# Patient Record
Sex: MALE | Race: WHITE | NOT HISPANIC OR LATINO | Employment: OTHER | URBAN - METROPOLITAN AREA
[De-identification: names, ages, dates, MRNs, and addresses within clinical notes are randomized per-mention and may not be internally consistent; named-entity substitution may affect disease eponyms.]

---

## 2018-04-23 ENCOUNTER — OFFICE VISIT CONVERTED (OUTPATIENT)
Dept: ORTHOPEDIC SURGERY | Facility: CLINIC | Age: 68
End: 2018-04-23
Attending: ORTHOPAEDIC SURGERY

## 2018-06-25 ENCOUNTER — OFFICE VISIT CONVERTED (OUTPATIENT)
Dept: ORTHOPEDIC SURGERY | Facility: CLINIC | Age: 68
End: 2018-06-25
Attending: PHYSICIAN ASSISTANT

## 2018-06-25 ENCOUNTER — CONVERSION ENCOUNTER (OUTPATIENT)
Dept: ORTHOPEDIC SURGERY | Facility: CLINIC | Age: 68
End: 2018-06-25

## 2019-06-04 ENCOUNTER — OFFICE VISIT (OUTPATIENT)
Dept: ENDOCRINOLOGY | Age: 69
End: 2019-06-04

## 2019-06-04 VITALS
HEIGHT: 72 IN | BODY MASS INDEX: 35.49 KG/M2 | SYSTOLIC BLOOD PRESSURE: 124 MMHG | WEIGHT: 262 LBS | DIASTOLIC BLOOD PRESSURE: 78 MMHG

## 2019-06-04 DIAGNOSIS — Z46.81 COUNSELING FOR INSULIN PUMP: ICD-10-CM

## 2019-06-04 DIAGNOSIS — Z46.81 INSULIN PUMP TITRATION: ICD-10-CM

## 2019-06-04 DIAGNOSIS — E78.2 MIXED HYPERLIPIDEMIA: ICD-10-CM

## 2019-06-04 DIAGNOSIS — I10 ESSENTIAL HYPERTENSION: ICD-10-CM

## 2019-06-04 DIAGNOSIS — E67.3 HYPERVITAMINOSIS D: ICD-10-CM

## 2019-06-04 DIAGNOSIS — IMO0002 DIABETES MELLITUS TYPE 2, UNCONTROLLED, WITH COMPLICATIONS: Primary | ICD-10-CM

## 2019-06-04 DIAGNOSIS — Z79.4 LONG-TERM INSULIN USE (HCC): ICD-10-CM

## 2019-06-04 PROCEDURE — 99214 OFFICE O/P EST MOD 30 MIN: CPT | Performed by: NURSE PRACTITIONER

## 2019-06-04 RX ORDER — METFORMIN HYDROCHLORIDE 500 MG/1
1000 TABLET, EXTENDED RELEASE ORAL 2 TIMES DAILY
Refills: 1 | COMMUNITY
Start: 2019-05-06 | End: 2022-03-31

## 2019-06-04 RX ORDER — CETIRIZINE HYDROCHLORIDE 10 MG/1
10 TABLET ORAL DAILY
COMMUNITY

## 2019-06-04 RX ORDER — VENLAFAXINE HYDROCHLORIDE 150 MG/1
150 CAPSULE, EXTENDED RELEASE ORAL DAILY
Refills: 1 | COMMUNITY
Start: 2019-04-06 | End: 2022-03-31

## 2019-06-04 RX ORDER — CARVEDILOL 12.5 MG/1
12.5 TABLET ORAL 2 TIMES DAILY
Refills: 1 | COMMUNITY
Start: 2019-04-06 | End: 2022-03-31

## 2019-06-04 RX ORDER — PERPHENAZINE 16 MG/1
1 TABLET, FILM COATED ORAL
Refills: 3 | COMMUNITY
Start: 2019-04-30 | End: 2019-07-16 | Stop reason: SDUPTHER

## 2019-06-04 RX ORDER — ASPIRIN 81 MG/1
81 TABLET, CHEWABLE ORAL DAILY
COMMUNITY
End: 2022-03-31

## 2019-06-04 RX ORDER — SIMVASTATIN 20 MG
20 TABLET ORAL DAILY
Refills: 1 | COMMUNITY
Start: 2019-04-06 | End: 2020-01-16 | Stop reason: SDUPTHER

## 2019-06-04 RX ORDER — AMLODIPINE BESYLATE AND BENAZEPRIL HYDROCHLORIDE 10; 20 MG/1; MG/1
1 CAPSULE ORAL DAILY
Refills: 1 | COMMUNITY
Start: 2019-05-06 | End: 2022-07-25

## 2019-06-04 NOTE — PROGRESS NOTES
Puma Baumann who presents for consult per the request of Provider, No Known  evaluation and treatment of Type 2 diabetes mellitus insulin dependent.      The initial diagnosis of diabetes was made 26 years ago. The condition of diabetes location is system with the course being clinical course has fluctuated , the severity is Moderate , and the modifying/allievating factors are  insulin pump. Insulin dosage review with Puma Baumann suggested compliance most of the time   .       The patient reports associated symptoms of hyperglycemia have been none and associated symptoms of hypoglycemia have been jitteriness and sweating, with their hypoglycemia threshold for symptoms is 80 mg/dl .       The patient is currently taking home blood tests - Blood glucose testin times daily, that are:  fasting- 1st thing in morning before eating or drinking  before each meal  bedtime  anytime you feel symptoms of hyperglycemia or hypoglycemia (high or low blood sugars)  Novolog U100 per medtronic 530g insulin pump. Changes pump site every 2.5 days.      Compliance with blood glucose monitoring: good.     Exercise:intermittently The patient is using carbohydrate counting, but is not on a specified limit, being a pump user.    Home blood glucose testing daily: 4  insulin pump upload   -Yes see upload from 2019, patient presently on paradigm revel 723    Patterns reported per patient are none.      The following portions of the patient's history were reviewed and updated as appropriate: current medications, past family history, past medical history, past social history, past surgical history and problem list.        Medical records, chart, and labs reviewed from primary care provider.    Past Surgical History:   Procedure Laterality Date   • KNEE SURGERY     • TRIGGER FINGER RELEASE         Family History   Problem Relation Age of Onset   • Diabetes Mother    • Diabetes Maternal Grandmother          Current Outpatient Medications:  "  •  amLODIPine-benazepril (LOTREL) 10-20 MG per capsule, Take 1 capsule by mouth Daily., Disp: , Rfl: 1  •  aspirin 81 MG chewable tablet, Chew 81 mg Daily., Disp: , Rfl:   •  carvedilol (COREG) 12.5 MG tablet, Take 12.5 mg by mouth 2 (Two) Times a Day., Disp: , Rfl: 1  •  cetirizine (zyrTEC) 10 MG tablet, Take 10 mg by mouth Daily., Disp: , Rfl:   •  CONTOUR NEXT TEST test strip, 1 each by Other route 5 (Five) Times a Day. test blood sugar 5 times daily, Disp: , Rfl: 3  •  lysine 500 MG tablet, Take  by mouth Daily., Disp: , Rfl:   •  metFORMIN ER (GLUCOPHAGE-XR) 500 MG 24 hr tablet, Take 1,000 mg by mouth 2 (Two) Times a Day., Disp: , Rfl: 1  •  Multiple Vitamins-Minerals (CENTRUM ADULTS PO), Take  by mouth., Disp: , Rfl:   •  Omega-3 Fatty Acids (OMEGA-3 CF PO), Take  by mouth., Disp: , Rfl:   •  simvastatin (ZOCOR) 20 MG tablet, Take 20 mg by mouth Daily., Disp: , Rfl: 1  •  venlafaxine XR (EFFEXOR-XR) 150 MG 24 hr capsule, Take 150 mg by mouth Daily., Disp: , Rfl: 1    Allergies   Allergen Reactions   • Codeine Nausea Only       Patient Active Problem List    Diagnosis   • Diabetes mellitus type 2, uncontrolled, with complications (CMS/[facility]) [E11.8, E11.65]   • Essential hypertension [I10]   • Mixed hyperlipidemia [E78.2]   • Long-term insulin use (CMS/[facility]) [Z79.4]   • Counseling for insulin pump [Z46.81]   • Insulin pump titration [Z46.81]   • Hypervitaminosis D  [E67.3]       Review of Systems  A comprehensive review of the 14 systems was negative except of listed below:  Endocrine: hyperglycemia      Objective:     Wt Readings from Last 3 Encounters:   06/04/19 119 kg (262 lb)     Temp Readings from Last 3 Encounters:   No data found for Temp     BP Readings from Last 3 Encounters:   06/04/19 124/78     Pulse Readings from Last 3 Encounters:   No data found for Pulse         /78   Ht 182.9 cm (72\")   Wt 119 kg (262 lb)   BMI 35.53 kg/m²     Physical Exam   Constitutional: He is oriented to person, " place, and time. He appears well-developed and well-nourished. No distress.   HENT:   Head: Normocephalic and atraumatic.   Eyes: EOM are normal. Pupils are equal, round, and reactive to light.   Neck: Normal range of motion. Neck supple. No thyromegaly present.   Cardiovascular: Normal rate, regular rhythm, normal heart sounds and intact distal pulses.   No murmur heard.  Pulmonary/Chest: Effort normal and breath sounds normal.   Abdominal: Soft. Bowel sounds are normal.   Musculoskeletal: Normal range of motion.   Neurological: He is alert and oriented to person, place, and time.   Skin: Skin is warm and dry. Capillary refill takes 2 to 3 seconds. He is not diaphoretic.   Psychiatric: He has a normal mood and affect. His behavior is normal. Judgment and thought content normal.   Nursing note and vitals reviewed.        Lab Review  No results found for this or any previous visit.      Assessment:   Puma was seen today for diabetes.    Diagnoses and all orders for this visit:    Diabetes mellitus type 2, uncontrolled, with complications (CMS/[facility])  -     Cortisol  -     Follicle Stimulating Hormone  -     Luteinizing Hormone  -     Testosterone  -     Testosterone, Free, Total  -     Comprehensive Metabolic Panel  -     C-Peptide  -     Hemoglobin A1c  -     Insulin, Total  -     Lipid Panel  -     Microalbumin / Creatinine Urine Ratio - Urine, Clean Catch  -     Uric Acid  -     Vitamin D 25 Hydroxy  -     TSH  -     T4, Free  -     Thyroid Antibodies  -     T3, Free  -     Fructosamine; Future  -     Comprehensive Metabolic Panel; Future  -     Hemoglobin A1c; Future  -     Lipid Panel; Future  -     Microalbumin / Creatinine Urine Ratio - Urine, Clean Catch; Future  -     Uric Acid; Future  -     Vitamin D 25 Hydroxy; Future  -     TSH; Future  -     T4, Free; Future  -     Thyroid Antibodies; Future  -     T3, Free; Future    Essential hypertension  -     Comprehensive Metabolic Panel  -     Comprehensive  Metabolic Panel; Future    Mixed hyperlipidemia  -     Comprehensive Metabolic Panel  -     Lipid Panel  -     Comprehensive Metabolic Panel; Future  -     Lipid Panel; Future    Long-term insulin use (CMS/HCC)  -     Comprehensive Metabolic Panel  -     Comprehensive Metabolic Panel; Future    Counseling for insulin pump  -     Comprehensive Metabolic Panel  -     Comprehensive Metabolic Panel; Future    Insulin pump titration  -     Comprehensive Metabolic Panel  -     Comprehensive Metabolic Panel; Future    Hypervitaminosis D   -     Vitamin D 25 Hydroxy  -     Comprehensive Metabolic Panel; Future  -     Vitamin D 25 Hydroxy; Future         Plan:    Summary/Medication changes:   Puma Baumann who presents for consult per the request of Provider, No Known  evaluation and treatment of Type 2 diabetes mellitus insulin dependent.  The initial diagnosis of diabetes was made 26 years ago with a general physical with PCP. The condition of diabetes  has fluctuated over the years by lab results, increase of insulin resistance and treatment options needed.  The present treatment is Medtronic Revel 723 insulin pump with oral medication metformin. The insulin dosage review with Puma Baumann suggested compliance most of the time.  Per wife at chair side in interview with patient questionable on whether patient is compliant with carbohydrate counting and inputting carbs into the pump as needed when he eats.  Blood glucose readings per evaluation of insulin pump shows that 81% of the time he is above target there is 0% below target average blood glucose is 223 mg/Magy plus or -60 mg/Magy. The patient reports associated symptoms of hyperglycemia have been none and associated symptoms of hypoglycemia have been jitteriness and sweating, with their hypoglycemia threshold for symptoms is 80 mg/dl . The   patient's history were reviewed and updated as appropriate: current medications, past family history, past medical history, past  social history, past surgical history and problem list.  Medical records, chart, and labs reviewed from primary care provider.  At this time additional lab work will be obtained and treat as indicated with the results.    1.  Diagnoses and all orders for this visit:    Diabetes mellitus type 2, uncontrolled, with complications (CMS/[facility])Long-term insulin use (CMS/[facility]), Counseling for insulin pump, and Insulin pump titration  Insulin pump changes today were as follows  Basal changes  12 AM- 3.5 units/h  4 AM-4.5 units/h  10 AM-3.5 units/h  Will change to you 200 and go back to the original settings at 12 AM- 3.1 units/h, 4 AM-4.1 units/h  10 AM-3.2 units/h    Carb ratio 1 unit for every 2.5 g    Insulin sensitivity 1 unit for 12 g    Target ranges to be changed to 100 320 mg/Magy    Insulin curve at 3 hours    Note: A Dexcom sensor will be ordered on this patient.  We will contact prior diabetologist NP for all lab work also contact [employer] for 30 days of supplies and lab work in their system.  Possible upgrade to Dexcom and tandem       Essential hypertension- chronic, stable no medication changes at this time. Refills prescribed. Future labs ordered for upcoming appointment and assessment.      Mixed hyperlipidemia- chronic, stable no medication changes at this time. Refills prescribed. Future labs ordered for upcoming appointment and assessment.     Additional instructions:   home blood tests -  Blood glucose testing: 3 times daily, that are:  fasting- 1st thing in morning before eating or drinking  before each meal and 1 or 2 hours after meal  bedtime  anytime you feel symptoms of hyperglycemia or hypoglycemia (high or low blood sugars)        Education:  interpretation of lab results, blood sugar goals, complications of diabetes mellitus, hypoglycemia prevention and treatment, exercise, illness management, self-monitoring of blood glucose skills, nutrition, carbohydrate counting and site rotation        Return  in about 3 months (around 9/4/2019), or if symptoms worsen or fail to improve, for Recheck. 3 months with Bettie-2 weeks prior for labs         Dragon transcription disclaimer     Much of this encounter note is an electronic transcription/translation of spoken language to printed text. The electronic translation of spoken language may permit erroneous, or at times, nonsensical words or phrases to be inadvertently transcribed. Although I have reviewed the note for such errors, some may still exist.

## 2019-06-05 PROBLEM — Z79.4 LONG-TERM INSULIN USE: Status: ACTIVE | Noted: 2019-06-05

## 2019-06-05 PROBLEM — I10 ESSENTIAL HYPERTENSION: Status: ACTIVE | Noted: 2019-06-05

## 2019-06-05 PROBLEM — Z46.81 INSULIN PUMP TITRATION: Status: ACTIVE | Noted: 2019-06-05

## 2019-06-05 PROBLEM — E78.2 MIXED HYPERLIPIDEMIA: Status: ACTIVE | Noted: 2019-06-05

## 2019-06-05 PROBLEM — IMO0002 DIABETES MELLITUS TYPE 2, UNCONTROLLED, WITH COMPLICATIONS: Status: ACTIVE | Noted: 2019-06-05

## 2019-06-05 PROBLEM — Z46.81 COUNSELING FOR INSULIN PUMP: Status: ACTIVE | Noted: 2019-06-05

## 2019-06-05 PROBLEM — E67.3 HYPERVITAMINOSIS D: Status: ACTIVE | Noted: 2019-06-05

## 2019-06-06 DIAGNOSIS — E78.2 MIXED HYPERLIPIDEMIA: Primary | ICD-10-CM

## 2019-06-06 LAB
25(OH)D3+25(OH)D2 SERPL-MCNC: 36 NG/ML (ref 30–100)
ALBUMIN SERPL-MCNC: 4.9 G/DL (ref 3.5–5.2)
ALBUMIN/CREAT UR: 11.6 MG/G CREAT (ref 0–30)
ALBUMIN/GLOB SERPL: 2.3 G/DL
ALP SERPL-CCNC: 97 U/L (ref 39–117)
ALT SERPL-CCNC: 16 U/L (ref 1–41)
AST SERPL-CCNC: 17 U/L (ref 1–40)
BILIRUB SERPL-MCNC: 0.7 MG/DL (ref 0.2–1.2)
BUN SERPL-MCNC: 14 MG/DL (ref 8–23)
BUN/CREAT SERPL: 16.7 (ref 7–25)
C PEPTIDE SERPL-MCNC: 0.9 NG/ML (ref 1.1–4.4)
CALCIUM SERPL-MCNC: 10.1 MG/DL (ref 8.6–10.5)
CHLORIDE SERPL-SCNC: 102 MMOL/L (ref 98–107)
CHOLEST SERPL-MCNC: 190 MG/DL (ref 0–200)
CO2 SERPL-SCNC: 25.8 MMOL/L (ref 22–29)
CORTIS SERPL-MCNC: 6.7 UG/DL
CREAT SERPL-MCNC: 0.84 MG/DL (ref 0.76–1.27)
CREAT UR-MCNC: 153.4 MG/DL
FSH SERPL-ACNC: 5.7 MIU/ML (ref 1.5–12.4)
GLOBULIN SER CALC-MCNC: 2.1 GM/DL
GLUCOSE SERPL-MCNC: 142 MG/DL (ref 65–99)
HBA1C MFR BLD: 7.6 % (ref 4.8–5.6)
HDLC SERPL-MCNC: 34 MG/DL (ref 40–60)
INSULIN SERPL-ACNC: 1.8 UIU/ML (ref 2.6–24.9)
INTERPRETATION: NORMAL
LDLC SERPL CALC-MCNC: 84 MG/DL (ref 0–100)
LH SERPL-ACNC: 5.7 MIU/ML (ref 1.7–8.6)
Lab: NORMAL
MICROALBUMIN UR-MCNC: 17.8 UG/ML
POTASSIUM SERPL-SCNC: 4.2 MMOL/L (ref 3.5–5.2)
PROT SERPL-MCNC: 7 G/DL (ref 6–8.5)
SODIUM SERPL-SCNC: 142 MMOL/L (ref 136–145)
T3FREE SERPL-MCNC: 2.9 PG/ML (ref 2–4.4)
T4 FREE SERPL-MCNC: 1.02 NG/DL (ref 0.93–1.7)
TESTOST FREE SERPL-MCNC: 4.6 PG/ML (ref 6.6–18.1)
TESTOST SERPL-MCNC: 301 NG/DL (ref 264–916)
THYROGLOB AB SERPL-ACNC: <1 IU/ML (ref 0–0.9)
THYROPEROXIDASE AB SERPL-ACNC: 11 IU/ML (ref 0–34)
TRIGL SERPL-MCNC: 362 MG/DL (ref 0–150)
TSH SERPL DL<=0.005 MIU/L-ACNC: 1.57 MIU/ML (ref 0.27–4.2)
URATE SERPL-MCNC: 5.2 MG/DL (ref 3.4–7)
VLDLC SERPL CALC-MCNC: 72.4 MG/DL

## 2019-06-06 RX ORDER — OMEGA-3-ACID ETHYL ESTERS 1 G/1
2 CAPSULE, LIQUID FILLED ORAL 2 TIMES DAILY
Qty: 180 CAPSULE | Refills: 1 | Status: SHIPPED | OUTPATIENT
Start: 2019-06-06 | End: 2019-07-16 | Stop reason: SDUPTHER

## 2019-06-26 ENCOUNTER — PRIOR AUTHORIZATION (OUTPATIENT)
Dept: ENDOCRINOLOGY | Age: 69
End: 2019-06-26

## 2019-07-16 DIAGNOSIS — E78.2 MIXED HYPERLIPIDEMIA: ICD-10-CM

## 2019-07-16 DIAGNOSIS — IMO0002 DIABETES MELLITUS TYPE 2, UNCONTROLLED, WITH COMPLICATIONS: Primary | ICD-10-CM

## 2019-07-16 RX ORDER — PERPHENAZINE 16 MG/1
1 TABLET, FILM COATED ORAL
Qty: 600 EACH | Refills: 4 | Status: SHIPPED | OUTPATIENT
Start: 2019-07-16 | End: 2019-07-17 | Stop reason: SDUPTHER

## 2019-07-16 RX ORDER — LANCETS 30 GAUGE
EACH MISCELLANEOUS
Qty: 600 EACH | Refills: 4 | Status: SHIPPED | OUTPATIENT
Start: 2019-07-16 | End: 2019-07-17 | Stop reason: SDUPTHER

## 2019-07-16 RX ORDER — OMEGA-3-ACID ETHYL ESTERS 1 G/1
2 CAPSULE, LIQUID FILLED ORAL 2 TIMES DAILY
Qty: 180 CAPSULE | Refills: 2 | Status: SHIPPED | OUTPATIENT
Start: 2019-07-16

## 2019-07-17 DIAGNOSIS — IMO0002 DIABETES MELLITUS TYPE 2, UNCONTROLLED, WITH COMPLICATIONS: ICD-10-CM

## 2019-07-17 RX ORDER — PERPHENAZINE 16 MG/1
TABLET, FILM COATED ORAL
Qty: 500 EACH | Refills: 1 | Status: SHIPPED | OUTPATIENT
Start: 2019-07-17 | End: 2019-09-26 | Stop reason: SDUPTHER

## 2019-07-17 RX ORDER — LANCETS 30 GAUGE
EACH MISCELLANEOUS
Qty: 500 EACH | Refills: 1 | Status: SHIPPED | OUTPATIENT
Start: 2019-07-17

## 2019-09-04 LAB
25(OH)D3+25(OH)D2 SERPL-MCNC: 37.9 NG/ML (ref 30–100)
ALBUMIN SERPL-MCNC: 4.8 G/DL (ref 3.5–5.2)
ALBUMIN/GLOB SERPL: 2.7 G/DL
ALP SERPL-CCNC: 85 U/L (ref 39–117)
ALT SERPL-CCNC: 16 U/L (ref 1–41)
AST SERPL-CCNC: 16 U/L (ref 1–40)
BILIRUB SERPL-MCNC: 0.8 MG/DL (ref 0.2–1.2)
BUN SERPL-MCNC: 12 MG/DL (ref 8–23)
BUN/CREAT SERPL: 13 (ref 7–25)
CALCIUM SERPL-MCNC: 9.3 MG/DL (ref 8.6–10.5)
CHLORIDE SERPL-SCNC: 102 MMOL/L (ref 98–107)
CHOLEST SERPL-MCNC: 159 MG/DL (ref 0–200)
CO2 SERPL-SCNC: 27.1 MMOL/L (ref 22–29)
CREAT SERPL-MCNC: 0.92 MG/DL (ref 0.76–1.27)
FRUCTOSAMINE SERPL-SCNC: 309 UMOL/L (ref 0–285)
GLOBULIN SER CALC-MCNC: 1.8 GM/DL
GLUCOSE SERPL-MCNC: 98 MG/DL (ref 65–99)
HBA1C MFR BLD: 6.8 % (ref 4.8–5.6)
HDLC SERPL-MCNC: 33 MG/DL (ref 40–60)
INTERPRETATION: NORMAL
LDLC SERPL CALC-MCNC: 81 MG/DL (ref 0–100)
Lab: NORMAL
POTASSIUM SERPL-SCNC: 3.8 MMOL/L (ref 3.5–5.2)
PROT SERPL-MCNC: 6.6 G/DL (ref 6–8.5)
SODIUM SERPL-SCNC: 143 MMOL/L (ref 136–145)
T3FREE SERPL-MCNC: 2.7 PG/ML (ref 2–4.4)
T4 FREE SERPL-MCNC: 1.11 NG/DL (ref 0.93–1.7)
THYROGLOB AB SERPL-ACNC: <1 IU/ML (ref 0–0.9)
THYROPEROXIDASE AB SERPL-ACNC: 9 IU/ML (ref 0–34)
TRIGL SERPL-MCNC: 226 MG/DL (ref 0–150)
TSH SERPL DL<=0.005 MIU/L-ACNC: 0.88 UIU/ML (ref 0.27–4.2)
UNABLE TO VOID: NORMAL
URATE SERPL-MCNC: 5.8 MG/DL (ref 3.4–7)
VLDLC SERPL CALC-MCNC: 45.2 MG/DL

## 2019-09-05 ENCOUNTER — RESULTS ENCOUNTER (OUTPATIENT)
Dept: ENDOCRINOLOGY | Age: 69
End: 2019-09-05

## 2019-09-05 DIAGNOSIS — Z79.4 LONG-TERM INSULIN USE (HCC): ICD-10-CM

## 2019-09-05 DIAGNOSIS — Z46.81 INSULIN PUMP TITRATION: ICD-10-CM

## 2019-09-05 DIAGNOSIS — E78.2 MIXED HYPERLIPIDEMIA: ICD-10-CM

## 2019-09-05 DIAGNOSIS — Z46.81 COUNSELING FOR INSULIN PUMP: ICD-10-CM

## 2019-09-05 DIAGNOSIS — E67.3 HYPERVITAMINOSIS D: ICD-10-CM

## 2019-09-05 DIAGNOSIS — I10 ESSENTIAL HYPERTENSION: ICD-10-CM

## 2019-09-05 DIAGNOSIS — IMO0002 DIABETES MELLITUS TYPE 2, UNCONTROLLED, WITH COMPLICATIONS: ICD-10-CM

## 2019-09-11 ENCOUNTER — TELEPHONE (OUTPATIENT)
Dept: ENDOCRINOLOGY | Age: 69
End: 2019-09-11

## 2019-09-11 NOTE — TELEPHONE ENCOUNTER
Jozef with [facility] ph. [phone], opt 2, fax [phone] asking to confirm that our office received  their request on 9-6-19 for chart notes and C-peptide and glucose lab results.

## 2019-09-11 NOTE — TELEPHONE ENCOUNTER
humalog    [facility] pharmacy sent over request stating they need script needs to state it is under part b so the medication is covered

## 2019-09-16 ENCOUNTER — TELEPHONE (OUTPATIENT)
Dept: ENDOCRINOLOGY | Age: 69
End: 2019-09-16

## 2019-09-16 NOTE — TELEPHONE ENCOUNTER
Patient states [employer] is requesting information about his labs to receive his supplies    [employer] has requested a few times    Patient wants to know if you are receiving the fax    Patient is requesting a return call  [phone]

## 2019-09-16 NOTE — TELEPHONE ENCOUNTER
Spoke with Tre at [email or web address]. He states that he could not locate the patient's chart notes and labs needed to process the order that was sent on 9/11/19. He then asked me to refax the paperwork to [phone]. I sent the fax to the above number as requested.

## 2019-09-17 DIAGNOSIS — Z79.4 LONG-TERM INSULIN USE (HCC): ICD-10-CM

## 2019-09-17 DIAGNOSIS — IMO0002 DIABETES MELLITUS TYPE 2, UNCONTROLLED, WITH COMPLICATIONS: Primary | ICD-10-CM

## 2019-09-18 ENCOUNTER — RESULTS ENCOUNTER (OUTPATIENT)
Dept: ENDOCRINOLOGY | Age: 69
End: 2019-09-18

## 2019-09-18 DIAGNOSIS — Z79.4 LONG-TERM INSULIN USE (HCC): ICD-10-CM

## 2019-09-18 DIAGNOSIS — IMO0002 DIABETES MELLITUS TYPE 2, UNCONTROLLED, WITH COMPLICATIONS: ICD-10-CM

## 2019-09-26 ENCOUNTER — OFFICE VISIT (OUTPATIENT)
Dept: ENDOCRINOLOGY | Age: 69
End: 2019-09-26

## 2019-09-26 VITALS
HEIGHT: 72 IN | BODY MASS INDEX: 36.16 KG/M2 | WEIGHT: 267 LBS | DIASTOLIC BLOOD PRESSURE: 74 MMHG | SYSTOLIC BLOOD PRESSURE: 132 MMHG

## 2019-09-26 DIAGNOSIS — IMO0002 DIABETES MELLITUS TYPE 2, UNCONTROLLED, WITH COMPLICATIONS: Primary | ICD-10-CM

## 2019-09-26 DIAGNOSIS — Z46.81 INSULIN PUMP TITRATION: ICD-10-CM

## 2019-09-26 DIAGNOSIS — E67.3 HYPERVITAMINOSIS D: ICD-10-CM

## 2019-09-26 DIAGNOSIS — E78.2 MIXED HYPERLIPIDEMIA: ICD-10-CM

## 2019-09-26 DIAGNOSIS — I10 ESSENTIAL HYPERTENSION: ICD-10-CM

## 2019-09-26 DIAGNOSIS — Z79.4 LONG-TERM INSULIN USE (HCC): ICD-10-CM

## 2019-09-26 DIAGNOSIS — Z46.81 COUNSELING FOR INSULIN PUMP: ICD-10-CM

## 2019-09-26 PROCEDURE — 99214 OFFICE O/P EST MOD 30 MIN: CPT | Performed by: NURSE PRACTITIONER

## 2019-09-26 RX ORDER — PERPHENAZINE 16 MG/1
TABLET, FILM COATED ORAL
Qty: 500 EACH | Refills: 1 | Status: SHIPPED | OUTPATIENT
Start: 2019-09-26 | End: 2019-11-04

## 2019-10-03 ENCOUNTER — TELEPHONE (OUTPATIENT)
Dept: ENDOCRINOLOGY | Age: 69
End: 2019-10-03

## 2019-10-03 NOTE — TELEPHONE ENCOUNTER
[facility] called looking for chart notes    I dont believe the patient was seen in 2017  They do need the last two chart notes    Also needs the cpeptide and the glucose faxed to them      Patient is out of the pump supplies and have been trying to get them

## 2019-10-03 NOTE — TELEPHONE ENCOUNTER
Insulin pump     Request for chart notes  Nov 2017 is needed and the last two previous   cpeptide and glucose on the 30th was requested    Needs the prescripton for the pump [facility]

## 2019-10-21 ENCOUNTER — TELEPHONE (OUTPATIENT)
Dept: ENDOCRINOLOGY | Age: 69
End: 2019-10-21

## 2019-10-21 NOTE — TELEPHONE ENCOUNTER
PT WANTS A SOONER LETICIA WITH [facility] PT SAID SHE WAS SWITCHING HIS INSULIN 500 UNITS PT SAID SHE NEEDS TO CHANGE INSULIN IN HIS PUMP.

## 2019-10-22 NOTE — TELEPHONE ENCOUNTER
Gave info to Margoth Gregg with [employer] to schedule an appointment with patient for pump consult.

## 2019-11-04 RX ORDER — PERPHENAZINE 16 MG/1
TABLET, FILM COATED ORAL
Qty: 200 EACH | Refills: 5 | Status: SHIPPED | OUTPATIENT
Start: 2019-11-04 | End: 2019-11-14 | Stop reason: SDUPTHER

## 2019-11-12 ENCOUNTER — TELEPHONE (OUTPATIENT)
Dept: ENDOCRINOLOGY | Age: 69
End: 2019-11-12

## 2019-11-12 NOTE — TELEPHONE ENCOUNTER
Wife Irma [phone] said patient is getting close to being out of Contour Next test strips, script is for 6 xday. Patient is testing 4xday.   [facility] sent fax this morning with what is needed from Medicare to get filled.

## 2019-11-14 RX ORDER — PERPHENAZINE 16 MG/1
TABLET, FILM COATED ORAL
Qty: 200 EACH | Refills: 5 | Status: SHIPPED | OUTPATIENT
Start: 2019-11-14

## 2019-11-14 RX ORDER — PERPHENAZINE 16 MG/1
TABLET, FILM COATED ORAL
Qty: 200 EACH | Refills: 5 | Status: CANCELLED | OUTPATIENT
Start: 2019-11-14

## 2019-11-14 NOTE — TELEPHONE ENCOUNTER
Spoke with [facility] pharmacy. They informed me that patient switched prescriptions to [facility].     LM for patient to call back.

## 2020-01-03 ENCOUNTER — RESULTS ENCOUNTER (OUTPATIENT)
Dept: ENDOCRINOLOGY | Age: 70
End: 2020-01-03

## 2020-01-03 DIAGNOSIS — I10 ESSENTIAL HYPERTENSION: ICD-10-CM

## 2020-01-03 DIAGNOSIS — Z79.4 LONG-TERM INSULIN USE (HCC): ICD-10-CM

## 2020-01-03 DIAGNOSIS — Z46.81 COUNSELING FOR INSULIN PUMP: ICD-10-CM

## 2020-01-03 DIAGNOSIS — E67.3 HYPERVITAMINOSIS D: ICD-10-CM

## 2020-01-03 DIAGNOSIS — IMO0002 DIABETES MELLITUS TYPE 2, UNCONTROLLED, WITH COMPLICATIONS: ICD-10-CM

## 2020-01-03 DIAGNOSIS — Z46.81 INSULIN PUMP TITRATION: ICD-10-CM

## 2020-01-03 DIAGNOSIS — E78.2 MIXED HYPERLIPIDEMIA: ICD-10-CM

## 2020-01-07 ENCOUNTER — TELEPHONE (OUTPATIENT)
Dept: ENDOCRINOLOGY | Age: 70
End: 2020-01-07

## 2020-01-08 ENCOUNTER — TELEPHONE (OUTPATIENT)
Dept: ENDOCRINOLOGY | Age: 70
End: 2020-01-08

## 2020-01-08 NOTE — TELEPHONE ENCOUNTER
This patient has seen Btetie once. No other provider yet in the office. Could you ask Dr. Kaufman to review to possibly take on as a patient?

## 2020-01-08 NOTE — TELEPHONE ENCOUNTER
Pastor is a karime pt and needing to r/s with a diff doc either yazmin or evan pt said doesn't matter just needs to be seen.       [phone]

## 2020-01-09 ENCOUNTER — TELEPHONE (OUTPATIENT)
Dept: ENDOCRINOLOGY | Age: 70
End: 2020-01-09

## 2020-01-09 NOTE — TELEPHONE ENCOUNTER
Pre Dr Kaufman At this time we are waiting on the POC meeting to make a decision on this.  At this time I am not accepting any patients as follow-ups.

## 2020-01-09 NOTE — TELEPHONE ENCOUNTER
At this time we are waiting on the POC meeting to make a decision on this.  At this time I am not accepting any patients as follow-ups.

## 2020-01-16 ENCOUNTER — TELEPHONE (OUTPATIENT)
Dept: ENDOCRINOLOGY | Age: 70
End: 2020-01-16

## 2020-01-16 DIAGNOSIS — IMO0002 DIABETES MELLITUS TYPE 2, UNCONTROLLED, WITH COMPLICATIONS: ICD-10-CM

## 2020-01-16 DIAGNOSIS — E78.2 MIXED HYPERLIPIDEMIA: Primary | ICD-10-CM

## 2020-01-16 DIAGNOSIS — E67.3 HYPERVITAMINOSIS D: ICD-10-CM

## 2020-01-16 DIAGNOSIS — E78.2 MIXED HYPERLIPIDEMIA: ICD-10-CM

## 2020-01-16 RX ORDER — SIMVASTATIN 20 MG
20 TABLET ORAL DAILY
Qty: 90 TABLET | Refills: 3 | Status: SHIPPED | OUTPATIENT
Start: 2020-01-16 | End: 2022-03-31

## 2020-01-16 NOTE — TELEPHONE ENCOUNTER
Patient's wife called stating that the patient has been seeing Bettie but now has no endocrinologist. He will be needing a provider going forward and is about out of medication and test strips. I need someone to take over care. Thanks

## 2020-01-16 NOTE — TELEPHONE ENCOUNTER
Pt is schedule with evan march 12th at 9:30 am pt stated he has labs to but will go else where to do those if you could please put the orders in and send to [facility].  [address], [place] [id number] [phone]     Spontaneous, unlabored and symmetrical

## 2020-10-26 ENCOUNTER — OUTSIDE FACILITY SERVICE (OUTPATIENT)
Dept: SLEEP MEDICINE | Facility: HOSPITAL | Age: 70
End: 2020-10-26

## 2020-10-26 ENCOUNTER — HOSPITAL ENCOUNTER (OUTPATIENT)
Dept: SLEEP MEDICINE | Facility: HOSPITAL | Age: 70
Discharge: HOME | End: 2020-10-26
Attending: INTERNAL MEDICINE

## 2020-10-26 PROCEDURE — 99204 OFFICE O/P NEW MOD 45 MIN: CPT | Performed by: INTERNAL MEDICINE

## 2020-12-01 ENCOUNTER — HOSPITAL ENCOUNTER (OUTPATIENT)
Dept: LAB | Facility: HOSPITAL | Age: 70
Discharge: HOME | End: 2020-12-01
Attending: INTERNAL MEDICINE

## 2020-12-01 LAB
ANION GAP SERPL CALC-SCNC: 23 MMOL/L (ref 8–19)
BUN SERPL-MCNC: 16 MG/DL (ref 5–25)
BUN/CREAT SERPL: 14 {RATIO} (ref 6–20)
CALCIUM SERPL-MCNC: 9.8 MG/DL (ref 8.7–10.4)
CHLORIDE SERPL-SCNC: 103 MMOL/L (ref 99–111)
CONV CO2: 21 MMOL/L (ref 22–32)
CREAT UR-MCNC: 1.11 MG/DL (ref 0.7–1.2)
GFR SERPLBLD BASED ON 1.73 SQ M-ARVRAT: >60 ML/MIN/{1.73_M2}
GLUCOSE SERPL-MCNC: 178 MG/DL (ref 70–99)
OSMOLALITY SERPL CALC.SUM OF ELEC: 302 MOSM/KG (ref 273–304)
POTASSIUM SERPL-SCNC: 3.9 MMOL/L (ref 3.5–5.3)
SODIUM SERPL-SCNC: 143 MMOL/L (ref 135–147)

## 2021-01-13 ENCOUNTER — HOSPITAL ENCOUNTER (OUTPATIENT)
Dept: URGENT CARE | Facility: CLINIC | Age: 71
Discharge: HOME | End: 2021-01-13
Attending: EMERGENCY MEDICINE

## 2021-01-15 LAB — SARS-COV-2 RNA SPEC QL NAA+PROBE: DETECTED

## 2021-02-19 ENCOUNTER — HOSPITAL ENCOUNTER (OUTPATIENT)
Dept: LAB | Facility: HOSPITAL | Age: 71
Discharge: HOME | End: 2021-02-19
Attending: INTERNAL MEDICINE

## 2021-02-19 LAB
ALBUMIN SERPL-MCNC: 4.5 G/DL (ref 3.5–5)
ALBUMIN/GLOB SERPL: 1.7 {RATIO} (ref 1.4–2.6)
ALP SERPL-CCNC: 101 U/L (ref 56–155)
ALT SERPL-CCNC: 34 U/L (ref 10–40)
ANION GAP SERPL CALC-SCNC: 14 MMOL/L (ref 8–19)
AST SERPL-CCNC: 25 U/L (ref 15–50)
BILIRUB SERPL-MCNC: 0.65 MG/DL (ref 0.2–1.3)
BUN SERPL-MCNC: 13 MG/DL (ref 5–25)
BUN/CREAT SERPL: 14 {RATIO} (ref 6–20)
CALCIUM SERPL-MCNC: 9.2 MG/DL (ref 8.7–10.4)
CHLORIDE SERPL-SCNC: 103 MMOL/L (ref 99–111)
CHOLEST SERPL-MCNC: 139 MG/DL (ref 107–200)
CHOLEST/HDLC SERPL: 4.2 {RATIO} (ref 3–6)
CONV CO2: 27 MMOL/L (ref 22–32)
CONV TOTAL PROTEIN: 7.1 G/DL (ref 6.3–8.2)
CREAT UR-MCNC: 0.95 MG/DL (ref 0.7–1.2)
EST. AVERAGE GLUCOSE BLD GHB EST-MCNC: 148 MG/DL
GFR SERPLBLD BASED ON 1.73 SQ M-ARVRAT: >60 ML/MIN/{1.73_M2}
GLOBULIN UR ELPH-MCNC: 2.6 G/DL (ref 2–3.5)
GLUCOSE SERPL-MCNC: 140 MG/DL (ref 70–99)
HBA1C MFR BLD: 6.8 % (ref 3.5–5.7)
HDLC SERPL-MCNC: 33 MG/DL (ref 40–60)
LDLC SERPL CALC-MCNC: 63 MG/DL (ref 70–100)
OSMOLALITY SERPL CALC.SUM OF ELEC: 292 MOSM/KG (ref 273–304)
POTASSIUM SERPL-SCNC: 4 MMOL/L (ref 3.5–5.3)
SODIUM SERPL-SCNC: 140 MMOL/L (ref 135–147)
TRIGL SERPL-MCNC: 215 MG/DL (ref 40–150)
VLDLC SERPL-MCNC: 43 MG/DL (ref 5–37)

## 2021-03-08 ENCOUNTER — HOSPITAL ENCOUNTER (OUTPATIENT)
Dept: VACCINE CLINIC | Facility: HOSPITAL | Age: 71
Discharge: HOME | End: 2021-03-08
Attending: INTERNAL MEDICINE

## 2021-03-29 ENCOUNTER — HOSPITAL ENCOUNTER (OUTPATIENT)
Dept: VACCINE CLINIC | Facility: HOSPITAL | Age: 71
Discharge: HOME | End: 2021-03-29
Attending: INTERNAL MEDICINE

## 2021-05-16 VITALS — BODY MASS INDEX: 35.21 KG/M2 | OXYGEN SATURATION: 98 % | WEIGHT: 260 LBS | HEART RATE: 63 BPM | HEIGHT: 72 IN

## 2021-05-16 VITALS — WEIGHT: 260 LBS | HEIGHT: 72 IN | OXYGEN SATURATION: 98 % | HEART RATE: 77 BPM | BODY MASS INDEX: 35.21 KG/M2

## 2021-06-01 ENCOUNTER — HOSPITAL ENCOUNTER (OUTPATIENT)
Dept: LAB | Facility: HOSPITAL | Age: 71
Discharge: HOME | End: 2021-06-01
Attending: INTERNAL MEDICINE

## 2021-06-01 LAB
ALBUMIN SERPL-MCNC: 4.6 G/DL (ref 3.5–5)
ALBUMIN/GLOB SERPL: 2 {RATIO} (ref 1.4–2.6)
ALP SERPL-CCNC: 97 U/L (ref 56–155)
ALT SERPL-CCNC: 25 U/L (ref 10–40)
ANION GAP SERPL CALC-SCNC: 13 MMOL/L (ref 8–19)
AST SERPL-CCNC: 21 U/L (ref 15–50)
BILIRUB SERPL-MCNC: 0.64 MG/DL (ref 0.2–1.3)
BUN SERPL-MCNC: 16 MG/DL (ref 5–25)
BUN/CREAT SERPL: 17 {RATIO} (ref 6–20)
CALCIUM SERPL-MCNC: 8.8 MG/DL (ref 8.7–10.4)
CHLORIDE SERPL-SCNC: 103 MMOL/L (ref 99–111)
CHOLEST SERPL-MCNC: 130 MG/DL (ref 107–200)
CHOLEST/HDLC SERPL: 3.5 {RATIO} (ref 3–6)
CONV CO2: 29 MMOL/L (ref 22–32)
CONV TOTAL PROTEIN: 6.9 G/DL (ref 6.3–8.2)
CREAT UR-MCNC: 0.94 MG/DL (ref 0.7–1.2)
EST. AVERAGE GLUCOSE BLD GHB EST-MCNC: 160 MG/DL
GFR SERPLBLD BASED ON 1.73 SQ M-ARVRAT: >60 ML/MIN/{1.73_M2}
GLOBULIN UR ELPH-MCNC: 2.3 G/DL (ref 2–3.5)
GLUCOSE SERPL-MCNC: 197 MG/DL (ref 70–99)
HBA1C MFR BLD: 7.2 % (ref 3.5–5.7)
HDLC SERPL-MCNC: 37 MG/DL (ref 40–60)
LDLC SERPL CALC-MCNC: 63 MG/DL (ref 70–100)
OSMOLALITY SERPL CALC.SUM OF ELEC: 299 MOSM/KG (ref 273–304)
POTASSIUM SERPL-SCNC: 3.8 MMOL/L (ref 3.5–5.3)
SODIUM SERPL-SCNC: 141 MMOL/L (ref 135–147)
T4 FREE SERPL-MCNC: 1.1 NG/DL (ref 0.9–1.8)
TRIGL SERPL-MCNC: 149 MG/DL (ref 40–150)
TSH SERPL-ACNC: 0.94 M[IU]/L (ref 0.27–4.2)
VLDLC SERPL-MCNC: 30 MG/DL (ref 5–37)

## 2021-08-30 ENCOUNTER — TRANSCRIBE ORDERS (OUTPATIENT)
Dept: LAB | Facility: HOSPITAL | Age: 71
End: 2021-08-30

## 2021-08-30 ENCOUNTER — LAB (OUTPATIENT)
Dept: LAB | Facility: HOSPITAL | Age: 71
End: 2021-08-30

## 2021-08-30 DIAGNOSIS — E11.9 DIABETES MELLITUS WITHOUT COMPLICATION (HCC): Primary | ICD-10-CM

## 2021-08-30 DIAGNOSIS — E11.9 DIABETES MELLITUS WITHOUT COMPLICATION (HCC): ICD-10-CM

## 2021-08-30 DIAGNOSIS — E11.649 DIABETIC HYPOGLYCEMIA (HCC): ICD-10-CM

## 2021-08-30 DIAGNOSIS — I10 ESSENTIAL HYPERTENSION, MALIGNANT: ICD-10-CM

## 2021-08-30 LAB — HBA1C MFR BLD: 7.22 % (ref 4.8–5.6)

## 2021-08-30 PROCEDURE — 84443 ASSAY THYROID STIM HORMONE: CPT

## 2021-08-30 PROCEDURE — 84439 ASSAY OF FREE THYROXINE: CPT

## 2021-08-30 PROCEDURE — 83036 HEMOGLOBIN GLYCOSYLATED A1C: CPT

## 2021-08-30 PROCEDURE — 80053 COMPREHEN METABOLIC PANEL: CPT

## 2021-08-30 PROCEDURE — 36415 COLL VENOUS BLD VENIPUNCTURE: CPT

## 2021-08-31 LAB
ALBUMIN SERPL-MCNC: 4.6 G/DL (ref 3.5–5.2)
ALBUMIN/GLOB SERPL: 2 G/DL
ALP SERPL-CCNC: 110 U/L (ref 39–117)
ALT SERPL W P-5'-P-CCNC: 30 U/L (ref 1–41)
ANION GAP SERPL CALCULATED.3IONS-SCNC: 11.8 MMOL/L (ref 5–15)
AST SERPL-CCNC: 21 U/L (ref 1–40)
BILIRUB SERPL-MCNC: 0.6 MG/DL (ref 0–1.2)
BUN SERPL-MCNC: 12 MG/DL (ref 8–23)
BUN/CREAT SERPL: 14 (ref 7–25)
CALCIUM SPEC-SCNC: 9.2 MG/DL (ref 8.6–10.5)
CHLORIDE SERPL-SCNC: 103 MMOL/L (ref 98–107)
CO2 SERPL-SCNC: 26.2 MMOL/L (ref 22–29)
CREAT SERPL-MCNC: 0.86 MG/DL (ref 0.76–1.27)
GFR SERPL CREATININE-BSD FRML MDRD: 88 ML/MIN/1.73
GLOBULIN UR ELPH-MCNC: 2.3 GM/DL
GLUCOSE SERPL-MCNC: 264 MG/DL (ref 65–99)
POTASSIUM SERPL-SCNC: 3.9 MMOL/L (ref 3.5–5.2)
PROT SERPL-MCNC: 6.9 G/DL (ref 6–8.5)
SODIUM SERPL-SCNC: 141 MMOL/L (ref 136–145)
T4 FREE SERPL-MCNC: 1.02 NG/DL (ref 0.93–1.7)
TSH SERPL DL<=0.05 MIU/L-ACNC: 0.89 UIU/ML (ref 0.27–4.2)

## 2021-11-01 ENCOUNTER — OFFICE VISIT (OUTPATIENT)
Dept: SLEEP MEDICINE | Facility: HOSPITAL | Age: 71
End: 2021-11-01

## 2021-11-01 VITALS
HEART RATE: 64 BPM | BODY MASS INDEX: 39.42 KG/M2 | HEIGHT: 72 IN | WEIGHT: 291 LBS | OXYGEN SATURATION: 97 % | TEMPERATURE: 97 F | SYSTOLIC BLOOD PRESSURE: 167 MMHG | DIASTOLIC BLOOD PRESSURE: 78 MMHG

## 2021-11-01 DIAGNOSIS — E66.9 CLASS 2 OBESITY: ICD-10-CM

## 2021-11-01 DIAGNOSIS — G47.33 OSA ON CPAP: Primary | ICD-10-CM

## 2021-11-01 DIAGNOSIS — Z99.89 OSA ON CPAP: Primary | ICD-10-CM

## 2021-11-01 PROBLEM — E66.812 CLASS 2 OBESITY: Status: ACTIVE | Noted: 2021-11-01

## 2021-11-01 PROCEDURE — G0463 HOSPITAL OUTPT CLINIC VISIT: HCPCS | Performed by: INTERNAL MEDICINE

## 2021-11-01 PROCEDURE — 99213 OFFICE O/P EST LOW 20 MIN: CPT | Performed by: INTERNAL MEDICINE

## 2021-11-01 RX ORDER — ASPIRIN 81 MG/1
TABLET ORAL
COMMUNITY

## 2021-11-01 RX ORDER — PANTOPRAZOLE SODIUM 40 MG/1
TABLET, DELAYED RELEASE ORAL
COMMUNITY
Start: 2021-10-29 | End: 2022-11-07

## 2021-11-01 RX ORDER — VENLAFAXINE 75 MG/1
TABLET ORAL
COMMUNITY
Start: 2021-09-03 | End: 2022-03-31

## 2021-11-01 RX ORDER — ATORVASTATIN CALCIUM 40 MG/1
1 TABLET, FILM COATED ORAL NIGHTLY
COMMUNITY
Start: 2021-11-01 | End: 2022-03-31

## 2021-11-01 RX ORDER — VENLAFAXINE HYDROCHLORIDE 150 MG/1
CAPSULE, EXTENDED RELEASE ORAL
COMMUNITY
Start: 2021-06-03

## 2021-11-01 RX ORDER — CARVEDILOL 25 MG/1
TABLET ORAL
COMMUNITY
Start: 2021-10-29

## 2021-11-01 RX ORDER — AMLODIPINE BESYLATE AND BENAZEPRIL HYDROCHLORIDE 10; 40 MG/1; MG/1
1 CAPSULE ORAL DAILY
COMMUNITY
Start: 2021-09-03 | End: 2022-03-31

## 2021-11-01 RX ORDER — DULAGLUTIDE 0.75 MG/.5ML
INJECTION, SOLUTION SUBCUTANEOUS
COMMUNITY
End: 2022-03-31

## 2021-11-01 NOTE — PROGRESS NOTES
"  [address] [address]  Phone: [phone]  Fax: [phone]      SLEEP CLINIC FOLLOW UP PROGRESS NOTE.    Pastor Bartholomew  1950  70 y.o.  male      PCP: Provider, No Known      Date of visit: 11/1/2021    Chief Complaint   Patient presents with   • Sleep Apnea   • Obesity       HPI:  This is a 70 y.o. years old patient who has a history of obstructive sleep apnea is here for  the annual compliance follow-up.  Sleep apnea is mild in severity with a AHI of 10.5/hr. Patient is using positive airway pressure therapy with auto CPAP and the symptoms of snoring, non-restorative sleep and daytime excessive sleepiness have improved significantly on the therapy. Normally goes to bed at 9:30 PM and wakes up at 8 AM.  The patient wakes up 3 time(s) during the night and has no problem going back to sleep.  Feels refreshed after waking up.  Patient also denies headaches and nasal congestion.   Patient also has diabetes and normally sees his endocrinologist at [facility].    Medications and allergies are reviewed by me and documented in the encounter.     SOCIAL ( habits pertaining to sleep medicine)  History tobacco use:No   History of alcohol use: 0 per week  Caffeine use: 2     REVIEW OF SYSTEMS:   [place] Sleepiness Scale :Total score: 13   Nasal congestion:Yes   Dry mouth/nose:No   Post nasal drip; No   Acid reflux/Heartburn:No   Abd bloating:No   Morning headache:No   Anxiety:Yes   Depression:Yes    PHYSICAL EXAMINATION:  CONSTITUTIONAL:  Vitals:    11/01/21 0900   BP: 167/78   Pulse: 64   Temp: 97 °F (36.1 °C)   SpO2: 97%   Weight: 132 kg (291 lb)   Height: 182.9 cm (72\")    Body mass index is 39.47 kg/m².   NOSE: nasal passages are clear, no nasal polyps, septum in the midline.  THROAT: throat is clear, oral airway Mallampati class 3  RESP SYSTEM: Breath sounds are normal, no wheezes or crackles  CARDIOVASULAR: Heart rate is regular without " murmur. No edema      Data reviewed:  The Smart card downloaded on 11/1/2021 has been reviewed independently by me for compliance and discussed the data with the patient.   Compliance; 100%  More than 4 hr use, 96%  Average use of the device 8 hours and 26 per night  Residual AHI: 3.2 /hr (goal < 5.0 /hr)  Mask type: Nasal mask  DME: [facility]        ASSESSMENT AND PLAN:  · Obstructive sleep apnea ( G 47.33).  The symptoms of sleep apnea have improved with the device and the treatment.  Patient's compliance with the device is excellent for treatment of sleep apnea.  I have independently reviewed the smart card down load and discussed with the patient the download data and encouarged the patient to continue to use the device.The residual AHI is acceptable. The device is benefiting the patient and the device is medically necessary.  Without proper control of sleep apnea and good compliance there is a increased risk for hypertension, diabetes mellitus and nonrestorative sleep with hypersomnia which can increase risk for motor vehicle accidents.  Untreated sleep apnea is also a risk factor for development of atrial fibrillation, pulmonary hypertension and stroke. The patient is also instructed to get the supplies from the [employer] company and and change them on a regular basis.  A prescription for supplies has been sent to the [employer] company.  I have also discussed the good sleep hygiene habits and adequate amount of sleep needed for good health.  · Obesity, class 2 with BMI is Body mass index is 39.47 kg/m².. I have discuss the relationship between the weight and sleep apnea. The benefit of weight loss in reducing severity of sleep apnea was discussed. Discussed diet and exercise with the patient to achieve ideal BMI.,   · Return in about 1 year (around 11/1/2022) for Annual visit with smartcard download. . Patient's questions were answered.        Bebeto Vazquez MD  Sleep Medicine.  Medical Director, [facility]  and [facility]  11/1/2021 ,

## 2021-11-23 ENCOUNTER — OFFICE VISIT (OUTPATIENT)
Dept: SURGERY | Facility: CLINIC | Age: 71
End: 2021-11-23

## 2021-11-23 VITALS — HEIGHT: 72 IN | WEIGHT: 286.4 LBS | BODY MASS INDEX: 38.79 KG/M2 | RESPIRATION RATE: 16 BRPM

## 2021-11-23 DIAGNOSIS — R10.31 RIGHT GROIN PAIN: Primary | ICD-10-CM

## 2021-11-23 PROCEDURE — 99202 OFFICE O/P NEW SF 15 MIN: CPT | Performed by: SURGERY

## 2021-11-23 RX ORDER — OMEGA-3/DHA/EPA/FISH OIL 60 MG-90MG
CAPSULE ORAL
COMMUNITY
End: 2022-03-31

## 2021-11-23 RX ORDER — MULTIPLE VITAMINS W/ MINERALS TAB 9MG-400MCG
TAB ORAL
COMMUNITY
End: 2022-03-31

## 2021-11-23 RX ORDER — OMEGA-3S/DHA/EPA/FISH OIL/D3 300MG-1000
CAPSULE ORAL DAILY
COMMUNITY

## 2021-11-23 RX ORDER — CHLORAL HYDRATE 500 MG
2 CAPSULE ORAL
COMMUNITY

## 2021-11-23 RX ORDER — OXYMETAZOLINE HYDROCHLORIDE 0.05 G/100ML
SPRAY NASAL
COMMUNITY
End: 2022-03-31

## 2021-11-23 RX ORDER — LYSINE 500 MG
TABLET ORAL
COMMUNITY

## 2021-11-23 RX ORDER — MULTIPLE VITAMINS W/ MINERALS TAB 9MG-400MCG
TAB ORAL
COMMUNITY

## 2021-11-23 RX ORDER — AMLODIPINE BESYLATE AND BENAZEPRIL HYDROCHLORIDE 5; 10 MG/1; MG/1
CAPSULE ORAL
COMMUNITY
End: 2022-03-31

## 2021-11-23 RX ORDER — CARVEDILOL 12.5 MG/1
TABLET ORAL
COMMUNITY
End: 2022-03-31

## 2021-11-23 RX ORDER — LORATADINE 10 MG/1
TABLET ORAL
COMMUNITY
End: 2022-03-31

## 2021-11-23 RX ORDER — SIMVASTATIN 20 MG
TABLET ORAL
COMMUNITY
End: 2022-03-31

## 2021-11-23 NOTE — PROGRESS NOTES
Chief Complaint:  Hernia        Primary Care Provider: Jaqui Lipscomb APRN    Referring Provider: Referring, Self    History of Present Illness  Pastor Bartholomew is a 70 y.o. male for referral for evaluation for a possible hernia.  Patient has a history of open left inguinal hernia repair by Dr. Goncalves.  Patient was getting up on his tractor recently and felt a sharp pain at his right groin and says his right groin feels like his left groin did when he had a hernia on the left side and he is worried he has a hernia on the right side now.  No noticeable bulge.  Pain is not getting worse.    Allergies: Codeine    Outpatient Medications Marked as Taking for the 11/23/21 encounter (Office Visit) with Rick Gauthier MD   Medication Sig Dispense Refill   • amLODIPine-benazepril (LOTREL) 10-40 MG per capsule Take 1 capsule by mouth Daily.     • aspirin (aspirin) 81 MG EC tablet Aspir-81 81 mg oral tablet,delayed release (DR/EC) take 1 tablet (81 mg) by oral route once daily   Active     • atorvastatin (LIPITOR) 40 MG tablet Take 1 tablet by mouth Every Night.     • carvedilol (COREG) 25 MG tablet      • cholecalciferol (VITAMIN D3) 10 MCG (400 UNIT) tablet Take  by mouth Daily.     • insulin regular (humuLIN R) 500 UNIT/ML CONCENTRATED injection Inject  under the skin into the appropriate area as directed.     • L-Lysine 500 MG tablet tablet Take  by mouth.     • loratadine (CLARITIN) 10 MG tablet loratadine 10 mg oral tablet take 1 tablet (10 mg) by oral route once daily   Active     • metFORMIN (GLUCOPHAGE) 500 MG tablet metformin 500 mg oral tablet take 1 tablet (500 mg) by oral route 2 times per day with morning and evening meals   Active     • multivitamin with minerals (CENTRUM ADULTS PO) Centrum 3,500-18-0.4 unit-mg-mg oral tablet,chewable chew 1 tablet by oral route daily   Active     • Omega-3 Fatty Acids (fish oil) 1000 MG capsule capsule Take 2 g by mouth.     • Oxymetazoline HCl (Nasal Spray) 0.05 %  "solution into the nostril(s) as directed by provider.     • pantoprazole (PROTONIX) 40 MG EC tablet      • venlafaxine XR (EFFEXOR-XR) 150 MG 24 hr capsule venlafaxine 150 mg oral capsule,extended release 24hr take 1 capsule (150 mg) by oral route once daily   Active         Past Medical History:   • Diabetes (HCC)   • Hernia, inguinal        Past Surgical History:   • HERNIA REPAIR       Family History:   Family History   Problem Relation Age of Onset   • Diabetes Mother    • COPD Brother         Social History:  Social History     Tobacco Use   • Smoking status: Never Smoker   • Smokeless tobacco: Never Used   Substance Use Topics   • Alcohol use: Not on file       Objective     Vital Signs:  Resp 16   Ht 182.9 cm (72\")   Wt 130 kg (286 lb 6.4 oz)   BMI 38.84 kg/m²   • Constitutional: here alone, alert, no acute distress, reliable historian  • HENT:  NCAT, no visible deformities or lesions  • Eyes:  sclerae clear, conjunctivae clear, EOMI, wearing glasses  • Neck:  normal appearance, no masses, trachea midline  • Respiratory:  breathing not labored, respiratory effort appears normal  • Cardiovascular:  heart regular rate  • Abdomen:  soft, nontender, nondistended.  Attention was focused at the right inguinal area.  I inserted the tip of my index finger into the upper right scrotum invaginating the upper scrotum directing my finger toward the right inguinal ring and having the patient perform a Valsalva maneuver.  I am not able to detect a bulge, impulse, or any abnormality concerning for a hernia  • Skin and subcutaneous tissue:  no visible concerning rashes or lesions, no jaundice  • Musculoskeletal: moving all extremities symmetrically and purposefully  • Neurologic:  no obvious motor or sensory deficits, normal gait, able to stand without difficulty, cerebellar function without any obvious abnormalities, alert & oriented x 3, speech clear  • Psychiatric:  judgment and insight intact, mood normal, affect " appropriate, cooperative    Assessment:  Right groin pain  No detectable right inguinal hernia  Likely pulled muscle    Plan:  Anti-inflammatories and limit activities that exacerbate pain.  Patient will schedule appoint to see me again if he develops a bulge or the pain worsens.    Rick Gauthier MD  11/23/2021    Electronically signed by Rick Gauthier MD, 11/23/21, 8:27 AM EST.

## 2021-12-08 ENCOUNTER — TRANSCRIBE ORDERS (OUTPATIENT)
Dept: PHYSICAL THERAPY | Facility: CLINIC | Age: 71
End: 2021-12-08

## 2021-12-08 DIAGNOSIS — M51.36 DEGENERATION OF LUMBAR INTERVERTEBRAL DISC: ICD-10-CM

## 2021-12-08 DIAGNOSIS — M51.36 DDD (DEGENERATIVE DISC DISEASE), LUMBAR: Primary | ICD-10-CM

## 2021-12-17 ENCOUNTER — TRANSCRIBE ORDERS (OUTPATIENT)
Dept: LAB | Facility: HOSPITAL | Age: 71
End: 2021-12-17

## 2021-12-17 ENCOUNTER — LAB (OUTPATIENT)
Dept: LAB | Facility: HOSPITAL | Age: 71
End: 2021-12-17

## 2021-12-17 DIAGNOSIS — Z79.899 ENCOUNTER FOR LONG-TERM (CURRENT) USE OF OTHER MEDICATIONS: ICD-10-CM

## 2021-12-17 DIAGNOSIS — Z79.899 ENCOUNTER FOR LONG-TERM (CURRENT) USE OF OTHER MEDICATIONS: Primary | ICD-10-CM

## 2021-12-17 DIAGNOSIS — E11.9 DIABETES MELLITUS WITHOUT COMPLICATION (HCC): ICD-10-CM

## 2021-12-17 DIAGNOSIS — Z11.59 SCREENING EXAMINATION FOR POLIOMYELITIS: ICD-10-CM

## 2021-12-17 DIAGNOSIS — I10 ESSENTIAL HYPERTENSION, MALIGNANT: ICD-10-CM

## 2021-12-17 DIAGNOSIS — E11.9 DIABETES MELLITUS WITHOUT COMPLICATION (HCC): Primary | ICD-10-CM

## 2021-12-17 DIAGNOSIS — E88.81 DYSMETABOLIC SYNDROME X: ICD-10-CM

## 2021-12-17 DIAGNOSIS — E78.5 HYPERLIPIDEMIA, UNSPECIFIED HYPERLIPIDEMIA TYPE: ICD-10-CM

## 2021-12-17 DIAGNOSIS — Z79.4 ENCOUNTER FOR LONG-TERM (CURRENT) USE OF INSULIN (HCC): ICD-10-CM

## 2021-12-17 LAB
ALBUMIN SERPL-MCNC: 4.2 G/DL (ref 3.5–5.2)
ALBUMIN/GLOB SERPL: 1.6 G/DL
ALP SERPL-CCNC: 105 U/L (ref 39–117)
ALT SERPL W P-5'-P-CCNC: 24 U/L (ref 1–41)
ANION GAP SERPL CALCULATED.3IONS-SCNC: 13.6 MMOL/L (ref 5–15)
AST SERPL-CCNC: 18 U/L (ref 1–40)
BASOPHILS # BLD AUTO: 0.04 10*3/MM3 (ref 0–0.2)
BASOPHILS NFR BLD AUTO: 0.6 % (ref 0–1.5)
BILIRUB SERPL-MCNC: 0.5 MG/DL (ref 0–1.2)
BUN SERPL-MCNC: 10 MG/DL (ref 8–23)
BUN/CREAT SERPL: 10.6 (ref 7–25)
CALCIUM SPEC-SCNC: 9 MG/DL (ref 8.6–10.5)
CHLORIDE SERPL-SCNC: 101 MMOL/L (ref 98–107)
CHOLEST SERPL-MCNC: 192 MG/DL (ref 0–200)
CO2 SERPL-SCNC: 25.4 MMOL/L (ref 22–29)
CREAT SERPL-MCNC: 0.94 MG/DL (ref 0.76–1.27)
DEPRECATED RDW RBC AUTO: 43 FL (ref 37–54)
EOSINOPHIL # BLD AUTO: 0.09 10*3/MM3 (ref 0–0.4)
EOSINOPHIL NFR BLD AUTO: 1.3 % (ref 0.3–6.2)
ERYTHROCYTE [DISTWIDTH] IN BLOOD BY AUTOMATED COUNT: 13.5 % (ref 12.3–15.4)
GFR SERPL CREATININE-BSD FRML MDRD: 80 ML/MIN/1.73
GLOBULIN UR ELPH-MCNC: 2.6 GM/DL
GLUCOSE SERPL-MCNC: 219 MG/DL (ref 65–99)
HBA1C MFR BLD: 7.29 % (ref 4.8–5.6)
HCT VFR BLD AUTO: 42.6 % (ref 37.5–51)
HCV AB SER DONR QL: NORMAL
HDLC SERPL-MCNC: 21 MG/DL (ref 40–60)
HGB BLD-MCNC: 14.2 G/DL (ref 13–17.7)
IMM GRANULOCYTES # BLD AUTO: 0.03 10*3/MM3 (ref 0–0.05)
IMM GRANULOCYTES NFR BLD AUTO: 0.4 % (ref 0–0.5)
LDLC SERPL CALC-MCNC: 65 MG/DL (ref 0–100)
LDLC/HDLC SERPL: 1.58 {RATIO}
LYMPHOCYTES # BLD AUTO: 1.14 10*3/MM3 (ref 0.7–3.1)
LYMPHOCYTES NFR BLD AUTO: 17.1 % (ref 19.6–45.3)
MCH RBC QN AUTO: 29.2 PG (ref 26.6–33)
MCHC RBC AUTO-ENTMCNC: 33.3 G/DL (ref 31.5–35.7)
MCV RBC AUTO: 87.7 FL (ref 79–97)
MONOCYTES # BLD AUTO: 0.47 10*3/MM3 (ref 0.1–0.9)
MONOCYTES NFR BLD AUTO: 7 % (ref 5–12)
NEUTROPHILS NFR BLD AUTO: 4.91 10*3/MM3 (ref 1.7–7)
NEUTROPHILS NFR BLD AUTO: 73.6 % (ref 42.7–76)
NRBC BLD AUTO-RTO: 0 /100 WBC (ref 0–0.2)
PLATELET # BLD AUTO: 234 10*3/MM3 (ref 140–450)
PMV BLD AUTO: 11.1 FL (ref 6–12)
POTASSIUM SERPL-SCNC: 3.5 MMOL/L (ref 3.5–5.2)
PROT SERPL-MCNC: 6.8 G/DL (ref 6–8.5)
RBC # BLD AUTO: 4.86 10*6/MM3 (ref 4.14–5.8)
SODIUM SERPL-SCNC: 140 MMOL/L (ref 136–145)
TRIGL SERPL-MCNC: 689 MG/DL (ref 0–150)
VLDLC SERPL-MCNC: 106 MG/DL (ref 5–40)
WBC NRBC COR # BLD: 6.68 10*3/MM3 (ref 3.4–10.8)

## 2021-12-17 PROCEDURE — 36415 COLL VENOUS BLD VENIPUNCTURE: CPT

## 2021-12-17 PROCEDURE — 85025 COMPLETE CBC W/AUTO DIFF WBC: CPT

## 2021-12-17 PROCEDURE — 83036 HEMOGLOBIN GLYCOSYLATED A1C: CPT

## 2021-12-17 PROCEDURE — 80061 LIPID PANEL: CPT

## 2021-12-17 PROCEDURE — 86803 HEPATITIS C AB TEST: CPT

## 2021-12-17 PROCEDURE — 80053 COMPREHEN METABOLIC PANEL: CPT

## 2022-01-21 ENCOUNTER — TREATMENT (OUTPATIENT)
Dept: PHYSICAL THERAPY | Facility: CLINIC | Age: 72
End: 2022-01-21

## 2022-01-21 DIAGNOSIS — M54.50 LOW BACK PAIN, UNSPECIFIED BACK PAIN LATERALITY, UNSPECIFIED CHRONICITY, UNSPECIFIED WHETHER SCIATICA PRESENT: Primary | ICD-10-CM

## 2022-01-21 DIAGNOSIS — M51.36 DDD (DEGENERATIVE DISC DISEASE), LUMBAR: ICD-10-CM

## 2022-01-21 PROCEDURE — 97110 THERAPEUTIC EXERCISES: CPT | Performed by: PHYSICAL THERAPIST

## 2022-01-21 PROCEDURE — 97162 PT EVAL MOD COMPLEX 30 MIN: CPT | Performed by: PHYSICAL THERAPIST

## 2022-01-21 NOTE — PROGRESS NOTES
Physical Therapy Initial Evaluation and Plan of Care    Patient: Pastor Bartholomew   : 1950  Diagnosis/ICD-10 Code:  Low back pain, unspecified back pain laterality, unspecified chronicity, unspecified whether sciatica present [M54.50]  Referring practitioner: Shahzad Rashid, *  Date of Initial Visit: 2022  Today's Date: 2022  Patient seen for 1 sessions           Subjective Questionnaire: Oswestry:  = 16%      Subjective Evaluation    History of Present Illness  Mechanism of injury: Pt presents with low back pain, x-rays show degenerative discs. He does report having symptoms into his right buttocks and leg. Pt reports things like lifting or standing for long periods make this worse. He has felt this pain before and has had it for several years. Sitting does make it slightly better, but he can still the pain is there. Pt does have diabetes, and reports having neuropathy in the feet.    Pain  Current pain rating: 3  At best pain ratin  At worst pain ratin  Quality: tight, radiating and dull ache  Relieving factors: rest and change in position  Aggravating factors: standing and lifting    Social Support  Lives in: one-story house (Stairs in basement, 2 steps to enter)  Lives with: spouse    Diagnostic Tests  X-ray: abnormal (DDD)    Patient Goals  Patient goals for therapy: decreased pain, increased motion, increased strength and independence with ADLs/IADLs       PMH: Diabetes      Objective          Postural Observations  Seated posture: fair  Standing posture: fair    Additional Postural Observation Details  Mildly decreased lumbar lordotic curve, increased paraspinal mass on left compared to right    Palpation     Additional Palpation Details  Lumbar CPA's, UPA's - moderate to severe hypomobility especially lower segments        Neurological Testing     Sensation     Lumbar   Left   Intact: light touch    Right   Intact: light touch    Comments   Left light touch: diminished  at feet  Right light touch: diminished at feet    Reflexes   Left   Patellar (L4): normal (2+)  Achilles (S1): normal (2+)    Right   Patellar (L4): normal (2+)  Achilles (S1): normal (2+)    Active Range of Motion     Additional Active Range of Motion Details  Flexion: fingertips to knees, good reversal of lumbar curve  Extension: limited 25% due to tightness, no hinging pattern noted    Rotation: limited 50% bilaterally with tightness  Side bending: limited 50% bilaterally due to tightness    Strength/Myotome Testing     Left Hip   Planes of Motion   Flexion: 4+  Extension: 4+  Abduction: 4+  Adduction: 4+    Right Hip   Planes of Motion   Flexion: 4+  Extension: 4+  Abduction: 4+  Adduction: 4+    Tests     Additional Tests Details  Repeated extension:    - standing no symptoms, no change   - prone mild increase in pain right lower lumbar, SI joint region    Repeated flexion:   - supine mild increase in pain right lower lumbar, SI joint region   - seated with decrease in symptoms and no pain    Quadrant: Right - no changes in symptoms; Left  - mild pain on right side      See Exercise, Manual, and Modality Logs for complete treatment.     Assessment & Plan     Assessment  Impairments: abnormal muscle firing, abnormal or restricted ROM, activity intolerance, impaired physical strength and pain with function  Functional Limitations: carrying objects, lifting, walking, uncomfortable because of pain, sitting and standing  Assessment details: The patient presents to physical therapy with complaints of low back pain with referral into the right lower extremity. Pt responded best to seated lumbar flexion today. Tested this in supine which he did not have as good of a response. Extension did not send pain into the distal extremity, but did note slightly increased in the right lumbar and SI joint region. The patient presents with associated left lower extremity weakness, lumbar stiffness, and functional deficits  (OSWESTRY). The patient would benefit from skilled PT intervention to address the above mentioned functional limitations.     Prognosis: good    Goals  Plan Goals: LOW BACK PROBLEMS:    1. The patient complains of low back pain.  LTG 1: 12 weeks:  The patient will report a pain rating of 3/10 or better at worst in order to improve  tolerance to activities of daily living and improve sleep quality.  STATUS:  New  STG 1a: 6 weeks:  The patient will report a pain rating of 5/10 or better at its worst.  STATUS:  New  TREATMENT:  Therapeutic exercises, manual therapy, aquatic therapy, home exercise   instruction, and modalities as needed for pain to include:  electrical stimulation, moist heat, ice,   ultrasound, and diathermy.      2. The patient demonstrates weakness of the bilateral hip.  LTG 2: 12 weeks:  The patient will demonstrate 5 /5 strength for bilateral hip flexion, abduction,  and extension in order to improve hip stability.  STATUS:  New  STG 2a: 6 weeks:  The patient will demonstrate independent HEP.  TREATMENT: Therapeutic exercises, manual therapy, aquatic therapy, home exercise instruction,  and modalities as needed for pain to include:  electrical stimulation, moist heat, ice, ultrasound, and   diathermy.    3. Mobility: Walking/Moving Around Functional Limitation    LTG 3: 12 weeks:  The patient will demonstrate 1-19 % limitation by achieving a score of 1-9 on the HUY.  STATUS:  New  STG 3 a: 6 weeks:  The patient will demonstrate 20-39 % limitation by achieving a score of 10-19 on the HUY.    STATUS:  New  TREATMENT:  Manual therapy, therapeutic exercise, home exercise instruction, and modalities as needed to include: moist heat, electrical stimulation, and ultrasound.      4. The patient has limited lumbar AROM  LTG 4: 12 weeks:  The patient will demonstrate lumbar AROM as follows: full flexion and extension pain free.  STATUS:  New  TREATMENT: Manual therapy, therapeutic exercise, home exercise  instruction, and modalities as needed to include: moist heat, electrical stimulation, and ultrasound.               Plan  Therapy options: will be seen for skilled therapy services  Planned modality interventions: TENS, cryotherapy, thermotherapy (hydrocollator packs), traction and dry needling  Planned therapy interventions: manual therapy, stretching, strengthening, therapeutic activities, neuromuscular re-education, home exercise program, joint mobilization, functional ROM exercises, soft tissue mobilization, spinal/joint mobilization, flexibility and gait training  Frequency: 3x week  Duration in weeks: 12  Treatment plan discussed with: patient        Visit Diagnoses:    ICD-10-CM ICD-9-CM   1. Low back pain, unspecified back pain laterality, unspecified chronicity, unspecified whether sciatica present  M54.50 724.2   2. DDD (degenerative disc disease), lumbar  M51.36 722.52       History # of Personal Factors and/or Comorbidities: MODERATE (1-2)  Examination of Body System(s): # of elements: MODERATE (3)  Clinical Presentation: STABLE   Clinical Decision Making: MODERATE      Timed:         Manual Therapy:    0     mins  20311;     Therapeutic Exercise:    12     mins  81213;     Neuromuscular Rey:    0    mins  66014;    Therapeutic Activity:     0     mins  45923;     Gait Trainin     mins  74309;     Ultrasound:     0     mins  33975;    Ionto                               0    mins   24502  Self Care                       0     mins   96577  Canalith Repos    0     mins 35647      Un-Timed:  Electrical Stimulation:    0     mins  54433 ( );  Dry Needling     0     mins self-pay  Traction     0     mins 41225  Low Eval     0     Mins  40625  Mod Eval     30     Mins  83436  High Eval                       0     Mins  58748  Re-Eval                           0    mins  66389    Timed Treatment:   12   mins   Total Treatment:     42   mins    PT SIGNATURE: Diego Doherty,  PT     Electronically signed 1/21/2022    [place] License: PT - [id number]     Initial Certification  Certification Period: 1/21/2022 thru 4/20/2022  I certify that the therapy services are furnished while this patient is under my care.  The services outlined above are required by this patient, and will be reviewed every 90 days.     PHYSICIAN: Shahzad Rashid PA      DATE:     Please sign and return via fax to [phone]. Thank you, [facility].

## 2022-02-09 ENCOUNTER — TREATMENT (OUTPATIENT)
Dept: PHYSICAL THERAPY | Facility: CLINIC | Age: 72
End: 2022-02-09

## 2022-02-09 DIAGNOSIS — M51.36 DDD (DEGENERATIVE DISC DISEASE), LUMBAR: ICD-10-CM

## 2022-02-09 DIAGNOSIS — M54.50 LOW BACK PAIN, UNSPECIFIED BACK PAIN LATERALITY, UNSPECIFIED CHRONICITY, UNSPECIFIED WHETHER SCIATICA PRESENT: Primary | ICD-10-CM

## 2022-02-09 PROCEDURE — 97140 MANUAL THERAPY 1/> REGIONS: CPT | Performed by: PHYSICAL THERAPIST

## 2022-02-09 PROCEDURE — 97110 THERAPEUTIC EXERCISES: CPT | Performed by: PHYSICAL THERAPIST

## 2022-02-09 NOTE — PROGRESS NOTES
Physical Therapy Daily Treatment Note      Patient: Pastor Bartholomew   : 1950  Referring practitioner: Shahzad Rashid, *  Date of Initial Visit: Type: THERAPY  Noted: 2022  Today's Date: 2022  Patient seen for 2 sessions           Subjective Questionnaire:       Subjective Evaluation    History of Present Illness    Subjective comment: Pt reports with report of R hip pain 4/10.  Pain  Current pain ratin           Objective   See Exercise, Manual, and Modality Logs for complete treatment.       Assessment & Plan     Assessment    Assessment details: Pt tolerated tx well reporting 0-1/10 apin after tx.        Visit Diagnoses:    ICD-10-CM ICD-9-CM   1. Low back pain, unspecified back pain laterality, unspecified chronicity, unspecified whether sciatica present  M54.50 724.2   2. DDD (degenerative disc disease), lumbar  M51.36 722.52       Progress per Plan of Care and Progress strengthening /stabilization /functional activity           Timed:  Manual Therapy:    6     mins  59660;  Therapeutic Exercise:    22     mins  84231;     Neuromuscular Rey:        mins  16582;    Therapeutic Activity:          mins  22314;     Gait Training:           mins  45212;     Ultrasound:          mins  10242;    Electrical Stimulation:         mins  32864 ( );  Aquatic Therapy          mins  48754    Untimed:  Electrical Stimulation:         mins  91154 ( );  Mechanical Traction:         mins  51841;     Timed Treatment:   28   mins   Total Treatment:     28   mins    Electronically signed    Brittany Correia PTA  Physical Therapist Assistant    [place] license: [id number]

## 2022-02-11 ENCOUNTER — TREATMENT (OUTPATIENT)
Dept: PHYSICAL THERAPY | Facility: CLINIC | Age: 72
End: 2022-02-11

## 2022-02-11 DIAGNOSIS — M54.50 LOW BACK PAIN, UNSPECIFIED BACK PAIN LATERALITY, UNSPECIFIED CHRONICITY, UNSPECIFIED WHETHER SCIATICA PRESENT: Primary | ICD-10-CM

## 2022-02-11 DIAGNOSIS — M51.36 DDD (DEGENERATIVE DISC DISEASE), LUMBAR: ICD-10-CM

## 2022-02-11 PROCEDURE — 97110 THERAPEUTIC EXERCISES: CPT | Performed by: PHYSICAL THERAPIST

## 2022-02-11 NOTE — PROGRESS NOTES
Physical Therapy Daily Treatment Note      Patient: Pastor Bartholomew   : 1950  Referring practitioner: Shahzad Rashid, *  Date of Initial Visit: Type: THERAPY  Noted: 2022  Today's Date: 2022  Patient seen for 3 sessions           Subjective Questionnaire:       Subjective Evaluation    History of Present Illness    Subjective comment: Pt reports having no pain just stiff.  Pt reported performing HEP intermittently.       Objective   See Exercise, Manual, and Modality Logs for complete treatment.       Assessment & Plan     Assessment    Assessment details: Pt continues with limited lumbar motion and weak core.          Visit Diagnoses:    ICD-10-CM ICD-9-CM   1. Low back pain, unspecified back pain laterality, unspecified chronicity, unspecified whether sciatica present  M54.50 724.2   2. DDD (degenerative disc disease), lumbar  M51.36 722.52       Progress per Plan of Care and Progress strengthening /stabilization /functional activity           Timed:  Manual Therapy:    5     mins  53873;  Therapeutic Exercise:    22     mins  76523;     Neuromuscular Rey:        mins  91306;    Therapeutic Activity:          mins  12353;     Gait Training:           mins  68732;     Ultrasound:          mins  50222;    Electrical Stimulation:         mins  24424 ( );  Aquatic Therapy          mins  22313    Untimed:  Electrical Stimulation:         mins  87290 ( );  Mechanical Traction:         mins  55402;     Timed Treatment:   27   mins   Total Treatment:     27   mins    Electronically signed    Brittany Correia PTA  Physical Therapist Assistant    [place] license: [id number]

## 2022-02-23 ENCOUNTER — TREATMENT (OUTPATIENT)
Dept: PHYSICAL THERAPY | Facility: CLINIC | Age: 72
End: 2022-02-23

## 2022-02-23 DIAGNOSIS — M54.50 LOW BACK PAIN, UNSPECIFIED BACK PAIN LATERALITY, UNSPECIFIED CHRONICITY, UNSPECIFIED WHETHER SCIATICA PRESENT: Primary | ICD-10-CM

## 2022-02-23 DIAGNOSIS — M51.36 DDD (DEGENERATIVE DISC DISEASE), LUMBAR: ICD-10-CM

## 2022-02-23 PROCEDURE — 97110 THERAPEUTIC EXERCISES: CPT | Performed by: PHYSICAL THERAPIST

## 2022-02-23 NOTE — PROGRESS NOTES
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 Feels like piriformis stretch has flared up his groin on the right side.     Objective   No complaints of increased pain or discomfort.     See Exercise, Manual, and Modality Logs for complete treatment.       Assessment/Plan  Pastor progressing as evident by decreased overall back  pain, although R leg pain. Pt tolerated exercises well, no complaints of increased pain or discomfort. Pt would benefit from skilled PT to address Range of Motion  and Strength deficits, pain management and any concerns with ADLs.       Progress per Plan of Care           Timed:  Manual Therapy:         mins  36419;  Therapeutic Exercise:    30     mins  52937;     Neuromuscular Rey:        mins  10251;    Therapeutic Activity:          mins  07785;     Gait Training:           mins  88998;    Aquatic Therapy:          mins  54292;       Untimed:  Electrical Stimulation:         mins  20386 ( );  Mechanical Traction:         mins  81783;       Timed Treatment:   30   mins   Total Treatment:     30   mins      Electronically signed:   Shikha Doherty PTA  Physical Therapist Assistant  Shalini STEPHENS License #: [id number]

## 2022-02-25 ENCOUNTER — TREATMENT (OUTPATIENT)
Dept: PHYSICAL THERAPY | Facility: CLINIC | Age: 72
End: 2022-02-25

## 2022-02-25 DIAGNOSIS — M51.36 DDD (DEGENERATIVE DISC DISEASE), LUMBAR: ICD-10-CM

## 2022-02-25 DIAGNOSIS — M54.50 LOW BACK PAIN, UNSPECIFIED BACK PAIN LATERALITY, UNSPECIFIED CHRONICITY, UNSPECIFIED WHETHER SCIATICA PRESENT: Primary | ICD-10-CM

## 2022-02-25 PROCEDURE — 97110 THERAPEUTIC EXERCISES: CPT | Performed by: PHYSICAL THERAPIST

## 2022-02-25 PROCEDURE — 97530 THERAPEUTIC ACTIVITIES: CPT | Performed by: PHYSICAL THERAPIST

## 2022-02-25 PROCEDURE — 97140 MANUAL THERAPY 1/> REGIONS: CPT | Performed by: PHYSICAL THERAPIST

## 2022-02-25 NOTE — PROGRESS NOTES
Progress Assessment        Patient: Pastor Bartholomew   : 1950  Diagnosis/ICD-10 Code:  Low back pain, unspecified back pain laterality, unspecified chronicity, unspecified whether sciatica present [M54.50]  Referring practitioner: Shahzad Rashid, *  Date of Initial Visit: Type: THERAPY  Noted: 2022  Today's Date: 2022  Patient seen for 5 sessions      Subjective:     Subjective Questionnaire: Oswestry:   Clinical Progress: improved  Home Program Compliance: Yes  Treatment has included: therapeutic exercise, manual therapy and therapeutic activity    Subjective Evaluation    History of Present Illness  Mechanism of injury: Pt presents today reporting improvement in low back pain since starting therapy, he no longer has pain radiating into his leg, just feels pain very slightly into his right hip. He felt the last visit's exercises were especially helpful and he has been doing these at home.    Pain  Current pain rating: 3         Objective   Active Range of Motion     Additional Active Range of Motion Details  Flexion: fingertips to knees, good reversal of lumbar curve  Extension: no limitations, just tight    Rotation: limited 25% bilaterally with tightness  Side bending: limited 50% bilaterally due to tightness     Strength/Myotome Testing      Left Hip   Planes of Motion   Flexion: 5  Extension: 4+  Abduction: 5  Adduction: 5     Right Hip   Planes of Motion   Flexion: 4+  Extension: 4+  Abduction: 4+  Adduction: 4+     Tests     Quadrant: Right - no changes in symptoms; Left  - pain free today      Assessment & Plan     Assessment  Impairments: abnormal muscle firing, abnormal or restricted ROM, activity intolerance, impaired physical strength and pain with function  Functional Limitations: carrying objects, lifting, walking, uncomfortable because of pain, sitting and standing  Assessment details: The patient presents to physical therapy with complaints of low back pain with referral  into the right lower extremity. HE has progressed very well with therapy thus far, reporting less pain and demonstrates meeting several goals set at the evaluation including ROM, pain, independence with HEP and functional disability questionnaire. He is expected to continue to improve with ongoing therapy. The patient presents with associated left lower extremity weakness, lumbar stiffness, and functional deficits (OSWESTRY). The patient would benefit from skilled PT intervention to address the above mentioned functional limitations.     Prognosis: good    Goals  Plan Goals: LOW BACK PROBLEMS:    1. The patient complains of low back pain.  LTG 1: 12 weeks:  The patient will report a pain rating of 3/10 or better at worst in order to improve  tolerance to activities of daily living and improve sleep quality.  STATUS:  Not met, progressing  STG 1a: 6 weeks:  The patient will report a pain rating of 5/10 or better at its worst.  STATUS:  MET  TREATMENT:  Therapeutic exercises, manual therapy, aquatic therapy, home exercise   instruction, and modalities as needed for pain to include:  electrical stimulation, moist heat, ice,   ultrasound, and diathermy.      2. The patient demonstrates weakness of the bilateral hip.  LTG 2: 12 weeks:  The patient will demonstrate 5 /5 strength for bilateral hip flexion, abduction,  and extension in order to improve hip stability.  STATUS:  Not met, progressing  STG 2a: 6 weeks:  The patient will demonstrate independent HEP.  STATUS:   MET  TREATMENT: Therapeutic exercises, manual therapy, aquatic therapy, home exercise instruction,  and modalities as needed for pain to include:  electrical stimulation, moist heat, ice, ultrasound, and   diathermy.    3. Mobility: Walking/Moving Around Functional Limitation    LTG 3: 12 weeks:  The patient will demonstrate 1-19 % limitation by achieving a score of 1-9 on the HUY.  STATUS:  MET  STG 3 a: 6 weeks:  The patient will demonstrate 20-39 %  limitation by achieving a score of 10-19 on the HUY.    STATUS:   MET  TREATMENT:  Manual therapy, therapeutic exercise, home exercise instruction, and modalities as needed to include: moist heat, electrical stimulation, and ultrasound.      4. The patient has limited lumbar AROM  LTG 4: 12 weeks:  The patient will demonstrate lumbar AROM as follows: full flexion and extension pain free.  STATUS:  MET  TREATMENT: Manual therapy, therapeutic exercise, home exercise instruction, and modalities as needed to include: moist heat, electrical stimulation, and ultrasound.               Plan  Therapy options: will be seen for skilled therapy services  Planned modality interventions: TENS, cryotherapy, thermotherapy (hydrocollator packs), traction and dry needling  Planned therapy interventions: manual therapy, stretching, strengthening, therapeutic activities, neuromuscular re-education, home exercise program, joint mobilization, functional ROM exercises, soft tissue mobilization, spinal/joint mobilization, flexibility and gait training  Frequency: 3x week  Duration in weeks: 12  Treatment plan discussed with: patient      Progress toward previous goals: Partially Met    See Exercise, Manual, and Modality Logs for complete treatment.         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      Timed:         Manual Therapy:    16     mins  64728;     Therapeutic Exercise:    10     mins  71928;     Neuromuscular Rey:    0    mins  99924;    Therapeutic Activity:     12     mins  40211;     Gait Trainin      mins  13731;     Ultrasound:     0     mins  00765;    Ionto                               0    mins   37979  Self Care                       0     mins   85933  Aquatic                          0     mins 44174          Timed Treatment:   38   mins   Total Treatment:     38   mins      I certify that the therapy services are furnished while this patient is under my care.  The services outlined above are required by this patient, and will be reviewed every 90 days.

## 2022-03-02 ENCOUNTER — TREATMENT (OUTPATIENT)
Dept: PHYSICAL THERAPY | Facility: CLINIC | Age: 72
End: 2022-03-02

## 2022-03-02 DIAGNOSIS — M51.36 DDD (DEGENERATIVE DISC DISEASE), LUMBAR: ICD-10-CM

## 2022-03-02 DIAGNOSIS — M54.50 LOW BACK PAIN, UNSPECIFIED BACK PAIN LATERALITY, UNSPECIFIED CHRONICITY, UNSPECIFIED WHETHER SCIATICA PRESENT: Primary | ICD-10-CM

## 2022-03-02 PROCEDURE — 97110 THERAPEUTIC EXERCISES: CPT | Performed by: PHYSICAL THERAPIST

## 2022-03-02 NOTE — PROGRESS NOTES
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 Has one more appt on Friday and feels good about it being his last appt because he is doing exercises at home.     Objective   No complaints of increased pain or discomfort.     See Exercise, Manual, and Modality Logs for complete treatment.       Assessment/Plan  Pastor progressing as evident by decreased overall back  pain. Pt tolerated exercises well, no complaints of increased pain or discomfort. Pt would benefit from skilled PT to address Range of Motion  and Strength deficits, pain management and any concerns with ADLs.       Progress per Plan of Care           Timed:  Manual Therapy:         mins  00158;  Therapeutic Exercise:    30     mins  66177;     Neuromuscular Rey:        mins  80401;    Therapeutic Activity:          mins  41759;     Gait Training:           mins  04226;    Aquatic Therapy:          mins  84847;       Untimed:  Electrical Stimulation:         mins  28008 ( );  Mechanical Traction:         mins  52962;       Timed Treatment:   30   mins   Total Treatment:     30   mins      Electronically signed:   Shikha Doherty PTA  Physical Therapist Assistant  Shalini STEPHENS License #: [id number]

## 2022-03-04 ENCOUNTER — TREATMENT (OUTPATIENT)
Dept: PHYSICAL THERAPY | Facility: CLINIC | Age: 72
End: 2022-03-04

## 2022-03-04 DIAGNOSIS — M54.50 LOW BACK PAIN, UNSPECIFIED BACK PAIN LATERALITY, UNSPECIFIED CHRONICITY, UNSPECIFIED WHETHER SCIATICA PRESENT: Primary | ICD-10-CM

## 2022-03-04 DIAGNOSIS — M51.36 DDD (DEGENERATIVE DISC DISEASE), LUMBAR: ICD-10-CM

## 2022-03-04 PROCEDURE — 97110 THERAPEUTIC EXERCISES: CPT | Performed by: PHYSICAL THERAPIST

## 2022-03-04 PROCEDURE — 97530 THERAPEUTIC ACTIVITIES: CPT | Performed by: PHYSICAL THERAPIST

## 2022-03-04 NOTE — PROGRESS NOTES
Physical Therapy Daily Progress Note    Patient: Pastor Bartholomew   : 1950  Diagnosis/ICD-10 Code:  Low back pain, unspecified back pain laterality, unspecified chronicity, unspecified whether sciatica present [M54.50]  Referring practitioner: Shahzad Rashid, *  Date of Initial Visit: Type: THERAPY  Noted: 2022  Today's Date: 3/4/2022  Patient seen for 7 sessions           Subjective Evaluation    History of Present Illness    Subjective comment: Pt reporting he feels good about today being his last day, understanding of his HEP. He is still having some trouble with his right hip, but feels some of the exercises have been helping with this as well. Pain  Current pain ratin           Objective   See Exercise, Manual, and Modality Logs for complete treatment.       Assessment & Plan     Assessment    Assessment details: Progressed with hip stabilization and lumbar stability today. Reviewed HEP and established ongoing program. Pt to return if needed within 30 days, if not, then DC to home program.               Manual Therapy:    0     mins  55826;  Therapeutic Exercise:    15     mins  02427;     Neuromuscular Rey:    0    mins  35448;    Therapeutic Activity:     8     mins  14085;     Gait Trainin     mins  62115;     Ultrasound:     0     mins  08282;    Electrical Stimulation:    0     mins  42912 ( );  Dry Needling     0     mins self-pay;  Aquatic Therapy    0     mins  73432;  Mechanical Traction    0     mins  89462  Moist Heat     0     mins  No charge    Timed Treatment:   23   mins   Total Treatment:     23   mins    Diego Doherty PT  Physical Therapist    Electronically signed 3/4/2022    [place] License: PT - [id number]

## 2022-03-11 ENCOUNTER — LAB (OUTPATIENT)
Dept: LAB | Facility: HOSPITAL | Age: 72
End: 2022-03-11

## 2022-03-11 ENCOUNTER — TRANSCRIBE ORDERS (OUTPATIENT)
Dept: LAB | Facility: HOSPITAL | Age: 72
End: 2022-03-11

## 2022-03-11 DIAGNOSIS — E78.5 HYPERLIPIDEMIA, UNSPECIFIED HYPERLIPIDEMIA TYPE: ICD-10-CM

## 2022-03-11 DIAGNOSIS — E11.9 DIABETES MELLITUS WITHOUT COMPLICATION: ICD-10-CM

## 2022-03-11 DIAGNOSIS — E11.9 DIABETES MELLITUS WITHOUT COMPLICATION: Primary | ICD-10-CM

## 2022-03-11 LAB
ALBUMIN SERPL-MCNC: 4.9 G/DL (ref 3.5–5.2)
ALBUMIN/GLOB SERPL: 2.2 G/DL
ALP SERPL-CCNC: 88 U/L (ref 39–117)
ALT SERPL W P-5'-P-CCNC: 17 U/L (ref 1–41)
ANION GAP SERPL CALCULATED.3IONS-SCNC: 10.7 MMOL/L (ref 5–15)
AST SERPL-CCNC: 17 U/L (ref 1–40)
BILIRUB SERPL-MCNC: 0.6 MG/DL (ref 0–1.2)
BUN SERPL-MCNC: 15 MG/DL (ref 8–23)
BUN/CREAT SERPL: 16.3 (ref 7–25)
CALCIUM SPEC-SCNC: 9.2 MG/DL (ref 8.6–10.5)
CHLORIDE SERPL-SCNC: 103 MMOL/L (ref 98–107)
CHOLEST SERPL-MCNC: 244 MG/DL (ref 0–200)
CO2 SERPL-SCNC: 28.3 MMOL/L (ref 22–29)
CREAT SERPL-MCNC: 0.92 MG/DL (ref 0.76–1.27)
EGFRCR SERPLBLD CKD-EPI 2021: 90 ML/MIN/1.73
GLOBULIN UR ELPH-MCNC: 2.2 GM/DL
GLUCOSE SERPL-MCNC: 126 MG/DL (ref 65–99)
HBA1C MFR BLD: 6.8 % (ref 4.8–5.6)
HDLC SERPL-MCNC: 32 MG/DL (ref 40–60)
LDLC SERPL CALC-MCNC: 175 MG/DL (ref 0–100)
LDLC/HDLC SERPL: 5.41 {RATIO}
POTASSIUM SERPL-SCNC: 3.4 MMOL/L (ref 3.5–5.2)
PROT SERPL-MCNC: 7.1 G/DL (ref 6–8.5)
SODIUM SERPL-SCNC: 142 MMOL/L (ref 136–145)
TRIGL SERPL-MCNC: 194 MG/DL (ref 0–150)
VLDLC SERPL-MCNC: 37 MG/DL (ref 5–40)

## 2022-03-11 PROCEDURE — 80053 COMPREHEN METABOLIC PANEL: CPT

## 2022-03-11 PROCEDURE — 83036 HEMOGLOBIN GLYCOSYLATED A1C: CPT

## 2022-03-11 PROCEDURE — 36415 COLL VENOUS BLD VENIPUNCTURE: CPT

## 2022-03-11 PROCEDURE — 80061 LIPID PANEL: CPT

## 2022-03-30 NOTE — PROGRESS NOTES
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 male who presents to [facility] as a new patient with history of nausea, vomiting, reflux, diabetes, hypertension and in need of a screening colonoscopy.  Patient reports that he was having nausea vomiting intermittently maybe once every other month and then in December began having vomiting more frequently.  He reports nausea vomiting at least once a month.  He has changed his diet to a low carb diet over the past 5 weeks and has noticed improvement in his bowel movements and belching but not his nausea and vomiting.  Patient reports a wave like sensation across the upper abdomen after vomiting.  Patient reports his bowel movements are regular and he eats at least 2 prunes every day to every other day.  He reports his bowel movements are slower if he does not intake prunes.  He reports dark stools prior to altering his diet.  He denies family history of gastric cancer or colon cancer.  Patient denies fever, nausea, vomiting, weight loss, night sweats, melena, hematochezia, hematemesis.    Labs performed on 03/11/2022 see below    No recent abdominal imaging.     Patient states his colonoscopy was about 10 years ago with Dr. Goncalves.    Patient has never had an EGD.     Results       Result Review :   The following data was reviewed by: Terri Loo NP on 03/31/2022     CMP    CMP 8/30/21 12/17/21 3/11/22   Glucose 264 (A) 219 (A) 126 (A)   BUN 12 10 15   Creatinine 0.86 0.94 0.92   eGFR Non African Am 88 80    Sodium 141 140 142   Potassium 3.9 3.5 3.4 (A)   Chloride 103 101 103   Calcium 9.2 9.0 9.2   Albumin 4.60 4.20 4.90   Total Bilirubin 0.6 0.5 0.6   Alkaline Phosphatase 110 105 88   AST (SGOT) 21 18 17   ALT (SGPT) 30 24 17   (A) Abnormal value            CBC    CBC 12/17/21   WBC 6.68   RBC 4.86   Hemoglobin 14.2   Hematocrit 42.6   MCV 87.7   MCH 29.2   MCHC 33.3   RDW 13.5    Platelets 234                      Past Medical History       Past Medical History:   Diagnosis Date   • Diabetes (HCC)    • Hernia, inguinal    • Hyperlipidemia    • Hypertension    • Type 2 diabetes mellitus (HCC)        Past Surgical History:   Procedure Laterality Date   • COLONOSCOPY     • HERNIA REPAIR     • KNEE SURGERY Bilateral    • TRIGGER FINGER RELEASE      x2         Current Outpatient Medications:   •  amLODIPine-benazepril (LOTREL) 10-20 MG per capsule, Take 1 capsule by mouth Daily., Disp: , Rfl: 1  •  aspirin 81 MG EC tablet, Aspir-81 81 mg oral tablet,delayed release (DR/EC) take 1 tablet (81 mg) by oral route once daily   Active, Disp: , Rfl:   •  carvedilol (COREG) 25 MG tablet, , Disp: , Rfl:   •  cetirizine (zyrTEC) 10 MG tablet, Take 10 mg by mouth Daily., Disp: , Rfl:   •  cholecalciferol (VITAMIN D3) 10 MCG (400 UNIT) tablet, Take  by mouth Daily., Disp: , Rfl:   •  CONTOUR NEXT TEST test strip, Test 6 times daily DX: e11.65, Disp: 200 each, Rfl: 5  •  insulin regular (HUMULIN R) 500 UNIT/ML CONCENTRATED injection, 50u daily with insulin pump, Disp: 40 mL, Rfl: 3  •  L-Lysine 500 MG tablet tablet, Take  by mouth., Disp: , Rfl:   •  Lancets misc, Check 5 times daily DX: e11.65, Disp: 500 each, Rfl: 1  •  metFORMIN (GLUCOPHAGE) 500 MG tablet, metformin 500 mg oral tablet take 1 tablet (500 mg) by oral route 2 times per day with morning and evening meals   Active, Disp: , Rfl:   •  multivitamin with minerals tablet tablet, Centrum 3,500-18-0.4 unit-mg-mg oral tablet,chewable chew 1 tablet by oral route daily   Active, Disp: , Rfl:   •  omega-3 acid ethyl esters (LOVAZA) 1 g capsule, Take 2 capsules by mouth 2 (Two) Times a Day., Disp: 180 capsule, Rfl: 2  •  Omega-3 Fatty Acids (fish oil) 1000 MG capsule capsule, Take 2 g by mouth., Disp: , Rfl:   •  pantoprazole (PROTONIX) 40 MG EC tablet, , Disp: , Rfl:   •  rosuvastatin (CRESTOR) 20 MG tablet, Take 20 mg by mouth Daily., Disp: , Rfl:   •   "venlafaxine XR (EFFEXOR-XR) 150 MG 24 hr capsule, venlafaxine 150 mg oral capsule,extended release 24hr take 1 capsule (150 mg) by oral route once daily   Active, Disp: , Rfl:   •  ondansetron ODT (Zofran ODT) 4 MG disintegrating tablet, Place 1 tablet on the tongue Every 8 (Eight) Hours As Needed for Nausea or Vomiting., Disp: 30 tablet, Rfl: 1     Allergies   Allergen Reactions   • Codeine Nausea Only   • Codeine Unknown - Low Severity       Family History   Problem Relation Age of Onset   • Diabetes Mother    • COPD Brother    • Diabetes Maternal Grandmother    • Colon cancer Neg Hx         Social History     Social History Narrative    ** Merged History Encounter **            Objective       Objective     Vital Signs:   /68 (BP Location: Left arm, Patient Position: Sitting, Cuff Size: Large Adult)   Pulse 65   Ht 182.9 cm (72\")   Wt 126 kg (278 lb)   SpO2 97%   BMI 37.70 kg/m²     Body mass index is 37.7 kg/m².    Physical Exam  Constitutional:       General: He is not in acute distress.     Appearance: Normal appearance. He is well-developed and normal weight.   Eyes:      Conjunctiva/sclera: Conjunctivae normal.      Pupils: Pupils are equal, round, and reactive to light.      Visual Fields: Right eye visual fields normal and left eye visual fields normal.   Cardiovascular:      Rate and Rhythm: Normal rate and regular rhythm.      Heart sounds: Normal heart sounds.   Pulmonary:      Effort: Pulmonary effort is normal. No retractions.      Breath sounds: Normal breath sounds and air entry.      Comments: Inspection of chest: normal appearance  Abdominal:      General: Bowel sounds are normal.      Palpations: Abdomen is soft.      Tenderness: There is no abdominal tenderness.      Comments: No appreciable hepatosplenomegaly   Musculoskeletal:      Cervical back: Neck supple.      Right lower leg: No edema.      Left lower leg: No edema.   Lymphadenopathy:      Cervical: No cervical adenopathy. "   Skin:     Findings: No lesion.      Comments: Turgor normal   Neurological:      Mental Status: He is alert and oriented to person, place, and time.   Psychiatric:         Mood and Affect: Mood and affect normal.              Assessment & Plan          Assessment and Plan    Diagnoses and all orders for this visit:    1. Nausea (Primary)    2. Nausea and vomiting, intractability of vomiting not specified, unspecified vomiting type    3. Gastroesophageal reflux disease, unspecified whether esophagitis present    4. Altered bowel habits    5. Encounter for screening for malignant neoplasm of colon    Other orders  -     ondansetron ODT (Zofran ODT) 4 MG disintegrating tablet; Place 1 tablet on the tongue Every 8 (Eight) Hours As Needed for Nausea or Vomiting.  Dispense: 30 tablet; Refill: 1      71-year-old male presenting the office today  as a new patient with history of nausea, vomiting, reflux, diabetes, hypertension and in need of a screening colonoscopy.  Plan:  I have recommended that the patient undergo further evaluation with an EGD and colonoscopy.  I have discussed this procedure in detail with the patient.  I have discussed the risks, benefits and alternatives.  I have discussed the risk of anesthesia, bleeding and perforation.  Patient understands these risks, benefits and alternatives and wishes to proceed.  I will schedule him at his earliest convenience.  I have prescribed Zofran ODT for the patient to use as needed for nausea and vomiting.  He will continue Protonix 40 mg daily.  We could consider gastric emptying study if continued nausea and vomiting with nondiagnostic EGD results.  Patient will continue with healthy lifestyle and diet for good control of his diabetes.  Patient will follow up in the office after endoscopy.  Patient agreeable to this plan will call with any questions or concerns.          Follow Up       Follow Up   Return for Follow up after endoscopy in office.  Patient was given  instructions and counseling regarding his condition or for health maintenance advice. Please see specific information pulled into the AVS if appropriate.

## 2022-03-31 ENCOUNTER — OFFICE VISIT (OUTPATIENT)
Dept: GASTROENTEROLOGY | Facility: CLINIC | Age: 72
End: 2022-03-31

## 2022-03-31 ENCOUNTER — PREP FOR SURGERY (OUTPATIENT)
Dept: OTHER | Facility: HOSPITAL | Age: 72
End: 2022-03-31

## 2022-03-31 VITALS
WEIGHT: 278 LBS | HEART RATE: 65 BPM | HEIGHT: 72 IN | OXYGEN SATURATION: 97 % | SYSTOLIC BLOOD PRESSURE: 151 MMHG | BODY MASS INDEX: 37.65 KG/M2 | DIASTOLIC BLOOD PRESSURE: 68 MMHG

## 2022-03-31 DIAGNOSIS — R11.2 NAUSEA AND VOMITING, INTRACTABILITY OF VOMITING NOT SPECIFIED, UNSPECIFIED VOMITING TYPE: ICD-10-CM

## 2022-03-31 DIAGNOSIS — Z12.11 ENCOUNTER FOR SCREENING FOR MALIGNANT NEOPLASM OF COLON: ICD-10-CM

## 2022-03-31 DIAGNOSIS — R19.4 ALTERED BOWEL HABITS: ICD-10-CM

## 2022-03-31 DIAGNOSIS — K21.9 GASTROESOPHAGEAL REFLUX DISEASE, UNSPECIFIED WHETHER ESOPHAGITIS PRESENT: ICD-10-CM

## 2022-03-31 DIAGNOSIS — R11.0 NAUSEA: Primary | ICD-10-CM

## 2022-03-31 PROCEDURE — 99204 OFFICE O/P NEW MOD 45 MIN: CPT | Performed by: NURSE PRACTITIONER

## 2022-03-31 RX ORDER — ROSUVASTATIN CALCIUM 20 MG/1
20 TABLET, COATED ORAL DAILY
COMMUNITY

## 2022-03-31 RX ORDER — ONDANSETRON 4 MG/1
4 TABLET, ORALLY DISINTEGRATING ORAL EVERY 8 HOURS PRN
Qty: 30 TABLET | Refills: 1 | Status: SHIPPED | OUTPATIENT
Start: 2022-03-31

## 2022-03-31 NOTE — PATIENT INSTRUCTIONS
Food Choices for Gastroesophageal Reflux Disease, Adult  When you have gastroesophageal reflux disease (GERD), the foods you eat and your eating habits are very important. Choosing the right foods can help ease your discomfort. Think about working with a food expert (dietitian) to help you make good choices.  What are tips for following this plan?  Reading food labels  Look for foods that are low in saturated fat. Foods that may help with your symptoms include:  Foods that have less than 5% of daily value (DV) of fat.  Foods that have 0 grams of trans fat.  Cooking  Do not garsia your food.  Cook your food by baking, steaming, grilling, or broiling. These are all methods that do not need a lot of fat for cooking.  To add flavor, try to use herbs that are low in spice and acidity.  Meal planning    Choose healthy foods that are low in fat, such as:  Fruits and vegetables.  Whole grains.  Low-fat dairy products.  Lean meats, fish, and poultry.  Eat small meals often instead of eating 3 large meals each day. Eat your meals slowly in a place where you are relaxed. Avoid bending over or lying down until 2-3 hours after eating.  Limit high-fat foods such as fatty meats or fried foods.  Limit your intake of fatty foods, such as oils, butter, and shortening.  Avoid the following as told by your doctor:  Foods that cause symptoms. These may be different for different people. Keep a food diary to keep track of foods that cause symptoms.  Alcohol.  Drinking a lot of liquid with meals.  Eating meals during the 2-3 hours before bed.    Lifestyle  Stay at a healthy weight. Ask your doctor what weight is healthy for you. If you need to lose weight, work with your doctor to do so safely.  Exercise for at least 30 minutes on 5 or more days each week, or as told by your doctor.  Wear loose-fitting clothes.  Do not smoke or use any products that contain nicotine or tobacco. If you need help quitting, ask your doctor.  Sleep with the head  of your bed higher than your feet. Use a wedge under the mattress or blocks under the bed frame to raise the head of the bed.  Chew sugar-free gum after meals.  What foods should eat?    Eat a healthy, well-balanced diet of fruits, vegetables, whole grains, low-fat dairy products, lean meats, fish, and poultry. Each person is different.  Foods that may cause symptoms in one person may not cause any symptoms in another person. Work with your doctor to find foods that are safe for you.  The items listed above may not be a complete list of what you can eat and drink. Contact a food expert for more options.  What foods should I avoid?  Limiting some of these foods may help in managing the symptoms of GERD. Everyone is different. Talk with a food expert or your doctor to help you find the exact foods to avoid, if any.  Fruits  Any fruits prepared with added fat. Any fruits that cause symptoms. For some people, this may include citrus fruits, such as oranges, grapefruit, pineapple, and te.  Vegetables  Deep-fried vegetables. French fries. Any vegetables prepared with added fat. Any vegetables that cause symptoms. For some people, this may include tomatoes and tomato products, chili peppers, onions and garlic, and horseradish.  Grains  Pastries or quick breads with added fat.  Meats and other proteins  High-fat meats, such as fatty beef or pork, hot dogs, ribs, ham, sausage, salami, and [place]. Fried meat or protein, including fried fish and fried chicken. Nuts and nut butters, in large amounts.  Dairy  Whole milk and chocolate milk. Sour cream. Cream. Ice cream. Cream cheese. Milkshakes.  Fats and oils  Butter. Margarine. Shortening. Ghee.  Beverages  Coffee and tea, with or without caffeine. Carbonated beverages. Sodas. Energy drinks. Fruit juice made with acidic fruits, such as orange or grapefruit. Tomato juice. Alcoholic drinks.  Sweets and desserts  Chocolate and cocoa. Donuts.  Seasonings and condiments  Pepper.  Peppermint and spearmint. Added salt. Any condiments, herbs, or seasonings that cause symptoms. For some people, this may include ramos, hot sauce, or vinegar-based salad dressings.  The items listed above may not be a complete list of what you should not eat and drink. Contact a food expert for more options.  Questions to ask your doctor  Diet and lifestyle changes are often the first steps that are taken to manage symptoms of GERD. If diet and lifestyle changes do not help, talk with your doctor about taking medicines.  Where to find more information  International Foundation for Gastrointestinal Disorders: aboutgerd.org  Summary  When you have GERD, food and lifestyle choices are very important in easing your symptoms.  Eat small meals often instead of 3 large meals a day. Eat your meals slowly and in a place where you are relaxed.  Avoid bending over or lying down until 2-3 hours after eating.  Limit high-fat foods such as fatty meats or fried foods.  This information is not intended to replace advice given to you by your health care provider. Make sure you discuss any questions you have with your health care provider.  Document Revised: 06/28/2021 Document Reviewed: 06/28/2021  Elsevier Patient Education © 2021 Elsevier Inc.

## 2022-04-04 ENCOUNTER — TELEPHONE (OUTPATIENT)
Dept: GASTROENTEROLOGY | Facility: CLINIC | Age: 72
End: 2022-04-04

## 2022-04-04 NOTE — TELEPHONE ENCOUNTER
"Patient's wife called the office stating pt has \"been sick since Friday night,\" and they wanted to notify Terri. Pt continues to take Zofran and PPI therapy. Pt encouraged to keep scope appt. Wife agreed to this plan. Terri aware.   "

## 2022-04-06 ENCOUNTER — PATIENT ROUNDING (BHMG ONLY) (OUTPATIENT)
Dept: GASTROENTEROLOGY | Facility: CLINIC | Age: 72
End: 2022-04-06

## 2022-04-06 NOTE — PROGRESS NOTES
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    My name is Radha     I am  with [facility]  [address] [place] [address] [phone].    Before we get started may I verify your date of birth? 1950    I am calling to officially welcome you to our practice and ask about your recent visit. Is this a good time to talk? Yes     Tell me about your visit with us. What things went well?  It was good       We're always looking for ways to make our patients' experiences even better. Do you have recommendations on ways we may improve?  No    Overall were you satisfied with your first visit to our practice? Yes        I appreciate you taking the time to speak with me today. Is there anything else I can do for you? No      Thank you, and have a great day.

## 2022-04-22 NOTE — SIGNIFICANT NOTE
Instructed patient to stop multivitamins.  Take BP meds the morning of with sips of water.  Clear liquids (no red or purple)      Electronically signed by Melissa Vazquez RN, 04/22/22, 11:01 AM EDT.

## 2022-04-25 ENCOUNTER — ANESTHESIA (OUTPATIENT)
Dept: GASTROENTEROLOGY | Facility: HOSPITAL | Age: 72
End: 2022-04-25

## 2022-04-25 ENCOUNTER — ANESTHESIA EVENT (OUTPATIENT)
Dept: GASTROENTEROLOGY | Facility: HOSPITAL | Age: 72
End: 2022-04-25

## 2022-04-25 ENCOUNTER — HOSPITAL ENCOUNTER (OUTPATIENT)
Facility: HOSPITAL | Age: 72
Setting detail: HOSPITAL OUTPATIENT SURGERY
Discharge: HOME | End: 2022-04-25
Attending: INTERNAL MEDICINE | Admitting: INTERNAL MEDICINE

## 2022-04-25 VITALS
HEART RATE: 58 BPM | DIASTOLIC BLOOD PRESSURE: 91 MMHG | OXYGEN SATURATION: 97 % | SYSTOLIC BLOOD PRESSURE: 187 MMHG | WEIGHT: 266.76 LBS | TEMPERATURE: 98.3 F | RESPIRATION RATE: 16 BRPM | BODY MASS INDEX: 36.18 KG/M2

## 2022-04-25 DIAGNOSIS — R11.2 NAUSEA AND VOMITING, INTRACTABILITY OF VOMITING NOT SPECIFIED, UNSPECIFIED VOMITING TYPE: ICD-10-CM

## 2022-04-25 DIAGNOSIS — Z12.11 ENCOUNTER FOR SCREENING FOR MALIGNANT NEOPLASM OF COLON: ICD-10-CM

## 2022-04-25 DIAGNOSIS — R11.0 NAUSEA: ICD-10-CM

## 2022-04-25 DIAGNOSIS — R11.2 NAUSEA AND VOMITING: ICD-10-CM

## 2022-04-25 DIAGNOSIS — R19.4 ALTERED BOWEL HABITS: ICD-10-CM

## 2022-04-25 DIAGNOSIS — K21.9 GASTROESOPHAGEAL REFLUX DISEASE, UNSPECIFIED WHETHER ESOPHAGITIS PRESENT: ICD-10-CM

## 2022-04-25 LAB — GLUCOSE BLDC GLUCOMTR-MCNC: 168 MG/DL (ref 70–99)

## 2022-04-25 PROCEDURE — 82962 GLUCOSE BLOOD TEST: CPT

## 2022-04-25 PROCEDURE — 88305 TISSUE EXAM BY PATHOLOGIST: CPT | Performed by: INTERNAL MEDICINE

## 2022-04-25 PROCEDURE — 25010000002 PROPOFOL 10 MG/ML EMULSION

## 2022-04-25 PROCEDURE — 43239 EGD BIOPSY SINGLE/MULTIPLE: CPT | Performed by: INTERNAL MEDICINE

## 2022-04-25 PROCEDURE — 88342 IMHCHEM/IMCYTCHM 1ST ANTB: CPT | Performed by: INTERNAL MEDICINE

## 2022-04-25 PROCEDURE — 45385 COLONOSCOPY W/LESION REMOVAL: CPT | Performed by: INTERNAL MEDICINE

## 2022-04-25 RX ORDER — LIDOCAINE HYDROCHLORIDE 20 MG/ML
INJECTION, SOLUTION EPIDURAL; INFILTRATION; INTRACAUDAL; PERINEURAL AS NEEDED
Status: DISCONTINUED | OUTPATIENT
Start: 2022-04-25 | End: 2022-04-25 | Stop reason: SURG

## 2022-04-25 RX ORDER — SODIUM CHLORIDE, SODIUM LACTATE, POTASSIUM CHLORIDE, CALCIUM CHLORIDE 600; 310; 30; 20 MG/100ML; MG/100ML; MG/100ML; MG/100ML
30 INJECTION, SOLUTION INTRAVENOUS CONTINUOUS
Status: DISCONTINUED | OUTPATIENT
Start: 2022-04-25 | End: 2022-04-25 | Stop reason: HOSPADM

## 2022-04-25 RX ORDER — GLYCOPYRROLATE 0.2 MG/ML
INJECTION INTRAMUSCULAR; INTRAVENOUS AS NEEDED
Status: DISCONTINUED | OUTPATIENT
Start: 2022-04-25 | End: 2022-04-25 | Stop reason: SURG

## 2022-04-25 RX ORDER — PROPOFOL 10 MG/ML
VIAL (ML) INTRAVENOUS AS NEEDED
Status: DISCONTINUED | OUTPATIENT
Start: 2022-04-25 | End: 2022-04-25 | Stop reason: SURG

## 2022-04-25 RX ADMIN — GLYCOPYRROLATE 0.1 MG: 0.2 INJECTION INTRAMUSCULAR; INTRAVENOUS at 15:45

## 2022-04-25 RX ADMIN — LIDOCAINE HYDROCHLORIDE 40 MG: 20 INJECTION, SOLUTION EPIDURAL; INFILTRATION; INTRACAUDAL; PERINEURAL at 15:57

## 2022-04-25 RX ADMIN — LIDOCAINE HYDROCHLORIDE 60 MG: 20 INJECTION, SOLUTION EPIDURAL; INFILTRATION; INTRACAUDAL; PERINEURAL at 15:34

## 2022-04-25 RX ADMIN — PROPOFOL 40 MG: 10 INJECTION, EMULSION INTRAVENOUS at 15:59

## 2022-04-25 RX ADMIN — SODIUM CHLORIDE, POTASSIUM CHLORIDE, SODIUM LACTATE AND CALCIUM CHLORIDE 30 ML/HR: 600; 310; 30; 20 INJECTION, SOLUTION INTRAVENOUS at 14:29

## 2022-04-25 RX ADMIN — PROPOFOL 100 MG: 10 INJECTION, EMULSION INTRAVENOUS at 15:34

## 2022-04-25 RX ADMIN — PROPOFOL 150 MCG/KG/MIN: 10 INJECTION, EMULSION INTRAVENOUS at 15:34

## 2022-04-25 NOTE — INTERVAL H&P NOTE
H&P reviewed. The patient was examined and there are no changes to the H&P.    Possible risks/complications, benefits, and alternatives to surgical or invasive procedure have been explained to patient and/or legal guardian.

## 2022-04-25 NOTE — ANESTHESIA POSTPROCEDURE EVALUATION
Patient: Pastor Bartholomew    Procedure Summary     Date: 04/25/22 Room / Location: [facility] ENDOSCOPY 2 / [facility] ENDOSCOPY    Anesthesia Start: 1532 Anesthesia Stop: 1608    Procedures:       ESOPHAGOGASTRODUODENOSCOPY WITH BX (N/A )      COLONOSCOPY WITH POLYPECTOMY (N/A ) Diagnosis:       Nausea      Nausea and vomiting, intractability of vomiting not specified, unspecified vomiting type      Gastroesophageal reflux disease, unspecified whether esophagitis present      Encounter for screening for malignant neoplasm of colon      Altered bowel habits      (Nausea [R11.0])      (Nausea and vomiting, intractability of vomiting not specified, unspecified vomiting type [R11.2])      (Gastroesophageal reflux disease, unspecified whether esophagitis present [K21.9])      (Encounter for screening for malignant neoplasm of colon [Z12.11])      (Altered bowel habits [R19.4])    Surgeons: Artie Dickerson MD Provider: Tre Hughes MD    Anesthesia Type: general, MAC ASA Status: 3          Anesthesia Type: general, MAC    Vitals  Vitals Value Taken Time   /91 04/25/22 1637   Temp 36.8 °C (98.3 °F) 04/25/22 1608   Pulse 59 04/25/22 1637   Resp 16 04/25/22 1636   SpO2 96 % 04/25/22 1637   Vitals shown include unvalidated device data.        Post Anesthesia Care and Evaluation    Patient location during evaluation: bedside  Patient participation: complete - patient participated  Level of consciousness: awake  Pain management: adequate  Airway patency: patent  Anesthetic complications: No anesthetic complications  PONV Status: none  Cardiovascular status: acceptable and stable  Respiratory status: acceptable  Hydration status: acceptable    Comments: An Anesthesiologist personally participated in the most demanding procedures (including induction and emergence if applicable) in the anesthesia plan, monitored the course of anesthesia administration at frequent intervals and remained physically present and  available for immediate diagnosis and treatment of emergencies.

## 2022-04-25 NOTE — ANESTHESIA PREPROCEDURE EVALUATION
Anesthesia Evaluation     Patient summary reviewed and Nursing notes reviewed   history of anesthetic complications: PONV  NPO Solid Status: > 8 hours  NPO Liquid Status: > 2 hours           Airway   Mallampati: III  TM distance: >3 FB  Neck ROM: full  No difficulty expected and Large neck circumference  Dental          Pulmonary - normal exam    breath sounds clear to auscultation  (+) sleep apnea,   Cardiovascular - normal exam  Exercise tolerance: good (4-7 METS)    Rhythm: regular  Rate: normal    (+) hypertension, hyperlipidemia,       Neuro/Psych- negative ROS  GI/Hepatic/Renal/Endo    (+) obesity,  GERD,  diabetes mellitus,     Musculoskeletal (-) negative ROS    Abdominal    Substance History - negative use     OB/GYN negative ob/gyn ROS         Other - negative ROS                       Anesthesia Plan    ASA 3     general and MAC   (Patient understands anesthesia not responsible for dental damage.)  intravenous induction     Anesthetic plan, all risks, benefits, and alternatives have been provided, discussed and informed consent has been obtained with: patient.  Use of blood products discussed with patient .   Plan discussed with CRNA.        CODE STATUS:

## 2022-04-28 LAB
CYTO UR: NORMAL
LAB AP CASE REPORT: NORMAL
LAB AP CLINICAL INFORMATION: NORMAL
PATH REPORT.FINAL DX SPEC: NORMAL
PATH REPORT.GROSS SPEC: NORMAL

## 2022-04-28 RX ORDER — BISMUTH SUBCITRATE POTASSIUM, METRONIDAZOLE, TETRACYCLINE HYDROCHLORIDE 140; 125; 125 MG/1; MG/1; MG/1
3 CAPSULE ORAL
Qty: 120 CAPSULE | Refills: 0 | Status: SHIPPED | OUTPATIENT
Start: 2022-04-28 | End: 2022-05-08

## 2022-04-29 ENCOUNTER — TELEPHONE (OUTPATIENT)
Dept: GASTROENTEROLOGY | Facility: CLINIC | Age: 72
End: 2022-04-29

## 2022-04-29 NOTE — TELEPHONE ENCOUNTER
----- Message from Ariana Pavon sent at 4/29/2022 12:54 PM EDT -----    ----- Message -----  From: Terri Loo NP  Sent: 4/28/2022   4:06 PM EDT  To: [facility]    Please call the patient and inform that they are positive for H. pylori.  H. pylori is overgrowth of bacteria within the stomach.  We treat this infection with antibiotics and antacids.  Educated the patient that I have called in medication to the pharmacy to treat H. pylori.  We will repeat a H. pylori breath test in 8 weeks.  Educated the patient that the breath test will need to be performed once the medication is completed and the patient will need to be off antacids for 2 weeks prior to the exam.  The breath test has been ordered and the patient can get the test performed at the hospital.  Patient cannot have anything to eat or drink at least 4 to 6 hours prior to the test.  Do not take this with multivitamin

## 2022-04-29 NOTE — TELEPHONE ENCOUNTER
Spoke to pt and informed of Terri OROPEZA result note. Educated and mailed pt on H. Pylori, H. Pylori breath test and importance medication adherence. Pt verified understanding.

## 2022-05-02 ENCOUNTER — DOCUMENTATION (OUTPATIENT)
Dept: PHYSICAL THERAPY | Facility: CLINIC | Age: 72
End: 2022-05-02

## 2022-05-02 DIAGNOSIS — M54.50 LOW BACK PAIN, UNSPECIFIED BACK PAIN LATERALITY, UNSPECIFIED CHRONICITY, UNSPECIFIED WHETHER SCIATICA PRESENT: Primary | ICD-10-CM

## 2022-05-02 DIAGNOSIS — M51.36 DDD (DEGENERATIVE DISC DISEASE), LUMBAR: ICD-10-CM

## 2022-05-02 NOTE — PROGRESS NOTES
Discharge Summary  Discharge Summary from Physical Therapy Report          Goals: Partially Met    Discharge Plan: Continue with current home exercise program as instructed    Comments Pt reporting he feels good about today being his last day, understanding of his HEP. He is still having some trouble with his right hip, but feels some of the exercises have been helping with this as well    Date of Discharge 5/2/22    1. The patient complains of low back pain.  LTG 1: 12 weeks:  The patient will report a pain rating of 3/10 or better at worst in order to improve  tolerance to activities of daily living and improve sleep quality.  STATUS:  Not met, progressing  STG 1a: 6 weeks:  The patient will report a pain rating of 5/10 or better at its worst.  STATUS:  MET  TREATMENT:  Therapeutic exercises, manual therapy, aquatic therapy, home exercise   instruction, and modalities as needed for pain to include:  electrical stimulation, moist heat, ice,   ultrasound, and diathermy.      2. The patient demonstrates weakness of the bilateral hip.  LTG 2: 12 weeks:  The patient will demonstrate 5 /5 strength for bilateral hip flexion, abduction,  and extension in order to improve hip stability.  STATUS:  Not met, progressing  STG 2a: 6 weeks:  The patient will demonstrate independent HEP.  STATUS:   MET  TREATMENT: Therapeutic exercises, manual therapy, aquatic therapy, home exercise instruction,  and modalities as needed for pain to include:  electrical stimulation, moist heat, ice, ultrasound, and   diathermy.    3. Mobility: Walking/Moving Around Functional Limitation                   LTG 3: 12 weeks:  The patient will demonstrate 1-19 % limitation by achieving a score of 1-9 on the HUY.  STATUS:  MET  STG 3 a: 6 weeks:  The patient will demonstrate 20-39 % limitation by achieving a score of 10-19 on the HUY.    STATUS:   MET  TREATMENT:  Manual therapy, therapeutic exercise, home exercise instruction, and modalities as needed  to include: moist heat, electrical stimulation, and ultrasound.      4. The patient has limited lumbar AROM  LTG 4: 12 weeks:  The patient will demonstrate lumbar AROM as follows: full flexion and extension pain free.  STATUS:  MET  TREATMENT: Manual therapy, therapeutic exercise, home exercise instruction, and modalities as needed to include: moist heat, electrical stimulation, and ultrasound.        Diego Doherty PT  Physical Therapist    Electronically signed 5/2/2022    [place] License: PT - [id number]

## 2022-06-15 ENCOUNTER — LAB (OUTPATIENT)
Dept: LAB | Facility: HOSPITAL | Age: 72
End: 2022-06-15

## 2022-06-15 ENCOUNTER — TRANSCRIBE ORDERS (OUTPATIENT)
Dept: LAB | Facility: HOSPITAL | Age: 72
End: 2022-06-15

## 2022-06-15 DIAGNOSIS — E78.5 HYPERLIPIDEMIA, UNSPECIFIED HYPERLIPIDEMIA TYPE: ICD-10-CM

## 2022-06-15 DIAGNOSIS — Z96.41 INSULIN PUMP STATUS: ICD-10-CM

## 2022-06-15 DIAGNOSIS — E11.9 DIABETES MELLITUS WITHOUT COMPLICATION: Primary | ICD-10-CM

## 2022-06-15 DIAGNOSIS — E11.9 DIABETES MELLITUS WITHOUT COMPLICATION: ICD-10-CM

## 2022-06-15 LAB
ALBUMIN SERPL-MCNC: 4.6 G/DL (ref 3.5–5.2)
ALBUMIN UR-MCNC: 2.5 MG/DL
ALBUMIN/GLOB SERPL: 2.6 G/DL
ALP SERPL-CCNC: 99 U/L (ref 39–117)
ALT SERPL W P-5'-P-CCNC: 13 U/L (ref 1–41)
ANION GAP SERPL CALCULATED.3IONS-SCNC: 12.1 MMOL/L (ref 5–15)
AST SERPL-CCNC: 16 U/L (ref 1–40)
BILIRUB SERPL-MCNC: 0.4 MG/DL (ref 0–1.2)
BUN SERPL-MCNC: 14 MG/DL (ref 8–23)
BUN/CREAT SERPL: 16.1 (ref 7–25)
CALCIUM SPEC-SCNC: 8.8 MG/DL (ref 8.6–10.5)
CHLORIDE SERPL-SCNC: 101 MMOL/L (ref 98–107)
CHOLEST SERPL-MCNC: 127 MG/DL (ref 0–200)
CO2 SERPL-SCNC: 25.9 MMOL/L (ref 22–29)
CREAT SERPL-MCNC: 0.87 MG/DL (ref 0.76–1.27)
CREAT UR-MCNC: 101.4 MG/DL
EGFRCR SERPLBLD CKD-EPI 2021: 93.4 ML/MIN/1.73
GLOBULIN UR ELPH-MCNC: 1.8 GM/DL
GLUCOSE SERPL-MCNC: 171 MG/DL (ref 65–99)
HBA1C MFR BLD: 7.5 % (ref 4.8–5.6)
HDLC SERPL-MCNC: 36 MG/DL (ref 40–60)
LDLC SERPL CALC-MCNC: 69 MG/DL (ref 0–100)
LDLC/HDLC SERPL: 1.86 {RATIO}
MICROALBUMIN/CREAT UR: 24.7 MG/G
POTASSIUM SERPL-SCNC: 3.6 MMOL/L (ref 3.5–5.2)
PROT SERPL-MCNC: 6.4 G/DL (ref 6–8.5)
SODIUM SERPL-SCNC: 139 MMOL/L (ref 136–145)
TRIGL SERPL-MCNC: 121 MG/DL (ref 0–150)
VLDLC SERPL-MCNC: 22 MG/DL (ref 5–40)

## 2022-06-15 PROCEDURE — 36415 COLL VENOUS BLD VENIPUNCTURE: CPT

## 2022-06-15 PROCEDURE — 82570 ASSAY OF URINE CREATININE: CPT

## 2022-06-15 PROCEDURE — 83036 HEMOGLOBIN GLYCOSYLATED A1C: CPT

## 2022-06-15 PROCEDURE — 80053 COMPREHEN METABOLIC PANEL: CPT

## 2022-06-15 PROCEDURE — 82043 UR ALBUMIN QUANTITATIVE: CPT

## 2022-06-15 PROCEDURE — 80061 LIPID PANEL: CPT

## 2022-07-20 NOTE — PROGRESS NOTES
Chief Complaint   3 month endo follow up    History of Present Illness       Pastor Bartholomew is a 71 y.o. male who presents to [facility] GASTROENTEROLOGY for follow-up with a history of nausea, vomiting, reflux, diabetes, hypertension.  Patient presenting for follow-up after endoscopy.  We reviewed endoscopy and pathology at this time.  Patient treated for H. pylori at the end of April.  At the last office visit patient was given Zofran to use as needed and continued on Protonix.  May 11th completed treatment for h. Pylori and reports resolution of his symptoms of nausea vomiting and reflux.  He continues on Protonix with good control of his reflux.  Patient denies fever, nausea, vomiting, weight loss, night sweats, melena, hematochezia, hematemesis.    Endoscopy: Review of the patient's most recent EGD and colonoscopy performed by Dr. Dickerson on 04.25.2022 4 mm polyp in the ascending colon.  Short segment of Paz's esophagus noted in the lower third of the esophagus, small hiatal hernia, stomach normal duodenum normal.  Biopsies consistent with chronic active gastritis, H. pylori.  Tubular adenoma colon biopsy.  Repeat: 5 years.  Repeat EGD in 3 years.        Results       Result Review :       CMP    CMP 12/17/21 3/11/22 6/15/22   Glucose 219 (A) 126 (A) 171 (A)   BUN 10 15 14   Creatinine 0.94 0.92 0.87   eGFR Non African Am 80     Sodium 140 142 139   Potassium 3.5 3.4 (A) 3.6   Chloride 101 103 101   Calcium 9.0 9.2 8.8   Albumin 4.20 4.90 4.60   Total Bilirubin 0.5 0.6 0.4   Alkaline Phosphatase 105 88 99   AST (SGOT) 18 17 16   ALT (SGPT) 24 17 13   (A) Abnormal value            CBC    CBC 12/17/21 4/29/22   WBC 6.68 6.85   RBC 4.86 5.02   Hemoglobin 14.2 14.0   Hematocrit 42.6 41.5   MCV 87.7 82.7   MCH 29.2 27.9   MCHC 33.3 33.7   RDW 13.5 12.9   Platelets 234 210                         Past Medical History       Past Medical History:   Diagnosis Date   • Diabetes (HCC)    • Hernia,  inguinal    • Hyperlipidemia    • Hypertension    • PONV (postoperative nausea and vomiting)    • Type 2 diabetes mellitus (HCC)        Past Surgical History:   Procedure Laterality Date   • COLONOSCOPY     • COLONOSCOPY N/A 04/25/2022    Procedure: COLONOSCOPY WITH POLYPECTOMY;  Surgeon: Artie Dickerson MD;  Location: [facility] ENDOSCOPY;  Service: Gastroenterology;  Laterality: N/A;  COLON POLYP   • ENDOSCOPY N/A 04/25/2022    Procedure: ESOPHAGOGASTRODUODENOSCOPY WITH BX;  Surgeon: Artie Dickerson MD;  Location: [facility] ENDOSCOPY;  Service: Gastroenterology;  Laterality: N/A;  CAPPS'S ESOPHAGUS, HIATAL HERNIA   • HERNIA REPAIR     • KNEE SURGERY Bilateral    • TRIGGER FINGER RELEASE Bilateral     x2 THUMBS         Current Outpatient Medications:   •  amLODIPine-benazepril (LOTREL) 10-40 MG per capsule, Take 1 capsule by mouth Daily., Disp: , Rfl:   •  aspirin 81 MG EC tablet, Aspir-81 81 mg oral tablet,delayed release (DR/EC) take 1 tablet (81 mg) by oral route once daily   Active, Disp: , Rfl:   •  carvedilol (COREG) 25 MG tablet, , Disp: , Rfl:   •  cetirizine (zyrTEC) 10 MG tablet, Take 10 mg by mouth Daily., Disp: , Rfl:   •  cholecalciferol (VITAMIN D3) 10 MCG (400 UNIT) tablet, Take  by mouth Daily., Disp: , Rfl:   •  CONTOUR NEXT TEST test strip, Test 6 times daily DX: e11.65, Disp: 200 each, Rfl: 5  •  fluticasone (FLONASE) 50 MCG/ACT nasal spray, INSITLL 1 SPRAY INTO EACH NOSTRIL EVERY DAY FOR 7 DAYS, Disp: , Rfl:   •  hydroCHLOROthiazide (HYDRODIURIL) 12.5 MG tablet, Take 12.5 mg by mouth Daily., Disp: , Rfl:   •  insulin regular (HUMULIN R) 500 UNIT/ML CONCENTRATED injection, 50u daily with insulin pump, Disp: 40 mL, Rfl: 3  •  L-Lysine 500 MG tablet tablet, Take  by mouth., Disp: , Rfl:   •  Lancets misc, Check 5 times daily DX: e11.65, Disp: 500 each, Rfl: 1  •  metFORMIN (GLUCOPHAGE) 500 MG tablet, metformin 500 mg oral tablet take 1 tablet (500 mg) by oral route 2 times per day with  "morning and evening meals   Active, Disp: , Rfl:   •  multivitamin with minerals tablet tablet, Centrum 3,500-18-0.4 unit-mg-mg oral tablet,chewable chew 1 tablet by oral route daily   Active, Disp: , Rfl:   •  omega-3 acid ethyl esters (LOVAZA) 1 g capsule, Take 2 capsules by mouth 2 (Two) Times a Day., Disp: 180 capsule, Rfl: 2  •  Omega-3 Fatty Acids (fish oil) 1000 MG capsule capsule, Take 2 g by mouth., Disp: , Rfl:   •  ondansetron ODT (Zofran ODT) 4 MG disintegrating tablet, Place 1 tablet on the tongue Every 8 (Eight) Hours As Needed for Nausea or Vomiting., Disp: 30 tablet, Rfl: 1  •  pantoprazole (PROTONIX) 40 MG EC tablet, , Disp: , Rfl:   •  rosuvastatin (CRESTOR) 20 MG tablet, Take 20 mg by mouth Daily., Disp: , Rfl:   •  venlafaxine XR (EFFEXOR-XR) 150 MG 24 hr capsule, venlafaxine 150 mg oral capsule,extended release 24hr take 1 capsule (150 mg) by oral route once daily   Active, Disp: , Rfl:      Allergies   Allergen Reactions   • Codeine Nausea Only   • Codeine Unknown - Low Severity       Family History   Problem Relation Age of Onset   • Diabetes Mother    • COPD Brother    • Diabetes Maternal Grandmother    • Colon cancer Neg Hx         Social History     Social History Narrative    ** Merged History Encounter **            Objective     Vital Signs:   /70 (BP Location: Right arm, Patient Position: Sitting, Cuff Size: Adult)   Pulse 63   Ht 182.9 cm (72\")   Wt 127 kg (279 lb)   SpO2 98%   BMI 37.84 kg/m²       Physical Exam  Constitutional:       General: He is not in acute distress.     Appearance: Normal appearance. He is well-developed and normal weight.   Eyes:      Conjunctiva/sclera: Conjunctivae normal.      Pupils: Pupils are equal, round, and reactive to light.      Visual Fields: Right eye visual fields normal and left eye visual fields normal.   Cardiovascular:      Rate and Rhythm: Normal rate and regular rhythm.      Heart sounds: Normal heart sounds.   Pulmonary:      " Effort: Pulmonary effort is normal. No retractions.      Breath sounds: Normal breath sounds and air entry.      Comments: Inspection of chest: normal appearance  Abdominal:      General: Bowel sounds are normal.      Palpations: Abdomen is soft.      Tenderness: There is no abdominal tenderness.      Comments: No appreciable hepatosplenomegaly   Musculoskeletal:      Cervical back: Neck supple.      Right lower leg: No edema.      Left lower leg: No edema.   Lymphadenopathy:      Cervical: No cervical adenopathy.   Skin:     Findings: No lesion.      Comments: Turgor normal   Neurological:      Mental Status: He is alert and oriented to person, place, and time.   Psychiatric:         Mood and Affect: Mood and affect normal.           Assessment & Plan          Assessment and Plan    Diagnoses and all orders for this visit:    1. Nausea (Primary)    2. Gastroesophageal reflux disease, unspecified whether esophagitis present    3. Encounter for screening for malignant neoplasm of colon    4. Altered bowel habits    5. Nausea and vomiting, unspecified vomiting type    6. History of Helicobacter pylori infection  -     H. Pylori Breath Test - Breath, Lung; Future      71-year-old male presenting the office today for follow-up with a history of nausea, vomiting, reflux, diabetes, hypertension.  Patient presenting for follow-up after endoscopy.  We reviewed endoscopy and pathology at this time.  Patient treated for H. pylori at the end of April and completed May 11.  I have ordered repeat breath testing given patient information sheet on how to complete this.  Patient understands that he will need to hold his Protonix 2 weeks prior to lab testing.  Patient will follow up on an as-needed basis.  He will be due for repeat colonoscopy 2027 and repeat EGD in 2025.  Patient agreeable to this plan.  Patient to call with any questions or concerns.        Follow Up       Follow Up   Return if symptoms worsen or fail to improve,  for We will call with H. pylori breath test..  Patient was given instructions and counseling regarding his condition or for health maintenance advice. Please see specific information pulled into the AVS if appropriate.

## 2022-07-25 ENCOUNTER — OFFICE VISIT (OUTPATIENT)
Dept: GASTROENTEROLOGY | Facility: CLINIC | Age: 72
End: 2022-07-25

## 2022-07-25 VITALS
HEIGHT: 72 IN | BODY MASS INDEX: 37.79 KG/M2 | DIASTOLIC BLOOD PRESSURE: 70 MMHG | HEART RATE: 63 BPM | OXYGEN SATURATION: 98 % | SYSTOLIC BLOOD PRESSURE: 174 MMHG | WEIGHT: 279 LBS

## 2022-07-25 DIAGNOSIS — R19.4 ALTERED BOWEL HABITS: ICD-10-CM

## 2022-07-25 DIAGNOSIS — Z12.11 ENCOUNTER FOR SCREENING FOR MALIGNANT NEOPLASM OF COLON: ICD-10-CM

## 2022-07-25 DIAGNOSIS — K21.9 GASTROESOPHAGEAL REFLUX DISEASE, UNSPECIFIED WHETHER ESOPHAGITIS PRESENT: ICD-10-CM

## 2022-07-25 DIAGNOSIS — Z86.19 HISTORY OF HELICOBACTER PYLORI INFECTION: ICD-10-CM

## 2022-07-25 DIAGNOSIS — R11.0 NAUSEA: Primary | ICD-10-CM

## 2022-07-25 DIAGNOSIS — R11.2 NAUSEA AND VOMITING, UNSPECIFIED VOMITING TYPE: ICD-10-CM

## 2022-07-25 PROCEDURE — 99213 OFFICE O/P EST LOW 20 MIN: CPT | Performed by: NURSE PRACTITIONER

## 2022-07-25 RX ORDER — HYDROCHLOROTHIAZIDE 12.5 MG/1
12.5 TABLET ORAL DAILY
COMMUNITY
Start: 2022-05-18

## 2022-07-25 RX ORDER — FLUTICASONE PROPIONATE 50 MCG
SPRAY, SUSPENSION (ML) NASAL
COMMUNITY
Start: 2022-04-29

## 2022-07-25 RX ORDER — LANSOPRAZOLE 30 MG/1
CAPSULE, DELAYED RELEASE ORAL
COMMUNITY
Start: 2022-04-29 | End: 2022-07-25

## 2022-07-25 RX ORDER — AMLODIPINE BESYLATE AND BENAZEPRIL HYDROCHLORIDE 10; 40 MG/1; MG/1
1 CAPSULE ORAL DAILY
COMMUNITY
Start: 2022-06-02

## 2022-07-25 NOTE — PATIENT INSTRUCTIONS
Paz's Esophagus    Paz's esophagus occurs when the tissue that lines the esophagus changes or becomes damaged. The esophagus is the tube that carries food from the throat to the stomach. With Paz's esophagus, the cells that line the esophagus are replaced by cells that are similar to the lining of the intestines (intestinal metaplasia).  Paz's esophagus itself may not cause any symptoms. However, many people who have Paz's esophagus also have gastroesophageal reflux disease (GERD), which may cause symptoms such as heartburn. Over time, a few people with this condition may develop cancer of the esophagus. Treatment may include medicines, procedures to destroy the abnormal cells, or surgery.  What are the causes?  The exact cause of this condition is not known. In some cases, the condition develops from damage to the lining of the esophagus caused by GERD. GERD occurs when stomach acids flow up from the stomach into the esophagus. Frequent symptoms of GERD may cause intestinal metaplasia or cause cell changes (dysplasia).  What increases the risk?  You are more likely to develop this condition if you:  Have GERD.  Are male.  Are .  Are obese.  Are older than 50.  Have a hiatal hernia. This is a condition in which part of your stomach bulges into your chest.  Smoke.  What are the signs or symptoms?  People with Paz's esophagus often have no symptoms. However, many people with this condition also have GERD. Symptoms of GERD may include:  Heartburn.  Difficulty swallowing.  Dry cough.  How is this diagnosed?  This condition may be diagnosed based on:  Results of an upper gastrointestinal endoscopy. For this exam, a thin, flexible tube with a light and a camera on the end (endoscope) is passed down your esophagus. Your health care provider can view the inside of your esophagus during this procedure.  Results of a biopsy. For this procedure, several tissue samples are removed (biopsy) from  your esophagus. They are then checked for intestinal metaplasia or dysplasia.  How is this treated?  Treatment for this condition may include:  Medicines (proton pump inhibitors, or PPIs) to decrease or stop GERD.  Periodic endoscopic exams to make sure that cancer is not developing.  A procedure or surgery for dysplasia. This may include:  Removal or destruction of abnormal cells.  Removal of part of the esophagus (esophagectomy).  Follow these instructions at home:  Eating and drinking  Eat more fruits and vegetables.  Avoid fatty foods.  Eat small, frequent meals instead of large meals.  Avoid foods that cause heartburn. These foods include:  Coffee and alcoholic drinks.  Tomatoes and foods made with tomatoes.  Greasy or spicy foods.  Chocolate and peppermint.  Do not drink alcohol.  General instructions  Take over-the-counter and prescription medicines only as told by your health care provider.  Do not use any products that contain nicotine or tobacco, such as cigarettes and e-cigarettes. If you need help quitting, ask your health care provider.  If you are being treated for GERD, make sure you take medicines and follow all instructions as told by your health care provider.  Keep all follow-up visits as told by your health care provider. This is important.  Contact a health care provider if:  You have heartburn or GERD symptoms.  You have difficulty swallowing.  Get help right away if:  You have chest pain.  You are unable to swallow.  You vomit blood or material that looks like coffee grounds.  Your stool (feces) is bright red or dark.  Summary  Paz's esophagus occurs when the tissue that lines the esophagus changes or becomes damaged.  Paz's esophagus may be diagnosed with an upper gastrointestinal endoscopy and a biopsy.  Treatment may include medicines, procedures to remove abnormal cells, or surgery.  Follow your health care provider's instructions about what to eat and drink, what medicines to  take, and when to call for help.  This information is not intended to replace advice given to you by your health care provider. Make sure you discuss any questions you have with your health care provider.  Document Revised: 04/15/2019 Document Reviewed: 04/15/2019  Elsevier Patient Education © 2021 [employer]

## 2022-08-01 ENCOUNTER — LAB (OUTPATIENT)
Dept: LAB | Facility: HOSPITAL | Age: 72
End: 2022-08-01

## 2022-08-01 DIAGNOSIS — Z86.19 HISTORY OF HELICOBACTER PYLORI INFECTION: ICD-10-CM

## 2022-08-01 LAB — UREA BREATH TEST QL: NEGATIVE

## 2022-08-01 PROCEDURE — 83013 H PYLORI (C-13) BREATH: CPT

## 2022-08-03 ENCOUNTER — TELEPHONE (OUTPATIENT)
Dept: GASTROENTEROLOGY | Facility: CLINIC | Age: 72
End: 2022-08-03

## 2022-08-03 NOTE — TELEPHONE ENCOUNTER
Pt did not answer, left a detailed message, also left a CB number ----- Message from JOHNNA Campo sent at 8/2/2022  7:43 AM EDT -----  Negative breath test

## 2022-09-27 ENCOUNTER — TRANSCRIBE ORDERS (OUTPATIENT)
Dept: LAB | Facility: HOSPITAL | Age: 72
End: 2022-09-27

## 2022-09-27 ENCOUNTER — LAB (OUTPATIENT)
Dept: LAB | Facility: HOSPITAL | Age: 72
End: 2022-09-27

## 2022-09-27 DIAGNOSIS — E11.9 DIABETES MELLITUS WITHOUT COMPLICATION: ICD-10-CM

## 2022-09-27 DIAGNOSIS — E11.9 DIABETES MELLITUS WITHOUT COMPLICATION: Primary | ICD-10-CM

## 2022-09-27 LAB
ALBUMIN SERPL-MCNC: 4.4 G/DL (ref 3.5–5.2)
ALBUMIN/GLOB SERPL: 2.3 G/DL
ALP SERPL-CCNC: 80 U/L (ref 39–117)
ALT SERPL W P-5'-P-CCNC: 15 U/L (ref 1–41)
ANION GAP SERPL CALCULATED.3IONS-SCNC: 10 MMOL/L (ref 5–15)
AST SERPL-CCNC: 17 U/L (ref 1–40)
BILIRUB SERPL-MCNC: 0.5 MG/DL (ref 0–1.2)
BUN SERPL-MCNC: 14 MG/DL (ref 8–23)
BUN/CREAT SERPL: 16.5 (ref 7–25)
CALCIUM SPEC-SCNC: 9 MG/DL (ref 8.6–10.5)
CHLORIDE SERPL-SCNC: 103 MMOL/L (ref 98–107)
CO2 SERPL-SCNC: 29 MMOL/L (ref 22–29)
CREAT SERPL-MCNC: 0.85 MG/DL (ref 0.76–1.27)
EGFRCR SERPLBLD CKD-EPI 2021: 94.1 ML/MIN/1.73
GLOBULIN UR ELPH-MCNC: 1.9 GM/DL
GLUCOSE SERPL-MCNC: 117 MG/DL (ref 65–99)
HBA1C MFR BLD: 6.9 % (ref 4.8–5.6)
POTASSIUM SERPL-SCNC: 3.7 MMOL/L (ref 3.5–5.2)
PROT SERPL-MCNC: 6.3 G/DL (ref 6–8.5)
SODIUM SERPL-SCNC: 142 MMOL/L (ref 136–145)

## 2022-09-27 PROCEDURE — 83036 HEMOGLOBIN GLYCOSYLATED A1C: CPT

## 2022-09-27 PROCEDURE — 80053 COMPREHEN METABOLIC PANEL: CPT

## 2022-09-27 PROCEDURE — 36415 COLL VENOUS BLD VENIPUNCTURE: CPT

## 2022-11-07 ENCOUNTER — OFFICE VISIT (OUTPATIENT)
Dept: SLEEP MEDICINE | Facility: HOSPITAL | Age: 72
End: 2022-11-07

## 2022-11-07 VITALS
DIASTOLIC BLOOD PRESSURE: 72 MMHG | HEIGHT: 72 IN | BODY MASS INDEX: 37.5 KG/M2 | WEIGHT: 276.9 LBS | OXYGEN SATURATION: 96 % | SYSTOLIC BLOOD PRESSURE: 151 MMHG | HEART RATE: 61 BPM

## 2022-11-07 DIAGNOSIS — G47.33 OSA ON CPAP: Primary | ICD-10-CM

## 2022-11-07 DIAGNOSIS — E66.9 CLASS 2 OBESITY: ICD-10-CM

## 2022-11-07 DIAGNOSIS — Z99.89 OSA ON CPAP: Primary | ICD-10-CM

## 2022-11-07 PROCEDURE — 99213 OFFICE O/P EST LOW 20 MIN: CPT | Performed by: INTERNAL MEDICINE

## 2022-11-07 PROCEDURE — G0463 HOSPITAL OUTPT CLINIC VISIT: HCPCS

## 2022-11-07 RX ORDER — PANTOPRAZOLE SODIUM 40 MG/1
40 TABLET, DELAYED RELEASE ORAL DAILY
COMMUNITY
Start: 2022-08-05

## 2022-11-07 RX ORDER — UBIDECARENONE 75 MG
100 CAPSULE ORAL DAILY
COMMUNITY

## 2022-11-07 RX ORDER — PYRIDOXINE HCL (VITAMIN B6) 100 MG
100 TABLET ORAL DAILY
COMMUNITY

## 2022-11-07 NOTE — PROGRESS NOTES
"  [address] [address]  Phone: [phone]  Fax: [phone]      SLEEP CLINIC FOLLOW UP PROGRESS NOTE.    Pastor Bartholomew  [id number]   1950  71 y.o.  male      PCP: Shahzad Rashid PA      Date of visit: 11/7/2022    Chief Complaint   Patient presents with   • Sleep Apnea   • Obesity       HPI:  This is a 71 y.o. years old patient is here for the management of obstructive sleep apnea.  Sleep apnea is mild in severity with a AHI of 10.5/hr. Patient is using positive airway pressure therapy with APAP and the symptoms of snoring, non-restorative sleep and daytime excessive sleepiness have improved significantly on the therapy. Normally goes to bed at 10 PM and wakes up at 8 AM.  The patient wakes up 3 time(s) during the night and has no problem going back to sleep.  Feels refreshed after waking up.  Patient also denies headaches and nasal congestion.     Medications and allergies are reviewed by me and documented in the encounter.     SOCIAL (habits pertaining to sleep medicine)  History tobacco use:No   History of alcohol use: 0 per week  Caffeine use: 2     REVIEW OF SYSTEMS:   [place] Sleepiness Scale :Total score: 13   Nasal congestion:No   Dry mouth/nose:No   Post nasal drip; No   Acid reflux/Heartburn:No   Abd bloating:No   Morning headache:No   Anxiety:No   Depression:Yes    PHYSICAL EXAMINATION:  CONSTITUTIONAL:  Vitals:    11/07/22 0700   BP: 151/72   Pulse: 61   SpO2: 96%   Weight: 126 kg (276 lb 14.4 oz)   Height: 182.9 cm (72.01\")    Body mass index is 37.55 kg/m².   NOSE: nasal passages are clear, No deformities noted   RESP SYSTEM: Not in any respiratory distress, no chest deformities noted,   CARDIOVASULAR: No edema noted  NEURO: Oriented x 3, gait normal,  Mood and affect appeared appropriate      Data reviewed:  The Smart card downloaded on 11/7/2022 has been reviewed independently by me for compliance and discussed the data " with the patient.   Compliance; 100%  More than 4 hr use, 90%  Average use of the device 8 hours and 39 minutes per night  Residual AHI: 3.5 /hr (goal < 5.0 /hr)  Mask type: Nasal  Device: REMstar, auto CPAP  DME: [facility]      ASSESSMENT AND PLAN:  · Obstructive sleep apnea ( G 47.33).  The symptoms of sleep apnea have improved with the device and the treatment.  Patient's compliance with the device is excellent for treatment of sleep apnea.  I have independently reviewed the smart card down load and discussed with the patient the download data and encouarged the patient to continue to use the device.The residual AHI is acceptable. The device is benefiting the patient and the device is medically necessary.  Without proper control of sleep apnea and good compliance there is a increased risk for hypertension, diabetes mellitus and nonrestorative sleep with hypersomnia which can increase risk for motor vehicle accidents.  Untreated sleep apnea is also a risk factor for development of atrial fibrillation, pulmonary hypertension and stroke. The patient is also instructed to get the supplies from the [facility] and and change them on a regular basis.  A prescription for supplies has been sent to the [facility].  I have also discussed the good sleep hygiene habits and adequate amount of sleep needed for good health.  · Obesity  2 with BMI is Body mass index is 37.55 kg/m².. I have discuss the relationship between the weight and sleep apnea. The benefit of weight loss in reducing severity of sleep apnea was discussed. Discussed diet and exercise with the patient to achieve ideal BMI.   · Return in about 1 year (around 11/7/2023) for with smart card down load. . Patient's questions were answered.      Bebeto Vazquez MD  Sleep Medicine.  Medical Director, [facility] sleep [facility]  11/7/2022 ,

## 2022-11-08 ENCOUNTER — TELEPHONE (OUTPATIENT)
Dept: SLEEP MEDICINE | Facility: HOSPITAL | Age: 72
End: 2022-11-08

## 2022-12-13 ENCOUNTER — LAB (OUTPATIENT)
Dept: LAB | Facility: HOSPITAL | Age: 72
End: 2022-12-13

## 2022-12-13 ENCOUNTER — TRANSCRIBE ORDERS (OUTPATIENT)
Dept: LAB | Facility: HOSPITAL | Age: 72
End: 2022-12-13

## 2022-12-13 DIAGNOSIS — E11.9 DIABETES MELLITUS WITHOUT COMPLICATION: ICD-10-CM

## 2022-12-13 DIAGNOSIS — E78.5 HYPERLIPIDEMIA, UNSPECIFIED HYPERLIPIDEMIA TYPE: ICD-10-CM

## 2022-12-13 DIAGNOSIS — I10 ESSENTIAL HYPERTENSION, MALIGNANT: ICD-10-CM

## 2022-12-13 DIAGNOSIS — E78.5 HYPERLIPIDEMIA, UNSPECIFIED HYPERLIPIDEMIA TYPE: Primary | ICD-10-CM

## 2022-12-13 LAB
ALBUMIN SERPL-MCNC: 4.5 G/DL (ref 3.5–5.2)
ALBUMIN/GLOB SERPL: 2.4 G/DL
ALP SERPL-CCNC: 67 U/L (ref 39–117)
ALT SERPL W P-5'-P-CCNC: 21 U/L (ref 1–41)
ANION GAP SERPL CALCULATED.3IONS-SCNC: 9 MMOL/L (ref 5–15)
AST SERPL-CCNC: 22 U/L (ref 1–40)
BILIRUB SERPL-MCNC: 0.7 MG/DL (ref 0–1.2)
BUN SERPL-MCNC: 13 MG/DL (ref 8–23)
BUN/CREAT SERPL: 15.5 (ref 7–25)
CALCIUM SPEC-SCNC: 8.9 MG/DL (ref 8.6–10.5)
CHLORIDE SERPL-SCNC: 101 MMOL/L (ref 98–107)
CHOLEST SERPL-MCNC: 136 MG/DL (ref 0–200)
CO2 SERPL-SCNC: 31 MMOL/L (ref 22–29)
CREAT SERPL-MCNC: 0.84 MG/DL (ref 0.76–1.27)
EGFRCR SERPLBLD CKD-EPI 2021: 94.4 ML/MIN/1.73
GLOBULIN UR ELPH-MCNC: 1.9 GM/DL
GLUCOSE SERPL-MCNC: 112 MG/DL (ref 65–99)
HBA1C MFR BLD: 7.9 % (ref 4.8–5.6)
HDLC SERPL-MCNC: 38 MG/DL (ref 40–60)
LDLC SERPL CALC-MCNC: 71 MG/DL (ref 0–100)
LDLC/HDLC SERPL: 1.77 {RATIO}
POTASSIUM SERPL-SCNC: 3.6 MMOL/L (ref 3.5–5.2)
PROT SERPL-MCNC: 6.4 G/DL (ref 6–8.5)
SODIUM SERPL-SCNC: 141 MMOL/L (ref 136–145)
TRIGL SERPL-MCNC: 153 MG/DL (ref 0–150)
TSH SERPL DL<=0.05 MIU/L-ACNC: 0.59 UIU/ML (ref 0.27–4.2)
VLDLC SERPL-MCNC: 27 MG/DL (ref 5–40)

## 2022-12-13 PROCEDURE — 84443 ASSAY THYROID STIM HORMONE: CPT

## 2022-12-13 PROCEDURE — 36415 COLL VENOUS BLD VENIPUNCTURE: CPT

## 2022-12-13 PROCEDURE — 80061 LIPID PANEL: CPT

## 2022-12-13 PROCEDURE — 83036 HEMOGLOBIN GLYCOSYLATED A1C: CPT

## 2022-12-13 PROCEDURE — 80053 COMPREHEN METABOLIC PANEL: CPT

## 2022-12-20 ENCOUNTER — TRANSCRIBE ORDERS (OUTPATIENT)
Dept: ADMINISTRATIVE | Facility: HOSPITAL | Age: 72
End: 2022-12-20

## 2022-12-20 DIAGNOSIS — R41.3 MEMORY CHANGES: Primary | ICD-10-CM

## 2023-02-01 ENCOUNTER — TRANSCRIBE ORDERS (OUTPATIENT)
Dept: LAB | Facility: HOSPITAL | Age: 73
End: 2023-02-01
Payer: MEDICARE

## 2023-02-01 ENCOUNTER — HOSPITAL ENCOUNTER (OUTPATIENT)
Dept: MRI IMAGING | Facility: HOSPITAL | Age: 73
Discharge: HOME | End: 2023-02-01
Payer: MEDICARE

## 2023-02-01 ENCOUNTER — LAB (OUTPATIENT)
Dept: LAB | Facility: HOSPITAL | Age: 73
End: 2023-02-01
Payer: MEDICARE

## 2023-02-01 DIAGNOSIS — Z01.812 PRE-OPERATIVE LABORATORY EXAMINATION: ICD-10-CM

## 2023-02-01 DIAGNOSIS — R41.3 MEMORY CHANGES: ICD-10-CM

## 2023-02-01 DIAGNOSIS — Z01.812 PRE-OPERATIVE LABORATORY EXAMINATION: Primary | ICD-10-CM

## 2023-02-01 DIAGNOSIS — M16.11 PRIMARY OSTEOARTHRITIS OF RIGHT HIP: ICD-10-CM

## 2023-02-01 LAB
ANION GAP SERPL CALCULATED.3IONS-SCNC: 9 MMOL/L (ref 5–15)
BASOPHILS # BLD AUTO: 0.04 10*3/MM3 (ref 0–0.2)
BASOPHILS NFR BLD AUTO: 0.6 % (ref 0–1.5)
BUN SERPL-MCNC: 12 MG/DL (ref 8–23)
BUN/CREAT SERPL: 13.3 (ref 7–25)
CALCIUM SPEC-SCNC: 9.2 MG/DL (ref 8.6–10.5)
CHLORIDE SERPL-SCNC: 104 MMOL/L (ref 98–107)
CO2 SERPL-SCNC: 31 MMOL/L (ref 22–29)
CREAT SERPL-MCNC: 0.9 MG/DL (ref 0.76–1.27)
DEPRECATED RDW RBC AUTO: 43 FL (ref 37–54)
EGFRCR SERPLBLD CKD-EPI 2021: 91.9 ML/MIN/1.73
EOSINOPHIL # BLD AUTO: 0.09 10*3/MM3 (ref 0–0.4)
EOSINOPHIL NFR BLD AUTO: 1.4 % (ref 0.3–6.2)
ERYTHROCYTE [DISTWIDTH] IN BLOOD BY AUTOMATED COUNT: 13.8 % (ref 12.3–15.4)
GLUCOSE SERPL-MCNC: 99 MG/DL (ref 65–99)
HBA1C MFR BLD: 6.8 % (ref 4.8–5.6)
HCT VFR BLD AUTO: 43.5 % (ref 37.5–51)
HGB BLD-MCNC: 14.3 G/DL (ref 13–17.7)
IMM GRANULOCYTES # BLD AUTO: 0.03 10*3/MM3 (ref 0–0.05)
IMM GRANULOCYTES NFR BLD AUTO: 0.5 % (ref 0–0.5)
LYMPHOCYTES # BLD AUTO: 1.79 10*3/MM3 (ref 0.7–3.1)
LYMPHOCYTES NFR BLD AUTO: 27.5 % (ref 19.6–45.3)
MCH RBC QN AUTO: 28.5 PG (ref 26.6–33)
MCHC RBC AUTO-ENTMCNC: 32.9 G/DL (ref 31.5–35.7)
MCV RBC AUTO: 86.7 FL (ref 79–97)
MONOCYTES # BLD AUTO: 0.46 10*3/MM3 (ref 0.1–0.9)
MONOCYTES NFR BLD AUTO: 7.1 % (ref 5–12)
NEUTROPHILS NFR BLD AUTO: 4.11 10*3/MM3 (ref 1.7–7)
NEUTROPHILS NFR BLD AUTO: 62.9 % (ref 42.7–76)
NRBC BLD AUTO-RTO: 0 /100 WBC (ref 0–0.2)
PLATELET # BLD AUTO: 248 10*3/MM3 (ref 140–450)
PMV BLD AUTO: 11.2 FL (ref 6–12)
POTASSIUM SERPL-SCNC: 3.9 MMOL/L (ref 3.5–5.2)
RBC # BLD AUTO: 5.02 10*6/MM3 (ref 4.14–5.8)
SODIUM SERPL-SCNC: 144 MMOL/L (ref 136–145)
WBC NRBC COR # BLD: 6.52 10*3/MM3 (ref 3.4–10.8)

## 2023-02-01 PROCEDURE — 80048 BASIC METABOLIC PNL TOTAL CA: CPT

## 2023-02-01 PROCEDURE — 70551 MRI BRAIN STEM W/O DYE: CPT

## 2023-02-01 PROCEDURE — 36415 COLL VENOUS BLD VENIPUNCTURE: CPT

## 2023-02-01 PROCEDURE — 83036 HEMOGLOBIN GLYCOSYLATED A1C: CPT

## 2023-02-01 PROCEDURE — 85025 COMPLETE CBC W/AUTO DIFF WBC: CPT

## 2023-03-30 ENCOUNTER — OFFICE VISIT (OUTPATIENT)
Dept: NEUROLOGY | Facility: CLINIC | Age: 73
End: 2023-03-30
Payer: MEDICARE

## 2023-03-30 VITALS
SYSTOLIC BLOOD PRESSURE: 148 MMHG | HEIGHT: 72 IN | WEIGHT: 268.2 LBS | BODY MASS INDEX: 36.33 KG/M2 | HEART RATE: 62 BPM | DIASTOLIC BLOOD PRESSURE: 72 MMHG

## 2023-03-30 DIAGNOSIS — I63.81 LACUNAR INFARCTION: ICD-10-CM

## 2023-03-30 PROCEDURE — 99215 OFFICE O/P EST HI 40 MIN: CPT | Performed by: NURSE PRACTITIONER

## 2023-03-30 PROCEDURE — 3077F SYST BP >= 140 MM HG: CPT | Performed by: NURSE PRACTITIONER

## 2023-03-30 PROCEDURE — 1160F RVW MEDS BY RX/DR IN RCRD: CPT | Performed by: NURSE PRACTITIONER

## 2023-03-30 PROCEDURE — 3078F DIAST BP <80 MM HG: CPT | Performed by: NURSE PRACTITIONER

## 2023-03-30 PROCEDURE — 1159F MED LIST DOCD IN RCRD: CPT | Performed by: NURSE PRACTITIONER

## 2023-03-30 NOTE — PROGRESS NOTES
"Chief Complaint  Memory Loss    Subjective          Pastor Bartholomew presents to [facility]OLOGY [facility]ERY  History of Present Illness  States he's been experiencing short term memory difficulty for the past 2 years.  Presents to the office with wife.  She will tell him something and he asks him the same question several times.  Difficulty remembering names.  Wife states he's quicker to anger or more irritable lately.        Objective   Vital Signs:   /72   Pulse 62   Ht 182.9 cm (72\")   Wt 122 kg (268 lb 3.2 oz)   BMI 36.37 kg/m²     Physical Exam  HENT:      Head: Normocephalic.   Pulmonary:      Effort: Pulmonary effort is normal.   Neurological:      Mental Status: He is alert and oriented to person, place, and time.      Sensory: Sensation is intact.      Motor: Motor function is intact.      Coordination: Coordination is intact.      Deep Tendon Reflexes: Reflexes are normal and symmetric.        Neurologic Exam     Mental Status   Oriented to person, place, and time.        Result Review :             MoCA: 25/30    MRI Brain:   1. There is fairly advanced leukomalacia and there is a small chronic cortical infarct in the right   occipital lobe and there appear to be chronic lacunar-type infarcts in the basal ganglia and   frontal white matter.  Overall, this appearance could be related to chronic vasculopathy.  2. No acute intracranial abnormality.  3. Mild left maxillary sinus mucosal disease.      Assessment and Plan    Diagnoses and all orders for this visit:    1. Leukomalacia, periventricular (Primary)  Assessment & Plan:  Will order CTA head/neck for further evaluation.  Concern for chronic vasculopathy.  Will refer to vascular neurology for consult.  Discussed with the patient they may feel a diagnostic angiogram is appropriate.     Orders:  -     Ambulatory Referral to Neurology    2. Lacunar infarction (HCC)  Assessment & Plan:  Continue aspirin and rosuvastatin. "  Discussed signs and symptoms of stroke including, but not limited to, visual disturbances, weakness of one side of the body, facial droop, cognitive changes, slurred speech, imbalance and dizziness.  Instructed patient to call 911 and get emergent medical treatment immediately at the onset of those symptoms.   Patient verbalized understanding.     Orders:  -     CT Angiogram Head; Future  -     CT Angiogram Neck With & Without Contrast; Future    I spent 45 minutes caring for Pastor on this date of service. This time includes time spent by me in the following activities:preparing for the visit, reviewing tests, obtaining and/or reviewing a separately obtained history, performing a medically appropriate examination and/or evaluation , counseling and educating the patient/family/caregiver, ordering medications, tests, or procedures, documenting information in the medical record and independently interpreting results and communicating that information with the patient/family/caregiver  Follow Up   Return if symptoms worsen or fail to improve.  Patient was given instructions and counseling regarding his condition or for health maintenance advice. Please see specific information pulled into the AVS if appropriate.

## 2023-03-31 PROBLEM — I63.81 LACUNAR INFARCTION: Status: ACTIVE | Noted: 2023-03-31

## 2023-03-31 NOTE — ASSESSMENT & PLAN NOTE
Continue aspirin and rosuvastatin.  Discussed signs and symptoms of stroke including, but not limited to, visual disturbances, weakness of one side of the body, facial droop, cognitive changes, slurred speech, imbalance and dizziness.  Instructed patient to call 911 and get emergent medical treatment immediately at the onset of those symptoms.   Patient verbalized understanding.

## 2023-03-31 NOTE — ASSESSMENT & PLAN NOTE
Will order CTA head/neck for further evaluation.  Concern for chronic vasculopathy.  Will refer to vascular neurology for consult.  Discussed with the patient they may feel a diagnostic angiogram is appropriate.

## 2023-04-07 ENCOUNTER — TRANSCRIBE ORDERS (OUTPATIENT)
Dept: LAB | Facility: HOSPITAL | Age: 73
End: 2023-04-07
Payer: MEDICARE

## 2023-04-07 ENCOUNTER — LAB (OUTPATIENT)
Dept: LAB | Facility: HOSPITAL | Age: 73
End: 2023-04-07
Payer: MEDICARE

## 2023-04-07 DIAGNOSIS — E11.9 DIABETES MELLITUS WITHOUT COMPLICATION: ICD-10-CM

## 2023-04-07 DIAGNOSIS — E11.9 DIABETES MELLITUS WITHOUT COMPLICATION: Primary | ICD-10-CM

## 2023-04-07 LAB
ALBUMIN SERPL-MCNC: 4.5 G/DL (ref 3.5–5.2)
ALBUMIN UR-MCNC: 9.4 MG/DL
ALBUMIN/GLOB SERPL: 1.9 G/DL
ALP SERPL-CCNC: 120 U/L (ref 39–117)
ALT SERPL W P-5'-P-CCNC: 21 U/L (ref 1–41)
ANION GAP SERPL CALCULATED.3IONS-SCNC: 10.2 MMOL/L (ref 5–15)
AST SERPL-CCNC: 17 U/L (ref 1–40)
BILIRUB SERPL-MCNC: 0.3 MG/DL (ref 0–1.2)
BUN SERPL-MCNC: 12 MG/DL (ref 8–23)
BUN/CREAT SERPL: 12.5 (ref 7–25)
CALCIUM SPEC-SCNC: 9.5 MG/DL (ref 8.6–10.5)
CHLORIDE SERPL-SCNC: 100 MMOL/L (ref 98–107)
CO2 SERPL-SCNC: 28.8 MMOL/L (ref 22–29)
CREAT SERPL-MCNC: 0.96 MG/DL (ref 0.76–1.27)
CREAT UR-MCNC: 125.8 MG/DL
EGFRCR SERPLBLD CKD-EPI 2021: 85 ML/MIN/1.73
GLOBULIN UR ELPH-MCNC: 2.4 GM/DL
GLUCOSE SERPL-MCNC: 206 MG/DL (ref 65–99)
HBA1C MFR BLD: 6.7 % (ref 4.8–5.6)
MICROALBUMIN/CREAT UR: 74.7 MG/G
POTASSIUM SERPL-SCNC: 3.4 MMOL/L (ref 3.5–5.2)
PROT SERPL-MCNC: 6.9 G/DL (ref 6–8.5)
SODIUM SERPL-SCNC: 139 MMOL/L (ref 136–145)

## 2023-04-07 PROCEDURE — 80053 COMPREHEN METABOLIC PANEL: CPT

## 2023-04-07 PROCEDURE — 82043 UR ALBUMIN QUANTITATIVE: CPT

## 2023-04-07 PROCEDURE — 82570 ASSAY OF URINE CREATININE: CPT

## 2023-04-07 PROCEDURE — 36415 COLL VENOUS BLD VENIPUNCTURE: CPT

## 2023-04-07 PROCEDURE — 83036 HEMOGLOBIN GLYCOSYLATED A1C: CPT

## 2023-04-10 ENCOUNTER — HOSPITAL ENCOUNTER (OUTPATIENT)
Dept: CT IMAGING | Facility: HOSPITAL | Age: 73
Discharge: HOME | End: 2023-04-10
Admitting: NURSE PRACTITIONER
Payer: MEDICARE

## 2023-04-10 DIAGNOSIS — I63.81 LACUNAR INFARCTION: ICD-10-CM

## 2023-04-10 PROCEDURE — 70498 CT ANGIOGRAPHY NECK: CPT

## 2023-04-10 PROCEDURE — 70496 CT ANGIOGRAPHY HEAD: CPT

## 2023-04-10 PROCEDURE — 25510000001 IOPAMIDOL PER 1 ML: Performed by: NURSE PRACTITIONER

## 2023-04-10 RX ADMIN — IOPAMIDOL 100 ML: 755 INJECTION, SOLUTION INTRAVENOUS at 14:37

## 2023-06-14 ENCOUNTER — OFFICE VISIT (OUTPATIENT)
Dept: PODIATRY | Facility: CLINIC | Age: 73
End: 2023-06-14
Payer: MEDICARE

## 2023-06-14 VITALS
OXYGEN SATURATION: 94 % | HEART RATE: 72 BPM | WEIGHT: 277 LBS | SYSTOLIC BLOOD PRESSURE: 178 MMHG | HEIGHT: 72 IN | TEMPERATURE: 97.3 F | DIASTOLIC BLOOD PRESSURE: 81 MMHG | BODY MASS INDEX: 37.52 KG/M2

## 2023-06-14 DIAGNOSIS — Z79.4 TYPE 2 DIABETES MELLITUS WITH HYPERGLYCEMIA, WITH LONG-TERM CURRENT USE OF INSULIN: Primary | ICD-10-CM

## 2023-06-14 DIAGNOSIS — E11.65 TYPE 2 DIABETES MELLITUS WITH HYPERGLYCEMIA, WITH LONG-TERM CURRENT USE OF INSULIN: Primary | ICD-10-CM

## 2023-06-14 DIAGNOSIS — M79.672 PAIN IN BOTH FEET: ICD-10-CM

## 2023-06-14 DIAGNOSIS — M79.671 PAIN IN BOTH FEET: ICD-10-CM

## 2023-06-14 DIAGNOSIS — B35.1 ONYCHOMYCOSIS: ICD-10-CM

## 2023-06-14 RX ORDER — PROCHLORPERAZINE 25 MG/1
SUPPOSITORY RECTAL
COMMUNITY

## 2023-06-14 NOTE — PROGRESS NOTES
[facility] - PODIATRY    Today's Date: 06/14/23    Patient Name: Pastor Bartholomew  MRN: [id number]  CSN: [id number]  PCP: Shahzad Rashid PA, 02-2-23  Referring Provider: Referring, Self    SUBJECTIVE     Chief Complaint   Patient presents with    Left Foot - Establish Care, Annual Exam, Diabetes, Nail Problem    Right Foot - Establish Care, Annual Exam, Diabetes, Nail Problem     HPI: Pastor Bartholomew, a 72 y.o.male, presents to clinic for a diabetic foot evaluation.    New, Established, New Problem: New    Onset: Insidious    Nature: Foot pain    Stable, worsening, improving: Stable    Patient controlling diabetes via: Via medication    Patient states there last blood glucose was: 6.7 A1c    Patient denies any fevers, chills, nausea, vomiting, shortness of breath, nor any other constitutional signs nor symptoms.    No other pedal complaints at this time.    Past Medical History:   Diagnosis Date    Diabetes     Hernia, inguinal     Hyperlipidemia     Hypertension     PONV (postoperative nausea and vomiting)     Type 2 diabetes mellitus      Past Surgical History:   Procedure Laterality Date    COLONOSCOPY      COLONOSCOPY N/A 04/25/2022    Procedure: COLONOSCOPY WITH POLYPECTOMY;  Surgeon: Artie Dickerson MD;  Location: [facility] ENDOSCOPY;  Service: Gastroenterology;  Laterality: N/A;  COLON POLYP    ENDOSCOPY N/A 04/25/2022    Procedure: ESOPHAGOGASTRODUODENOSCOPY WITH BX;  Surgeon: Artie Dickerson MD;  Location: [facility] ENDOSCOPY;  Service: Gastroenterology;  Laterality: N/A;  CAPPS'S ESOPHAGUS, HIATAL HERNIA    HERNIA REPAIR      KNEE SURGERY Bilateral     TRIGGER FINGER RELEASE Bilateral     x2 THUMBS     Family History   Problem Relation Age of Onset    Diabetes Mother     Hypertension Mother     Heart attack Mother     COPD Brother     Diabetes Maternal Grandmother     Colon cancer Neg Hx      Social History     Socioeconomic History    Marital status:    Tobacco  Use    Smoking status: Never     Passive exposure: Yes    Smokeless tobacco: Never   Vaping Use    Vaping Use: Never used   Substance and Sexual Activity    Alcohol use: Never    Drug use: Never    Sexual activity: Defer     Allergies   Allergen Reactions    Codeine Nausea Only    Codeine Unknown - Low Severity    Semaglutide(0.25 Or 0.5mg-Dos) GI Intolerance     Current Outpatient Medications   Medication Sig Dispense Refill    amLODIPine-benazepril (LOTREL) 10-40 MG per capsule Take 1 capsule by mouth Daily.      aspirin 81 MG EC tablet Aspir-81 81 mg oral tablet,delayed release (DR/EC) take 1 tablet (81 mg) by oral route once daily   Active      carvedilol (COREG) 25 MG tablet Take 1 tablet by mouth 2 (Two) Times a Day With Meals.      cetirizine (zyrTEC) 10 MG tablet Take 1 tablet by mouth Daily.      cholecalciferol (VITAMIN D3) 10 MCG (400 UNIT) tablet Take  by mouth Daily.      co-enzyme Q-10 30 MG capsule Take 1 capsule by mouth Daily.      Continuous Blood Gluc Sensor (Dexcom G6 Sensor) Every 10 (Ten) Days.      fluticasone (FLONASE) 50 MCG/ACT nasal spray INSITLL 1 SPRAY INTO EACH NOSTRIL EVERY DAY FOR 7 DAYS      hydroCHLOROthiazide (HYDRODIURIL) 12.5 MG tablet Take 1 tablet by mouth Daily.      insulin patient supplied pump Inject  under the skin into the appropriate area as directed Continuous.      insulin regular (HUMULIN R) 500 UNIT/ML CONCENTRATED injection 50u daily with insulin pump 40 mL 3    L-Lysine 500 MG tablet tablet Take  by mouth.      metFORMIN (GLUCOPHAGE) 500 MG tablet metformin 500 mg oral tablet take 1 tablet (500 mg) by oral route 2 times per day with morning and evening meals   Active      multivitamin with minerals tablet tablet Centrum 3,500-18-0.4 unit-mg-mg oral tablet,chewable chew 1 tablet by oral route daily   Active      Omega-3 Fatty Acids (fish oil) 1000 MG capsule capsule Take 2 capsules by mouth.      pyridoxine (VITAMIN B-6) 100 MG tablet Take 1 tablet by mouth Daily.       rosuvastatin (CRESTOR) 20 MG tablet Take 1 tablet by mouth Daily.      venlafaxine XR (EFFEXOR-XR) 150 MG 24 hr capsule venlafaxine 150 mg oral capsule,extended release 24hr take 1 capsule (150 mg) by oral route once daily   Active      vitamin B-12 (CYANOCOBALAMIN) 100 MCG tablet Take 1 tablet by mouth Daily.      CONTOUR NEXT TEST test strip Test 6 times daily DX: e11.65 (Patient not taking: Reported on 6/14/2023) 200 each 5    Lancets misc Check 5 times daily DX: e11.65 (Patient not taking: Reported on 6/14/2023) 500 each 1    omega-3 acid ethyl esters (LOVAZA) 1 g capsule Take 2 capsules by mouth 2 (Two) Times a Day. (Patient not taking: Reported on 6/14/2023) 180 capsule 2    ondansetron ODT (Zofran ODT) 4 MG disintegrating tablet Place 1 tablet on the tongue Every 8 (Eight) Hours As Needed for Nausea or Vomiting. (Patient not taking: Reported on 6/14/2023) 30 tablet 1    pantoprazole (PROTONIX) 40 MG EC tablet Take 1 tablet by mouth Daily. (Patient not taking: Reported on 6/14/2023)       No current facility-administered medications for this visit.     Review of Systems   Constitutional: Negative.    Skin:         Painful toenails   Neurological:  Positive for numbness.   All other systems reviewed and are negative.    OBJECTIVE     Vitals:    06/14/23 0850   BP: 178/81   Pulse: 72   Temp: 97.3 °F (36.3 °C)   SpO2: 94%       Body mass index is 37.57 kg/m².    Lab Results   Component Value Date    HGBA1C 6.70 (H) 04/07/2023       Lab Results   Component Value Date    GLUCOSE 206 (H) 04/07/2023    CALCIUM 9.5 04/07/2023     04/07/2023    K 3.4 (L) 04/07/2023    CO2 28.8 04/07/2023     04/07/2023    BUN 12 04/07/2023    CREATININE 0.96 04/07/2023    EGFRIFAFRI 100 09/03/2019    EGFRIFNONA 80 12/17/2021    BCR 12.5 04/07/2023    ANIONGAP 10.2 04/07/2023       Patient seen in no apparent distress.      PHYSICAL EXAM:     Foot/Ankle Exam    GENERAL  Appearance:  appears stated age  Orientation:   AAOx3  Affect:  appropriate  Gait:  unimpaired  Assistance:  independent  Right shoe gear: casual shoe  Left shoe gear: casual shoe    VASCULAR     Right Foot Vascularity   Normal vascular exam    Dorsalis pedis:  2+  Posterior tibial:  2+  Skin temperature:  warm  Edema grading:  None  CFT:  < 3 seconds  Pedal hair growth:  Present  Varicosities:  none     Left Foot Vascularity   Normal vascular exam    Dorsalis pedis:  2+  Posterior tibial:  2+  Skin temperature:  warm  Edema grading:  None  CFT:  < 3 seconds  Pedal hair growth:  Present  Varicosities:  none     NEUROLOGIC     Right Foot Neurologic   Light touch sensation: diminished  Vibratory sensation: diminished  Hot/Cold sensation: diminished  Protective Sensation using [place] Monofilament:   Sites intact: 2  Sites tested: 10     Left Foot Neurologic   Light touch sensation: diminished  Vibratory sensation: diminished  Hot/Cold sensation:  diminished  Protective Sensation using [place] Monofilament:   Sites intact: 2  Sites tested: 10    MUSCLE STRENGTH     Right Foot Muscle Strength   Foot dorsiflexion:  4  Foot plantar flexion:  4  Foot inversion:  4  Foot eversion:  4     Left Foot Muscle Strength   Foot dorsiflexion:  4  Foot plantar flexion:  4  Foot inversion:  4  Foot eversion:  4    RANGE OF MOTION     Right Foot Range of Motion   Foot and ankle ROM within normal limits       Left Foot Range of Motion   Foot and ankle ROM within normal limits      DERMATOLOGIC      Right Foot Dermatologic   Skin  Right foot skin is intact.   Nails  1.  Positive for elongated, onychomycosis, abnormal thickness, subungual debris and ingrown toenail.  2.  Positive for elongated, onychomycosis, abnormal thickness, subungual debris and ingrown toenail.  3.  Positive for elongated, onychomycosis, abnormal thickness, subungual debris and ingrown toenail.  4.  Positive for elongated, onychomycosis, abnormal thickness, subungual debris and ingrown  toenail.  5.  Positive for elongated, onychomycosis, abnormal thickness, subungual debris and ingrown toenail.  Nails comment:  Toenails 1, 2, 3, 4, and 5     Left Foot Dermatologic   Skin  Left foot skin is intact.   Nails comment:  Toenails 1, 2, 3, 4, and 5  Nails  1.  Positive for elongated, onychomycosis, abnormal thickness, subungual debris and ingrown toenail.  2.  Positive for elongated, onychomycosis, abnormal thickness, subungual debris and ingrown toenail.  3.  Positive for elongated, onychomycosis, abnormal thickness, subungual debris and ingrown toenail.  4.  Positive for elongated, onychomycosis, abnormally thick, subungual debris and ingrown toenail.  5.  Positive for elongated, onychomycosis, abnormally thick, subungual debris and ingrown toenail.          ASSESSMENT/PLAN     Diagnoses and all orders for this visit:    1. Type 2 diabetes mellitus with hyperglycemia, with long-term current use of insulin (Primary)    2. Onychomycosis    3. Pain in both feet      Toenails 1, 2, 3, 4, 5 on Right and 1, 2, 3, 4, 5 on Left were debrided with nail nippers then filed with a Bhaveshmel nail mark.  Patient tolerated procedure well without complications.    Comprehensive lower extremity examination and evaluation was performed.    Discussed findings and treatment plan including risks, benefits, and treatment options with patient in detail. Patient agreed with treatment plan.    Medications and allergies reviewed.  Reviewed available blood glucose and HgB A1C lab values along with other pertinent labs.  These were discussed with the patient as to their importance of diabetic maintenance.    Diabetic foot exam performed and documented this date, compliant with CQM required standards. Detail of findings as noted in physical exam.  Lower extremity Neurologic exam for diabetic patient performed and documented this date, compliant with PQRS required standards. Detail of findings as noted in physical exam.  Advised  patient importance of good routine lower extremity hygiene. Advised patient importance of evaluating for intact skin and pain free nail borders.  Advised patient to use mirror to evaluate plantar/ soles of feet for better visualization. Advised patient monitor and phone office to be seen if any cracking to skin, open lesions, painful nail borders or if nails become elongated prior to next visit. Advised patient importance of daily cleansing of lower extremities, followed by good skin cream to maintain normal hydration of skin. Also advised patient importance of close daily monitoring of blood sugar. Advised to regulate diet and medications to maintain control of blood sugar in optimal range. Contact primary care provider if difficulties maintaining blood sugar levels.  Advised Patient of presence of Diabetes Mellitus condition.  Advised Patient risk of progression and worsening or improvement, then return of condition.  Will monitor condition for any change in future. Treat with most appropriate treatment pending status of condition.  Counseled and advised patient extensively on nature and ramifications of diabetes. Standard instructions given to patient for good diabetic foot care and maintenance. Advised importance of careful monitoring to avoid break down and complications secondary to diabetes. Advised patient importance of strict maintenance of blood sugar control. Advised patient of possible ominous results from neglect of condition, i.e.: amputation/ loss of digits, feet and legs, or even death.  Patient states understands counseling, will monitor closely, continue good hygiene and routine diabetic foot care. Patient will contact office if questions or problems.      An After Visit Summary was printed and given to the patient at discharge, including (if requested) any available informative/educational handouts regarding diagnosis, treatment, or medications. All questions were answered to patient/family  satisfaction. Should symptoms fail to improve or worsen they agree to call or return to clinic or to go to the Emergency Department. Discussed the importance of following up with any needed screening tests/labs/specialist appointments and any requested follow-up recommended by me today. Importance of maintaining follow-up discussed and patient accepts that missed appointments can delay diagnosis and potentially lead to worsening of conditions.    Return in about 3 months (around 9/14/2023)., or sooner if acute issues arise.    This document has been electronically signed by Phuc Mena DPM on June 14, 2023 10:42 EDT

## 2023-07-24 ENCOUNTER — TRANSCRIBE ORDERS (OUTPATIENT)
Dept: LAB | Facility: HOSPITAL | Age: 73
End: 2023-07-24
Payer: MEDICARE

## 2023-07-24 ENCOUNTER — LAB (OUTPATIENT)
Dept: LAB | Facility: HOSPITAL | Age: 73
End: 2023-07-24
Payer: MEDICARE

## 2023-07-24 DIAGNOSIS — G31.84 MCI (MILD COGNITIVE IMPAIRMENT) WITH MEMORY LOSS: ICD-10-CM

## 2023-07-24 DIAGNOSIS — G31.84 MCI (MILD COGNITIVE IMPAIRMENT) WITH MEMORY LOSS: Primary | ICD-10-CM

## 2023-07-24 DIAGNOSIS — E11.9 DIABETES MELLITUS WITHOUT COMPLICATION: Primary | ICD-10-CM

## 2023-07-24 DIAGNOSIS — E11.9 DIABETES MELLITUS WITHOUT COMPLICATION: ICD-10-CM

## 2023-07-24 LAB
ALBUMIN SERPL-MCNC: 4.8 G/DL (ref 3.5–5.2)
ALBUMIN/GLOB SERPL: 2.8 G/DL
ALP SERPL-CCNC: 84 U/L (ref 39–117)
ALT SERPL W P-5'-P-CCNC: 18 U/L (ref 1–41)
ANION GAP SERPL CALCULATED.3IONS-SCNC: 9.8 MMOL/L (ref 5–15)
AST SERPL-CCNC: 16 U/L (ref 1–40)
BILIRUB SERPL-MCNC: 0.6 MG/DL (ref 0–1.2)
BUN SERPL-MCNC: 11 MG/DL (ref 8–23)
BUN/CREAT SERPL: 11.2 (ref 7–25)
CALCIUM SPEC-SCNC: 9 MG/DL (ref 8.6–10.5)
CHLORIDE SERPL-SCNC: 104 MMOL/L (ref 98–107)
CHOLEST SERPL-MCNC: 136 MG/DL (ref 0–200)
CO2 SERPL-SCNC: 29.2 MMOL/L (ref 22–29)
CREAT SERPL-MCNC: 0.98 MG/DL (ref 0.76–1.27)
EGFRCR SERPLBLD CKD-EPI 2021: 83 ML/MIN/1.73
FOLATE SERPL-MCNC: 17.7 NG/ML (ref 4.78–24.2)
GLOBULIN UR ELPH-MCNC: 1.7 GM/DL
GLUCOSE SERPL-MCNC: 146 MG/DL (ref 65–99)
HBA1C MFR BLD: 7.5 % (ref 4.8–5.6)
HDLC SERPL-MCNC: 32 MG/DL (ref 40–60)
LDLC SERPL CALC-MCNC: 68 MG/DL (ref 0–100)
LDLC/HDLC SERPL: 1.91 {RATIO}
POTASSIUM SERPL-SCNC: 3.9 MMOL/L (ref 3.5–5.2)
PROT SERPL-MCNC: 6.5 G/DL (ref 6–8.5)
SODIUM SERPL-SCNC: 143 MMOL/L (ref 136–145)
TRIGL SERPL-MCNC: 215 MG/DL (ref 0–150)
TSH SERPL DL<=0.05 MIU/L-ACNC: 0.78 UIU/ML (ref 0.27–4.2)
VIT B12 BLD-MCNC: 489 PG/ML (ref 211–946)
VLDLC SERPL-MCNC: 36 MG/DL (ref 5–40)

## 2023-07-24 PROCEDURE — 82746 ASSAY OF FOLIC ACID SERUM: CPT

## 2023-07-24 PROCEDURE — 36415 COLL VENOUS BLD VENIPUNCTURE: CPT

## 2023-07-24 PROCEDURE — 80053 COMPREHEN METABOLIC PANEL: CPT

## 2023-07-24 PROCEDURE — 82607 VITAMIN B-12: CPT

## 2023-07-24 PROCEDURE — 84443 ASSAY THYROID STIM HORMONE: CPT

## 2023-07-24 PROCEDURE — 80061 LIPID PANEL: CPT

## 2023-07-24 PROCEDURE — 83036 HEMOGLOBIN GLYCOSYLATED A1C: CPT

## 2023-09-08 ENCOUNTER — OFFICE VISIT (OUTPATIENT)
Dept: PODIATRY | Facility: CLINIC | Age: 73
End: 2023-09-08
Payer: MEDICARE

## 2023-09-08 VITALS
WEIGHT: 280 LBS | TEMPERATURE: 97.7 F | OXYGEN SATURATION: 95 % | HEART RATE: 66 BPM | HEIGHT: 72 IN | BODY MASS INDEX: 37.93 KG/M2

## 2023-09-08 DIAGNOSIS — E11.65 TYPE 2 DIABETES MELLITUS WITH HYPERGLYCEMIA, WITH LONG-TERM CURRENT USE OF INSULIN: Primary | ICD-10-CM

## 2023-09-08 DIAGNOSIS — M79.671 PAIN IN BOTH FEET: ICD-10-CM

## 2023-09-08 DIAGNOSIS — Z79.4 TYPE 2 DIABETES MELLITUS WITH HYPERGLYCEMIA, WITH LONG-TERM CURRENT USE OF INSULIN: Primary | ICD-10-CM

## 2023-09-08 DIAGNOSIS — B35.1 ONYCHOMYCOSIS: ICD-10-CM

## 2023-09-08 DIAGNOSIS — M79.672 PAIN IN BOTH FEET: ICD-10-CM

## 2023-09-08 RX ORDER — AMOXICILLIN 875 MG/1
1 TABLET, COATED ORAL EVERY 12 HOURS SCHEDULED
COMMUNITY
Start: 2023-08-13

## 2023-09-08 NOTE — PROGRESS NOTES
[facility] - PODIATRY    Today's Date: 09/08/23    Patient Name: Pastor Bartholomew  MRN: [id number]  CSN: [id number]  PCP: Shahzad Rashid PA, 02-2-23  Referring Provider: No ref. provider found    SUBJECTIVE     Chief Complaint   Patient presents with    Left Foot - Follow-up, Nail Problem    Right Foot - Follow-up, Nail Problem     HPI: Pastor Bartholomew, a 72 y.o.male, presents to clinic for a diabetic foot evaluation.    New, Established, New Problem: New    Onset: Insidious    Nature: Foot pain    Stable, worsening, improving: Stable    Patient controlling diabetes via: Via medication    Patient states there last blood glucose was: 6.7 A1c    Patient denies any fevers, chills, nausea, vomiting, shortness of breath, nor any other constitutional signs nor symptoms.    No other pedal complaints at this time.    Past Medical History:   Diagnosis Date    Diabetes     Hernia, inguinal     Hyperlipidemia     Hypertension     PONV (postoperative nausea and vomiting)     Type 2 diabetes mellitus      Past Surgical History:   Procedure Laterality Date    COLONOSCOPY      COLONOSCOPY N/A 04/25/2022    Procedure: COLONOSCOPY WITH POLYPECTOMY;  Surgeon: Artie Dickerson MD;  Location: [facility] ENDOSCOPY;  Service: Gastroenterology;  Laterality: N/A;  COLON POLYP    ENDOSCOPY N/A 04/25/2022    Procedure: ESOPHAGOGASTRODUODENOSCOPY WITH BX;  Surgeon: Artie Dickerson MD;  Location: [facility] ENDOSCOPY;  Service: Gastroenterology;  Laterality: N/A;  CAPPS'S ESOPHAGUS, HIATAL HERNIA    HERNIA REPAIR      KNEE SURGERY Bilateral     TRIGGER FINGER RELEASE Bilateral     x2 THUMBS     Family History   Problem Relation Age of Onset    Diabetes Mother     Hypertension Mother     Heart attack Mother     COPD Brother     Diabetes Maternal Grandmother     Colon cancer Neg Hx      Social History     Socioeconomic History    Marital status:    Tobacco Use    Smoking status: Never     Passive exposure:  Yes    Smokeless tobacco: Never   Vaping Use    Vaping Use: Never used   Substance and Sexual Activity    Alcohol use: Never    Drug use: Never    Sexual activity: Defer     Allergies   Allergen Reactions    Codeine Nausea Only    Codeine Unknown - Low Severity    Semaglutide(0.25 Or 0.5mg-Dos) GI Intolerance     Current Outpatient Medications   Medication Sig Dispense Refill    amLODIPine-benazepril (LOTREL) 10-40 MG per capsule Take 1 capsule by mouth Daily.      aspirin 81 MG EC tablet Aspir-81 81 mg oral tablet,delayed release (DR/EC) take 1 tablet (81 mg) by oral route once daily   Active      carvedilol (COREG) 25 MG tablet Take 1 tablet by mouth 2 (Two) Times a Day With Meals.      cetirizine (zyrTEC) 10 MG tablet Take 1 tablet by mouth Daily.      cholecalciferol (VITAMIN D3) 10 MCG (400 UNIT) tablet Take  by mouth Daily.      co-enzyme Q-10 30 MG capsule Take 1 capsule by mouth Daily.      Continuous Blood Gluc Sensor (Dexcom G6 Sensor) Every 10 (Ten) Days.      CONTOUR NEXT TEST test strip Test 6 times daily DX: e11.65 200 each 5    fluticasone (FLONASE) 50 MCG/ACT nasal spray INSITLL 1 SPRAY INTO EACH NOSTRIL EVERY DAY FOR 7 DAYS      hydroCHLOROthiazide (HYDRODIURIL) 12.5 MG tablet Take 1 tablet by mouth Daily.      insulin patient supplied pump Inject  under the skin into the appropriate area as directed Continuous.      insulin regular (HUMULIN R) 500 UNIT/ML CONCENTRATED injection 50u daily with insulin pump 40 mL 3    L-Lysine 500 MG tablet tablet Take  by mouth.      Lancets misc Check 5 times daily DX: e11.65 500 each 1    metFORMIN (GLUCOPHAGE) 500 MG tablet metformin 500 mg oral tablet take 1 tablet (500 mg) by oral route 2 times per day with morning and evening meals   Active      multivitamin with minerals tablet tablet Centrum 3,500-18-0.4 unit-mg-mg oral tablet,chewable chew 1 tablet by oral route daily   Active      omega-3 acid ethyl esters (LOVAZA) 1 g capsule Take 2 capsules by mouth 2  (Two) Times a Day. 180 capsule 2    Omega-3 Fatty Acids (fish oil) 1000 MG capsule capsule Take 2 capsules by mouth.      ondansetron ODT (Zofran ODT) 4 MG disintegrating tablet Place 1 tablet on the tongue Every 8 (Eight) Hours As Needed for Nausea or Vomiting. 30 tablet 1    pantoprazole (PROTONIX) 40 MG EC tablet Take 1 tablet by mouth Daily.      pyridoxine (VITAMIN B-6) 100 MG tablet Take 1 tablet by mouth Daily.      rosuvastatin (CRESTOR) 20 MG tablet Take 1 tablet by mouth Daily.      venlafaxine XR (EFFEXOR-XR) 150 MG 24 hr capsule venlafaxine 150 mg oral capsule,extended release 24hr take 1 capsule (150 mg) by oral route once daily   Active      vitamin B-12 (CYANOCOBALAMIN) 100 MCG tablet Take 1 tablet by mouth Daily.      amoxicillin (AMOXIL) 875 MG tablet Take 1 tablet by mouth Every 12 (Twelve) Hours. (Patient not taking: Reported on 9/8/2023)       No current facility-administered medications for this visit.     Review of Systems   Constitutional: Negative.    Skin:         Painful toenails   Neurological:  Positive for numbness.   All other systems reviewed and are negative.    OBJECTIVE     Vitals:    09/08/23 0919   Pulse: 66   Temp: 97.7 °F (36.5 °C)   SpO2: 95%       Body mass index is 37.97 kg/m².    Lab Results   Component Value Date    HGBA1C 7.50 (H) 07/24/2023       Lab Results   Component Value Date    GLUCOSE 146 (H) 07/24/2023    CALCIUM 9.0 07/24/2023     07/24/2023    K 3.9 07/24/2023    CO2 29.2 (H) 07/24/2023     07/24/2023    BUN 11 07/24/2023    CREATININE 0.98 07/24/2023    EGFRIFAFRI 100 09/03/2019    EGFRIFNONA 80 12/17/2021    BCR 11.2 07/24/2023    ANIONGAP 9.8 07/24/2023       Patient seen in no apparent distress.      PHYSICAL EXAM:     Foot/Ankle Exam    GENERAL  Appearance:  appears stated age  Orientation:  AAOx3  Affect:  appropriate  Gait:  unimpaired  Assistance:  independent  Right shoe gear: casual shoe  Left shoe gear: casual shoe    VASCULAR     Right  Foot Vascularity   Normal vascular exam    Dorsalis pedis:  2+  Posterior tibial:  2+  Skin temperature:  warm  Edema grading:  None  CFT:  < 3 seconds  Pedal hair growth:  Present  Varicosities:  none     Left Foot Vascularity   Normal vascular exam    Dorsalis pedis:  2+  Posterior tibial:  2+  Skin temperature:  warm  Edema grading:  None  CFT:  < 3 seconds  Pedal hair growth:  Present  Varicosities:  none     NEUROLOGIC     Right Foot Neurologic   Light touch sensation: diminished  Vibratory sensation: diminished  Hot/Cold sensation: diminished  Protective Sensation using [place] Monofilament:   Sites intact: 2  Sites tested: 10     Left Foot Neurologic   Light touch sensation: diminished  Vibratory sensation: diminished  Hot/Cold sensation:  diminished  Protective Sensation using [place] Monofilament:   Sites intact: 2  Sites tested: 10    MUSCLE STRENGTH     Right Foot Muscle Strength   Foot dorsiflexion:  4  Foot plantar flexion:  4  Foot inversion:  4  Foot eversion:  4     Left Foot Muscle Strength   Foot dorsiflexion:  4  Foot plantar flexion:  4  Foot inversion:  4  Foot eversion:  4    RANGE OF MOTION     Right Foot Range of Motion   Foot and ankle ROM within normal limits       Left Foot Range of Motion   Foot and ankle ROM within normal limits      DERMATOLOGIC      Right Foot Dermatologic   Skin  Right foot skin is intact.   Nails  1.  Positive for elongated, onychomycosis, abnormal thickness, subungual debris and ingrown toenail.  2.  Positive for elongated, onychomycosis, abnormal thickness, subungual debris and ingrown toenail.  3.  Positive for elongated, onychomycosis, abnormal thickness, subungual debris and ingrown toenail.  4.  Positive for elongated, onychomycosis, abnormal thickness, subungual debris and ingrown toenail.  5.  Positive for elongated, onychomycosis, abnormal thickness, subungual debris and ingrown toenail.  Nails comment:  Toenails 1, 2, 3, 4, and 5     Left  Foot Dermatologic   Skin  Left foot skin is intact.   Nails comment:  Toenails 1, 2, 3, 4, and 5  Nails  1.  Positive for elongated, onychomycosis, abnormal thickness, subungual debris and ingrown toenail.  2.  Positive for elongated, onychomycosis, abnormal thickness, subungual debris and ingrown toenail.  3.  Positive for elongated, onychomycosis, abnormal thickness, subungual debris and ingrown toenail.  4.  Positive for elongated, onychomycosis, abnormally thick, subungual debris and ingrown toenail.  5.  Positive for elongated, onychomycosis, abnormally thick, subungual debris and ingrown toenail.          ASSESSMENT/PLAN     Diagnoses and all orders for this visit:    1. Type 2 diabetes mellitus with hyperglycemia, with long-term current use of insulin (Primary)    2. Onychomycosis    3. Pain in both feet      Toenails 1, 2, 3, 4, 5 on Right and 1, 2, 3, 4, 5 on Left were debrided with nail nippers then filed with a Bhaveshmel nail mark.  Patient tolerated procedure well without complications.    Comprehensive lower extremity examination and evaluation was performed.    Discussed findings and treatment plan including risks, benefits, and treatment options with patient in detail. Patient agreed with treatment plan.    Medications and allergies reviewed.  Reviewed available blood glucose and HgB A1C lab values along with other pertinent labs.  These were discussed with the patient as to their importance of diabetic maintenance.    Diabetic foot exam performed and documented this date, compliant with CQM required standards. Detail of findings as noted in physical exam.  Lower extremity Neurologic exam for diabetic patient performed and documented this date, compliant with PQRS required standards. Detail of findings as noted in physical exam.  Advised patient importance of good routine lower extremity hygiene. Advised patient importance of evaluating for intact skin and pain free nail borders.  Advised patient to  use mirror to evaluate plantar/ soles of feet for better visualization. Advised patient monitor and phone office to be seen if any cracking to skin, open lesions, painful nail borders or if nails become elongated prior to next visit. Advised patient importance of daily cleansing of lower extremities, followed by good skin cream to maintain normal hydration of skin. Also advised patient importance of close daily monitoring of blood sugar. Advised to regulate diet and medications to maintain control of blood sugar in optimal range. Contact primary care provider if difficulties maintaining blood sugar levels.  Advised Patient of presence of Diabetes Mellitus condition.  Advised Patient risk of progression and worsening or improvement, then return of condition.  Will monitor condition for any change in future. Treat with most appropriate treatment pending status of condition.  Counseled and advised patient extensively on nature and ramifications of diabetes. Standard instructions given to patient for good diabetic foot care and maintenance. Advised importance of careful monitoring to avoid break down and complications secondary to diabetes. Advised patient importance of strict maintenance of blood sugar control. Advised patient of possible ominous results from neglect of condition, i.e.: amputation/ loss of digits, feet and legs, or even death.  Patient states understands counseling, will monitor closely, continue good hygiene and routine diabetic foot care. Patient will contact office if questions or problems.      An After Visit Summary was printed and given to the patient at discharge, including (if requested) any available informative/educational handouts regarding diagnosis, treatment, or medications. All questions were answered to patient/family satisfaction. Should symptoms fail to improve or worsen they agree to call or return to clinic or to go to the Emergency Department. Discussed the importance of following  up with any needed screening tests/labs/specialist appointments and any requested follow-up recommended by me today. Importance of maintaining follow-up discussed and patient accepts that missed appointments can delay diagnosis and potentially lead to worsening of conditions.    Return in about 1 month (around 10/8/2023)., or sooner if acute issues arise.    This document has been electronically signed by Phuc Mena DPM on September 8, 2023 10:31 EDT

## 2023-09-12 ENCOUNTER — TELEPHONE (OUTPATIENT)
Dept: NEUROLOGY | Facility: CLINIC | Age: 73
End: 2023-09-12
Payer: MEDICARE

## 2023-09-12 NOTE — TELEPHONE ENCOUNTER
Provider: WILL    Caller: KAMI    Relationship to Patient: WIFE    Phone Number: [phone]    Reason for Call: PT WIFE CALLED AND STATES THAT PROVIDER REFERRED PT TO  AND HE REFERRED PT BACK TO PROVIDER. WIFE STATES THAT PT WAS PUT ON [id number] 5MG. NEXT AVAILABLE IS NO UNTIL 02/01/23 AND WIFE IS WANTING TO KNOW IF PROVIDER WILL FILL THIS MEDICATION UNTIL PT CAN BE SEEN IN OFFICE.    PLEASE REVIEW AND ADVISE.THANK YOU

## 2023-11-03 ENCOUNTER — TRANSCRIBE ORDERS (OUTPATIENT)
Dept: LAB | Facility: HOSPITAL | Age: 73
End: 2023-11-03
Payer: MEDICARE

## 2023-11-03 ENCOUNTER — LAB (OUTPATIENT)
Dept: LAB | Facility: HOSPITAL | Age: 73
End: 2023-11-03
Payer: MEDICARE

## 2023-11-03 DIAGNOSIS — E78.5 HYPERLIPIDEMIA, UNSPECIFIED HYPERLIPIDEMIA TYPE: ICD-10-CM

## 2023-11-03 DIAGNOSIS — E11.9 DIABETES MELLITUS WITHOUT COMPLICATION: ICD-10-CM

## 2023-11-03 DIAGNOSIS — E11.9 DIABETES MELLITUS WITHOUT COMPLICATION: Primary | ICD-10-CM

## 2023-11-03 LAB
ALBUMIN SERPL-MCNC: 4.3 G/DL (ref 3.5–5.2)
ALBUMIN UR-MCNC: 7.1 MG/DL
ALBUMIN/GLOB SERPL: 2 G/DL
ALP SERPL-CCNC: 77 U/L (ref 39–117)
ALT SERPL W P-5'-P-CCNC: 20 U/L (ref 1–41)
ANION GAP SERPL CALCULATED.3IONS-SCNC: 10 MMOL/L (ref 5–15)
AST SERPL-CCNC: 21 U/L (ref 1–40)
BILIRUB SERPL-MCNC: 0.5 MG/DL (ref 0–1.2)
BUN SERPL-MCNC: 12 MG/DL (ref 8–23)
BUN/CREAT SERPL: 12.4 (ref 7–25)
CALCIUM SPEC-SCNC: 9.2 MG/DL (ref 8.6–10.5)
CHLORIDE SERPL-SCNC: 105 MMOL/L (ref 98–107)
CHOLEST SERPL-MCNC: 136 MG/DL (ref 0–200)
CO2 SERPL-SCNC: 30 MMOL/L (ref 22–29)
CREAT SERPL-MCNC: 0.97 MG/DL (ref 0.76–1.27)
CREAT UR-MCNC: 170.2 MG/DL
EGFRCR SERPLBLD CKD-EPI 2021: 84 ML/MIN/1.73
GLOBULIN UR ELPH-MCNC: 2.1 GM/DL
GLUCOSE SERPL-MCNC: 118 MG/DL (ref 65–99)
HBA1C MFR BLD: 7.4 % (ref 4.8–5.6)
HDLC SERPL-MCNC: 34 MG/DL (ref 40–60)
LDLC SERPL CALC-MCNC: 68 MG/DL (ref 0–100)
LDLC/HDLC SERPL: 1.81 {RATIO}
MICROALBUMIN/CREAT UR: 41.7 MG/G (ref 0–29)
POTASSIUM SERPL-SCNC: 3.7 MMOL/L (ref 3.5–5.2)
PROT SERPL-MCNC: 6.4 G/DL (ref 6–8.5)
SODIUM SERPL-SCNC: 145 MMOL/L (ref 136–145)
T4 FREE SERPL-MCNC: 0.87 NG/DL (ref 0.93–1.7)
TRIGL SERPL-MCNC: 203 MG/DL (ref 0–150)
TSH SERPL DL<=0.05 MIU/L-ACNC: 1.01 UIU/ML (ref 0.27–4.2)
VLDLC SERPL-MCNC: 34 MG/DL (ref 5–40)

## 2023-11-03 PROCEDURE — 83036 HEMOGLOBIN GLYCOSYLATED A1C: CPT

## 2023-11-03 PROCEDURE — 80053 COMPREHEN METABOLIC PANEL: CPT

## 2023-11-03 PROCEDURE — 36415 COLL VENOUS BLD VENIPUNCTURE: CPT

## 2023-11-03 PROCEDURE — 80061 LIPID PANEL: CPT

## 2023-11-03 PROCEDURE — 82570 ASSAY OF URINE CREATININE: CPT

## 2023-11-03 PROCEDURE — 84443 ASSAY THYROID STIM HORMONE: CPT

## 2023-11-03 PROCEDURE — 82043 UR ALBUMIN QUANTITATIVE: CPT

## 2023-11-03 PROCEDURE — 84439 ASSAY OF FREE THYROXINE: CPT

## 2023-11-13 ENCOUNTER — OFFICE VISIT (OUTPATIENT)
Dept: PODIATRY | Facility: CLINIC | Age: 73
End: 2023-11-13
Payer: MEDICARE

## 2023-11-13 VITALS
TEMPERATURE: 96.7 F | OXYGEN SATURATION: 98 % | BODY MASS INDEX: 38.6 KG/M2 | WEIGHT: 285 LBS | HEART RATE: 64 BPM | SYSTOLIC BLOOD PRESSURE: 145 MMHG | HEIGHT: 72 IN | DIASTOLIC BLOOD PRESSURE: 63 MMHG

## 2023-11-13 DIAGNOSIS — B35.1 ONYCHOMYCOSIS: ICD-10-CM

## 2023-11-13 DIAGNOSIS — Z79.4 TYPE 2 DIABETES MELLITUS WITH HYPERGLYCEMIA, WITH LONG-TERM CURRENT USE OF INSULIN: Primary | ICD-10-CM

## 2023-11-13 DIAGNOSIS — M79.671 PAIN IN BOTH FEET: ICD-10-CM

## 2023-11-13 DIAGNOSIS — M79.672 PAIN IN BOTH FEET: ICD-10-CM

## 2023-11-13 DIAGNOSIS — E11.65 TYPE 2 DIABETES MELLITUS WITH HYPERGLYCEMIA, WITH LONG-TERM CURRENT USE OF INSULIN: Primary | ICD-10-CM

## 2023-11-13 PROCEDURE — 1159F MED LIST DOCD IN RCRD: CPT | Performed by: PODIATRIST

## 2023-11-13 PROCEDURE — 11721 DEBRIDE NAIL 6 OR MORE: CPT | Performed by: PODIATRIST

## 2023-11-13 PROCEDURE — 3078F DIAST BP <80 MM HG: CPT | Performed by: PODIATRIST

## 2023-11-13 PROCEDURE — 1160F RVW MEDS BY RX/DR IN RCRD: CPT | Performed by: PODIATRIST

## 2023-11-13 PROCEDURE — 3077F SYST BP >= 140 MM HG: CPT | Performed by: PODIATRIST

## 2023-11-13 RX ORDER — DIETHYLPROPION HYDROCHLORIDE 75 MG/1
TABLET ORAL
COMMUNITY
Start: 2023-10-11

## 2023-11-13 RX ORDER — DONEPEZIL HYDROCHLORIDE 5 MG/1
5 TABLET, FILM COATED ORAL
COMMUNITY
Start: 2023-09-18 | End: 2024-09-17

## 2023-11-13 NOTE — PROGRESS NOTES
[facility] - PODIATRY    Today's Date: 11/13/23    Patient Name: Pastor Bartholomew  MRN: [id number]  CSN: [id number]  PCP: Shahzad Rashid PA, 02-2-23  Referring Provider: No ref. provider found    SUBJECTIVE     Chief Complaint   Patient presents with    Left Foot - Follow-up, Nail Problem    Right Foot - Follow-up, Nail Problem     HPI: Pastor Bartholomew, a 72 y.o.male, presents to clinic for a diabetic foot evaluation.    New, Established, New Problem: New    Onset: Insidious    Nature: Foot pain    Stable, worsening, improving: Stable    Patient controlling diabetes via: Via medication    Patient states there last blood glucose was: 6.7 A1c    Patient denies any fevers, chills, nausea, vomiting, shortness of breath, nor any other constitutional signs nor symptoms.    No other pedal complaints at this time.    Past Medical History:   Diagnosis Date    Diabetes     Hernia, inguinal     Hyperlipidemia     Hypertension     PONV (postoperative nausea and vomiting)     Type 2 diabetes mellitus      Past Surgical History:   Procedure Laterality Date    COLONOSCOPY      COLONOSCOPY N/A 04/25/2022    Procedure: COLONOSCOPY WITH POLYPECTOMY;  Surgeon: Artie Dickerson MD;  Location: [facility] ENDOSCOPY;  Service: Gastroenterology;  Laterality: N/A;  COLON POLYP    ENDOSCOPY N/A 04/25/2022    Procedure: ESOPHAGOGASTRODUODENOSCOPY WITH BX;  Surgeon: Artie Dickerson MD;  Location: [facility] ENDOSCOPY;  Service: Gastroenterology;  Laterality: N/A;  CAPPS'S ESOPHAGUS, HIATAL HERNIA    HERNIA REPAIR      KNEE SURGERY Bilateral     TRIGGER FINGER RELEASE Bilateral     x2 THUMBS     Family History   Problem Relation Age of Onset    Diabetes Mother     Hypertension Mother     Heart attack Mother     COPD Brother     Diabetes Maternal Grandmother     Colon cancer Neg Hx      Social History     Socioeconomic History    Marital status:    Tobacco Use    Smoking status: Never     Passive exposure:  Yes    Smokeless tobacco: Never   Vaping Use    Vaping Use: Never used   Substance and Sexual Activity    Alcohol use: Never    Drug use: Never    Sexual activity: Defer     Allergies   Allergen Reactions    Codeine Nausea Only    Codeine Unknown - Low Severity    Semaglutide(0.25 Or 0.5mg-Dos) GI Intolerance     Current Outpatient Medications   Medication Sig Dispense Refill    amLODIPine-benazepril (LOTREL) 10-40 MG per capsule Take 1 capsule by mouth Daily.      aspirin 81 MG EC tablet Aspir-81 81 mg oral tablet,delayed release (DR/EC) take 1 tablet (81 mg) by oral route once daily   Active      carvedilol (COREG) 25 MG tablet Take 1 tablet by mouth 2 (Two) Times a Day With Meals.      cetirizine (zyrTEC) 10 MG tablet Take 1 tablet by mouth Daily.      cholecalciferol (VITAMIN D3) 10 MCG (400 UNIT) tablet Take  by mouth Daily.      co-enzyme Q-10 30 MG capsule Take 1 capsule by mouth Daily.      Continuous Blood Gluc Sensor (Dexcom G6 Sensor) Every 10 (Ten) Days.      CONTOUR NEXT TEST test strip Test 6 times daily DX: e11.65 200 each 5    Diethylpropion HCl ER 75 MG tablet sustained-release 24 hour TAKE 1 TABLET BY MOUTH DAILY. MAX DAILY AMOUNT: 75 MG      donepezil (ARICEPT) 5 MG tablet Take 1 tablet by mouth.      fluticasone (FLONASE) 50 MCG/ACT nasal spray INSITLL 1 SPRAY INTO EACH NOSTRIL EVERY DAY FOR 7 DAYS      hydroCHLOROthiazide (HYDRODIURIL) 12.5 MG tablet Take 1 tablet by mouth Daily.      insulin patient supplied pump Inject  under the skin into the appropriate area as directed Continuous.      insulin regular (HUMULIN R) 500 UNIT/ML CONCENTRATED injection 50u daily with insulin pump 40 mL 3    L-Lysine 500 MG tablet tablet Take  by mouth.      Lancets misc Check 5 times daily DX: e11.65 500 each 1    metFORMIN (GLUCOPHAGE) 500 MG tablet metformin 500 mg oral tablet take 1 tablet (500 mg) by oral route 2 times per day with morning and evening meals   Active      multivitamin with minerals tablet  tablet Centrum 3,500-18-0.4 unit-mg-mg oral tablet,chewable chew 1 tablet by oral route daily   Active      omega-3 acid ethyl esters (LOVAZA) 1 g capsule Take 2 capsules by mouth 2 (Two) Times a Day. 180 capsule 2    Omega-3 Fatty Acids (fish oil) 1000 MG capsule capsule Take 2 capsules by mouth.      ondansetron ODT (Zofran ODT) 4 MG disintegrating tablet Place 1 tablet on the tongue Every 8 (Eight) Hours As Needed for Nausea or Vomiting. 30 tablet 1    pantoprazole (PROTONIX) 40 MG EC tablet Take 1 tablet by mouth Daily.      pyridoxine (VITAMIN B-6) 100 MG tablet Take 1 tablet by mouth Daily.      rosuvastatin (CRESTOR) 20 MG tablet Take 1 tablet by mouth Daily.      venlafaxine XR (EFFEXOR-XR) 150 MG 24 hr capsule venlafaxine 150 mg oral capsule,extended release 24hr take 1 capsule (150 mg) by oral route once daily   Active      vitamin B-12 (CYANOCOBALAMIN) 100 MCG tablet Take 1 tablet by mouth Daily.       No current facility-administered medications for this visit.     Review of Systems   Constitutional: Negative.    Skin:         Painful toenails   Neurological:  Positive for numbness.   All other systems reviewed and are negative.      OBJECTIVE     Vitals:    11/13/23 0833   BP: 145/63   Pulse: 64   Temp: 96.7 °F (35.9 °C)   SpO2: 98%       Body mass index is 38.65 kg/m².    Lab Results   Component Value Date    HGBA1C 7.40 (H) 11/03/2023       Lab Results   Component Value Date    GLUCOSE 118 (H) 11/03/2023    CALCIUM 9.2 11/03/2023     11/03/2023    K 3.7 11/03/2023    CO2 30.0 (H) 11/03/2023     11/03/2023    BUN 12 11/03/2023    CREATININE 0.97 11/03/2023    EGFRIFAFRI 100 09/03/2019    EGFRIFNONA 80 12/17/2021    BCR 12.4 11/03/2023    ANIONGAP 10.0 11/03/2023       Patient seen in no apparent distress.      PHYSICAL EXAM:     Foot/Ankle Exam    GENERAL  Appearance:  appears stated age  Orientation:  AAOx3  Affect:  appropriate  Gait:  unimpaired  Assistance:  independent  Right shoe  gear: casual shoe  Left shoe gear: casual shoe    VASCULAR     Right Foot Vascularity   Normal vascular exam    Dorsalis pedis:  2+  Posterior tibial:  2+  Skin temperature:  warm  Edema grading:  None  CFT:  < 3 seconds  Pedal hair growth:  Present  Varicosities:  none     Left Foot Vascularity   Normal vascular exam    Dorsalis pedis:  2+  Posterior tibial:  2+  Skin temperature:  warm  Edema grading:  None  CFT:  < 3 seconds  Pedal hair growth:  Present  Varicosities:  none     NEUROLOGIC     Right Foot Neurologic   Light touch sensation: diminished  Vibratory sensation: diminished  Hot/Cold sensation: diminished  Protective Sensation using [place]-C[place]in Monofilament:   Sites intact: 2  Sites tested: 10     Left Foot Neurologic   Light touch sensation: diminished  Vibratory sensation: diminished  Hot/Cold sensation:  diminished  Protective Sensation using [place]-C[place]in Monofilament:   Sites intact: 2  Sites tested: 10    MUSCLE STRENGTH     Right Foot Muscle Strength   Foot dorsiflexion:  4  Foot plantar flexion:  4  Foot inversion:  4  Foot eversion:  4     Left Foot Muscle Strength   Foot dorsiflexion:  4  Foot plantar flexion:  4  Foot inversion:  4  Foot eversion:  4    RANGE OF MOTION     Right Foot Range of Motion   Foot and ankle ROM within normal limits       Left Foot Range of Motion   Foot and ankle ROM within normal limits      DERMATOLOGIC      Right Foot Dermatologic   Skin  Right foot skin is intact.   Nails  1.  Positive for elongated, onychomycosis, abnormal thickness, subungual debris and ingrown toenail.  2.  Positive for elongated, onychomycosis, abnormal thickness, subungual debris and ingrown toenail.  3.  Positive for elongated, onychomycosis, abnormal thickness, subungual debris and ingrown toenail.  4.  Positive for elongated, onychomycosis, abnormal thickness, subungual debris and ingrown toenail.  5.  Positive for elongated, onychomycosis, abnormal thickness, subungual debris and  ingrown toenail.  Nails comment:  Toenails 1, 2, 3, 4, and 5     Left Foot Dermatologic   Skin  Left foot skin is intact.   Nails comment:  Toenails 1, 2, 3, 4, and 5  Nails  1.  Positive for elongated, onychomycosis, abnormal thickness, subungual debris and ingrown toenail.  2.  Positive for elongated, onychomycosis, abnormal thickness, subungual debris and ingrown toenail.  3.  Positive for elongated, onychomycosis, abnormal thickness, subungual debris and ingrown toenail.  4.  Positive for elongated, onychomycosis, abnormally thick, subungual debris and ingrown toenail.  5.  Positive for elongated, onychomycosis, abnormally thick, subungual debris and ingrown toenail.            ASSESSMENT/PLAN     Diagnoses and all orders for this visit:    1. Type 2 diabetes mellitus with hyperglycemia, with long-term current use of insulin (Primary)    2. Onychomycosis    3. Pain in both feet      Toenails 1, 2, 3, 4, 5 on Right and 1, 2, 3, 4, 5 on Left were debrided with nail nippers then filed with a Dremel nail mark.  Patient tolerated procedure well without complications.    Comprehensive lower extremity examination and evaluation was performed.    Discussed findings and treatment plan including risks, benefits, and treatment options with patient in detail. Patient agreed with treatment plan.    Medications and allergies reviewed.  Reviewed available blood glucose and HgB A1C lab values along with other pertinent labs.  These were discussed with the patient as to their importance of diabetic maintenance.    Diabetic foot exam performed and documented this date, compliant with CQM required standards. Detail of findings as noted in physical exam.  Lower extremity Neurologic exam for diabetic patient performed and documented this date, compliant with PQRS required standards. Detail of findings as noted in physical exam.  Advised patient importance of good routine lower extremity hygiene. Advised patient importance of  evaluating for intact skin and pain free nail borders.  Advised patient to use mirror to evaluate plantar/ soles of feet for better visualization. Advised patient monitor and phone office to be seen if any cracking to skin, open lesions, painful nail borders or if nails become elongated prior to next visit. Advised patient importance of daily cleansing of lower extremities, followed by good skin cream to maintain normal hydration of skin. Also advised patient importance of close daily monitoring of blood sugar. Advised to regulate diet and medications to maintain control of blood sugar in optimal range. Contact primary care provider if difficulties maintaining blood sugar levels.  Advised Patient of presence of Diabetes Mellitus condition.  Advised Patient risk of progression and worsening or improvement, then return of condition.  Will monitor condition for any change in future. Treat with most appropriate treatment pending status of condition.  Counseled and advised patient extensively on nature and ramifications of diabetes. Standard instructions given to patient for good diabetic foot care and maintenance. Advised importance of careful monitoring to avoid break down and complications secondary to diabetes. Advised patient importance of strict maintenance of blood sugar control. Advised patient of possible ominous results from neglect of condition, i.e.: amputation/ loss of digits, feet and legs, or even death.  Patient states understands counseling, will monitor closely, continue good hygiene and routine diabetic foot care. Patient will contact office if questions or problems.      An After Visit Summary was printed and given to the patient at discharge, including (if requested) any available informative/educational handouts regarding diagnosis, treatment, or medications. All questions were answered to patient/family satisfaction. Should symptoms fail to improve or worsen they agree to call or return to clinic  or to go to the Emergency Department. Discussed the importance of following up with any needed screening tests/labs/specialist appointments and any requested follow-up recommended by me today. Importance of maintaining follow-up discussed and patient accepts that missed appointments can delay diagnosis and potentially lead to worsening of conditions.    Return in about 3 months (around 2/13/2024)., or sooner if acute issues arise.    This document has been electronically signed by Phuc Mena DPM on November 13, 2023 11:51 EST

## 2023-12-13 ENCOUNTER — OFFICE VISIT (OUTPATIENT)
Dept: SLEEP MEDICINE | Facility: HOSPITAL | Age: 73
End: 2023-12-13
Payer: MEDICARE

## 2023-12-13 VITALS
HEIGHT: 72 IN | DIASTOLIC BLOOD PRESSURE: 78 MMHG | SYSTOLIC BLOOD PRESSURE: 161 MMHG | WEIGHT: 285.5 LBS | HEART RATE: 60 BPM | OXYGEN SATURATION: 95 % | BODY MASS INDEX: 38.67 KG/M2

## 2023-12-13 DIAGNOSIS — G47.19 EXCESSIVE DAYTIME SLEEPINESS: ICD-10-CM

## 2023-12-13 DIAGNOSIS — R06.83 SNORING: ICD-10-CM

## 2023-12-13 DIAGNOSIS — E66.9 CLASS 2 OBESITY: ICD-10-CM

## 2023-12-13 DIAGNOSIS — G47.33 OSA (OBSTRUCTIVE SLEEP APNEA): Primary | ICD-10-CM

## 2023-12-13 PROBLEM — E66.812 CLASS 2 OBESITY: Status: ACTIVE | Noted: 2023-12-13

## 2023-12-13 PROBLEM — E66.812 CLASS 2 OBESITY: Status: RESOLVED | Noted: 2021-11-01 | Resolved: 2023-12-13

## 2023-12-13 PROCEDURE — G0463 HOSPITAL OUTPT CLINIC VISIT: HCPCS

## 2023-12-13 NOTE — PROGRESS NOTES
"  [address] [address]  [place]   [place] [address]  Phone: [phone]  Fax: [phone]      SLEEP CLINIC FOLLOW UP PROGRESS NOTE.    Pastor Bartholomew  [id number]   1950  72 y.o.  male      PCP: Shahzad Rashid PA      Date of visit: 12/13/2023    Chief Complaint   Patient presents with    Sleep Apnea    Snoring    Obesity       HPI:  This is a 72 y.o. years old patient is here for the management of obstructive sleep apnea.  Sleep apnea is mild in severity with a AHI of 11/hr. Patient is using positive airway pressure therapy with auto CPAP and the symptoms of sleep apnea have improved significantly on the therapy. Normally patient goes to bed at 11 PM and wakes up at 730 AM .  The patient wakes up 3 time(s) during the night and has no problem going back to sleep.  Feels refreshed after waking up.     Sleep test was done in 2008 and he has a very old CPAP.  Now the patient is on Medicare.  He also has a history of diabetes mellitus and hypertension and I am going to repeat another home sleep test to reevaluate him and get him a new CPAP    Medications and allergies are reviewed by me and documented in the encounter.     SOCIAL (habits pertaining to sleep medicine)  History tobacco use:No   History of alcohol use: 0 per week  Caffeine use: 3     REVIEW OF SYSTEMS:   Pertaining positive symptoms are:  Anderson Sleepiness Scale :Total score: 19   Dry nose      PHYSICAL EXAMINATION:  CONSTITUTIONAL:  Vitals:    12/13/23 0900   BP: 161/78   Pulse: 60   SpO2: 95%   Weight: 130 kg (285 lb 8 oz)   Height: 182.9 cm (72.01\")    Body mass index is 38.71 kg/m².   NOSE: nasal passages are clear, No deformities noted   RESP SYSTEM: Not in any respiratory distress, no chest deformities noted,   CARDIOVASULAR: No edema noted  NEURO: Oriented x 3, gait normal,  Mood and affect appeared appropriate            ASSESSMENT AND PLAN:  Obstructive sleep apnea ( G 47.33).  Patient needs to be " reevaluated and I have ordered a home sleep test.  I also instructed him not to use the CPAP for about 3 nights for the test.  Once the test is done I will get him a new CPAP.  Obesity  2 with BMI is Body mass index is 38.71 kg/m².. I have discuss the relationship between the weight and sleep apnea. The benefit of weight loss in reducing severity of sleep apnea was discussed. Discussed diet and exercise with the patient to achieve ideal BMI.  Hypertension  Diabetes mellitus  Return for 31 to 90 days after PAP setup with down load. . Patient's questions were answered.    12/13/2023  Bebeto Vazquez MD  Sleep Medicine.  Medical Director,   [facility], [place] and [place] [facility].

## 2023-12-24 ENCOUNTER — HOSPITAL ENCOUNTER (EMERGENCY)
Facility: HOSPITAL | Age: 73
Discharge: HOME | End: 2023-12-24
Attending: EMERGENCY MEDICINE | Admitting: EMERGENCY MEDICINE
Payer: MEDICARE

## 2023-12-24 VITALS
SYSTOLIC BLOOD PRESSURE: 201 MMHG | DIASTOLIC BLOOD PRESSURE: 83 MMHG | HEART RATE: 78 BPM | HEIGHT: 72 IN | OXYGEN SATURATION: 96 % | TEMPERATURE: 97.9 F | RESPIRATION RATE: 18 BRPM | BODY MASS INDEX: 37.47 KG/M2 | WEIGHT: 276.68 LBS

## 2023-12-24 DIAGNOSIS — R11.2 NAUSEA AND VOMITING, UNSPECIFIED VOMITING TYPE: Primary | ICD-10-CM

## 2023-12-24 DIAGNOSIS — I10 UNCONTROLLED HYPERTENSION: ICD-10-CM

## 2023-12-24 DIAGNOSIS — R73.9 HYPERGLYCEMIA: ICD-10-CM

## 2023-12-24 LAB
ALBUMIN SERPL-MCNC: 4.6 G/DL (ref 3.5–5.2)
ALBUMIN/GLOB SERPL: 1.6 G/DL
ALP SERPL-CCNC: 77 U/L (ref 39–117)
ALT SERPL W P-5'-P-CCNC: 23 U/L (ref 1–41)
ANION GAP SERPL CALCULATED.3IONS-SCNC: 15.2 MMOL/L (ref 5–15)
AST SERPL-CCNC: 37 U/L (ref 1–40)
BACTERIA UR QL AUTO: NORMAL /HPF
BASE EXCESS BLDV CALC-SCNC: 1.9 MMOL/L (ref -2–2)
BASOPHILS # BLD AUTO: 0.03 10*3/MM3 (ref 0–0.2)
BASOPHILS NFR BLD AUTO: 0.3 % (ref 0–1.5)
BDY SITE: ABNORMAL
BILIRUB SERPL-MCNC: 0.7 MG/DL (ref 0–1.2)
BILIRUB UR QL STRIP: NEGATIVE
BUN SERPL-MCNC: 10 MG/DL (ref 8–23)
BUN/CREAT SERPL: 10.9 (ref 7–25)
CALCIUM SPEC-SCNC: 9.4 MG/DL (ref 8.6–10.5)
CHLORIDE SERPL-SCNC: 99 MMOL/L (ref 98–107)
CLARITY UR: CLEAR
CO2 SERPL-SCNC: 26.8 MMOL/L (ref 22–29)
COHGB MFR BLD: 2 % (ref 0–1.5)
COLOR UR: YELLOW
CREAT SERPL-MCNC: 0.92 MG/DL (ref 0.76–1.27)
D-LACTATE SERPL-SCNC: 1.5 MMOL/L (ref 0.5–2)
DEPRECATED RDW RBC AUTO: 42.6 FL (ref 37–54)
EGFRCR SERPLBLD CKD-EPI 2021: 89.5 ML/MIN/1.73
EOSINOPHIL # BLD AUTO: 0.01 10*3/MM3 (ref 0–0.4)
EOSINOPHIL NFR BLD AUTO: 0.1 % (ref 0.3–6.2)
ERYTHROCYTE [DISTWIDTH] IN BLOOD BY AUTOMATED COUNT: 13.6 % (ref 12.3–15.4)
FHHB: 34.8 % (ref 0–5)
GLOBULIN UR ELPH-MCNC: 2.9 GM/DL
GLUCOSE BLDC GLUCOMTR-MCNC: 252 MG/DL (ref 70–99)
GLUCOSE SERPL-MCNC: 253 MG/DL (ref 65–99)
GLUCOSE UR STRIP-MCNC: ABNORMAL MG/DL
HCO3 BLDV-SCNC: 27.2 MMOL/L (ref 22–26)
HCT VFR BLD AUTO: 41.9 % (ref 37.5–51)
HGB BLD-MCNC: 14.4 G/DL (ref 13–17.7)
HGB BLDA-MCNC: 15.7 G/DL (ref 13.8–16.4)
HGB UR QL STRIP.AUTO: NEGATIVE
HOLD SPECIMEN: NORMAL
HOLD SPECIMEN: NORMAL
HYALINE CASTS UR QL AUTO: NORMAL /LPF
IMM GRANULOCYTES # BLD AUTO: 0.03 10*3/MM3 (ref 0–0.05)
IMM GRANULOCYTES NFR BLD AUTO: 0.3 % (ref 0–0.5)
INHALED O2 CONCENTRATION: ABNORMAL %
KETONES UR QL STRIP: ABNORMAL
LEUKOCYTE ESTERASE UR QL STRIP.AUTO: NEGATIVE
LIPASE SERPL-CCNC: 19 U/L (ref 13–60)
LYMPHOCYTES # BLD AUTO: 1.44 10*3/MM3 (ref 0.7–3.1)
LYMPHOCYTES NFR BLD AUTO: 16.7 % (ref 19.6–45.3)
MCH RBC QN AUTO: 29.6 PG (ref 26.6–33)
MCHC RBC AUTO-ENTMCNC: 34.4 G/DL (ref 31.5–35.7)
MCV RBC AUTO: 86 FL (ref 79–97)
METHGB BLD QL: 0.2 % (ref 0–1.5)
MODALITY: ABNORMAL
MONOCYTES # BLD AUTO: 0.38 10*3/MM3 (ref 0.1–0.9)
MONOCYTES NFR BLD AUTO: 4.4 % (ref 5–12)
NEUTROPHILS NFR BLD AUTO: 6.73 10*3/MM3 (ref 1.7–7)
NEUTROPHILS NFR BLD AUTO: 78.2 % (ref 42.7–76)
NITRITE UR QL STRIP: NEGATIVE
NOTE: ABNORMAL
NRBC BLD AUTO-RTO: 0 /100 WBC (ref 0–0.2)
OXYHGB MFR BLDV: 63 % (ref 94–99)
PCO2 BLDV: 44.9 MM HG (ref 41–51)
PH BLDV: 7.4 PH UNITS (ref 7.31–7.41)
PH UR STRIP.AUTO: 7 [PH] (ref 5–8)
PLATELET # BLD AUTO: 216 10*3/MM3 (ref 140–450)
PMV BLD AUTO: 10.8 FL (ref 6–12)
PO2 BLDV: 31.9 MM HG (ref 35–42)
POTASSIUM SERPL-SCNC: 4.3 MMOL/L (ref 3.5–5.2)
PROT SERPL-MCNC: 7.5 G/DL (ref 6–8.5)
PROT UR QL STRIP: ABNORMAL
QT INTERVAL: 437 MS
QTC INTERVAL: 455 MS
RBC # BLD AUTO: 4.87 10*6/MM3 (ref 4.14–5.8)
RBC # UR STRIP: NORMAL /HPF
REF LAB TEST METHOD: NORMAL
SAO2 % BLDCOV: 64.4 % (ref 45–75)
SODIUM SERPL-SCNC: 141 MMOL/L (ref 136–145)
SP GR UR STRIP: 1.02 (ref 1–1.03)
SQUAMOUS #/AREA URNS HPF: NORMAL /HPF
UROBILINOGEN UR QL STRIP: ABNORMAL
WBC # UR STRIP: NORMAL /HPF
WBC NRBC COR # BLD AUTO: 8.62 10*3/MM3 (ref 3.4–10.8)
WHOLE BLOOD HOLD COAG: NORMAL
WHOLE BLOOD HOLD SPECIMEN: NORMAL

## 2023-12-24 PROCEDURE — 99283 EMERGENCY DEPT VISIT LOW MDM: CPT

## 2023-12-24 PROCEDURE — 82820 HEMOGLOBIN-OXYGEN AFFINITY: CPT | Performed by: EMERGENCY MEDICINE

## 2023-12-24 PROCEDURE — 96375 TX/PRO/DX INJ NEW DRUG ADDON: CPT

## 2023-12-24 PROCEDURE — 80053 COMPREHEN METABOLIC PANEL: CPT | Performed by: EMERGENCY MEDICINE

## 2023-12-24 PROCEDURE — 25010000002 ONDANSETRON PER 1 MG: Performed by: EMERGENCY MEDICINE

## 2023-12-24 PROCEDURE — 25810000003 SODIUM CHLORIDE 0.9 % SOLUTION: Performed by: EMERGENCY MEDICINE

## 2023-12-24 PROCEDURE — 81001 URINALYSIS AUTO W/SCOPE: CPT | Performed by: EMERGENCY MEDICINE

## 2023-12-24 PROCEDURE — 93005 ELECTROCARDIOGRAM TRACING: CPT | Performed by: EMERGENCY MEDICINE

## 2023-12-24 PROCEDURE — 25010000002 HYDRALAZINE PER 20 MG: Performed by: EMERGENCY MEDICINE

## 2023-12-24 PROCEDURE — 83605 ASSAY OF LACTIC ACID: CPT | Performed by: EMERGENCY MEDICINE

## 2023-12-24 PROCEDURE — 85025 COMPLETE CBC W/AUTO DIFF WBC: CPT | Performed by: EMERGENCY MEDICINE

## 2023-12-24 PROCEDURE — 82948 REAGENT STRIP/BLOOD GLUCOSE: CPT

## 2023-12-24 PROCEDURE — 96374 THER/PROPH/DIAG INJ IV PUSH: CPT

## 2023-12-24 PROCEDURE — 82805 BLOOD GASES W/O2 SATURATION: CPT | Performed by: EMERGENCY MEDICINE

## 2023-12-24 PROCEDURE — 83690 ASSAY OF LIPASE: CPT | Performed by: EMERGENCY MEDICINE

## 2023-12-24 RX ORDER — ONDANSETRON 2 MG/ML
4 INJECTION INTRAMUSCULAR; INTRAVENOUS ONCE
Status: COMPLETED | OUTPATIENT
Start: 2023-12-24 | End: 2023-12-24

## 2023-12-24 RX ORDER — HYDRALAZINE HYDROCHLORIDE 20 MG/ML
10 INJECTION INTRAMUSCULAR; INTRAVENOUS ONCE
Status: COMPLETED | OUTPATIENT
Start: 2023-12-24 | End: 2023-12-24

## 2023-12-24 RX ORDER — ONDANSETRON 4 MG/1
4 TABLET, ORALLY DISINTEGRATING ORAL EVERY 8 HOURS PRN
Qty: 14 TABLET | Refills: 0 | Status: SHIPPED | OUTPATIENT
Start: 2023-12-24

## 2023-12-24 RX ORDER — SODIUM CHLORIDE 0.9 % (FLUSH) 0.9 %
10 SYRINGE (ML) INJECTION AS NEEDED
Status: DISCONTINUED | OUTPATIENT
Start: 2023-12-24 | End: 2023-12-24 | Stop reason: HOSPADM

## 2023-12-24 RX ADMIN — SODIUM CHLORIDE 1000 ML: 9 INJECTION, SOLUTION INTRAVENOUS at 15:55

## 2023-12-24 RX ADMIN — HYDRALAZINE HYDROCHLORIDE 10 MG: 20 INJECTION, SOLUTION INTRAMUSCULAR; INTRAVENOUS at 17:48

## 2023-12-24 RX ADMIN — ONDANSETRON 4 MG: 2 INJECTION INTRAMUSCULAR; INTRAVENOUS at 15:55

## 2023-12-24 NOTE — ED PROVIDER NOTES
Time: 3:46 PM EST  Date of encounter:  12/24/2023  Independent Historian/Clinical History and Information was obtained by:   Patient and Family    History is limited by: N/A    Chief Complaint: Nausea vomiting      History of Present Illness:  Patient is a 72 y.o. year old male who presents to the emergency department for evaluation of nausea vomiting.  Patient reports onset yesterday and has had total of 5-6 episodes of nausea vomiting.  Denies diarrhea.    HPI    Patient Care Team  Primary Care Provider: Shahzad Rashid PA    Past Medical History:     Allergies   Allergen Reactions    Codeine Nausea Only    Codeine Unknown - Low Severity    Semaglutide(0.25 Or 0.5mg-Dos) GI Intolerance     Past Medical History:   Diagnosis Date    Diabetes     Hernia, inguinal     Hyperlipidemia     Hypertension     PONV (postoperative nausea and vomiting)     Type 2 diabetes mellitus      Past Surgical History:   Procedure Laterality Date    COLONOSCOPY      COLONOSCOPY N/A 04/25/2022    Procedure: COLONOSCOPY WITH POLYPECTOMY;  Surgeon: Artie Dickerson MD;  Location: [facility] ENDOSCOPY;  Service: Gastroenterology;  Laterality: N/A;  COLON POLYP    ENDOSCOPY N/A 04/25/2022    Procedure: ESOPHAGOGASTRODUODENOSCOPY WITH BX;  Surgeon: Artie Dickerson MD;  Location: [facility] ENDOSCOPY;  Service: Gastroenterology;  Laterality: N/A;  CAPPS'S ESOPHAGUS, HIATAL HERNIA    HERNIA REPAIR      KNEE SURGERY Bilateral     TRIGGER FINGER RELEASE Bilateral     x2 THUMBS     Family History   Problem Relation Age of Onset    Diabetes Mother     Hypertension Mother     Heart attack Mother     COPD Brother     Diabetes Maternal Grandmother     Colon cancer Neg Hx        Home Medications:  Prior to Admission medications    Medication Sig Start Date End Date Taking? Authorizing Provider   amLODIPine-benazepril (LOTREL) 10-40 MG per capsule Take 1 capsule by mouth Daily. 6/2/22   ProviderHector MD   aspirin 81 MG EC tablet  Aspir-81 81 mg oral tablet,delayed release (DR/EC) take 1 tablet (81 mg) by oral route once daily   Active    Hector Judge MD   carvedilol (COREG) 25 MG tablet Take 1 tablet by mouth 2 (Two) Times a Day With Meals. 10/29/21   Hector Judge MD   cetirizine (zyrTEC) 10 MG tablet Take 1 tablet by mouth Daily.    Hector Judge MD   cholecalciferol (VITAMIN D3) 10 MCG (400 UNIT) tablet Take  by mouth Daily.    Hector Judge MD   co-enzyme Q-10 30 MG capsule Take 1 capsule by mouth Daily.    Hector Judge MD   Continuous Blood Gluc Sensor (Dexcom G6 Sensor) Every 10 (Ten) Days.    Hector Judge MD   CONTOUR NEXT TEST test strip Test 6 times daily DX: e11.65 11/14/19   Bettie Huston APRN   Diethylpropion HCl ER 75 MG tablet sustained-release 24 hour TAKE 1 TABLET BY MOUTH DAILY. MAX DAILY AMOUNT: 75 MG 10/11/23   Hector Judge MD   donepezil (ARICEPT) 5 MG tablet Take 1 tablet by mouth. 9/18/23 9/17/24  Hector Judge MD   fluticasone (FLONASE) 50 MCG/ACT nasal spray INSITLL 1 SPRAY INTO EACH NOSTRIL EVERY DAY FOR 7 DAYS 4/29/22   Hector Judge MD   hydroCHLOROthiazide (HYDRODIURIL) 12.5 MG tablet Take 1 tablet by mouth Daily. 5/18/22   Hector Judge MD   insulin patient supplied pump Inject  under the skin into the appropriate area as directed Continuous.    Hector Judge MD   insulin regular (HUMULIN R) 500 UNIT/ML CONCENTRATED injection 50u daily with insulin pump 1/16/20   Camille Montelongo MD   L-Lysine 500 MG tablet tablet Take  by mouth.    Hector Judge MD   Lancets misc Check 5 times daily DX: e11.65 7/17/19   Bettie Huston APRN   metFORMIN (GLUCOPHAGE) 500 MG tablet metformin 500 mg oral tablet take 1 tablet (500 mg) by oral route 2 times per day with morning and evening meals   Active    Hector Judge MD   multivitamin with minerals tablet tablet Centrum 3,500-18-0.4 unit-mg-mg oral tablet,chewable  chew 1 tablet by oral route daily   Active    Hector Judge MD   Omega-3 Fatty Acids (fish oil) 1000 MG capsule capsule Take 2 capsules by mouth.    Hector Judge MD   ondansetron ODT (Zofran ODT) 4 MG disintegrating tablet Place 1 tablet on the tongue Every 8 (Eight) Hours As Needed for Nausea or Vomiting. 3/31/22   Terri Loo APRN   pantoprazole (PROTONIX) 40 MG EC tablet Take 1 tablet by mouth Daily. 8/5/22   Hector Judge MD   pyridoxine (VITAMIN B-6) 100 MG tablet Take 1 tablet by mouth Daily.    Hector Judge MD   rosuvastatin (CRESTOR) 20 MG tablet Take 1 tablet by mouth Daily.    Hector Judge MD   venlafaxine XR (EFFEXOR-XR) 150 MG 24 hr capsule venlafaxine 150 mg oral capsule,extended release 24hr take 1 capsule (150 mg) by oral route once daily   Active 6/3/21   Hector Judge MD   vitamin B-12 (CYANOCOBALAMIN) 100 MCG tablet Take 1 tablet by mouth Daily.    Hector Judge MD        Social History:   Social History     Tobacco Use    Smoking status: Never     Passive exposure: Yes    Smokeless tobacco: Never   Vaping Use    Vaping Use: Never used   Substance Use Topics    Alcohol use: Never    Drug use: Never         Review of Systems:  Review of Systems   Constitutional:  Negative for chills and fever.   HENT:  Negative for congestion, rhinorrhea and sore throat.    Eyes:  Negative for pain and visual disturbance.   Respiratory:  Negative for apnea, cough, chest tightness and shortness of breath.    Cardiovascular:  Negative for chest pain and palpitations.   Gastrointestinal:  Positive for nausea and vomiting. Negative for abdominal pain and diarrhea.   Genitourinary:  Negative for difficulty urinating and dysuria.   Musculoskeletal:  Negative for joint swelling and myalgias.   Skin:  Negative for color change.   Neurological:  Negative for seizures and headaches.   Psychiatric/Behavioral: Negative.     All other systems reviewed and are  "negative.       Physical Exam:  BP (!) 201/83 (Patient Position: Lying)   Pulse 78   Temp 97.9 °F (36.6 °C) (Oral)   Resp 18   Ht 182.9 cm (72\")   Wt 126 kg (276 lb 10.8 oz)   SpO2 96%   BMI 37.52 kg/m²     Physical Exam  Vitals and nursing note reviewed.   Constitutional:       General: He is not in acute distress.     Appearance: Normal appearance. He is not toxic-appearing.   HENT:      Head: Normocephalic and atraumatic.      Jaw: There is normal jaw occlusion.      Mouth/Throat:      Mouth: Mucous membranes are dry.   Eyes:      General: Lids are normal.      Extraocular Movements: Extraocular movements intact.      Conjunctiva/sclera: Conjunctivae normal.      Pupils: Pupils are equal, round, and reactive to light.   Cardiovascular:      Rate and Rhythm: Normal rate and regular rhythm.      Pulses: Normal pulses.      Heart sounds: Normal heart sounds.   Pulmonary:      Effort: Pulmonary effort is normal. No respiratory distress.      Breath sounds: Normal breath sounds. No wheezing or rhonchi.   Abdominal:      General: Abdomen is flat.      Palpations: Abdomen is soft.      Tenderness: There is no abdominal tenderness. There is no guarding or rebound.   Musculoskeletal:         General: Normal range of motion.      Cervical back: Normal range of motion and neck supple.      Right lower leg: No edema.      Left lower leg: No edema.   Skin:     General: Skin is warm and dry.   Neurological:      Mental Status: He is alert and oriented to person, place, and time. Mental status is at baseline.   Psychiatric:         Mood and Affect: Mood normal.                  Procedures:  Procedures      Medical Decision Making:      Comorbidities that affect care:    Diabetes, Hypertension    External Notes reviewed:    Previous Clinic Note: Family medicine office visit for general medical management      The following orders were placed and all results were independently analyzed by me:  Orders Placed This Encounter "   Procedures    [place] Draw    Comprehensive Metabolic Panel    Lipase    Urinalysis With Microscopic If Indicated (No Culture) - Urine, Clean Catch    Lactic Acid, Plasma    CBC Auto Differential    Blood Gas, Venous -With Co-Ox Panel: Yes    Urinalysis, Microscopic Only - Urine, Clean Catch    NPO Diet NPO Type: Strict NPO    Undress & Gown    POC Glucose Once    ECG 12 Lead Electrolyte Imbalance    Insert Peripheral IV    CBC & Differential    Green Top (Gel)    Lavender Top    Gold Top - SST    Light Blue Top       Medications Given in the Emergency Department:  Medications   sodium chloride 0.9 % flush 10 mL (has no administration in time range)   ondansetron (ZOFRAN) injection 4 mg (4 mg Intravenous Given 12/24/23 1555)   sodium chloride 0.9 % bolus 1,000 mL (0 mL Intravenous Stopped 12/24/23 1625)   hydrALAZINE (APRESOLINE) injection 10 mg (10 mg Intravenous Given 12/24/23 1748)        ED Course:         Labs:    Lab Results (last 24 hours)       Procedure Component Value Units Date/Time    POC Glucose Once [[id number]]  (Abnormal) Collected: 12/24/23 1352    Specimen: Blood Updated: 12/24/23 1354     Glucose 252 mg/dL      Comment: Serial Number: [id number]:  [id number]       Comprehensive Metabolic Panel [[id number]]  (Abnormal) Collected: 12/24/23 1453    Specimen: Blood Updated: 12/24/23 1539     Glucose 253 mg/dL      BUN 10 mg/dL      Creatinine 0.92 mg/dL      Sodium 141 mmol/L      Potassium 4.3 mmol/L      Comment: Slight hemolysis detected by analyzer. Result may be falsely elevated.        Chloride 99 mmol/L      CO2 26.8 mmol/L      Calcium 9.4 mg/dL      Total Protein 7.5 g/dL      Albumin 4.6 g/dL      ALT (SGPT) 23 U/L      AST (SGOT) 37 U/L      Comment: Slight hemolysis detected by analyzer. Result may be falsely elevated.        Alkaline Phosphatase 77 U/L      Total Bilirubin 0.7 mg/dL      Globulin 2.9 gm/dL      A/G Ratio 1.6 g/dL      BUN/Creatinine Ratio 10.9     Anion Gap 15.2  mmol/L      eGFR 89.5 mL/min/1.73     Narrative:      GFR Normal >60  Chronic Kidney Disease <60  Kidney Failure <15      Lipase [[id number]]  (Normal) Collected: 12/24/23 1453    Specimen: Blood Updated: 12/24/23 1525     Lipase 19 U/L     Lactic Acid, Plasma [[id number]]  (Normal) Collected: 12/24/23 1453    Specimen: Blood Updated: 12/24/23 1523     Lactate 1.5 mmol/L     CBC & Differential [[id number]]  (Abnormal) Collected: 12/24/23 1544    Specimen: Blood Updated: 12/24/23 1559    Narrative:      The following orders were created for panel order CBC & Differential.  Procedure                               Abnormality         Status                     ---------                               -----------         ------                     CBC Auto Differential[[id number]]        Abnormal            Final result                 Please view results for these tests on the individual orders.    CBC Auto Differential [[id number]]  (Abnormal) Collected: 12/24/23 1544    Specimen: Blood Updated: 12/24/23 1559     WBC 8.62 10*3/mm3      RBC 4.87 10*6/mm3      Hemoglobin 14.4 g/dL      Hematocrit 41.9 %      MCV 86.0 fL      MCH 29.6 pg      MCHC 34.4 g/dL      RDW 13.6 %      RDW-SD 42.6 fl      MPV 10.8 fL      Platelets 216 10*3/mm3      Neutrophil % 78.2 %      Lymphocyte % 16.7 %      Monocyte % 4.4 %      Eosinophil % 0.1 %      Basophil % 0.3 %      Immature Grans % 0.3 %      Neutrophils, Absolute 6.73 10*3/mm3      Lymphocytes, Absolute 1.44 10*3/mm3      Monocytes, Absolute 0.38 10*3/mm3      Eosinophils, Absolute 0.01 10*3/mm3      Basophils, Absolute 0.03 10*3/mm3      Immature Grans, Absolute 0.03 10*3/mm3      nRBC 0.0 /100 WBC     Blood Gas, Venous -With Co-Ox Panel: Yes [[id number]]  (Abnormal) Collected: 12/24/23 1545    Specimen: Venous Blood Updated: 12/24/23 1551     pH, Venous 7.400 pH Units      pCO2, Venous 44.9 mm Hg      pO2, Venous 31.9 mm Hg      HCO3, Venous 27.2 mmol/L      Base Excess, Venous 1.9  mmol/L      O2 Saturation, Venous 64.4 %      Hemoglobin, Blood Gas 15.7 g/dL      Carboxyhemoglobin 2.0 %      Methemoglobin 0.20 %      Oxyhemoglobin 63.0 %      FHHB 34.8 %      Note --     Site Venous     Modality N/A     FIO2 --    Urinalysis With Microscopic If Indicated (No Culture) - Urine, Clean Catch [[id number]]  (Abnormal) Collected: 12/24/23 1600    Specimen: Urine, Clean Catch Updated: 12/24/23 1614     Color, UA Yellow     Appearance, UA Clear     pH, UA 7.0     Specific Gravity, UA 1.019     Glucose,  mg/dL (1+)     Ketones, UA 40 mg/dL (2+)     Bilirubin, UA Negative     Blood, UA Negative     Protein, UA 30 mg/dL (1+)     Leuk Esterase, UA Negative     Nitrite, UA Negative     Urobilinogen, UA 1.0 E.U./dL    Urinalysis, Microscopic Only - Urine, Clean Catch [[id number]] Collected: 12/24/23 1600    Specimen: Urine, Clean Catch Updated: 12/24/23 1614     RBC, UA 0-2 /HPF      WBC, UA 0-2 /HPF      Bacteria, UA None Seen /HPF      Squamous Epithelial Cells, UA 0-2 /HPF      Hyaline Casts, UA 0-2 /LPF      Methodology Automated Microscopy             Imaging:    No Radiology Exams Resulted Within Past 24 Hours      Differential Diagnosis and Discussion:    Vomiting: Differential diagnosis includes but is not limited to migraine, labyrinthine disorders, psychogenic, metabolic and endocrine causes, peptic ulcer, gastric outlet obstruction, gastritis, gastroenteritis, appendicitis, intestinal obstruction, paralytic ileus, food poisoning, cholecystitis, acute hepatitis, acute pancreatitis, acute febrile illness, and myocardial infarction.    All labs were reviewed and interpreted by me.    MDM  Number of Diagnoses or Management Options  Hyperglycemia  Nausea and vomiting, unspecified vomiting type  Uncontrolled hypertension  Diagnosis management comments: In summary this is a 72-year-old male patient who presents to the emergency department for evaluation and treatment of nausea vomiting and difficulty  holding down medications as a result.  He notes that his blood sugar and blood pressure have been elevated as a result of not being able to keep his medications down.  CBC independently reviewed by me and shows no critical abnormalities.  CMP independently reviewed by me and shows no critical abnormalities except mild hyperglycemia without changes of DKA.  Patient has received antiemetics in the emerged department has had no further vomiting episodes.  Abdomen exam is benign.  Will be discharged home with prescription for antiemetics.  Very strict return to ER and follow-up instructions have been provided to the patient.                   Patient Care Considerations:    CT ABDOMEN AND PELVIS: I considered ordering a CT scan of the abdomen and pelvis however benign abdominal examination      Consultants/Shared Management Plan:    None    Social Determinants of Health:    Patient is independent, reliable, and has access to care.       Disposition and Care Coordination:    Discharged: I considered escalation of care by admitting this patient for observation, however the patient has improved and is suitable and  stable for discharge.    I have explained the patient´s condition, diagnoses and treatment plan based on the information available to me at this time. I have answered questions and addressed any concerns. The patient has a good  understanding of the patient´s diagnosis, condition, and treatment plan as can be expected at this point. The vital signs have been stable. The patient´s condition is stable and appropriate for discharge from the emergency department.      The patient will pursue further outpatient evaluation with the primary care physician or other designated or consulting physician as outlined in the discharge instructions. They are agreeable to this plan of care and follow-up instructions have been explained in detail. The patient has received these instructions in written format and have expressed an  understanding of the discharge instructions. The patient is aware that any significant change in condition or worsening of symptoms should prompt an immediate return to this or the closest emergency department or call to 911.  I have explained discharge medications and the need for follow up with the patient/caretakers. This was also printed in the discharge instructions. Patient was discharged with the following medications and follow up:      Medication List        Changed      * ondansetron ODT 4 MG disintegrating tablet  Commonly known as: Zofran ODT  Place 1 tablet on the tongue Every 8 (Eight) Hours As Needed for Nausea or Vomiting.  What changed: Another medication with the same name was added. Make sure you understand how and when to take each.     * ondansetron ODT 4 MG disintegrating tablet  Commonly known as: ZOFRAN-ODT  Place 1 tablet on the tongue Every 8 (Eight) Hours As Needed for Nausea or Vomiting.  What changed: You were already taking a medication with the same name, and this prescription was added. Make sure you understand how and when to take each.           * This list has 2 medication(s) that are the same as other medications prescribed for you. Read the directions carefully, and ask your doctor or other care provider to review them with you.                   Where to Get Your Medications        These medications were sent to [facility]/pharmacy #[id number] - [place], [place] - [address] - [phone] [facility] [phone] FX  [address] [place] [address] [address]      Hours: 24-hours Phone: [phone]   ondansetron ODT 4 MG disintegrating tablet      Shahzad Rashid PA  [id number]  [place] [address]  [phone]    In 1 week         Final diagnoses:   Nausea and vomiting, unspecified vomiting type   Hyperglycemia   Uncontrolled hypertension        ED Disposition       ED Disposition   Discharge    Condition   Stable    Comment   --               This medical record created  using voice recognition software.             Brandon Thibodeaux MD  12/24/23 1531

## 2024-01-02 ENCOUNTER — HOSPITAL ENCOUNTER (OUTPATIENT)
Dept: SLEEP MEDICINE | Facility: HOSPITAL | Age: 74
Discharge: HOME | End: 2024-01-02
Admitting: INTERNAL MEDICINE
Payer: MEDICARE

## 2024-01-02 DIAGNOSIS — E66.9 CLASS 2 OBESITY: ICD-10-CM

## 2024-01-02 DIAGNOSIS — G47.33 OSA (OBSTRUCTIVE SLEEP APNEA): ICD-10-CM

## 2024-01-02 DIAGNOSIS — G47.19 EXCESSIVE DAYTIME SLEEPINESS: ICD-10-CM

## 2024-01-02 DIAGNOSIS — R06.83 SNORING: ICD-10-CM

## 2024-01-02 PROCEDURE — 95806 SLEEP STUDY UNATT&RESP EFFT: CPT

## 2024-01-03 LAB
QT INTERVAL: 437 MS
QTC INTERVAL: 455 MS

## 2024-01-10 DIAGNOSIS — G47.33 OSA (OBSTRUCTIVE SLEEP APNEA): Primary | ICD-10-CM

## 2024-01-16 ENCOUNTER — TELEPHONE (OUTPATIENT)
Dept: SLEEP MEDICINE | Facility: HOSPITAL | Age: 74
End: 2024-01-16
Payer: MEDICARE

## 2024-02-01 ENCOUNTER — OFFICE VISIT (OUTPATIENT)
Dept: NEUROLOGY | Facility: CLINIC | Age: 74
End: 2024-02-01
Payer: MEDICARE

## 2024-02-01 VITALS
DIASTOLIC BLOOD PRESSURE: 67 MMHG | SYSTOLIC BLOOD PRESSURE: 151 MMHG | HEART RATE: 59 BPM | BODY MASS INDEX: 39.04 KG/M2 | HEIGHT: 72 IN | WEIGHT: 288.2 LBS

## 2024-02-01 DIAGNOSIS — G31.84 MILD COGNITIVE IMPAIRMENT: ICD-10-CM

## 2024-02-01 DIAGNOSIS — E78.2 MIXED HYPERLIPIDEMIA: ICD-10-CM

## 2024-02-01 DIAGNOSIS — E11.65 TYPE 2 DIABETES MELLITUS WITH HYPERGLYCEMIA, WITHOUT LONG-TERM CURRENT USE OF INSULIN: ICD-10-CM

## 2024-02-01 DIAGNOSIS — I63.81 LACUNAR INFARCTION: ICD-10-CM

## 2024-02-01 RX ORDER — ROSUVASTATIN CALCIUM 40 MG/1
40 TABLET, COATED ORAL NIGHTLY
Qty: 90 TABLET | Refills: 1 | Status: ON HOLD | OUTPATIENT
Start: 2024-02-01

## 2024-02-01 RX ORDER — DONEPEZIL HYDROCHLORIDE 10 MG/1
10 TABLET, FILM COATED ORAL NIGHTLY
Qty: 90 TABLET | Refills: 1 | Status: ON HOLD | OUTPATIENT
Start: 2024-02-01

## 2024-02-01 RX ORDER — HYDRALAZINE HYDROCHLORIDE 10 MG/1
10 TABLET, FILM COATED ORAL 3 TIMES DAILY PRN
Status: ON HOLD | COMMUNITY
Start: 2024-01-09 | End: 2024-07-07

## 2024-02-01 NOTE — PATIENT INSTRUCTIONS
Stroke Prevention  Some medical conditions and behaviors can lead to a higher chance of having a stroke. You can help prevent a stroke by eating healthy, exercising, not smoking, and managing any medical conditions you have.  Stroke is a leading cause of functional impairment. Primary prevention is particularly important because a majority of strokes are first-time events. Stroke changes the lives of not only those who experience a stroke but also their family and other caregivers.  How can this condition affect me?  A stroke is a medical emergency and should be treated right away. A stroke can lead to brain damage and can sometimes be life-threatening. If a person gets medical treatment right away, there is a better chance of surviving and recovering from a stroke.  What can increase my risk?  The following medical conditions may increase your risk of a stroke:  Cardiovascular disease.  High blood pressure (hypertension).  Diabetes.  High cholesterol.  Sickle cell disease.  Blood clotting disorders (hypercoagulable state).  Obesity.  Sleep disorders (obstructive sleep apnea).  Other risk factors include:  Being older than age 60.  Having a history of blood clots, stroke, or mini-stroke (transient ischemic attack, TIA).  Genetic factors, such as race, ethnicity, or a family history of stroke.  Smoking cigarettes or using other tobacco products.  Taking birth control pills, especially if you also use tobacco.  Heavy use of alcohol or drugs, especially cocaine and methamphetamine.  Physical inactivity.  What actions can I take to prevent this?  Manage your health conditions  High cholesterol levels.  Eating a healthy diet is important for preventing high cholesterol. If cholesterol cannot be managed through diet alone, you may need to take medicines.  Take any prescribed medicines to control your cholesterol as told by your health care provider.  Hypertension.  To reduce your risk of stroke, try to keep your blood  pressure below 130/80.  Eating a healthy diet and exercising regularly are important for controlling blood pressure. If these steps are not enough to manage your blood pressure, you may need to take medicines.  Take any prescribed medicines to control hypertension as told by your health care provider.  Ask your health care provider if you should monitor your blood pressure at home.  Have your blood pressure checked every year, even if your blood pressure is normal. Blood pressure increases with age and some medical conditions.  Diabetes.  Eating a healthy diet and exercising regularly are important parts of managing your blood sugar (glucose). If your blood sugar cannot be managed through diet and exercise, you may need to take medicines.  Take any prescribed medicines to control your diabetes as told by your health care provider.  Get evaluated for obstructive sleep apnea. Talk to your health care provider about getting a sleep evaluation if you snore a lot or have excessive sleepiness.  Make sure that any other medical conditions you have, such as atrial fibrillation or atherosclerosis, are managed.  Nutrition  Follow instructions from your health care provider about what to eat or drink to help manage your health condition. These instructions may include:  Reducing your daily calorie intake.  Limiting how much salt (sodium) you use to 1,500 milligrams (mg) each day.  Using only healthy fats for cooking, such as olive oil, canola oil, or sunflower oil.  Eating healthy foods. You can do this by:  Choosing foods that are high in fiber, such as whole grains, and fresh fruits and vegetables.  Eating at least 5 servings of fruits and vegetables a day. Try to fill one-half of your plate with fruits and vegetables at each meal.  Choosing lean protein foods, such as lean cuts of meat, poultry without skin, fish, tofu, beans, and nuts.  Eating low-fat dairy products.  Avoiding foods that are high in sodium. This can help  "lower blood pressure.  Avoiding foods that have saturated fat, trans fat, and cholesterol. This can help prevent high cholesterol.  Avoiding processed and prepared foods.  Counting your daily carbohydrate intake.    Lifestyle  If you drink alcohol:  Limit how much you have to:  0-1 drink a day for women who are not pregnant.  0-2 drinks a day for men.  Know how much alcohol is in your drink. In the [place] one drink equals one 12 oz bottle of beer (355mL), one 5 oz glass of wine (148mL), or one 1½ oz glass of hard liquor (44mL).  Do not use any products that contain nicotine or tobacco. These products include cigarettes, chewing tobacco, and vaping devices, such as e-cigarettes. If you need help quitting, ask your health care provider.  Avoid secondhand smoke.  Do not use drugs.  Activity    Try to stay at a healthy weight.  Get at least 30 minutes of exercise on most days, such as:  Fast walking.  Biking.  Swimming.  Medicines  Take over-the-counter and prescription medicines only as told by your health care provider. Aspirin or blood thinners (antiplatelets or anticoagulants) may be recommended to reduce your risk of forming blood clots that can lead to stroke.  Avoid taking birth control pills. Talk to your health care provider about the risks of taking birth control pills if:  You are over 35 years old.  You smoke.  You get very bad headaches.  You have had a blood clot.  Where to find more information  American Stroke Association: www.strokeassociation.org  Get help right away if:  You or a loved one has any symptoms of a stroke. \"BE FAST\" is an easy way to remember the main warning signs of a stroke:  B - Balance. Signs are dizziness, sudden trouble walking, or loss of balance.  E - Eyes. Signs are trouble seeing or a sudden change in vision.  F - Face. Signs are sudden weakness or numbness of the face, or the face or eyelid drooping on one side.  A - Arms. Signs are weakness or numbness in an arm. This happens " "suddenly and usually on one side of the body.  S - Speech. Signs are sudden trouble speaking, slurred speech, or trouble understanding what people say.  T - Time. Time to call emergency services. Write down what time symptoms started.  You or a loved one has other signs of a stroke, such as:  A sudden, severe headache with no known cause.  Nausea or vomiting.  Seizure.  These symptoms may represent a serious problem that is an emergency. Do not wait to see if the symptoms will go away. Get medical help right away. Call your local emergency services (911 in the [place] Do not drive yourself to the hospital.  Summary  You can help to prevent a stroke by eating healthy, exercising, not smoking, limiting alcohol intake, and managing any medical conditions you may have.  Do not use any products that contain nicotine or tobacco. These include cigarettes, chewing tobacco, and vaping devices, such as e-cigarettes. If you need help quitting, ask your health care provider.  Remember \"BE FAST\" for warning signs of a stroke. Get help right away if you or a loved one has any of these signs.  This information is not intended to replace advice given to you by your health care provider. Make sure you discuss any questions you have with your health care provider.  Document Revised: 07/01/2021 Document Reviewed: 07/19/2021  Elsevier Patient Education © 2023 [employer].    "

## 2024-02-01 NOTE — PROGRESS NOTES
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  Seems to have good days and worse days.  Sometimes struggles with word recall.      Interval History:   States he's been experiencing short term memory difficulty for the past 2 years.  Presents to the office with wife.  She will tell him something and he asks him the same question several times.  Difficulty remembering names.  Wife states he's quicker to anger or more irritable lately.        Objective   Vital Signs:   /67   Pulse 59   Ht 182.9 cm (72\")   Wt 131 kg (288 lb 3.2 oz)   BMI 39.09 kg/m²     Physical Exam  HENT:      Head: Normocephalic.   Pulmonary:      Effort: Pulmonary effort is normal.   Neurological:      Mental Status: He is alert and oriented to person, place, and time.      Sensory: Sensation is intact.      Motor: Motor function is intact.      Coordination: Coordination is intact.      Deep Tendon Reflexes: Reflexes are normal and symmetric.        Neurologic Exam     Mental Status   Oriented to person, place, and time.        Result Review :   CBC:  Lab Results   Component Value Date    WBC 8.62 12/24/2023    RBC 4.87 12/24/2023    HGB 14.4 12/24/2023    HCT 41.9 12/24/2023    MCV 86.0 12/24/2023    MCH 29.6 12/24/2023    MCHC 34.4 12/24/2023    RDW 13.6 12/24/2023     12/24/2023     CMP:  Lab Results   Component Value Date     (H) 12/19/2022    BUN 10 12/24/2023    CREATININE 0.92 12/24/2023    EGFRIFNONA 80 12/17/2021    EGFRIFAFRI >60 12/19/2022     12/24/2023    K 4.3 12/24/2023    CL 99 12/24/2023    CALCIUM 9.4 12/24/2023    PROTENTOTREF 6.6 09/03/2019    ALBUMIN 4.6 12/24/2023    LABGLOBREF 1.8 09/03/2019    BILITOT 0.7 12/24/2023    ALKPHOS 77 12/24/2023    AST 37 12/24/2023    ALT 23 12/24/2023     LIPID PANEL:  Lab Results   Component Value Date    CHLPL 130 " 06/01/2021    TRIG 203 (H) 11/03/2023    HDL 34 (L) 11/03/2023    VLDL 34 11/03/2023    LDL 68 11/03/2023    LDLHDL 1.81 11/03/2023     HGBA1C(MOST RECENT):  Lab Results   Component Value Date    HGBA1C 7.40 (H) 11/03/2023     Data reviewed : Consultant notes [facility]            Results for orders placed or performed during the hospital encounter of 02/01/23   MRI Brain Without Contrast    Narrative    PROCEDURE: MRI BRAIN WO CONTRAST     COMPARISON: None     INDICATIONS: MEMORY CHANGES AND BRAIN FOG             TECHNIQUE: A variety of imaging planes and parameters were utilized for visualization of suspected   pathology.  Images were performed without contrast.     FINDINGS:   There is a small chronic cortical infarct in the right occipital lobe.  There is confluency   periventricular white matter FLAIR hyperintense signal abnormality.  There are more focal areas of   leukomalacia within the deep white matter in the frontal lobes bilaterally and within the basal   ganglia that may relate to chronic lacunar-type infarcts.  There is mild patchy central pontine   FLAIR hyperintense signal.  There is no acute or chronic intracranial hemorrhage.  There are no   abnormal extra-axial collections.  There are prominent perivascular spaces most pronounced in the   basal ganglia.  The major arterial flow voids appear intact.  Orbits are unremarkable.  There is   mild left maxillary sinus mucosal disease.  Calvarial and superficial soft tissue signal is within   normal limits.  Midline structures appear intact.       Impression       1. There is fairly advanced leukomalacia and there is a small chronic cortical infarct in the right   occipital lobe and there appear to be chronic lacunar-type infarcts in the basal ganglia and   frontal white matter.  Overall, this appearance could be related to chronic vasculopathy.  2. No acute intracranial abnormality.  3. Mild left maxillary sinus mucosal disease.                FILI GALINDO MD         Electronically Signed and Approved By: FILI GALINDO MD on 2/01/2023 at 9:40                      CTA Head/Neck:   1. 60% stenosis at the proximal right cervical ICA primarily due to an irregular mixed plaque with   what appears to be either a small chronic dissection or an ulcerated plaque.  2. No significant left carotid stenosis.  3. Moderate focal stenoses in the mid right P2 segment and the distal left P2 segment.  4. There is evidence for significant chronic microvascular disease in a periventricular white   matter and there appears to be a small cortical infarct in the right frontal lobe that was not   readily apparent on the brain MRI from 2/1/2023 and may represent and interval small infarct.     Assessment and Plan    Diagnoses and all orders for this visit:    1. Leukomalacia, periventricular (Primary)  Assessment & Plan:  Thought to be secondary to cerebrovascular disease.  Will increase crestor to 40mg.  Will increase donepezil to 10mg to address MCI.       2. Lacunar infarction    3. Mild cognitive impairment    4. Mixed hyperlipidemia    5. Type 2 diabetes mellitus with hyperglycemia, without long-term current use of insulin    Other orders  -     donepezil (ARICEPT) 10 MG tablet; Take 1 tablet by mouth Every Night.  Dispense: 90 tablet; Refill: 1  -     rosuvastatin (CRESTOR) 40 MG tablet; Take 1 tablet by mouth Every Night.  Dispense: 90 tablet; Refill: 1        Follow Up   Return in about 4 months (around 6/1/2024) for stroke f/u.  Patient was given instructions and counseling regarding his condition or for health maintenance advice. Please see specific information pulled into the AVS if appropriate.

## 2024-02-01 NOTE — ASSESSMENT & PLAN NOTE
Thought to be secondary to cerebrovascular disease.  Will increase crestor to 40mg.  Will increase donepezil to 10mg to address MCI.

## 2024-02-04 ENCOUNTER — HOSPITAL ENCOUNTER (INPATIENT)
Facility: HOSPITAL | Age: 74
LOS: 4 days | Discharge: HOME | End: 2024-02-08
Attending: EMERGENCY MEDICINE | Admitting: STUDENT IN AN ORGANIZED HEALTH CARE EDUCATION/TRAINING PROGRAM
Payer: MEDICARE

## 2024-02-04 ENCOUNTER — APPOINTMENT (OUTPATIENT)
Dept: CT IMAGING | Facility: HOSPITAL | Age: 74
End: 2024-02-04
Payer: MEDICARE

## 2024-02-04 DIAGNOSIS — R26.2 DIFFICULTY IN WALKING: ICD-10-CM

## 2024-02-04 DIAGNOSIS — Z78.9 DECREASED ACTIVITIES OF DAILY LIVING (ADL): ICD-10-CM

## 2024-02-04 DIAGNOSIS — R42 VERTIGO: Primary | ICD-10-CM

## 2024-02-04 DIAGNOSIS — R13.10 DYSPHAGIA, UNSPECIFIED TYPE: ICD-10-CM

## 2024-02-04 DIAGNOSIS — R11.15 PERSISTENT VOMITING: ICD-10-CM

## 2024-02-04 DIAGNOSIS — I48.91 ATRIAL FIBRILLATION WITH RAPID VENTRICULAR RESPONSE: ICD-10-CM

## 2024-02-04 DIAGNOSIS — I10 POORLY-CONTROLLED HYPERTENSION: ICD-10-CM

## 2024-02-04 LAB
ALBUMIN SERPL-MCNC: 4.7 G/DL (ref 3.5–5.2)
ALBUMIN/GLOB SERPL: 1.6 G/DL
ALP SERPL-CCNC: 98 U/L (ref 39–117)
ALT SERPL W P-5'-P-CCNC: 25 U/L (ref 1–41)
ANION GAP SERPL CALCULATED.3IONS-SCNC: 14.3 MMOL/L (ref 5–15)
AST SERPL-CCNC: 21 U/L (ref 1–40)
BACTERIA UR QL AUTO: ABNORMAL /HPF
BASOPHILS # BLD AUTO: 0.05 10*3/MM3 (ref 0–0.2)
BASOPHILS NFR BLD AUTO: 0.6 % (ref 0–1.5)
BILIRUB SERPL-MCNC: 0.8 MG/DL (ref 0–1.2)
BILIRUB UR QL STRIP: NEGATIVE
BUN SERPL-MCNC: 10 MG/DL (ref 8–23)
BUN/CREAT SERPL: 11.6 (ref 7–25)
CALCIUM SPEC-SCNC: 9.9 MG/DL (ref 8.6–10.5)
CHLORIDE SERPL-SCNC: 99 MMOL/L (ref 98–107)
CLARITY UR: CLEAR
CO2 SERPL-SCNC: 25.7 MMOL/L (ref 22–29)
COLOR UR: YELLOW
CREAT SERPL-MCNC: 0.86 MG/DL (ref 0.76–1.27)
D-LACTATE SERPL-SCNC: 1.6 MMOL/L (ref 0.5–2)
DEPRECATED RDW RBC AUTO: 44.6 FL (ref 37–54)
EGFRCR SERPLBLD CKD-EPI 2021: 92.6 ML/MIN/1.73
EOSINOPHIL # BLD AUTO: 0.05 10*3/MM3 (ref 0–0.4)
EOSINOPHIL NFR BLD AUTO: 0.6 % (ref 0.3–6.2)
ERYTHROCYTE [DISTWIDTH] IN BLOOD BY AUTOMATED COUNT: 14.2 % (ref 12.3–15.4)
GLOBULIN UR ELPH-MCNC: 2.9 GM/DL
GLUCOSE BLDC GLUCOMTR-MCNC: 236 MG/DL (ref 70–99)
GLUCOSE SERPL-MCNC: 249 MG/DL (ref 65–99)
GLUCOSE UR STRIP-MCNC: ABNORMAL MG/DL
HCT VFR BLD AUTO: 47.3 % (ref 37.5–51)
HGB BLD-MCNC: 16.3 G/DL (ref 13–17.7)
HGB UR QL STRIP.AUTO: ABNORMAL
HOLD SPECIMEN: NORMAL
HOLD SPECIMEN: NORMAL
HYALINE CASTS UR QL AUTO: ABNORMAL /LPF
IMM GRANULOCYTES # BLD AUTO: 0.02 10*3/MM3 (ref 0–0.05)
IMM GRANULOCYTES NFR BLD AUTO: 0.2 % (ref 0–0.5)
KETONES UR QL STRIP: ABNORMAL
LEUKOCYTE ESTERASE UR QL STRIP.AUTO: NEGATIVE
LIPASE SERPL-CCNC: 33 U/L (ref 13–60)
LYMPHOCYTES # BLD AUTO: 1.5 10*3/MM3 (ref 0.7–3.1)
LYMPHOCYTES NFR BLD AUTO: 17.2 % (ref 19.6–45.3)
MCH RBC QN AUTO: 29.7 PG (ref 26.6–33)
MCHC RBC AUTO-ENTMCNC: 34.5 G/DL (ref 31.5–35.7)
MCV RBC AUTO: 86.3 FL (ref 79–97)
MONOCYTES # BLD AUTO: 0.39 10*3/MM3 (ref 0.1–0.9)
MONOCYTES NFR BLD AUTO: 4.5 % (ref 5–12)
NEUTROPHILS NFR BLD AUTO: 6.73 10*3/MM3 (ref 1.7–7)
NEUTROPHILS NFR BLD AUTO: 76.9 % (ref 42.7–76)
NITRITE UR QL STRIP: NEGATIVE
NRBC BLD AUTO-RTO: 0 /100 WBC (ref 0–0.2)
NT-PROBNP SERPL-MCNC: 894.2 PG/ML (ref 0–900)
PH UR STRIP.AUTO: 7.5 [PH] (ref 5–8)
PLATELET # BLD AUTO: 239 10*3/MM3 (ref 140–450)
PMV BLD AUTO: 10.3 FL (ref 6–12)
POTASSIUM SERPL-SCNC: 3.7 MMOL/L (ref 3.5–5.2)
PROT SERPL-MCNC: 7.6 G/DL (ref 6–8.5)
PROT UR QL STRIP: ABNORMAL
RBC # BLD AUTO: 5.48 10*6/MM3 (ref 4.14–5.8)
RBC # UR STRIP: ABNORMAL /HPF
REF LAB TEST METHOD: ABNORMAL
SODIUM SERPL-SCNC: 139 MMOL/L (ref 136–145)
SP GR UR STRIP: 1.01 (ref 1–1.03)
SQUAMOUS #/AREA URNS HPF: ABNORMAL /HPF
TROPONIN T SERPL HS-MCNC: 27 NG/L
UROBILINOGEN UR QL STRIP: ABNORMAL
WBC # UR STRIP: ABNORMAL /HPF
WBC NRBC COR # BLD AUTO: 8.74 10*3/MM3 (ref 3.4–10.8)
WHOLE BLOOD HOLD COAG: NORMAL
WHOLE BLOOD HOLD SPECIMEN: NORMAL

## 2024-02-04 PROCEDURE — 25010000002 HYDRALAZINE PER 20 MG: Performed by: EMERGENCY MEDICINE

## 2024-02-04 PROCEDURE — 83690 ASSAY OF LIPASE: CPT

## 2024-02-04 PROCEDURE — 93005 ELECTROCARDIOGRAM TRACING: CPT | Performed by: EMERGENCY MEDICINE

## 2024-02-04 PROCEDURE — 74177 CT ABD & PELVIS W/CONTRAST: CPT

## 2024-02-04 PROCEDURE — 84484 ASSAY OF TROPONIN QUANT: CPT | Performed by: EMERGENCY MEDICINE

## 2024-02-04 PROCEDURE — 99285 EMERGENCY DEPT VISIT HI MDM: CPT

## 2024-02-04 PROCEDURE — 36415 COLL VENOUS BLD VENIPUNCTURE: CPT

## 2024-02-04 PROCEDURE — 25010000002 ONDANSETRON PER 1 MG: Performed by: EMERGENCY MEDICINE

## 2024-02-04 PROCEDURE — 25010000002 DIAZEPAM PER 5 MG: Performed by: EMERGENCY MEDICINE

## 2024-02-04 PROCEDURE — 84443 ASSAY THYROID STIM HORMONE: CPT | Performed by: STUDENT IN AN ORGANIZED HEALTH CARE EDUCATION/TRAINING PROGRAM

## 2024-02-04 PROCEDURE — 99222 1ST HOSP IP/OBS MODERATE 55: CPT | Performed by: STUDENT IN AN ORGANIZED HEALTH CARE EDUCATION/TRAINING PROGRAM

## 2024-02-04 PROCEDURE — 83880 ASSAY OF NATRIURETIC PEPTIDE: CPT | Performed by: EMERGENCY MEDICINE

## 2024-02-04 PROCEDURE — 25510000001 IOPAMIDOL PER 1 ML: Performed by: EMERGENCY MEDICINE

## 2024-02-04 PROCEDURE — 82948 REAGENT STRIP/BLOOD GLUCOSE: CPT

## 2024-02-04 PROCEDURE — 83605 ASSAY OF LACTIC ACID: CPT

## 2024-02-04 PROCEDURE — 81001 URINALYSIS AUTO W/SCOPE: CPT

## 2024-02-04 PROCEDURE — 80053 COMPREHEN METABOLIC PANEL: CPT

## 2024-02-04 PROCEDURE — 25810000003 SODIUM CHLORIDE 0.9 % SOLUTION: Performed by: EMERGENCY MEDICINE

## 2024-02-04 PROCEDURE — 93010 ELECTROCARDIOGRAM REPORT: CPT | Performed by: INTERNAL MEDICINE

## 2024-02-04 PROCEDURE — 85025 COMPLETE CBC W/AUTO DIFF WBC: CPT

## 2024-02-04 PROCEDURE — 70450 CT HEAD/BRAIN W/O DYE: CPT

## 2024-02-04 RX ORDER — PANTOPRAZOLE SODIUM 40 MG/1
40 TABLET, DELAYED RELEASE ORAL DAILY
Status: DISCONTINUED | OUTPATIENT
Start: 2024-02-05 | End: 2024-02-08 | Stop reason: HOSPADM

## 2024-02-04 RX ORDER — VENLAFAXINE HYDROCHLORIDE 150 MG/1
150 CAPSULE, EXTENDED RELEASE ORAL DAILY
Status: DISCONTINUED | OUTPATIENT
Start: 2024-02-05 | End: 2024-02-08 | Stop reason: HOSPADM

## 2024-02-04 RX ORDER — CARVEDILOL 25 MG/1
25 TABLET ORAL 2 TIMES DAILY WITH MEALS
Status: DISCONTINUED | OUTPATIENT
Start: 2024-02-04 | End: 2024-02-08 | Stop reason: HOSPADM

## 2024-02-04 RX ORDER — ONDANSETRON 2 MG/ML
4 INJECTION INTRAMUSCULAR; INTRAVENOUS ONCE
Status: COMPLETED | OUTPATIENT
Start: 2024-02-04 | End: 2024-02-04

## 2024-02-04 RX ORDER — CETIRIZINE HYDROCHLORIDE 10 MG/1
10 TABLET ORAL DAILY PRN
Status: DISCONTINUED | OUTPATIENT
Start: 2024-02-04 | End: 2024-02-08 | Stop reason: HOSPADM

## 2024-02-04 RX ORDER — DIAZEPAM 5 MG/ML
5 INJECTION, SOLUTION INTRAMUSCULAR; INTRAVENOUS ONCE
Status: COMPLETED | OUTPATIENT
Start: 2024-02-04 | End: 2024-02-04

## 2024-02-04 RX ORDER — DILTIAZEM HCL IN NACL,ISO-OSM 125 MG/125
5-15 PLASTIC BAG, INJECTION (ML) INTRAVENOUS
Status: DISCONTINUED | OUTPATIENT
Start: 2024-02-04 | End: 2024-02-08 | Stop reason: HOSPADM

## 2024-02-04 RX ORDER — FAMOTIDINE 10 MG/ML
20 INJECTION, SOLUTION INTRAVENOUS ONCE
Status: COMPLETED | OUTPATIENT
Start: 2024-02-04 | End: 2024-02-04

## 2024-02-04 RX ORDER — HYDROCHLOROTHIAZIDE 12.5 MG/1
12.5 TABLET ORAL DAILY
Status: DISCONTINUED | OUTPATIENT
Start: 2024-02-05 | End: 2024-02-08

## 2024-02-04 RX ORDER — SODIUM CHLORIDE 0.9 % (FLUSH) 0.9 %
10 SYRINGE (ML) INJECTION AS NEEDED
Status: DISCONTINUED | OUTPATIENT
Start: 2024-02-04 | End: 2024-02-08 | Stop reason: HOSPADM

## 2024-02-04 RX ORDER — HYDRALAZINE HYDROCHLORIDE 20 MG/ML
20 INJECTION INTRAMUSCULAR; INTRAVENOUS ONCE
Status: COMPLETED | OUTPATIENT
Start: 2024-02-04 | End: 2024-02-04

## 2024-02-04 RX ORDER — UBIDECARENONE 75 MG
100 CAPSULE ORAL DAILY
Status: DISCONTINUED | OUTPATIENT
Start: 2024-02-05 | End: 2024-02-08 | Stop reason: HOSPADM

## 2024-02-04 RX ORDER — ATORVASTATIN CALCIUM 40 MG/1
80 TABLET, FILM COATED ORAL NIGHTLY
Status: CANCELLED | OUTPATIENT
Start: 2024-02-04

## 2024-02-04 RX ORDER — DONEPEZIL HYDROCHLORIDE 10 MG/1
10 TABLET, FILM COATED ORAL NIGHTLY
Status: DISCONTINUED | OUTPATIENT
Start: 2024-02-04 | End: 2024-02-08 | Stop reason: HOSPADM

## 2024-02-04 RX ORDER — LANOLIN ALCOHOL/MO/W.PET/CERES
100 CREAM (GRAM) TOPICAL DAILY
Status: DISCONTINUED | OUTPATIENT
Start: 2024-02-05 | End: 2024-02-08 | Stop reason: HOSPADM

## 2024-02-04 RX ORDER — ROSUVASTATIN CALCIUM 20 MG/1
40 TABLET, COATED ORAL NIGHTLY
Status: DISCONTINUED | OUTPATIENT
Start: 2024-02-04 | End: 2024-02-08 | Stop reason: HOSPADM

## 2024-02-04 RX ORDER — LISINOPRIL 20 MG/1
40 TABLET ORAL
Status: DISCONTINUED | OUTPATIENT
Start: 2024-02-05 | End: 2024-02-08 | Stop reason: HOSPADM

## 2024-02-04 RX ORDER — AMLODIPINE BESYLATE 5 MG/1
10 TABLET ORAL
Status: DISCONTINUED | OUTPATIENT
Start: 2024-02-05 | End: 2024-02-08 | Stop reason: HOSPADM

## 2024-02-04 RX ADMIN — IOPAMIDOL 100 ML: 755 INJECTION, SOLUTION INTRAVENOUS at 14:31

## 2024-02-04 RX ADMIN — DIAZEPAM 5 MG: 5 INJECTION, SOLUTION INTRAMUSCULAR; INTRAVENOUS at 16:46

## 2024-02-04 RX ADMIN — ONDANSETRON HYDROCHLORIDE 4 MG: 2 SOLUTION INTRAMUSCULAR; INTRAVENOUS at 16:46

## 2024-02-04 RX ADMIN — FAMOTIDINE 20 MG: 10 INJECTION INTRAVENOUS at 13:24

## 2024-02-04 RX ADMIN — SODIUM CHLORIDE 1000 ML: 9 INJECTION, SOLUTION INTRAVENOUS at 13:24

## 2024-02-04 RX ADMIN — HYDRALAZINE HYDROCHLORIDE 20 MG: 20 INJECTION, SOLUTION INTRAMUSCULAR; INTRAVENOUS at 13:58

## 2024-02-04 RX ADMIN — ONDANSETRON HYDROCHLORIDE 4 MG: 2 SOLUTION INTRAMUSCULAR; INTRAVENOUS at 13:24

## 2024-02-04 RX ADMIN — DILTIAZEM HYDROCHLORIDE 5 MG/HR: 5 INJECTION INTRAVENOUS at 16:59

## 2024-02-04 NOTE — H&P
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  Patient states that roughly around 10 AM on 2024 he had abrupt onset of nausea with some disequilibrium.  Patient states that since then he has had very limited p.o. intake including none of his home blood pressure medications.  His glucoses remained elevated and his insulin pump has been controlling it to the 200s.  He states his persistent nausea has been accompanied by over 4 bouts of nonbloody emesis.  Today he had an associated mild headache and persistently elevated blood pressure of 200s prompting him to come to the ER for evaluation    Upon arrival here he was noted to be hypertensive to the 200s as well as tachycardic to the 130s.  He was given IV treatment for both with some improvement but stated he started to have some worsening of his dizziness and progressed into rapid atrial fibrillation on EKG which point patient was started on a Cardizem drip.  CT head was done and is pending at this time.  Hospitalist was then contacted for admission of rapid atrial fibrillation that is new onset in nature.  At the time my exam patient states his dizziness and nausea is mildly improved.  Denies having any palpitations over the last few days.  No history of atrial fibrillation.  No chest pain.  Patient has not had any new medications started.          Personal History     Past Medical History:  Past Medical History:   Diagnosis Date    Diabetes     Hernia, inguinal     Hyperlipidemia     Hypertension     PONV  (postoperative nausea and vomiting)     Type 2 diabetes mellitus          Past Surgical History:  Past Surgical History:   Procedure Laterality Date    COLONOSCOPY      COLONOSCOPY N/A 04/25/2022    Procedure: COLONOSCOPY WITH POLYPECTOMY;  Surgeon: Artie Dickerson MD;  Location: [facility] ENDOSCOPY;  Service: Gastroenterology;  Laterality: N/A;  COLON POLYP    ENDOSCOPY N/A 04/25/2022    Procedure: ESOPHAGOGASTRODUODENOSCOPY WITH BX;  Surgeon: Artie Dickerson MD;  Location: [facility] ENDOSCOPY;  Service: Gastroenterology;  Laterality: N/A;  CAPPS'S ESOPHAGUS, HIATAL HERNIA    HERNIA REPAIR      KNEE SURGERY Bilateral     TRIGGER FINGER RELEASE Bilateral     x2 THUMBS         Family History:   Reviewed and noncontributory except as mentioned in HPI    Social History:   Social Determinants of Health     Tobacco Use: Medium Risk (2/4/2024)    Patient History     Smoking Tobacco Use: Never     Smokeless Tobacco Use: Never     Passive Exposure: Yes   Alcohol Use: Not on file   Financial Resource Strain: Not on file   Food Insecurity: Not on file   Transportation Needs: Not on file   Physical Activity: Not on file   Stress: Not on file   Social Connections: Unknown (10/9/2023)    Family and Community Support     Help with Day-to-Day Activities: Not on file     Lonely or Isolated: Not on file   Interpersonal Safety: Not At Risk (2/4/2024)    Abuse Screen     Unsafe at Home or Work/School: no     Feels Threatened by Someone?: no     Does Anyone Keep You from Contacting Others or Doint Things Outside the Home?: no     Physical Sign of Abuse Present: no   Depression: Not on file   Housing Stability: Unknown (10/9/2023)    Housing Stability     Current Living Arrangements: Not on file     Potentially Unsafe Housing Conditions: Not on file   Utilities: Not on file   Health Literacy: Unknown (10/9/2023)    Education     Help with school or training?: Not on file     Preferred Language: Not on file   Employment:  Unknown (10/9/2023)    Employment     Do you want help finding or keeping work or a job?: Not on file   Disabilities: Unknown (10/9/2023)    Disabilities     Concentrating, Remembering, or Making Decisions Difficulty: Not on file     Doing Errands Independently Difficulty: Not on file         Home Medications:  Dexcom G6 Sensor, Diethylpropion HCl ER, L-Lysine, Lancets, amLODIPine-benazepril, aspirin, carvedilol, cetirizine, cholecalciferol, co-enzyme Q-10, donepezil, fish oil, fluticasone, glucose blood, hydrALAZINE, hydroCHLOROthiazide, insulin, insulin regular, metFORMIN, multivitamin with minerals, ondansetron ODT, pantoprazole, pyridoxine, rosuvastatin, venlafaxine XR, and vitamin B-12    Allergies:  Allergies   Allergen Reactions    Codeine Nausea Only    Codeine Unknown - Low Severity    Semaglutide(0.25 Or 0.5mg-Dos) GI Intolerance       Review of Systems   All systems were reviewed and negative except for: Dizziness, headache, nausea, vomiting    Objective   Objective     Vitals:   Temp:  [97.7 °F (36.5 °C)-98.8 °F (37.1 °C)] 98 °F (36.7 °C)  Heart Rate:  [] 105  Resp:  [16-22] 16  BP: (153-205)/() 176/81    Physical Exam    Constitutional: Awake, alert, no acute distress   Eyes: Pupils equal, sclerae anicteric, no conjunctival injection   HENT: NCAT, mucous membranes moist   Neck: Supple, no thyromegaly, no lymphadenopathy, trachea midline   Respiratory: Clear to auscultation bilaterally, nonlabored respirations    Cardiovascular: Irregular irregular rhythm with elevated right, no murmurs, rubs, or gallops, palpable pedal pulses bilaterally   Gastrointestinal: Positive bowel sounds, soft, nontender, nondistended   Musculoskeletal: No bilateral ankle edema, no clubbing or cyanosis to extremities   Psychiatric: Appropriate affect, cooperative   Neurologic: Oriented x 3, strength symmetric in all extremities, Cranial Nerves grossly intact to confrontation, speech clear   Skin: No rashes     Result  Review    Result Review:  I have personally reviewed the results from the time of this admission to 2/4/2024 18:35 EST and agree with these findings:  [x]  Laboratory  [x]  Microbiology  [x]  Radiology  [x]  EKG/Telemetry   [x]  Cardiology/Vascular   []  Pathology  [x]  Old records  []  Other:      Assessment & Plan   Assessment / Plan     Assessment/Plan:   Rapid atrial fibrillation  Rule out posterior circulation CVA  Uncontrolled hypertension, possibly secondary to CVA and missed home medications  Hypertension  Hyperlipidemia  Poorly controlled type 2 diabetes on insulin pump  Morbid obesity, BMI 37    Plan  - Admit to hospitalist service  - We will continue NIH checks every 4 hours.  Fall precautions.  We will rule out CVA with MRI and MRA of the head and neck.  Unfortunate patient is outside of any intervention window as his symptoms have been going on for almost 36 hours.  We will get echocardiogram to evaluate etiology of his atrial fibrillation as well.  Continue Cardizem drip for now however we will have to allow some degree of permissive hypertension for stroke rule out and thus we will hold home blood pressure meds until the a.m.  Supportive care with antiemetics.  Could be a component of gastroparesis as well however unable to give Reglan due to prolonged QTc on EKG. CHADS=VASC = 3, will start AC when CVA definitively ruled out  -PT/OT eval.  Dysphagia screen prior to diet  - Patient to continue using insulin pump while inpatient for type 2 diabetes.  Diabetic diet ordered.  - Will risk stratify for CVA with lipid panel and A1c.  Will intensify statin to high intensity dosing  - Counseled on weight loss  - Clarify other chronic home meds and resume as appropriate      Discussed with ER Physician and Nurse    All labs/imaging studies were personally reviewed and findings are as noted above      DVT Prophylaxis: SCDs    CODE STATUS:    Code Status (Patient has no pulse and is not breathing): CPR (Attempt  to Resuscitate)  Medical Interventions (Patient has pulse or is breathing): Full Support      Admission Status:  I believe this patient meets inpatient status.    Electronically signed by Artie Mary MD, 02/04/24, 6:35 PM EST.

## 2024-02-04 NOTE — ED PROVIDER NOTES
Time: 1:00 PM EST  Date of encounter:  2/4/2024  Independent Historian/Clinical History and Information was obtained by:   Patient and Family    History is limited by: N/A    Chief Complaint: Persistent vomiting.      History of Present Illness:  Patient is a 73 y.o. year old male who presents to the emergency department for evaluation of persistent vomiting.  This patient states that he has been having persistent nausea and vomiting for the last 2 days.  He has been unable to keep his medications down because of the vomiting.  He states it is worse when he gets up and walks.  The patient denies any headache or true dizziness (vertigo).  The patient denies any chest pain shortness of breath cough or sore throat.  He states that he does feel lightheaded at times.  He has no abdominal pain or bloody or black bowel movements.    HPI    Patient Care Team  Primary Care Provider: Shahzad Rashid PA    Past Medical History:     Allergies   Allergen Reactions    Codeine Nausea Only    Codeine Unknown - Low Severity    Semaglutide(0.25 Or 0.5mg-Dos) GI Intolerance     Past Medical History:   Diagnosis Date    Diabetes     Hernia, inguinal     Hyperlipidemia     Hypertension     PONV (postoperative nausea and vomiting)     Type 2 diabetes mellitus      Past Surgical History:   Procedure Laterality Date    COLONOSCOPY      COLONOSCOPY N/A 04/25/2022    Procedure: COLONOSCOPY WITH POLYPECTOMY;  Surgeon: Artie Dickerson MD;  Location: [facility] ENDOSCOPY;  Service: Gastroenterology;  Laterality: N/A;  COLON POLYP    ENDOSCOPY N/A 04/25/2022    Procedure: ESOPHAGOGASTRODUODENOSCOPY WITH BX;  Surgeon: Artie Dickerson MD;  Location: [facility] ENDOSCOPY;  Service: Gastroenterology;  Laterality: N/A;  CAPPS'S ESOPHAGUS, HIATAL HERNIA    HERNIA REPAIR      KNEE SURGERY Bilateral     TRIGGER FINGER RELEASE Bilateral     x2 THUMBS     Family History   Problem Relation Age of Onset    Diabetes Mother     Hypertension  Mother     Heart attack Mother     COPD Brother     Diabetes Maternal Grandmother     Colon cancer Neg Hx        Home Medications:  Prior to Admission medications    Medication Sig Start Date End Date Taking? Authorizing Provider   amLODIPine-benazepril (LOTREL) 10-40 MG per capsule Take 1 capsule by mouth Daily. 6/2/22   Hector Judge MD   aspirin 81 MG EC tablet Aspir-81 81 mg oral tablet,delayed release (DR/EC) take 1 tablet (81 mg) by oral route once daily   Active    Hector Judge MD   carvedilol (COREG) 25 MG tablet Take 1 tablet by mouth 2 (Two) Times a Day With Meals. 10/29/21   Hector Judge MD   cetirizine (zyrTEC) 10 MG tablet Take 1 tablet by mouth As Needed.    Hector Judge MD   cholecalciferol (VITAMIN D3) 10 MCG (400 UNIT) tablet Take  by mouth Daily.    Hector Judge MD   co-enzyme Q-10 30 MG capsule Take 1 capsule by mouth Daily.    Hector Judge MD   Continuous Blood Gluc Sensor (Dexcom G6 Sensor) Every 10 (Ten) Days.    Hector Judge MD   CONTOUR NEXT TEST test strip Test 6 times daily DX: e11.65 11/14/19   Bettie Huston APRN   Diethylpropion HCl ER 75 MG tablet sustained-release 24 hour TAKE 1 TABLET BY MOUTH DAILY. MAX DAILY AMOUNT: 75 MG 10/11/23   Hector Judge MD   donepezil (ARICEPT) 10 MG tablet Take 1 tablet by mouth Every Night. 2/1/24   Marilee Blunt APRN   fluticasone (FLONASE) 50 MCG/ACT nasal spray As Needed. 4/29/22   Hector Judge MD   hydrALAZINE (APRESOLINE) 10 MG tablet Take 1 tablet by mouth 3 (Three) Times a Day. 1/9/24 7/7/24  Hector Judge MD   hydroCHLOROthiazide (HYDRODIURIL) 12.5 MG tablet Take 1 tablet by mouth Daily. 5/18/22   Hector Judge MD   insulin patient supplied pump Inject  under the skin into the appropriate area as directed Continuous.    Hector Judge MD   insulin regular (HUMULIN R) 500 UNIT/ML CONCENTRATED injection 50u daily with insulin pump 1/16/20    Camille Montelongo MD   L-Lysine 500 MG tablet tablet Take  by mouth.    Provider, Historical, MD   Lancets misc Check 5 times daily DX: e11.65 7/17/19   Bettie Huston APRN   metFORMIN (GLUCOPHAGE) 500 MG tablet metformin 500 mg oral tablet take 1 tablet (500 mg) by oral route 2 times per day with morning and evening meals   Active    Hector Judge MD   multivitamin with minerals tablet tablet Centrum 3,500-18-0.4 unit-mg-mg oral tablet,chewable chew 1 tablet by oral route daily   Active    Hector Judge MD   Omega-3 Fatty Acids (fish oil) 1000 MG capsule capsule Take 2 capsules by mouth.    Hector Judge MD   ondansetron ODT (Zofran ODT) 4 MG disintegrating tablet Place 1 tablet on the tongue Every 8 (Eight) Hours As Needed for Nausea or Vomiting.  Patient not taking: Reported on 2/1/2024 3/31/22   Terri Loo APRN   ondansetron ODT (ZOFRAN-ODT) 4 MG disintegrating tablet Place 1 tablet on the tongue Every 8 (Eight) Hours As Needed for Nausea or Vomiting. 12/24/23   Brandon Thibodeaux MD   pantoprazole (PROTONIX) 40 MG EC tablet Take 1 tablet by mouth Daily. 8/5/22   Hector Judge MD   pyridoxine (VITAMIN B-6) 100 MG tablet Take 1 tablet by mouth Daily.    Hector Judge MD   rosuvastatin (CRESTOR) 40 MG tablet Take 1 tablet by mouth Every Night. 2/1/24   Marilee Blunt APRN   venlafaxine XR (EFFEXOR-XR) 150 MG 24 hr capsule venlafaxine 150 mg oral capsule,extended release 24hr take 1 capsule (150 mg) by oral route once daily   Active 6/3/21   Hector Judge MD   vitamin B-12 (CYANOCOBALAMIN) 100 MCG tablet Take 1 tablet by mouth Daily.    Hector Judge MD        Social History:   Social History     Tobacco Use    Smoking status: Never     Passive exposure: Yes    Smokeless tobacco: Never   Vaping Use    Vaping Use: Never used   Substance Use Topics    Alcohol use: Never    Drug use: Never         Review of Systems:  Review of Systems   Constitutional:   "Negative for chills and fever.   HENT:  Negative for congestion, ear pain and sore throat.    Eyes:  Negative for pain.   Respiratory:  Negative for cough, chest tightness and shortness of breath.    Cardiovascular:  Negative for chest pain.   Gastrointestinal:  Positive for nausea and vomiting. Negative for abdominal pain and diarrhea.   Genitourinary:  Negative for flank pain and hematuria.   Musculoskeletal:  Negative for joint swelling.   Skin:  Negative for pallor.   Neurological:  Positive for weakness and light-headedness. Negative for seizures and headaches.   All other systems reviewed and are negative.       Physical Exam:  BP (!) 186/90   Pulse 91   Temp 98 °F (36.7 °C)   Resp 16   Ht 182.9 cm (72\")   Wt 127 kg (279 lb 1.6 oz)   SpO2 94%   BMI 37.85 kg/m²     Physical Exam  Vitals and nursing note reviewed.   Constitutional:       General: He is not in acute distress.     Appearance: Normal appearance. He is not toxic-appearing.   HENT:      Head: Normocephalic and atraumatic.      Mouth/Throat:      Mouth: Mucous membranes are moist.   Eyes:      General: No scleral icterus.  Cardiovascular:      Rate and Rhythm: Tachycardia present. Rhythm irregular.      Pulses: Normal pulses.      Heart sounds: Normal heart sounds.   Pulmonary:      Effort: Pulmonary effort is normal. No respiratory distress.      Breath sounds: Normal breath sounds.   Abdominal:      General: Abdomen is flat.      Palpations: Abdomen is soft.      Tenderness: There is no abdominal tenderness.   Musculoskeletal:         General: Normal range of motion.      Cervical back: Normal range of motion and neck supple.   Skin:     General: Skin is warm and dry.   Neurological:      General: No focal deficit present.      Mental Status: He is alert and oriented to person, place, and time. Mental status is at baseline.      Motor: Weakness present.      Comments: The patient has generalized weakness and there are no focal neurologic " findings noted.   Psychiatric:         Mood and Affect: Mood normal.         Behavior: Behavior normal.         Thought Content: Thought content normal.         Judgment: Judgment normal.                  Procedures:  Procedures      Medical Decision Making:      Comorbidities that affect care:    Hypertension    External Notes reviewed:    Previous Radiological Studies: The patient had an MRI performed and he was seen earlier this week by neurology for lacunar infarction because minute of impairment and periventricular leukomalacia.      The following orders were placed and all results were independently analyzed by me:  Orders Placed This Encounter   Procedures    CT Abdomen Pelvis With Contrast    CT Head Without Contrast    [place] Draw    Comprehensive Metabolic Panel    Lipase    Urinalysis With Microscopic If Indicated (No Culture) - Urine, Clean Catch    Lactic Acid, Plasma    CBC Auto Differential    Urinalysis, Microscopic Only - Urine, Clean Catch    Single High Sensitivity Troponin T    BNP    NPO Diet NPO Type: Strict NPO    Undress & Gown    Code Status and Medical Interventions:    Hospitalist (on-call MD unless specified)    POC Glucose Once    ECG 12 Lead Rhythm Change    Insert Peripheral IV    Inpatient Admission    CBC & Differential    Green Top (Gel)    Lavender Top    Gold Top - SST    Light Blue Top       Medications Given in the Emergency Department:  Medications   sodium chloride 0.9 % flush 10 mL (has no administration in time range)   dilTIAZem (CARDIZEM) 125 mg in 125 mL sodium chloride  infusion (10 mg/hr Intravenous Rate/Dose Change 2/4/24 1718)   sodium chloride 0.9 % bolus 1,000 mL (0 mL Intravenous Stopped 2/4/24 1514)   famotidine (PEPCID) injection 20 mg (20 mg Intravenous Given 2/4/24 1324)   ondansetron (ZOFRAN) injection 4 mg (4 mg Intravenous Given 2/4/24 1324)   hydrALAZINE (APRESOLINE) injection 20 mg (20 mg Intravenous Given 2/4/24 1358)   iopamidol (ISOVUE-370) 76 %  injection 100 mL (100 mL Intravenous Given 2/4/24 1431)   ondansetron (ZOFRAN) injection 4 mg (4 mg Intravenous Given 2/4/24 1646)   diazePAM (VALIUM) injection 5 mg (5 mg Intravenous Given 2/4/24 1646)   dilTIAZem (CARDIZEM) bolus from bag 1 mg/mL 10 mg (10 mg Intravenous Bolus from Bag 2/4/24 1655)        ED Course:    ED Course as of 02/04/24 1911   [place] Feb 04, 2024   1617 RDW-SD: 44.6 [PP]      ED Course User Index  [PP] Aaron House,        EKG: Atrial fibrillation with a rate of 114 bpm  Abnormal P wave and OK interval  Normal QRS normal axis  Nonspecific ST changes  Prolonged QT interval      Labs:    Lab Results (last 24 hours)       Procedure Component Value Units Date/Time    CBC & Differential [[id number]]  (Abnormal) Collected: 02/04/24 1027    Specimen: Blood Updated: 02/04/24 1037    Narrative:      The following orders were created for panel order CBC & Differential.  Procedure                               Abnormality         Status                     ---------                               -----------         ------                     CBC Auto Differential[[id number]]        Abnormal            Final result                 Please view results for these tests on the individual orders.    Comprehensive Metabolic Panel [[id number]]  (Abnormal) Collected: 02/04/24 1027    Specimen: Blood Updated: 02/04/24 1102     Glucose 249 mg/dL      BUN 10 mg/dL      Creatinine 0.86 mg/dL      Sodium 139 mmol/L      Potassium 3.7 mmol/L      Chloride 99 mmol/L      CO2 25.7 mmol/L      Calcium 9.9 mg/dL      Total Protein 7.6 g/dL      Albumin 4.7 g/dL      ALT (SGPT) 25 U/L      AST (SGOT) 21 U/L      Alkaline Phosphatase 98 U/L      Total Bilirubin 0.8 mg/dL      Globulin 2.9 gm/dL      A/G Ratio 1.6 g/dL      BUN/Creatinine Ratio 11.6     Anion Gap 14.3 mmol/L      eGFR 92.6 mL/min/1.73     Narrative:      GFR Normal >60  Chronic Kidney Disease <60  Kidney Failure <15    The GFR formula is only valid for  adults with stable renal function between ages 18 and 70.    Lipase [[id number]]  (Normal) Collected: 02/04/24 1027    Specimen: Blood Updated: 02/04/24 1102     Lipase 33 U/L     Lactic Acid, Plasma [[id number]]  (Normal) Collected: 02/04/24 1027    Specimen: Blood Updated: 02/04/24 1057     Lactate 1.6 mmol/L     CBC Auto Differential [[id number]]  (Abnormal) Collected: 02/04/24 1027    Specimen: Blood Updated: 02/04/24 1037     WBC 8.74 10*3/mm3      RBC 5.48 10*6/mm3      Hemoglobin 16.3 g/dL      Hematocrit 47.3 %      MCV 86.3 fL      MCH 29.7 pg      MCHC 34.5 g/dL      RDW 14.2 %      RDW-SD 44.6 fl      MPV 10.3 fL      Platelets 239 10*3/mm3      Neutrophil % 76.9 %      Lymphocyte % 17.2 %      Monocyte % 4.5 %      Eosinophil % 0.6 %      Basophil % 0.6 %      Immature Grans % 0.2 %      Neutrophils, Absolute 6.73 10*3/mm3      Lymphocytes, Absolute 1.50 10*3/mm3      Monocytes, Absolute 0.39 10*3/mm3      Eosinophils, Absolute 0.05 10*3/mm3      Basophils, Absolute 0.05 10*3/mm3      Immature Grans, Absolute 0.02 10*3/mm3      nRBC 0.0 /100 WBC     POC Glucose Once [[id number]]  (Abnormal) Collected: 02/04/24 1056    Specimen: Blood Updated: 02/04/24 1722     Glucose 236 mg/dL      Comment: Serial Number: [id number]:  [id number]       Urinalysis With Microscopic If Indicated (No Culture) - Urine, Clean Catch [[id number]]  (Abnormal) Collected: 02/04/24 1128    Specimen: Urine, Clean Catch Updated: 02/04/24 1143     Color, UA Yellow     Appearance, UA Clear     pH, UA 7.5     Specific Gravity, UA 1.013     Glucose,  mg/dL (2+)     Ketones, UA 15 mg/dL (1+)     Bilirubin, UA Negative     Blood, UA Trace     Protein,  mg/dL (2+)     Leuk Esterase, UA Negative     Nitrite, UA Negative     Urobilinogen, UA 0.2 E.U./dL    Urinalysis, Microscopic Only - Urine, Clean Catch [[id number]]  (Abnormal) Collected: 02/04/24 1128    Specimen: Urine, Clean Catch Updated: 02/04/24 1143     RBC, UA 3-5 /HPF       WBC, UA 0-2 /HPF      Bacteria, UA None Seen /HPF      Squamous Epithelial Cells, UA 0-2 /HPF      Hyaline Casts, UA None Seen /LPF      Methodology Automated Microscopy    Single High Sensitivity Troponin T [[id number]]  (Abnormal) Collected: 02/04/24 1630    Specimen: Blood Updated: 02/04/24 1654     HS Troponin T 27 ng/L     Narrative:      High Sensitive Troponin T Reference Range:  <14.0 ng/L- Negative Female for AMI  <22.0 ng/L- Negative Male for AMI  >=14 - Abnormal Female indicating possible myocardial injury.  >=22 - Abnormal Male indicating possible myocardial injury.   Clinicians would have to utilize clinical acumen, EKG, Troponin, and serial changes to determine if it is an Acute Myocardial Infarction or myocardial injury due to an underlying chronic condition.         BNP [[id number]]  (Normal) Collected: 02/04/24 1630    Specimen: Blood Updated: 02/04/24 1654     proBNP 894.2 pg/mL     Narrative:      This assay is used as an aid in the diagnosis of individuals suspected of having heart failure. It can be used as an aid in the diagnosis of acute decompensated heart failure (ADHF) in patients presenting with signs and symptoms of ADHF to the emergency department (ED). In addition, NT-proBNP of <300 pg/mL indicates ADHF is not likely.    Age Range Result Interpretation  NT-proBNP Concentration (pg/mL:      <50             Positive            >450                   Gray                 300-450                    Negative             <300    50-75           Positive            >900                  Gray                300-900                  Negative            <300      >75             Positive            >1800                  Gray                300-1800                  Negative            <300             Imaging:    CT Abdomen Pelvis With Contrast    Result Date: 2/4/2024  PROCEDURE: CT ABDOMEN PELVIS W CONTRAST  COMPARISON: [facility], CT, ABDOMEN/PELVIS WITH CONTRAST, 4/19/2016,  5:02.  INDICATIONS: GENERALIZED Abdominal pain and vomiting  TECHNIQUE: After obtaining the patient's consent, CT images were created with non-ionic intravenous contrast material.   PROTOCOL:   Standard imaging protocol performed    RADIATION:   DLP: 1778 mGy*cm   Automated exposure control was utilized to minimize radiation dose. CONTRAST: 100 cc Isovue 370 I.V. LABS:   eGFR: >60 ml/min/1.73m2  FINDINGS:    Lung bases:  4 mm noncalcified nodule at the left costophrenic angle image 1. Mild scarring noted at the lung bases.  No free air is noted below the diaphragm.   Organs:  The liver, gallbladder, pancreas and adrenal glands are grossly unremarkable in appearance.  The superior portion of the spleen is excluded from field of view of the study and is otherwise unremarkable.  Mild perinephric stranding of the kidneys again noted kidneys are otherwise unremarkable in appearance  GI tract:  There is diverticulosis without evidence of acute diverticulitis.  The appendix is visualized and appears within normal limits.  Ileocecal valve is grossly unremarkable in appearance.  Small area of possible epiploic appendagitis noted to a very mild degree in the left lower quadrant.  The stomach and small bowel demonstrate no acute abnormality.  No suspicious mesenteric adenopathy or fluid collections are noted.  Small fat containing periumbilical hernia and small ventral hernias noted.  Pelvis:  Streak artifact from right hip prosthesis causes some mild limitation of the pelvis.  Calcification is noted within the prostate.  The urinary bladder appears grossly unremarkable in appearance.  No suspicious pelvic adenopathy or fluid collections are noted  Retroperitoneum:  Mild atherosclerotic changes are noted of the aorta which is normal in caliber.  There is no suspicious retroperitoneal adenopathy  Bones and soft tissues:  Multilevel degenerative changes noted of the spine.  Vertebral body hemangioma noted at L3.  Right hip  prosthesis noted.        1. No acute intra-abdominal or intrapelvic abnormality noted.  Specifically no evidence of obstruction or significant inflammatory change of the GI tract. 2. 4 mm noncalcified nodule left lung base likely post inflammatory or infectious in nature.  Optional follow-up at 12 months could be considered with a CT chest in a high risk patient 3. Diverticulosis without evidence of acute diverticulitis. 4. Other findings as above     SARA RANDOLPH MD       Electronically Signed and Approved By: SARA RANDOLPH MD on 2/04/2024 at 15:58                Differential Diagnosis and Discussion:    Vomiting: Differential diagnosis includes but is not limited to migraine, labyrinthine disorders, psychogenic, metabolic and endocrine causes, peptic ulcer, gastric outlet obstruction, gastritis, gastroenteritis, appendicitis, intestinal obstruction, paralytic ileus, food poisoning, cholecystitis, acute hepatitis, acute pancreatitis, acute febrile illness, and myocardial infarction.  Weakness: Based on the patient's history, signs, and symptoms, the diffential diagnosis includes but is not limited to meningitis, stroke, sepsis, subarachnoid hemorrhage, intracranial bleeding, encephalitis, acute uti, dehydration, MS, myasthenia gravis, Guillan [place], migraine variant, neuromuscular disorders vertigo, electrolyte imbalance, and metabolic disorders.    All labs were reviewed and interpreted by me.  EKG was interpreted by me.    MDM  Number of Diagnoses or Management Options  Atrial fibrillation with rapid ventricular response  Persistent vomiting  Poorly-controlled hypertension  Vertigo  Diagnosis management comments: This patient presents to the emergency department with a chief complaint of vomiting; however, he also has positional vomiting which may be concerned about vertigo.  The patient abdominal CT is unremarkable however he also was in atrial fibrillation with rapid ventricular response.  Patient also  has uncontrolled hypertension.  The patient will be admitted for further evaluation and treatment.       Amount and/or Complexity of Data Reviewed  Clinical lab tests: reviewed         Critical Care Note: Total Critical Care time of 45 minutes. Total critical care time documented does not include time spent on separately billed procedures for services of nurses or physician assistants. I personally saw and examined the patient. I have reviewed all diagnostic interpretations and treatment plans as written. I was present for the key portions of any procedures performed and the inclusive time noted in any critical care statement. Critical care time includes patient management by me, time spent at the patients bedside,  time to review lab and imaging results, discussing patient care, documentation in the medical record, and time spent with family or caregiver.        Patient Care Considerations:    SEPSIS IS NOT PRESENT IN THE EMERGENCY DEPARTMENT: Patient meets SIRS criteria in the emergency department however the patient does not have a known source of bacterial infection to confirm the diagnosis of sepsis.        Consultants/Shared Management Plan:    Hospitalist: I have discussed the case with hospitalist who agrees to accept the patient for admission.    Social Determinants of Health:    Patient is unable to carry out activities of daily life. Escalation of care is necessary.       Disposition and Care Coordination:    Admit:   Through independent evaluation of the patient's history, physical, and imperical data, the patient meets criteria for inpatient admission to the hospital.        Final diagnoses:   Vertigo   Persistent vomiting   Atrial fibrillation with rapid ventricular response   Poorly-controlled hypertension        ED Disposition       ED Disposition   Decision to Admit    Condition   --    Comment   Level of Care: Telemetry [5]   Diagnosis: Rapid atrial fibrillation [[id number]]   Admitting Physician:  EARLE WILSON [[id number]]   Attending Physician: EARLE WILSON [[id number]]   Certification: I Certify That Inpatient Hospital Services Are Medically Necessary For Greater Than 2 Midnights                 This medical record created using voice recognition software.             Aaron House,   02/04/24 1911

## 2024-02-05 ENCOUNTER — APPOINTMENT (OUTPATIENT)
Dept: MRI IMAGING | Facility: HOSPITAL | Age: 74
End: 2024-02-05
Payer: MEDICARE

## 2024-02-05 ENCOUNTER — APPOINTMENT (OUTPATIENT)
Dept: CARDIOLOGY | Facility: HOSPITAL | Age: 74
End: 2024-02-05
Payer: MEDICARE

## 2024-02-05 LAB
ANION GAP SERPL CALCULATED.3IONS-SCNC: 13.5 MMOL/L (ref 5–15)
BASOPHILS # BLD AUTO: 0.06 10*3/MM3 (ref 0–0.2)
BASOPHILS NFR BLD AUTO: 0.5 % (ref 0–1.5)
BH CV ECHO MEAS - AO MAX PG: 8.4 MMHG
BH CV ECHO MEAS - AO MEAN PG: 5 MMHG
BH CV ECHO MEAS - AO V2 MAX: 145.2 CM/SEC
BH CV ECHO MEAS - AO V2 VTI: 28.4 CM
BH CV ECHO MEAS - AVA(I,D): 2.33 CM2
BH CV ECHO MEAS - EDV(MOD-SP2): 47.8 ML
BH CV ECHO MEAS - EDV(MOD-SP4): 43 ML
BH CV ECHO MEAS - EF(MOD-BP): 62 %
BH CV ECHO MEAS - EF(MOD-SP2): 75.1 %
BH CV ECHO MEAS - EF(MOD-SP4): 43.3 %
BH CV ECHO MEAS - ESV(MOD-SP2): 11.9 ML
BH CV ECHO MEAS - ESV(MOD-SP4): 24.4 ML
BH CV ECHO MEAS - LAT PEAK E' VEL: 13.8 CM/SEC
BH CV ECHO MEAS - LV MAX PG: 6.4 MMHG
BH CV ECHO MEAS - LV MEAN PG: 3.3 MMHG
BH CV ECHO MEAS - LV V1 MAX: 126.4 CM/SEC
BH CV ECHO MEAS - LV V1 VTI: 21.7 CM
BH CV ECHO MEAS - LVOT AREA: 3 CM2
BH CV ECHO MEAS - LVOT DIAM: 1.97 CM
BH CV ECHO MEAS - MED PEAK E' VEL: 10.2 CM/SEC
BH CV ECHO MEAS - MV DEC SLOPE: 549 CM/SEC2
BH CV ECHO MEAS - MV DEC TIME: 0.15 SEC
BH CV ECHO MEAS - MV E MAX VEL: 83.1 CM/SEC
BH CV ECHO MEAS - PA V2 MAX: 93.7 CM/SEC
BH CV ECHO MEAS - RVDD: 3.7 CM
BH CV ECHO MEAS - SV(LVOT): 66.1 ML
BH CV ECHO MEAS - SV(MOD-SP2): 35.9 ML
BH CV ECHO MEAS - SV(MOD-SP4): 18.6 ML
BH CV ECHO MEAS - TAPSE (>1.6): 1.8 CM
BH CV ECHO MEASUREMENTS AVERAGE E/E' RATIO: 6.93
BUN SERPL-MCNC: 12 MG/DL (ref 8–23)
BUN/CREAT SERPL: 14.3 (ref 7–25)
CALCIUM SPEC-SCNC: 9.4 MG/DL (ref 8.6–10.5)
CHLORIDE SERPL-SCNC: 104 MMOL/L (ref 98–107)
CHOLEST SERPL-MCNC: 189 MG/DL (ref 0–200)
CO2 SERPL-SCNC: 25.5 MMOL/L (ref 22–29)
CREAT SERPL-MCNC: 0.84 MG/DL (ref 0.76–1.27)
DEPRECATED RDW RBC AUTO: 42.9 FL (ref 37–54)
EGFRCR SERPLBLD CKD-EPI 2021: 93.2 ML/MIN/1.73
EOSINOPHIL # BLD AUTO: 0.02 10*3/MM3 (ref 0–0.4)
EOSINOPHIL NFR BLD AUTO: 0.2 % (ref 0.3–6.2)
ERYTHROCYTE [DISTWIDTH] IN BLOOD BY AUTOMATED COUNT: 14 % (ref 12.3–15.4)
GEN 5 2HR TROPONIN T REFLEX: 36 NG/L
GLUCOSE BLDC GLUCOMTR-MCNC: 134 MG/DL (ref 70–99)
GLUCOSE BLDC GLUCOMTR-MCNC: 150 MG/DL (ref 70–99)
GLUCOSE BLDC GLUCOMTR-MCNC: 205 MG/DL (ref 70–99)
GLUCOSE SERPL-MCNC: 128 MG/DL (ref 65–99)
HBA1C MFR BLD: 7.4 % (ref 4.8–5.6)
HCT VFR BLD AUTO: 44.3 % (ref 37.5–51)
HDLC SERPL-MCNC: 38 MG/DL (ref 40–60)
HGB BLD-MCNC: 15.6 G/DL (ref 13–17.7)
IMM GRANULOCYTES # BLD AUTO: 0.05 10*3/MM3 (ref 0–0.05)
IMM GRANULOCYTES NFR BLD AUTO: 0.4 % (ref 0–0.5)
LDLC SERPL CALC-MCNC: 125 MG/DL (ref 0–100)
LDLC/HDLC SERPL: 3.2 {RATIO}
LYMPHOCYTES # BLD AUTO: 1.66 10*3/MM3 (ref 0.7–3.1)
LYMPHOCYTES NFR BLD AUTO: 13.3 % (ref 19.6–45.3)
MAGNESIUM SERPL-MCNC: 2 MG/DL (ref 1.6–2.4)
MCH RBC QN AUTO: 29.8 PG (ref 26.6–33)
MCHC RBC AUTO-ENTMCNC: 35.2 G/DL (ref 31.5–35.7)
MCV RBC AUTO: 84.5 FL (ref 79–97)
MONOCYTES # BLD AUTO: 0.65 10*3/MM3 (ref 0.1–0.9)
MONOCYTES NFR BLD AUTO: 5.2 % (ref 5–12)
NEUTROPHILS NFR BLD AUTO: 10.02 10*3/MM3 (ref 1.7–7)
NEUTROPHILS NFR BLD AUTO: 80.4 % (ref 42.7–76)
NRBC BLD AUTO-RTO: 0 /100 WBC (ref 0–0.2)
PLATELET # BLD AUTO: 256 10*3/MM3 (ref 140–450)
PMV BLD AUTO: 10.8 FL (ref 6–12)
POTASSIUM SERPL-SCNC: 3.2 MMOL/L (ref 3.5–5.2)
RBC # BLD AUTO: 5.24 10*6/MM3 (ref 4.14–5.8)
SODIUM SERPL-SCNC: 143 MMOL/L (ref 136–145)
TRIGL SERPL-MCNC: 147 MG/DL (ref 0–150)
TROPONIN T DELTA: -1 NG/L
TROPONIN T SERPL HS-MCNC: 37 NG/L
TSH SERPL DL<=0.05 MIU/L-ACNC: 0.55 UIU/ML (ref 0.27–4.2)
VIT B12 BLD-MCNC: 424 PG/ML (ref 211–946)
VLDLC SERPL-MCNC: 26 MG/DL (ref 5–40)
WBC NRBC COR # BLD AUTO: 12.46 10*3/MM3 (ref 3.4–10.8)

## 2024-02-05 PROCEDURE — 82948 REAGENT STRIP/BLOOD GLUCOSE: CPT | Performed by: STUDENT IN AN ORGANIZED HEALTH CARE EDUCATION/TRAINING PROGRAM

## 2024-02-05 PROCEDURE — 93005 ELECTROCARDIOGRAM TRACING: CPT | Performed by: STUDENT IN AN ORGANIZED HEALTH CARE EDUCATION/TRAINING PROGRAM

## 2024-02-05 PROCEDURE — 63710000001 INSULIN LISPRO (HUMAN) PER 5 UNITS: Performed by: INTERNAL MEDICINE

## 2024-02-05 PROCEDURE — 93306 TTE W/DOPPLER COMPLETE: CPT | Performed by: INTERNAL MEDICINE

## 2024-02-05 PROCEDURE — 82948 REAGENT STRIP/BLOOD GLUCOSE: CPT

## 2024-02-05 PROCEDURE — 25010000002 PROCHLORPERAZINE 10 MG/2ML SOLUTION: Performed by: STUDENT IN AN ORGANIZED HEALTH CARE EDUCATION/TRAINING PROGRAM

## 2024-02-05 PROCEDURE — 70547 MR ANGIOGRAPHY NECK W/O DYE: CPT

## 2024-02-05 PROCEDURE — 99232 SBSQ HOSP IP/OBS MODERATE 35: CPT | Performed by: INTERNAL MEDICINE

## 2024-02-05 PROCEDURE — 83735 ASSAY OF MAGNESIUM: CPT | Performed by: STUDENT IN AN ORGANIZED HEALTH CARE EDUCATION/TRAINING PROGRAM

## 2024-02-05 PROCEDURE — 84484 ASSAY OF TROPONIN QUANT: CPT | Performed by: STUDENT IN AN ORGANIZED HEALTH CARE EDUCATION/TRAINING PROGRAM

## 2024-02-05 PROCEDURE — 25010000002 POTASSIUM CHLORIDE 10 MEQ/100ML SOLUTION: Performed by: INTERNAL MEDICINE

## 2024-02-05 PROCEDURE — 93010 ELECTROCARDIOGRAM REPORT: CPT | Performed by: INTERNAL MEDICINE

## 2024-02-05 PROCEDURE — 70544 MR ANGIOGRAPHY HEAD W/O DYE: CPT

## 2024-02-05 PROCEDURE — 94799 UNLISTED PULMONARY SVC/PX: CPT

## 2024-02-05 PROCEDURE — 99222 1ST HOSP IP/OBS MODERATE 55: CPT | Performed by: PSYCHIATRY & NEUROLOGY

## 2024-02-05 PROCEDURE — 70551 MRI BRAIN STEM W/O DYE: CPT

## 2024-02-05 PROCEDURE — 82607 VITAMIN B-12: CPT | Performed by: STUDENT IN AN ORGANIZED HEALTH CARE EDUCATION/TRAINING PROGRAM

## 2024-02-05 PROCEDURE — 94660 CPAP INITIATION&MGMT: CPT

## 2024-02-05 PROCEDURE — 83036 HEMOGLOBIN GLYCOSYLATED A1C: CPT | Performed by: STUDENT IN AN ORGANIZED HEALTH CARE EDUCATION/TRAINING PROGRAM

## 2024-02-05 PROCEDURE — 80048 BASIC METABOLIC PNL TOTAL CA: CPT | Performed by: STUDENT IN AN ORGANIZED HEALTH CARE EDUCATION/TRAINING PROGRAM

## 2024-02-05 PROCEDURE — 25010000002 SULFUR HEXAFLUORIDE MICROSPH 60.7-25 MG RECONSTITUTED SUSPENSION: Performed by: INTERNAL MEDICINE

## 2024-02-05 PROCEDURE — 93306 TTE W/DOPPLER COMPLETE: CPT

## 2024-02-05 PROCEDURE — 94762 N-INVAS EAR/PLS OXIMTRY CONT: CPT

## 2024-02-05 PROCEDURE — 85025 COMPLETE CBC W/AUTO DIFF WBC: CPT | Performed by: STUDENT IN AN ORGANIZED HEALTH CARE EDUCATION/TRAINING PROGRAM

## 2024-02-05 PROCEDURE — 80061 LIPID PANEL: CPT | Performed by: STUDENT IN AN ORGANIZED HEALTH CARE EDUCATION/TRAINING PROGRAM

## 2024-02-05 PROCEDURE — 25810000003 SODIUM CHLORIDE 0.9 % SOLUTION: Performed by: INTERNAL MEDICINE

## 2024-02-05 PROCEDURE — 92610 EVALUATE SWALLOWING FUNCTION: CPT

## 2024-02-05 PROCEDURE — 94761 N-INVAS EAR/PLS OXIMETRY MLT: CPT

## 2024-02-05 PROCEDURE — 93005 ELECTROCARDIOGRAM TRACING: CPT | Performed by: INTERNAL MEDICINE

## 2024-02-05 RX ORDER — NICOTINE POLACRILEX 4 MG
15 LOZENGE BUCCAL
Status: DISCONTINUED | OUTPATIENT
Start: 2024-02-05 | End: 2024-02-08 | Stop reason: HOSPADM

## 2024-02-05 RX ORDER — AMOXICILLIN 250 MG
2 CAPSULE ORAL 2 TIMES DAILY
Status: DISCONTINUED | OUTPATIENT
Start: 2024-02-05 | End: 2024-02-08 | Stop reason: HOSPADM

## 2024-02-05 RX ORDER — SODIUM CHLORIDE 0.9 % (FLUSH) 0.9 %
10 SYRINGE (ML) INJECTION AS NEEDED
Status: DISCONTINUED | OUTPATIENT
Start: 2024-02-05 | End: 2024-02-08 | Stop reason: HOSPADM

## 2024-02-05 RX ORDER — DEXTROSE MONOHYDRATE 25 G/50ML
25 INJECTION, SOLUTION INTRAVENOUS
Status: DISCONTINUED | OUTPATIENT
Start: 2024-02-05 | End: 2024-02-08 | Stop reason: HOSPADM

## 2024-02-05 RX ORDER — CHOLECALCIFEROL (VITAMIN D3) 125 MCG
5 CAPSULE ORAL NIGHTLY PRN
Status: DISCONTINUED | OUTPATIENT
Start: 2024-02-05 | End: 2024-02-08 | Stop reason: HOSPADM

## 2024-02-05 RX ORDER — ALUMINA, MAGNESIA, AND SIMETHICONE 2400; 2400; 240 MG/30ML; MG/30ML; MG/30ML
15 SUSPENSION ORAL EVERY 6 HOURS PRN
Status: DISCONTINUED | OUTPATIENT
Start: 2024-02-05 | End: 2024-02-08 | Stop reason: HOSPADM

## 2024-02-05 RX ORDER — INSULIN LISPRO 100 [IU]/ML
2-9 INJECTION, SOLUTION INTRAVENOUS; SUBCUTANEOUS
Status: DISCONTINUED | OUTPATIENT
Start: 2024-02-05 | End: 2024-02-07

## 2024-02-05 RX ORDER — SODIUM CHLORIDE 9 MG/ML
40 INJECTION, SOLUTION INTRAVENOUS AS NEEDED
Status: DISCONTINUED | OUTPATIENT
Start: 2024-02-05 | End: 2024-02-08 | Stop reason: HOSPADM

## 2024-02-05 RX ORDER — ASPIRIN 81 MG/1
81 TABLET, CHEWABLE ORAL DAILY
Status: DISCONTINUED | OUTPATIENT
Start: 2024-02-05 | End: 2024-02-08 | Stop reason: HOSPADM

## 2024-02-05 RX ORDER — SODIUM CHLORIDE 0.9 % (FLUSH) 0.9 %
10 SYRINGE (ML) INJECTION EVERY 12 HOURS SCHEDULED
Status: DISCONTINUED | OUTPATIENT
Start: 2024-02-05 | End: 2024-02-08 | Stop reason: HOSPADM

## 2024-02-05 RX ORDER — BISACODYL 10 MG
10 SUPPOSITORY, RECTAL RECTAL DAILY PRN
Status: DISCONTINUED | OUTPATIENT
Start: 2024-02-05 | End: 2024-02-08 | Stop reason: HOSPADM

## 2024-02-05 RX ORDER — POLYETHYLENE GLYCOL 3350 17 G/17G
17 POWDER, FOR SOLUTION ORAL DAILY PRN
Status: DISCONTINUED | OUTPATIENT
Start: 2024-02-05 | End: 2024-02-08 | Stop reason: HOSPADM

## 2024-02-05 RX ORDER — POTASSIUM CHLORIDE 7.45 MG/ML
10 INJECTION INTRAVENOUS
Status: COMPLETED | OUTPATIENT
Start: 2024-02-05 | End: 2024-02-05

## 2024-02-05 RX ORDER — NITROGLYCERIN 0.4 MG/1
0.4 TABLET SUBLINGUAL
Status: DISCONTINUED | OUTPATIENT
Start: 2024-02-05 | End: 2024-02-08 | Stop reason: HOSPADM

## 2024-02-05 RX ORDER — SODIUM CHLORIDE 9 MG/ML
50 INJECTION, SOLUTION INTRAVENOUS CONTINUOUS
Status: DISCONTINUED | OUTPATIENT
Start: 2024-02-05 | End: 2024-02-08 | Stop reason: HOSPADM

## 2024-02-05 RX ORDER — BISACODYL 5 MG/1
5 TABLET, DELAYED RELEASE ORAL DAILY PRN
Status: DISCONTINUED | OUTPATIENT
Start: 2024-02-05 | End: 2024-02-08 | Stop reason: HOSPADM

## 2024-02-05 RX ORDER — PROCHLORPERAZINE EDISYLATE 5 MG/ML
5 INJECTION INTRAMUSCULAR; INTRAVENOUS EVERY 6 HOURS PRN
Status: DISCONTINUED | OUTPATIENT
Start: 2024-02-05 | End: 2024-02-08 | Stop reason: HOSPADM

## 2024-02-05 RX ORDER — ACETAMINOPHEN 325 MG/1
650 TABLET ORAL EVERY 6 HOURS PRN
Status: DISCONTINUED | OUTPATIENT
Start: 2024-02-05 | End: 2024-02-08 | Stop reason: HOSPADM

## 2024-02-05 RX ORDER — ASPIRIN 300 MG/1
300 SUPPOSITORY RECTAL DAILY
Status: DISCONTINUED | OUTPATIENT
Start: 2024-02-05 | End: 2024-02-08 | Stop reason: HOSPADM

## 2024-02-05 RX ORDER — IBUPROFEN 600 MG/1
1 TABLET ORAL
Status: DISCONTINUED | OUTPATIENT
Start: 2024-02-05 | End: 2024-02-08 | Stop reason: HOSPADM

## 2024-02-05 RX ADMIN — DILTIAZEM HYDROCHLORIDE 5 MG/HR: 5 INJECTION INTRAVENOUS at 05:00

## 2024-02-05 RX ADMIN — VENLAFAXINE HYDROCHLORIDE 150 MG: 150 CAPSULE, EXTENDED RELEASE ORAL at 09:27

## 2024-02-05 RX ADMIN — PANTOPRAZOLE SODIUM 40 MG: 40 TABLET, DELAYED RELEASE ORAL at 08:26

## 2024-02-05 RX ADMIN — POTASSIUM CHLORIDE 10 MEQ: 7.46 INJECTION, SOLUTION INTRAVENOUS at 09:29

## 2024-02-05 RX ADMIN — Medication 100 MCG: at 09:27

## 2024-02-05 RX ADMIN — SODIUM CHLORIDE 75 ML/HR: 9 INJECTION, SOLUTION INTRAVENOUS at 10:34

## 2024-02-05 RX ADMIN — DONEPEZIL HYDROCHLORIDE 10 MG: 10 TABLET, FILM COATED ORAL at 20:50

## 2024-02-05 RX ADMIN — Medication 10 ML: at 01:19

## 2024-02-05 RX ADMIN — Medication 10 ML: at 08:27

## 2024-02-05 RX ADMIN — LISINOPRIL 40 MG: 20 TABLET ORAL at 08:26

## 2024-02-05 RX ADMIN — PYRIDOXINE HCL TAB 50 MG 100 MG: 50 TAB at 09:27

## 2024-02-05 RX ADMIN — POTASSIUM CHLORIDE 10 MEQ: 7.46 INJECTION, SOLUTION INTRAVENOUS at 13:27

## 2024-02-05 RX ADMIN — Medication 10 ML: at 20:50

## 2024-02-05 RX ADMIN — AMLODIPINE BESYLATE 10 MG: 5 TABLET ORAL at 08:26

## 2024-02-05 RX ADMIN — POTASSIUM CHLORIDE 10 MEQ: 7.46 INJECTION, SOLUTION INTRAVENOUS at 08:26

## 2024-02-05 RX ADMIN — PROCHLORPERAZINE EDISYLATE 5 MG: 5 INJECTION INTRAMUSCULAR; INTRAVENOUS at 08:25

## 2024-02-05 RX ADMIN — POTASSIUM CHLORIDE 10 MEQ: 7.46 INJECTION, SOLUTION INTRAVENOUS at 10:34

## 2024-02-05 RX ADMIN — ROSUVASTATIN CALCIUM 40 MG: 20 TABLET, FILM COATED ORAL at 01:17

## 2024-02-05 RX ADMIN — ASPIRIN 81 MG CHEWABLE TABLET 81 MG: 81 TABLET CHEWABLE at 08:26

## 2024-02-05 RX ADMIN — CARVEDILOL 25 MG: 25 TABLET, FILM COATED ORAL at 17:25

## 2024-02-05 RX ADMIN — ROSUVASTATIN CALCIUM 40 MG: 20 TABLET, FILM COATED ORAL at 20:50

## 2024-02-05 RX ADMIN — SULFUR HEXAFLUORIDE 2 ML: KIT at 09:18

## 2024-02-05 RX ADMIN — CARVEDILOL 25 MG: 25 TABLET, FILM COATED ORAL at 08:26

## 2024-02-05 RX ADMIN — DONEPEZIL HYDROCHLORIDE 10 MG: 10 TABLET, FILM COATED ORAL at 01:18

## 2024-02-05 RX ADMIN — INSULIN LISPRO 4 UNITS: 100 INJECTION, SOLUTION INTRAVENOUS; SUBCUTANEOUS at 20:57

## 2024-02-05 NOTE — THERAPY EVALUATION
Acute Care - Speech Language Pathology   Swallow Initial Evaluation  Cherie     Patient Name: Pastor Bartholomew  : 1950  MRN: [id number]  Today's Date: 2024               Admit Date: 2024    Visit Dx:     ICD-10-CM ICD-9-CM   1. Vertigo  R42 780.4   2. Persistent vomiting  R11.15 536.2   3. Atrial fibrillation with rapid ventricular response  I48.91 427.31   4. Poorly-controlled hypertension  I10 401.9   5. Dysphagia, unspecified type  R13.10 787.20     Patient Active Problem List   Diagnosis    Type 2 diabetes mellitus with hyperglycemia, without long-term current use of insulin    Essential hypertension    Mixed hyperlipidemia    Long-term insulin use    Counseling for insulin pump    Insulin pump titration    Hypervitaminosis D     BERNICE (obstructive sleep apnea)    Nausea    Nausea and vomiting    Gastroesophageal reflux disease    Altered bowel habits    Leukomalacia, periventricular    Lacunar infarction    Snoring    Excessive daytime sleepiness    Class 2 obesity    Mild cognitive impairment    Rapid atrial fibrillation     Past Medical History:   Diagnosis Date    Capps esophagus     Diabetes     Hernia, inguinal     Hyperlipidemia     Hypertension     PONV (postoperative nausea and vomiting)     Sleep apnea     Type 2 diabetes mellitus      Past Surgical History:   Procedure Laterality Date    COLONOSCOPY      COLONOSCOPY N/A 2022    Procedure: COLONOSCOPY WITH POLYPECTOMY;  Surgeon: Artie Dickerson MD;  Location: [facility] ENDOSCOPY;  Service: Gastroenterology;  Laterality: N/A;  COLON POLYP    ENDOSCOPY N/A 2022    Procedure: ESOPHAGOGASTRODUODENOSCOPY WITH BX;  Surgeon: Artie Dickerson MD;  Location: [facility] ENDOSCOPY;  Service: Gastroenterology;  Laterality: N/A;  CAPPS'S ESOPHAGUS, HIATAL HERNIA    HERNIA REPAIR      KNEE SURGERY Bilateral     TRIGGER FINGER RELEASE Bilateral     x2 THUMBS       SLP Recommendation and Plan  SLP Swallowing Diagnosis: swallow  WFL/no suspected pharyngeal impairment (02/05/24 1019)  SLP Diet Recommendation: clear liquid diet (02/05/24 1019)  Recommended Precautions and Strategies: upright posture during/after eating (02/05/24 1019)  SLP Rec. for Method of Medication Administration: meds whole, as tolerated (02/05/24 1019)     Monitor for Signs of Aspiration: notify SLP if any concerns (02/05/24 1019)     Swallow Criteria for Skilled Therapeutic Interventions Met: no problems identified which require skilled intervention (02/05/24 1019)  Anticipated Discharge Disposition (SLP): No further SLP services warranted (02/05/24 1019)     Therapy Frequency (Swallow): evaluation only (02/05/24 1019)                                                     SWALLOW EVALUATION (last 72 hours)       SLP Adult Swallow Evaluation       Row Name 02/05/24 1019                   Rehab Evaluation    Document Type evaluation  -SN        Patient Observations alert;cooperative  -SN        Patient Effort good  -SN        Symptoms Noted During/After Treatment dizziness  -SN        Oral Care patient refused intervention  -SN           General Information    Current Method of Nutrition regular textures;thin liquids  -SN        Prior Level of Function-Swallowing no diet consistency restrictions  -SN        Plans/Goals Discussed with patient;spouse/S.O.  -SN        Barriers to Rehab none identified  -SN        Patient's Goals for Discharge return home  -SN           Oral Motor Structure and Function    Dentition Assessment natural, present and adequate  -SN        Secretion Management WNL/WFL  -SN        Mucosal Quality moist, healthy  -SN        Gag Response WFL  -SN        Volitional Swallow WFL  -SN        Volitional Cough WFL  -SN           Oral Musculature and Cranial Nerve Assessment    Oral Motor General Assessment WFL  -SN           General Eating/Swallowing Observations    Respiratory Support Currently in Use room air  -SN        Eating/Swallowing Skills self-fed   -SN        Positioning During Eating upright 90 degree;upright in bed  -SN        Utensils Used spoon;cup;straw  -SN        Consistencies Trialed ice chips;pureed  patient refused regular solids due to nausea  -SN           Clinical Swallow Eval    Oral Prep Phase WFL  -SN        Oral Transit WFL  -SN        Oral Residue WFL  -SN        Pharyngeal Phase no overt signs/symptoms of pharyngeal impairment  -SN        Esophageal Phase unremarkable  -SN        Clinical Swallow Evaluation Summary Patient is a 73-year-old male admitted to [facility] due to rapid atrial fibrillation.  He reports nausea and vomiting, dizziness.  MRI results pending.  Oral motor evaluation within functional limits.  No facial asymmetry or weakness observed.  No lingual deviation or weakness.  Swallow assessed only with thin liquids and purée solids.  Patient refusing solid consistencies due to nausea and fear of vomiting.  Laryngeal elevation on palpation.  Patient did not demonstrate any overt signs or symptoms of aspiration at bedside however cannot rule out silent aspiration.  -SN           SLP Evaluation Clinical Impression    SLP Swallowing Diagnosis swallow WFL/no suspected pharyngeal impairment  -SN        Swallow Criteria for Skilled Therapeutic Interventions Met no problems identified which require skilled intervention  -SN           Recommendations    Therapy Frequency (Swallow) evaluation only  -SN        SLP Diet Recommendation clear liquid diet  -SN        Recommended Precautions and Strategies upright posture during/after eating  -SN        SLP Rec. for Method of Medication Administration meds whole;as tolerated  -SN        Monitor for Signs of Aspiration notify SLP if any concerns  -SN                  User Key  (r) = Recorded By, (t) = Taken By, (c) = Cosigned By      Initials Name Effective Dates    Gladis Bragg MS-CCC/SLP, CNT 03/31/21 -                 Patient scored level 7 of 7 on functional communication  measures for swallowing, indicating a 0% limitation in function.  At this time patient does not require any further skilled speech pathology services regarding dysphagia.  If he should demonstrate a decline in status please reconsult speech pathology.    Patient requesting clear liquids only at this time due to nausea.  Recommend advance diet when patient requests.    EDUCATION  The patient has been educated in the following areas:   Dysphagia (Swallowing Impairment).              Time Calculation:    Time Calculation- SLP       Row Name 02/05/24 1019             Time Calculation- SLP    SLP Start Time 0915  -SN      SLP Stop Time 1015  -SN      SLP Time Calculation (min) 60 min  -SN      SLP Received On 02/05/24  -SN         Untimed Charges    [id number]-CV Eval Oral Pharyng Swallow Minutes 60  -SN         Total Minutes    Untimed Charges Total Minutes 60  -SN       Total Minutes 60  -SN                User Key  (r) = Recorded By, (t) = Taken By, (c) = Cosigned By      Initials Name Provider Type    SN Gladis Eid MS-CCC/SLP, HUSSAIN Speech and Language Pathologist                    Therapy Charges for Today       Code Description Service Date Service Provider Modifiers Qty    [id number] HC ST EVAL ORAL PHARYNG SWALLOW 4 2/5/2024 Gladis Eid MS-CCC/SLPHUSSAIN GN 1                 PETER Yin/SLP, CNT  2/5/2024

## 2024-02-05 NOTE — CONSULTS
[facility]  [facility] Tele[facility]urology Consult Services    Routine Consult New    Patient Name:   Pastor Bartholomew  YOB: 1950  Identification Number:   MRN - [id number]  Date of Service:   02/05/2024 09:11:53    Diagnosis        R42 - Dizziness/ Vertigo/ Giddiness        I67.2 - Cerebral atherosclerosis    Impression  Peripheral vestibulopathy versus posterior circulation stroke  Pending brain MRI as well as MRA head/Neck as part of his work up  Echocardiogram completed but need results  Will follow up thereafter  Continue aspirin for now    Our recommendations are outlined below    ----------------------------------------------------------------------------------------------------    Imaging  CT HEAD: No acute findings.    Labs  Elevated glucose    Chief Complaint:  persistent vomiting    History of Present Illness:  this is a 73-year-old male patient who sought care due to dizziness with severe nausea/vomiting. Notes dizziness with head movements. No ear pain/pressure. No facial droop. Additionally, no diplopia or other visual disturbances.       Past Medical History:       Hypertension       Diabetes Mellitus       Hyperlipidemia       Atrial Fibrillation       Coronary Artery Disease       There is no history of Stroke       There is no history of Covid-19    Medications:    No Anticoagulant use   Antiplatelet use: Yes aspirin  Reviewed EMR for current medications    Allergies:   Reviewed  Description: Codeine, Semaglutid    Social History:  Smoking: No  Alcohol Use: No  Drug Use: No    Family History:    There Is Family History Of:   Diabetes Mother    Hypertension Mother    Heart attack Mother    COPD Brother    Diabetes Maternal Grandmothe        There is no family history of premature cerebrovascular disease pertinent to this consultation    ROS :  14 Points Review of Systems was performed and was negative except mentioned in HPI.    Past Surgical History:  There Is No Surgical History  Contributory To Today’s Visit  There Is Surgical History of:  as noted above    Examination  BP(186/84), Pulse(89), Temp(97.9), Resp(18),    Neuro Exam:  General: Alert,Awake, Oriented to Time, Place, Person    Speech: Fluent:    Language: Intact:    Face: Symmetric:    Facial Sensation: Intact:    Extraocular Movements: Intact:    Motor Exam: No Drift:    Sensation: Intact:    Coordination: Intact:           Patient/Family was informed the Neurology Consult would happen via TeleHealth consult by way of interactive audio and video telecommunications and consented to receiving care in this manner.    Telehealth Neurology consultation was provided. I spent 30 minutes providing telehealth care. This includes time spent for face to face visit via telemedicine, review of medical records, imaging studies and discussion of findings with providers, the patient and/or family.      Dr Nura Vazquez      [facility]  For Inpatient follow-up with [facility] physician please call [facility] [phone]. This is not an outpatient service. Post hospital discharge, please contact hospital directly.    Please do not communicate with [facility] physicians via secure chat. If you have any questions, Please contact [facility].  Please call or reconsult our service if there are any clinical or diagnostic changes.

## 2024-02-05 NOTE — CONSULTS
Consult received per Stroke Protocol. Patient with a noted DM diagnosis, with a current HbA1c of 7.4%, and an estimated average glucose of 166 mg/dL and has remained consistent over the last 9 months. Patient's home regimen consists of Humulin R U-500 via insulin pump, and Metformin (500mg BID). Patient follows regularly with Dr. Bravo, endocrinologist with [facility]. Patient is contemplating a new insulin pump that will connect with his DEXCOM G6 and offer basal modulation for improved glycemic control.

## 2024-02-05 NOTE — PROGRESS NOTES
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  Patient was unable to take home antihypertensive medications due to vomiting, therefore, upon initial evaluation patient was noted to be tachycardic and significantly hypertensive.  Patient went into A-fib with RVR per EKG while in the ED.  Cardizem drip was started.  Hospitalist service contacted for further evaluation and management.  Teleneurology consulted.  CT head without any acute intracranial abnormalities.    Interval Followup:   No acute events overnight. Patient continues on Cardizem drip. Patient's nausea and vomiting has improved some. Patient continues to be hypertensive.  He noted.  With position changes he is very notices his symptoms complaints. He has had these issues in the past and they self resolved. Denies any active chest pain or shortness of breath.  Nursing with no additional acute issues to report.    Objective   Objective     Vitals:   Temp:  [97.7 °F (36.5 °C)-98.8 °F (37.1 °C)] 97.9 °F (36.6 °C)  Heart Rate:  [] 62  Resp:  [16-22] 18  BP: (153-212)/() 186/84  Physical Exam   Gen: No acute distress, Conversant, Pleasant, lying in bed  Resp: CTAB, No w/r/r, No respiratory distress appreciated  Card: RRR, No m/r/g  Abd: Soft, Nontender, Nondistended, + bowel sounds    Result Review    Result Review:  I have personally reviewed the results as below and agree with these findings:  []  Laboratory:   CMP          2023    14:53 2024    10:27 2024    04:28   CMP   Glucose 253  249  128    BUN 10  10   12    Creatinine 0.92  0.86  0.84    EGFR 89.5  92.6  93.2    Sodium 141  139  143    Potassium 4.3  3.7  3.2    Chloride 99  99  104    Calcium 9.4  9.9  9.4    Total Protein 7.5  7.6     Albumin 4.6  4.7     Globulin 2.9  2.9     Total Bilirubin 0.7  0.8     Alkaline Phosphatase 77  98     AST (SGOT) 37  21     ALT (SGPT) 23  25     Albumin/Globulin Ratio 1.6  1.6     BUN/Creatinine Ratio 10.9  11.6  14.3    Anion Gap 15.2  14.3  13.5      CBC          12/24/2023    15:44 2/4/2024    10:27 2/5/2024    04:28   CBC   WBC 8.62  8.74  12.46    RBC 4.87  5.48  5.24    Hemoglobin 14.4  16.3  15.6    Hematocrit 41.9  47.3  44.3    MCV 86.0  86.3  84.5    MCH 29.6  29.7  29.8    MCHC 34.4  34.5  35.2    RDW 13.6  14.2  14.0    Platelets 216  239  256    Troponin flat.  Magnesium within normal limits.  []  Microbiology:   [x]  Radiology:   [x]  EKG/Telemetry:    []  Cardiology/Vascular:    []  Pathology:  []  Old records:  []  Other:    Assessment & Plan   Assessment / Plan     Assessment:  Paroxysmal atrial fibrillation with RVR, new diagnosis  Uncontrolled hypertension  Rule out stroke  Nausea and vomiting  Hyperlipidemia  Type 2 diabetes mellitus on insulin pump  Obesity (BMI: 37.40)  Possible vertigo  Nodule left lung base 4 mm    Plan:  -Teleneurology consulted and following, appreciate assistance and recommendations in the care of this patient.  -MRI brain ordered.  Follow-up results.  -Continue Cardizem drip  -Start home amlodipine, carvedilol and lisinopril.  Wean patient off of Cardizem drip as tolerated  -Hemoglobin A1c pending  -Echocardiogram obtained.  Follow-up official report.  -Follow-up CT chest scan could be considered in 12 months for 4 mm noncalcified nodule  -Repeat EKG ordered.  -Given atrial fibrillation and concern for possible stroke once CVA is ruled out by MRI will start anticoagulation  -PT/OT consulted.  Requested Cecy-Hallpike and Epley maneuvers to be performed.  -Obtain orthostatic vital  signs every shift  -Resume IV fluids  -Will monitor electrolytes and renal function with BMP and magnesium level in the AM  -Will monitor WBC and Hgb with CBC in the AM  -Clinical course will dictate further management     DVT Prophylaxis: SCDs  GI Prophylaxis: Pantoprazole  Diet: Cardiac/diabetic  Dispo: PT/OT consulted  Code Status: Full code     Personally reviewed patients labs and imaging, discussed with patient and nurse at bedside. Discussed management with the following consultants: Teleneurology.     Part of this note may be an electronic transcription/translation of spoken language to printed text using the Dragon dictation system.    DVT prophylaxis:  Mechanical DVT prophylaxis orders are present.        CODE STATUS:   Code Status (Patient has no pulse and is not breathing): CPR (Attempt to Resuscitate)  Medical Interventions (Patient has pulse or is breathing): Full Support        Electronically signed by Shahzad Otero MD, 02/05/24, 9:44 AM EST.

## 2024-02-05 NOTE — PLAN OF CARE
Goal Outcome Evaluation:  Plan of Care Reviewed With: patient, spouse

## 2024-02-05 NOTE — PLAN OF CARE
Goal Outcome Evaluation:  Plan of Care Reviewed With: patient           Outcome Evaluation: Patient did not wear cpap last night, currently on room air spo2 95%.

## 2024-02-05 NOTE — CONSULTS
Patient resting in bed with his spouse at bedside. Spouse explains that the patient removed his insulin pump when he went down for MRI and is not currently wearing the device as he does not have the needed supplies available to initiate a new infusion site.

## 2024-02-06 LAB
ANION GAP SERPL CALCULATED.3IONS-SCNC: 13.5 MMOL/L (ref 5–15)
BUN SERPL-MCNC: 15 MG/DL (ref 8–23)
BUN/CREAT SERPL: 17.9 (ref 7–25)
CALCIUM SPEC-SCNC: 8.8 MG/DL (ref 8.6–10.5)
CHLORIDE SERPL-SCNC: 102 MMOL/L (ref 98–107)
CO2 SERPL-SCNC: 23.5 MMOL/L (ref 22–29)
CREAT SERPL-MCNC: 0.84 MG/DL (ref 0.76–1.27)
DEPRECATED RDW RBC AUTO: 43 FL (ref 37–54)
EGFRCR SERPLBLD CKD-EPI 2021: 93.2 ML/MIN/1.73
ERYTHROCYTE [DISTWIDTH] IN BLOOD BY AUTOMATED COUNT: 13.7 % (ref 12.3–15.4)
GEN 5 2HR TROPONIN T REFLEX: 33 NG/L
GLUCOSE BLDC GLUCOMTR-MCNC: 205 MG/DL (ref 70–99)
GLUCOSE BLDC GLUCOMTR-MCNC: 242 MG/DL (ref 70–99)
GLUCOSE BLDC GLUCOMTR-MCNC: 269 MG/DL (ref 70–99)
GLUCOSE BLDC GLUCOMTR-MCNC: 303 MG/DL (ref 70–99)
GLUCOSE BLDC GLUCOMTR-MCNC: 390 MG/DL (ref 70–99)
GLUCOSE SERPL-MCNC: 229 MG/DL (ref 65–99)
HCT VFR BLD AUTO: 42.8 % (ref 37.5–51)
HGB BLD-MCNC: 14.6 G/DL (ref 13–17.7)
MAGNESIUM SERPL-MCNC: 2 MG/DL (ref 1.6–2.4)
MCH RBC QN AUTO: 29.2 PG (ref 26.6–33)
MCHC RBC AUTO-ENTMCNC: 34.1 G/DL (ref 31.5–35.7)
MCV RBC AUTO: 85.6 FL (ref 79–97)
PLATELET # BLD AUTO: 230 10*3/MM3 (ref 140–450)
PMV BLD AUTO: 11 FL (ref 6–12)
POTASSIUM SERPL-SCNC: 3.6 MMOL/L (ref 3.5–5.2)
RBC # BLD AUTO: 5 10*6/MM3 (ref 4.14–5.8)
SODIUM SERPL-SCNC: 139 MMOL/L (ref 136–145)
TROPONIN T DELTA: -4 NG/L
TROPONIN T SERPL HS-MCNC: 37 NG/L
WBC NRBC COR # BLD AUTO: 10.17 10*3/MM3 (ref 3.4–10.8)

## 2024-02-06 PROCEDURE — 94799 UNLISTED PULMONARY SVC/PX: CPT

## 2024-02-06 PROCEDURE — 84484 ASSAY OF TROPONIN QUANT: CPT | Performed by: INTERNAL MEDICINE

## 2024-02-06 PROCEDURE — 94761 N-INVAS EAR/PLS OXIMETRY MLT: CPT

## 2024-02-06 PROCEDURE — 63710000001 INSULIN LISPRO (HUMAN) PER 5 UNITS: Performed by: INTERNAL MEDICINE

## 2024-02-06 PROCEDURE — 97161 PT EVAL LOW COMPLEX 20 MIN: CPT

## 2024-02-06 PROCEDURE — 82948 REAGENT STRIP/BLOOD GLUCOSE: CPT | Performed by: STUDENT IN AN ORGANIZED HEALTH CARE EDUCATION/TRAINING PROGRAM

## 2024-02-06 PROCEDURE — 99232 SBSQ HOSP IP/OBS MODERATE 35: CPT | Performed by: PSYCHIATRY & NEUROLOGY

## 2024-02-06 PROCEDURE — 99232 SBSQ HOSP IP/OBS MODERATE 35: CPT | Performed by: INTERNAL MEDICINE

## 2024-02-06 PROCEDURE — 99222 1ST HOSP IP/OBS MODERATE 55: CPT | Performed by: INTERNAL MEDICINE

## 2024-02-06 PROCEDURE — 82948 REAGENT STRIP/BLOOD GLUCOSE: CPT

## 2024-02-06 PROCEDURE — 80048 BASIC METABOLIC PNL TOTAL CA: CPT | Performed by: INTERNAL MEDICINE

## 2024-02-06 PROCEDURE — 83735 ASSAY OF MAGNESIUM: CPT | Performed by: INTERNAL MEDICINE

## 2024-02-06 PROCEDURE — 85027 COMPLETE CBC AUTOMATED: CPT | Performed by: INTERNAL MEDICINE

## 2024-02-06 RX ADMIN — AMLODIPINE BESYLATE 10 MG: 5 TABLET ORAL at 08:12

## 2024-02-06 RX ADMIN — INSULIN LISPRO 8 UNITS: 100 INJECTION, SOLUTION INTRAVENOUS; SUBCUTANEOUS at 11:21

## 2024-02-06 RX ADMIN — CARVEDILOL 25 MG: 25 TABLET, FILM COATED ORAL at 17:03

## 2024-02-06 RX ADMIN — INSULIN LISPRO 7 UNITS: 100 INJECTION, SOLUTION INTRAVENOUS; SUBCUTANEOUS at 08:12

## 2024-02-06 RX ADMIN — APIXABAN 5 MG: 5 TABLET, FILM COATED ORAL at 21:12

## 2024-02-06 RX ADMIN — APIXABAN 5 MG: 5 TABLET, FILM COATED ORAL at 08:13

## 2024-02-06 RX ADMIN — Medication 10 ML: at 21:13

## 2024-02-06 RX ADMIN — Medication 10 ML: at 21:12

## 2024-02-06 RX ADMIN — ASPIRIN 81 MG CHEWABLE TABLET 81 MG: 81 TABLET CHEWABLE at 08:13

## 2024-02-06 RX ADMIN — Medication 5 MG: at 21:13

## 2024-02-06 RX ADMIN — VENLAFAXINE HYDROCHLORIDE 150 MG: 150 CAPSULE, EXTENDED RELEASE ORAL at 08:13

## 2024-02-06 RX ADMIN — Medication 10 ML: at 08:13

## 2024-02-06 RX ADMIN — LISINOPRIL 40 MG: 20 TABLET ORAL at 08:12

## 2024-02-06 RX ADMIN — CARVEDILOL 25 MG: 25 TABLET, FILM COATED ORAL at 08:13

## 2024-02-06 RX ADMIN — PYRIDOXINE HCL TAB 50 MG 100 MG: 50 TAB at 08:13

## 2024-02-06 RX ADMIN — Medication 100 MCG: at 08:13

## 2024-02-06 RX ADMIN — DONEPEZIL HYDROCHLORIDE 10 MG: 10 TABLET, FILM COATED ORAL at 21:13

## 2024-02-06 RX ADMIN — PANTOPRAZOLE SODIUM 40 MG: 40 TABLET, DELAYED RELEASE ORAL at 08:13

## 2024-02-06 RX ADMIN — ROSUVASTATIN CALCIUM 40 MG: 20 TABLET, FILM COATED ORAL at 21:12

## 2024-02-06 RX ADMIN — INSULIN LISPRO 4 UNITS: 100 INJECTION, SOLUTION INTRAVENOUS; SUBCUTANEOUS at 21:23

## 2024-02-06 RX ADMIN — INSULIN LISPRO 6 UNITS: 100 INJECTION, SOLUTION INTRAVENOUS; SUBCUTANEOUS at 17:03

## 2024-02-06 NOTE — PLAN OF CARE
Goal Outcome Evaluation:  Plan of Care Reviewed With: (P) patient           Outcome Evaluation: (P) Patient presents to be independent with all functional mobility. Skilled PT is not indicated at this time.      Anticipated Discharge Disposition (PT): (P) home

## 2024-02-06 NOTE — THERAPY EVALUATION
Acute Care - Physical Therapy Initial Evaluation   [facility]     Patient Name: Pastor Bartholomew  : 1950  MRN: [id number]  Today's Date: 2024      Visit Dx:     ICD-10-CM ICD-9-CM   1. Vertigo  R42 780.4   2. Persistent vomiting  R11.15 536.2   3. Atrial fibrillation with rapid ventricular response  I48.91 427.31   4. Poorly-controlled hypertension  I10 401.9   5. Dysphagia, unspecified type  R13.10 787.20   6. Difficulty in walking  R26.2 719.7     Patient Active Problem List   Diagnosis    Type 2 diabetes mellitus with hyperglycemia, without long-term current use of insulin    Essential hypertension    Mixed hyperlipidemia    Long-term insulin use    Counseling for insulin pump    Insulin pump titration    Hypervitaminosis D     BERNICE (obstructive sleep apnea)    Nausea    Nausea and vomiting    Gastroesophageal reflux disease    Altered bowel habits    Leukomalacia, periventricular    Lacunar infarction    Snoring    Excessive daytime sleepiness    Class 2 obesity    Mild cognitive impairment    Rapid atrial fibrillation     Past Medical History:   Diagnosis Date    Capps esophagus     Diabetes     Hernia, inguinal     Hyperlipidemia     Hypertension     PONV (postoperative nausea and vomiting)     Sleep apnea     Type 2 diabetes mellitus      Past Surgical History:   Procedure Laterality Date    COLONOSCOPY      COLONOSCOPY N/A 2022    Procedure: COLONOSCOPY WITH POLYPECTOMY;  Surgeon: Artie Dickerson MD;  Location: [facility] ENDOSCOPY;  Service: Gastroenterology;  Laterality: N/A;  COLON POLYP    ENDOSCOPY N/A 2022    Procedure: ESOPHAGOGASTRODUODENOSCOPY WITH BX;  Surgeon: Artie Dickerson MD;  Location: [facility] ENDOSCOPY;  Service: Gastroenterology;  Laterality: N/A;  CAPPS'S ESOPHAGUS, HIATAL HERNIA    HERNIA REPAIR      KNEE SURGERY Bilateral     TRIGGER FINGER RELEASE Bilateral     x2 THUMBS     PT Assessment (last 12 hours)       PT Evaluation and Treatment       Row Name  02/06/24 1020          Physical Therapy Time and Intention    Subjective Information no complaints (P)   -DG     Document Type evaluation (P)   -DG     Mode of Treatment individual therapy;physical therapy (P)   -DG     Patient Effort excellent (P)   -DG     Symptoms Noted During/After Treatment none (P)   -DG       Row Name 02/06/24 1020          General Information    Patient Profile Reviewed yes (P)   -DG     Patient Observations alert;cooperative;agree to therapy (P)   -DG     Prior Level of Function independent:;gait;transfer;bed mobility;ADL's (P)   -DG     Equipment Currently Used at Home none (P)   -DG     Existing Precautions/Restrictions fall (P)   -DG     Barriers to Rehab none identified (P)   -DG       Row Name 02/06/24 1020          Living Environment    Current Living Arrangements home (P)   -DG     Home Accessibility stairs to enter home (P)   -DG     People in Home spouse (P)   -DG     Name(s) of People in Home Wife Irma (P)   -DG     Primary Care Provided by self (P)   -DG       Row Name 02/06/24 1020          Home Main Entrance    Number of Stairs, Main Entrance two (P)   -DG       Row Name 02/06/24 1020          Cognition    Cognitive Function WFL (P)   -DG       Row Name 02/06/24 1020          Range of Motion (ROM)    Range of Motion bilateral lower extremities;bilateral upper extremities;ROM is WFL (P)   -DG       Row Name 02/06/24 1020          Strength (Manual Muscle Testing)    Strength (Manual Muscle Testing) bilateral lower extremities (P)   4+/5  -DG       Row Name 02/06/24 1020          Bed Mobility    Bed Mobility supine-sit-supine (P)   -DG     Supine-Sit-Supine [place] (Bed Mobility) independent (P)   -DG       Row Name 02/06/24 1020          Transfers    Transfers sit-stand transfer (P)   -DG       Row Name 02/06/24 1020          Sit-Stand Transfer    Sit-Stand [place] (Transfers) independent (P)   -DG     Assistive Device (Sit-Stand Transfers) other (see comments) (P)    No AD  -DG       Row Name 02/06/24 1020          Gait/Stairs (Locomotion)    Gait/Stairs Locomotion gait/ambulation independence (P)   -DG     [place] Level (Gait) independent (P)   -DG     Assistive Device (Gait) other (see comments) (P)   No AD  -DG     Distance in Feet (Gait) 250 (P)   -DG       Row Name 02/06/24 1020          Balance    Balance Assessment standing dynamic balance (P)   -DG     Dynamic Standing Balance independent (P)   -DG     Position/Device Used, Standing Balance unsupported (P)   -DG       Row Name 02/06/24 1020          Plan of Care Review    Plan of Care Reviewed With patient (P)   -DG     Outcome Evaluation Patient presents to be independent with all functional mobility. Skilled PT is not indicated at this time. (P)   -DG       Row Name 02/06/24 1020          Positioning and Restraints    Pre-Treatment Position in bed (P)   -DG     Post Treatment Position bed (P)   -DG     In Bed sitting EOB;call light within reach;encouraged to call for assist (P)   -DG       Row Name 02/06/24 1020          Therapy Assessment/Plan (PT)    Criteria for Skilled Interventions Met (PT) no problems identified which require skilled intervention (P)   -DG     Therapy Frequency (PT) evaluation only (P)   -DG       Row Name 02/06/24 1020          PT Evaluation Complexity    History, PT Evaluation Complexity no personal factors and/or comorbidities (P)   -DG     Examination of Body Systems (PT Eval Complexity) total of 4 or more elements (P)   -DG     Clinical Presentation (PT Evaluation Complexity) stable (P)   -DG     Clinical Decision Making (PT Evaluation Complexity) low complexity (P)   -DG     Overall Complexity (PT Evaluation Complexity) low complexity (P)   -DG       Row Name 02/06/24 1020          Therapy Plan Review/Discharge Plan (PT)    Therapy Plan Review (PT) patient (P)   -DG               User Key  (r) = Recorded By, (t) = Taken By, (c) = Cosigned By      Initials Name Provider Type    DG  Renee Gottlieb, PT Student PT Student                      PT Recommendation and Plan  Anticipated Discharge Disposition (PT): (P) home  Therapy Frequency (PT): (P) evaluation only  Plan of Care Reviewed With: (P) patient  Outcome Evaluation: (P) Patient presents to be independent with all functional mobility. Skilled PT is not indicated at this time.   Outcome Measures       Row Name 02/06/24 1000             How much help from another person do you currently need...    Turning from your back to your side while in flat bed without using bedrails? 4 (P)   -DG      Moving from lying on back to sitting on the side of a flat bed without bedrails? 4 (P)   -DG      Moving to and from a bed to a chair (including a wheelchair)? 4 (P)   -DG      Standing up from a chair using your arms (e.g., wheelchair, bedside chair)? 4 (P)   -DG      Climbing 3-5 steps with a railing? 4 (P)   -DG      To walk in hospital room? 4 (P)   -DG      AM-PAC 6 Clicks Score (PT) 24 (P)   -DG      Highest Level of Mobility Goal 8 --> Walked 250 feet or more (P)   -DG         Functional Assessment    Outcome Measure Options AM-PAC 6 Clicks Basic Mobility (PT) (P)   -DG                User Key  (r) = Recorded By, (t) = Taken By, (c) = Cosigned By      Initials Name Provider Type    Renee Carrera PT Student PT Student                     Time Calculation:    PT Charges       Row Name 02/06/24 1050             Time Calculation    PT Received On 02/06/24 (P)   -DG         Untimed Charges    PT Eval/Re-eval Minutes 25 (P)   -DG         Total Minutes    Untimed Charges Total Minutes 25 (P)   -DG       Total Minutes 25 (P)   -DG                User Key  (r) = Recorded By, (t) = Taken By, (c) = Cosigned By      Initials Name Provider Type    Renee Carrera, PT Student PT Student                  Therapy Charges for Today       Code Description Service Date Service Provider Modifiers Qty    40992751787 HC PT EVAL LOW COMPLEXITY 2 2/6/2024 Renee Gottlieb PT  Student GP 1            PT G-Codes  Outcome Measure Options: (P) AM-PAC 6 Clicks Basic Mobility (PT)  AM-PAC 6 Clicks Score (PT): (P) 24    Renee Gottlibe, PT Student  2/6/2024

## 2024-02-06 NOTE — PLAN OF CARE
Goal Outcome Evaluation:  Plan of Care Reviewed With: patient        Progress: no change  Outcome Evaluation: VSS. A&Ox4. No acute changes to patient condition. No signs or symptoms of distress expressed or observed. Resting in bed with eyes closed and visible respirations.

## 2024-02-06 NOTE — PLAN OF CARE
Goal Outcome Evaluation:  Plan of Care Reviewed With: patient        Progress: no change  Outcome Evaluation: Patient did not wear bipap last night. He is on 2l nc doing fine.

## 2024-02-06 NOTE — CONSULTS
Cardiology Consult Note  [facility] CARE UNIT          Patient Identification:  Pastor Bartholomew      [id number]  73 y.o.        male  1950           Reason for Consultation: Atrial fibrillation      PCP: Shahzad Rashid PA    History of Present Illness:     Patient is a 73-year-old with a history of diabetes type 2, essential hypertension, dyslipidemia who presented with complaints of nausea and vomiting that started on Saturday.  He had complained of some chills on Saturday but no overt fevers.  The patient had not had any chest pain issues.  In the emergency room he was found to be hypertensive with new onset of atrial fibrillation heart rate in the 130s to 40s range has been placed on Cardizem drip.  Patient has had dizziness issues but no presyncope or syncopal episodes.      Past History:  Past Medical History:   Diagnosis Date    Capps esophagus     Diabetes     Hernia, inguinal     Hyperlipidemia     Hypertension     PONV (postoperative nausea and vomiting)     Sleep apnea     Type 2 diabetes mellitus      Past Surgical History:   Procedure Laterality Date    COLONOSCOPY      COLONOSCOPY N/A 04/25/2022    Procedure: COLONOSCOPY WITH POLYPECTOMY;  Surgeon: Artie Dickerson MD;  Location: [facility] ENDOSCOPY;  Service: Gastroenterology;  Laterality: N/A;  COLON POLYP    ENDOSCOPY N/A 04/25/2022    Procedure: ESOPHAGOGASTRODUODENOSCOPY WITH BX;  Surgeon: Artie Dickerson MD;  Location: [facility] ENDOSCOPY;  Service: Gastroenterology;  Laterality: N/A;  CAPPS'S ESOPHAGUS, HIATAL HERNIA    HERNIA REPAIR      KNEE SURGERY Bilateral     TRIGGER FINGER RELEASE Bilateral     x2 THUMBS     Allergies   Allergen Reactions    Codeine Nausea Only    Codeine Unknown - Low Severity    Semaglutide(0.25 Or 0.5mg-Dos) GI Intolerance     Social History     Socioeconomic History    Marital status:    Tobacco Use    Smoking status: Never     Passive exposure: Yes    Smokeless  tobacco: Never   Vaping Use    Vaping Use: Never used   Substance and Sexual Activity    Alcohol use: Never    Drug use: Never    Sexual activity: Defer     Family History   Problem Relation Age of Onset    Diabetes Mother     Hypertension Mother     Heart attack Mother     COPD Brother     Diabetes Maternal Grandmother     Colon cancer Neg Hx        Medications:  Prior to Admission medications    Medication Sig Start Date End Date Taking? Authorizing Provider   amLODIPine-benazepril (LOTREL) 10-40 MG per capsule Take 1 capsule by mouth Daily. 6/2/22  Yes Hector Judge MD   aspirin 81 MG EC tablet 1 tablet Daily.   Yes Hector Judge MD   carvedilol (COREG) 25 MG tablet Take 1 tablet by mouth 2 (Two) Times a Day With Meals. 10/29/21  Yes Hector Judge MD   cetirizine (zyrTEC) 10 MG tablet Take 1 tablet by mouth Daily As Needed.   Yes Hector Judge MD   cholecalciferol (VITAMIN D3) 10 MCG (400 UNIT) tablet Take 1 tablet by mouth Daily.   Yes Hector Judge MD   co-enzyme Q-10 30 MG capsule Take 1 capsule by mouth Daily.   Yes Hector Judge MD   Continuous Blood Gluc Sensor (Dexcom G6 Sensor) Every 10 (Ten) Days.   Yes Hector Judge MD   donepezil (ARICEPT) 10 MG tablet Take 1 tablet by mouth Every Night. 2/1/24  Yes Marilee Blunt APRN   fluticasone (FLONASE) 50 MCG/ACT nasal spray 1 spray into the nostril(s) as directed by provider Daily As Needed. 4/29/22  Yes Hector Judge MD   hydrALAZINE (APRESOLINE) 10 MG tablet Take 1 tablet by mouth 3 (Three) Times a Day As Needed. 1/9/24 7/7/24 Yes Hector Judge MD   hydroCHLOROthiazide (HYDRODIURIL) 12.5 MG tablet Take 1 tablet by mouth Daily. 5/18/22  Yes Hector Judge MD   insulin patient supplied pump Inject  under the skin into the appropriate area as directed Continuous. Type of Insulin: Humulin R 500  Type of Pump:[employer] [phone]  Bolus amount: 50 units daily.   Basal  amount : Max basal 5.50u/hr  Correction factor:   Insulin to carbohydrate ratio: 12a-12p 10                                                 12p-2p 10                                                  2p -12 a 7    Date of last site change: 2/3/24  Location of catheter: right side on stomach    Frequency of refill: approximately 3 days  Last refill date: per pt 2/3/24    Target 12a-3a 130-150               3a-6a -100-120               6a-9p 100-120               9p-12a 130-150    Active Ins time : 5 hours    Basal Pattern Setup 27.3    Prescriber Shara Campbell  Phone number [phone]   Yes Hector Judge MD   insulin regular (HUMULIN R) 500 UNIT/ML CONCENTRATED injection 50u daily with insulin pump  Patient taking differently: Inject 10 unit marking on U-100 syringe under the skin into the appropriate area as directed Daily. 60u daily with insulin pump 1/16/20  Yes Camille Montelongo MD   L-Lysine 500 MG tablet tablet Take 1 tablet by mouth Daily.   Yes Hector Judge MD   metFORMIN (GLUCOPHAGE) 500 MG tablet Take 1 tablet by mouth 2 (Two) Times a Day With Meals.   Yes Hector Judge MD   multivitamin with minerals tablet tablet Take 1 tablet by mouth Daily.   Yes Hector Judge MD   Omega-3 Fatty Acids (fish oil) 1000 MG capsule capsule Take 2 capsules by mouth Daily With Breakfast.   Yes Hector Judge MD   ondansetron ODT (Zofran ODT) 4 MG disintegrating tablet Place 1 tablet on the tongue Every 8 (Eight) Hours As Needed for Nausea or Vomiting. 3/31/22  Yes Terri Loo APRN   pantoprazole (PROTONIX) 40 MG EC tablet Take 1 tablet by mouth Daily. 8/5/22  Yes Hector Judge MD   pyridoxine (VITAMIN B-6) 100 MG tablet Take 1 tablet by mouth Daily.   Yes Hector Judge MD   rosuvastatin (CRESTOR) 40 MG tablet Take 1 tablet by mouth Every Night. 2/1/24  Yes Marilee Blunt APRN   venlafaxine XR (EFFEXOR-XR) 150 MG 24 hr capsule Take 1 capsule by mouth Daily. 6/3/21  Yes  Provider, MD Hector   vitamin B-12 (CYANOCOBALAMIN) 100 MCG tablet Take 1 tablet by mouth Daily.   Yes ProviderHector MD   CONTOUR NEXT TEST test strip Test 6 times daily DX: e11.65 11/14/19   Bettie Huston APRN   Lancets misc Check 5 times daily DX: e11.65 7/17/19   Bettie Huston APRN      Current medications:  amLODIPine, 10 mg, Oral, Q24H   And  lisinopril, 40 mg, Oral, Q24H  apixaban, 5 mg, Oral, BID  aspirin, 81 mg, Oral, Daily   Or  aspirin, 300 mg, Rectal, Daily  carvedilol, 25 mg, Oral, BID With Meals  donepezil, 10 mg, Oral, Nightly  [Held by provider] hydroCHLOROthiazide, 12.5 mg, Oral, Daily  insulin lispro, 2-9 Units, Subcutaneous, 4x Daily AC & at Bedtime  pantoprazole, 40 mg, Oral, Daily  rosuvastatin, 40 mg, Oral, Nightly  senna-docusate sodium, 2 tablet, Oral, BID  sodium chloride, 10 mL, Intravenous, Q12H  sodium chloride, 10 mL, Intravenous, Q12H  venlafaxine XR, 150 mg, Oral, Daily  vitamin B-12, 100 mcg, Oral, Daily  pyridoxine, 100 mg, Oral, Daily      Current IV drips:  dilTIAZem, 5-15 mg/hr, Last Rate: Stopped (02/05/24 0927)  sodium chloride, 75 mL/hr, Last Rate: 75 mL/hr (02/06/24 1115)        Review of Systems   Constitutional: Negative for chills, fever and weight loss.   HENT:  Negative for congestion and nosebleeds.    Cardiovascular:  Positive for irregular heartbeat. Negative for orthopnea and paroxysmal nocturnal dyspnea.   Respiratory:  Negative for cough and shortness of breath.    Endocrine: Negative for cold intolerance and heat intolerance.   Skin:  Negative for rash.   Musculoskeletal:  Negative for back pain and muscle weakness.   Gastrointestinal:  Positive for nausea and vomiting. Negative for abdominal pain.   Genitourinary:  Negative for dysuria and nocturia.   Neurological:  Negative for dizziness and light-headedness.   Psychiatric/Behavioral:  Negative for altered mental status and hallucinations.          Physical Exam    /79 (BP Location:  "Left arm, Patient Position: Lying)   Pulse 83   Temp 98.1 °F (36.7 °C) (Oral)   Resp 18   Ht 182.9 cm (72\")   Wt 125 kg (275 lb 12.7 oz)   SpO2 93%   BMI 37.40 kg/m²  Body mass index is 37.4 kg/m².   Oxygen saturation   @FLOWAN(10::1)@ SpO2  Min: 93 %  Max: 97 %    General Appearance:   no acute distress  Alert and oriented x3  HENT:   lips not cyanotic  Atraumatic  Neck:  thyroid not enlarged  supple  Respiratory:  no respiratory distress  normal breath sounds  no rales  Cardiovascular:  no jugular venous distention  regular rhythm  apical impulse normal  S1 normal, S2 normal  no S3, no S4   no murmur  no rub, no thrill  no carotid bruit  pedal pulses normal  lower extremity edema: none    Gastrointestinal:   bowel sounds normal  non-tender  no hepatomegaly, no splenomegaly  Musculoskeletal:  no clubbing of fingers.   normocephalic, head atraumatic  Skin:   warm, dry  No rashes  Neuro/Psychiatric:  normal mood and affect  judgement and insight appropriate      Cardiographics:     ECG  (personally reviewed)              Telemetry:  (personally reviewed) atrial fibrillation with RVR   Results for orders placed during the hospital encounter of 02/04/24    Adult Transthoracic Echo Complete W/ Cont if Necessary Per Protocol (With Agitated Saline)    Interpretation Summary    Technically difficult study due to poor acoustic windows.    Left ventricular systolic function is normal. Calculated left ventricular EF = 62%    Left ventricular diastolic function was indeterminate.    Cardiac valves morphology was not well-visualized on the study.         No results found for this or any previous visit.     Cardiolite (Tc-99m Sestamibi) stress test                  Lab Review:       CBC          2/4/2024    10:27 2/5/2024    04:28 2/6/2024    04:15   CBC   WBC 8.74  12.46  10.17    RBC 5.48  5.24  5.00    Hemoglobin 16.3  15.6  14.6    Hematocrit 47.3  44.3  42.8    MCV 86.3  84.5  85.6    MCH 29.7  29.8  29.2    MCHC " "34.5  35.2  34.1    RDW 14.2  14.0  13.7    Platelets 239  256  230        CMP          2/4/2024    10:27 2/5/2024    04:28 2/6/2024    04:15   CMP   Glucose 249  128  229    BUN 10  12  15    Creatinine 0.86  0.84  0.84    EGFR 92.6  93.2  93.2    Sodium 139  143  139    Potassium 3.7  3.2  3.6    Chloride 99  104  102    Calcium 9.9  9.4  8.8    Total Protein 7.6      Albumin 4.7      Globulin 2.9      Total Bilirubin 0.8      Alkaline Phosphatase 98      AST (SGOT) 21      ALT (SGPT) 25      Albumin/Globulin Ratio 1.6      BUN/Creatinine Ratio 11.6  14.3  17.9    Anion Gap 14.3  13.5  13.5         CARDIAC LABS:      Lab 02/06/24 0827 02/06/24 0415 02/05/24  0555 02/05/24  0428 02/04/24  1630   PROBNP  --   --   --   --  894.2   HSTROP T 33* 37* 36* 37* 27*      No results found for: \"DIGOXIN\"   Lab Results   Component Value Date    TSH 0.553 02/04/2024     Results from last 7 days   Lab Units 02/05/24  0428   CHOLESTEROL mg/dL 189   HDL CHOL mg/dL 38*     No results found for: \"POCTROP\"  No components found for: \"DDIMERQUAN\"  Lab Results   Component Value Date    MG 2.0 02/06/2024             CARDIAC LABS:      Lab 02/06/24  0827 02/06/24 0415 02/05/24  0555 02/05/24  0428 02/04/24  1630   PROBNP  --   --   --   --  894.2   HSTROP T 33* 37* 36* 37* 27*      CHADS-VASc Risk Assessment              3 Total Score    1 Hypertension    1 DM    1 Age 65-74        Criteria that do not apply:    CHF    Age >/= 75    PRIOR STROKE/TIA/THROMBO    Vascular Disease    Sex: Female           Assessment:    Rapid atrial fibrillation    Essential hypertension      Atrial fibrillation new onset with RVR now better rate controlled no significant symptoms of ischemia and normal ejection fraction on echocardiogram he does have some baseline EKG changes consistent with repolarization abnormalities which are relatively unchanged from several years ago.  Agree with anticoagulating on Eliquis 5 twice daily for CVA prevention given her " Forest Vargas.  For the time being would recommend just a rate control strategy if additional rate control needed would favor digoxin as patient is already on Norvasc and will have to downtrended trait on that to get on Cardizem or verapamil long-term.    Hypertension not ideally controlled but better here with tolerating his p.o. medications if additional antihypertensive needed would favor diuretic added on in combination with his other antihypertensives.    Plan:  1.  Coreg 25 twice daily  2.  Eliquis 5 twice daily  3.  Lisinopril 40 mg daily  4.  Amlodipine 10 mg daily        Thank you for allowing us to share in [facility].            Aaron Manuel MD   2/6/2024    14:53 EST

## 2024-02-06 NOTE — PROGRESS NOTES
[facility]   Hospitalist Progress Note  Date: 2024  Patient Name: Pastor Bartholomew  : 1950  MRN: [id number]  Date of admission: 2024  Consultants:   -Teleneurology  -Cardiology: Dr. 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 male with type 2 diabetes mellitus on insulin pump, essential hypertension and hyperlipidemia that presented to the ED with intractable nausea and vomiting associated with dizziness.  Patient was unable to take home antihypertensive medications due to vomiting, therefore, upon initial evaluation patient was noted to be tachycardic and significantly hypertensive.  Patient went into A-fib with RVR per EKG while in the ED.  Cardizem drip was started.  Hospitalist service contacted for further evaluation and management.  Teleneurology consulted.  CT head without any acute intracranial abnormalities.  MRI brain was obtained and no evidence of acute stroke identified.  Patient with paroxysmal atrial fibrillation during hospitalization.  Given new diagnosis of A-fib cardiology was consulted.  Patient started on Eliquis.  Patient was noted to have positive orthostatics during hospitalization, IV fluids given and orthostatic vital signs been improved.  Of note, CT chest imaging obtained during hospitalization did show a 4 mm noncalcified nodule in the left lung base, recommended that repeat CT scan to be completed within 12 months to monitor for progression/resolution.    Interval Followup:   No acute issues overnight.  Patient did note that his lightheadedness/dizziness and nausea vomiting have improved.  Blood pressures have improved as well.  Patient denies any active chest pain or shortness of breath.  Nursing with no additional acute issues to report.    Objective   Objective     Vitals:   Temp:  [97.9 °F (36.6 °C)-99 °F (37.2 °C)] 99 °F (37.2 °C)  Heart Rate:  [] 80  Resp:  [18-22] 20  BP:  (114-183)/() 156/78  Flow (L/min):  [2] 2  Physical Exam   Gen: No acute distress, lying in bed, pleasant, conversant  Resp: CTAB, No w/r/r, normal respiratory effort  Card: RRR, No m/r/g  Abd: Soft, Nontender, Nondistended, + bowel sounds    Result Review    Result Review:  I have personally reviewed the results as below and agree with these findings:  []  Laboratory:   CMP          2/4/2024    10:27 2/5/2024    04:28 2/6/2024    04:15   CMP   Glucose 249  128  229    BUN 10  12  15    Creatinine 0.86  0.84  0.84    EGFR 92.6  93.2  93.2    Sodium 139  143  139    Potassium 3.7  3.2  3.6    Chloride 99  104  102    Calcium 9.9  9.4  8.8    Total Protein 7.6      Albumin 4.7      Globulin 2.9      Total Bilirubin 0.8      Alkaline Phosphatase 98      AST (SGOT) 21      ALT (SGPT) 25      Albumin/Globulin Ratio 1.6      BUN/Creatinine Ratio 11.6  14.3  17.9    Anion Gap 14.3  13.5  13.5      CBC          2/4/2024    10:27 2/5/2024    04:28 2/6/2024    04:15   CBC   WBC 8.74  12.46  10.17    RBC 5.48  5.24  5.00    Hemoglobin 16.3  15.6  14.6    Hematocrit 47.3  44.3  42.8    MCV 86.3  84.5  85.6    MCH 29.7  29.8  29.2    MCHC 34.5  35.2  34.1    RDW 14.2  14.0  13.7    Platelets 239  256  230    Hyperglycemia noted.  Magnesium within normal limits.  []  Microbiology:   [x]  Radiology: MRI brain imaging personally reviewed.  No evidence of ischemic change noted.  Progression of chronic white matter changes noted.  MRA head and neck obtained.  No evidence of large vessel occlusion.  [x]  EKG/Telemetry:    []  Cardiology/Vascular:    []  Pathology:  []  Old records:  []  Other:    Assessment & Plan   Assessment / Plan     Assessment:  Paroxysmal atrial fibrillation with RVR, new diagnosis  Uncontrolled hypertension  Nausea and vomiting  Orthostatic  Hyperlipidemia  Type 2 diabetes mellitus on insulin pump  Obesity (BMI: 37.40)  Possible vertigo  Nodule left lung base 4 mm    Plan:  -Teleneurology consulted and  following, appreciate assistance and recommendations in the care of this patient.  -MRI and MRA imaging personally reviewed.  Results noted above.  -Start Eliquis  -Continue amlodipine, carvedilol and lisinopril  -Given new diagnosis of A-fib cardiology consulted to assist in care of the patient  -Continue to obtain orthostatic vital signs every shift  -Continue IV fluids  -Monitor electrolytes and renal function with BMP and magnesium level in the AM  -Monitor WBC and Hgb with CBC in the AM  -Clinical course will dictate further management     DVT Prophylaxis: SCDs  GI Prophylaxis: Pantoprazole  Diet: Cardiac/diabetic  Dispo: Home when medically appropriate for discharge  Code Status: Full code     Personally reviewed patients labs and imaging, discussed with patient and nurse at bedside. Discussed management with the following consultants: Cardiology and Teleneurology.     Part of this note may be an electronic transcription/translation of spoken language to printed text using the Dragon dictation system.    DVT prophylaxis:  Medical and mechanical DVT prophylaxis orders are present.        CODE STATUS:   Code Status (Patient has no pulse and is not breathing): CPR (Attempt to Resuscitate)  Medical Interventions (Patient has pulse or is breathing): Full Support      Electronically signed by Shahzad Otero MD, 02/06/24, 8:33 AM EST.

## 2024-02-06 NOTE — PLAN OF CARE
Goal Outcome Evaluation:  Plan of Care Reviewed With: patient, spouse        Progress: improving  Outcome Evaluation: Patient stable this shift, A&Ox4, denies c/o nausea/vomiting today. Stroke r/o orders discontinued per neuro and hospitalist, neuro signed off.

## 2024-02-06 NOTE — PROGRESS NOTES
[facility]  [facility]    Routine Consult Follow-Up    Patient Name:   Pastor Bartholomew  YOB: 1950  Identification Number:   MRN - [id number]  Date of Service:   02/06/2024 07:33:38    Diagnosis        R42 - Dizziness/ Vertigo/ Giddiness        I67.2 - Cerebral atherosclerosis    Impression  Likely peripheral vestibulopathy  I would refer for vestibular rehabilitation with physical therapy, which he can do as an outpatient  I would also recommend that he have follow up with an ENT after discharge for further testing and VNG    Our recommendations are outlined below    DVT Prophylaxis :  Choice of Primary Team    Dispositions :  No further recommendations  Will signoff please contact for any questions    Subjective  this is a 73-year-old male patient who sought care due to dizziness with severe nausea/vomiting. Notes dizziness with head movements. No ear pain/pressure. No facial droop. Additionally, no diplopia or other visual disturbances. Hospital course otherwise complicated by new onset atrial fibrillation    Hospital Course  Doing better today, some right ear pressure. No neurological changes.    Imaging  CT HEAD: No acute findings.  BRAIN MRI: No acute/subacute stroke  MRA HEAD/NECK: RAVI narrowing, 50%    Labs  Elevated glucose  Potassium 3.2  A1c = 7.4  B12 normal  Potassium 3.2       Examination  BP(166/88), Pulse(78), Temp(98.6), Resp(22),    Neuro Exam:  General: Alert,Awake, Oriented to Time, Place, Person    Speech: Fluent:    Language: Intact:    Face: Symmetric:    Extraocular Movements: Intact:    Motor Exam: No Drift:    Coordination: Intact:           Patient/Family was informed the Neurology Consult would happen via TeleHealth consult by way of interactive audio and video telecommunications and consented to receiving care in this manner.    Telehealth Neurology consultation was provided. I spent 25 minutes providing telehealth care. This  includes time spent for face to face visit via telemedicine, review of medical records, imaging studies and discussion of findings with providers, the patient and/or family.      Dr Nura Vazquez      [facility]  For Inpatient follow-up with [facility] physician please call [facility] [place] [phone]. This is not an outpatient service. Post hospital discharge, please contact hospital directly.    Please do not communicate with [facility] physicians via secure chat. If you have any questions, Please contact [facility] [place].  Please call or reconsult our service if there are any clinical or diagnostic changes.

## 2024-02-07 LAB
ANION GAP SERPL CALCULATED.3IONS-SCNC: 10.9 MMOL/L (ref 5–15)
BUN SERPL-MCNC: 16 MG/DL (ref 8–23)
BUN/CREAT SERPL: 19.5 (ref 7–25)
CALCIUM SPEC-SCNC: 8.4 MG/DL (ref 8.6–10.5)
CHLORIDE SERPL-SCNC: 102 MMOL/L (ref 98–107)
CO2 SERPL-SCNC: 24.1 MMOL/L (ref 22–29)
CREAT SERPL-MCNC: 0.82 MG/DL (ref 0.76–1.27)
DEPRECATED RDW RBC AUTO: 41.1 FL (ref 37–54)
EGFRCR SERPLBLD CKD-EPI 2021: 93.9 ML/MIN/1.73
ERYTHROCYTE [DISTWIDTH] IN BLOOD BY AUTOMATED COUNT: 13.2 % (ref 12.3–15.4)
GLUCOSE BLDC GLUCOMTR-MCNC: 291 MG/DL (ref 70–99)
GLUCOSE BLDC GLUCOMTR-MCNC: 296 MG/DL (ref 70–99)
GLUCOSE BLDC GLUCOMTR-MCNC: 329 MG/DL (ref 70–99)
GLUCOSE BLDC GLUCOMTR-MCNC: 347 MG/DL (ref 70–99)
GLUCOSE SERPL-MCNC: 286 MG/DL (ref 65–99)
HCT VFR BLD AUTO: 40.3 % (ref 37.5–51)
HGB BLD-MCNC: 14 G/DL (ref 13–17.7)
MAGNESIUM SERPL-MCNC: 1.9 MG/DL (ref 1.6–2.4)
MCH RBC QN AUTO: 29.4 PG (ref 26.6–33)
MCHC RBC AUTO-ENTMCNC: 34.7 G/DL (ref 31.5–35.7)
MCV RBC AUTO: 84.7 FL (ref 79–97)
PLATELET # BLD AUTO: 210 10*3/MM3 (ref 140–450)
PMV BLD AUTO: 11 FL (ref 6–12)
POTASSIUM SERPL-SCNC: 3.6 MMOL/L (ref 3.5–5.2)
RBC # BLD AUTO: 4.76 10*6/MM3 (ref 4.14–5.8)
SODIUM SERPL-SCNC: 137 MMOL/L (ref 136–145)
WBC NRBC COR # BLD AUTO: 8.76 10*3/MM3 (ref 3.4–10.8)

## 2024-02-07 PROCEDURE — 25810000003 SODIUM CHLORIDE 0.9 % SOLUTION: Performed by: INTERNAL MEDICINE

## 2024-02-07 PROCEDURE — 63710000001 INSULIN DETEMIR PER 5 UNITS: Performed by: INTERNAL MEDICINE

## 2024-02-07 PROCEDURE — 85027 COMPLETE CBC AUTOMATED: CPT | Performed by: INTERNAL MEDICINE

## 2024-02-07 PROCEDURE — 63710000001 INSULIN LISPRO (HUMAN) PER 5 UNITS: Performed by: INTERNAL MEDICINE

## 2024-02-07 PROCEDURE — 99232 SBSQ HOSP IP/OBS MODERATE 35: CPT | Performed by: INTERNAL MEDICINE

## 2024-02-07 PROCEDURE — 83735 ASSAY OF MAGNESIUM: CPT | Performed by: INTERNAL MEDICINE

## 2024-02-07 PROCEDURE — 82948 REAGENT STRIP/BLOOD GLUCOSE: CPT

## 2024-02-07 PROCEDURE — 82948 REAGENT STRIP/BLOOD GLUCOSE: CPT | Performed by: INTERNAL MEDICINE

## 2024-02-07 PROCEDURE — 94799 UNLISTED PULMONARY SVC/PX: CPT

## 2024-02-07 PROCEDURE — 97165 OT EVAL LOW COMPLEX 30 MIN: CPT

## 2024-02-07 PROCEDURE — 80048 BASIC METABOLIC PNL TOTAL CA: CPT | Performed by: INTERNAL MEDICINE

## 2024-02-07 RX ORDER — INSULIN LISPRO 100 [IU]/ML
3-14 INJECTION, SOLUTION INTRAVENOUS; SUBCUTANEOUS
Status: DISCONTINUED | OUTPATIENT
Start: 2024-02-07 | End: 2024-02-08 | Stop reason: HOSPADM

## 2024-02-07 RX ADMIN — ROSUVASTATIN CALCIUM 40 MG: 20 TABLET, FILM COATED ORAL at 20:40

## 2024-02-07 RX ADMIN — APIXABAN 5 MG: 5 TABLET, FILM COATED ORAL at 08:10

## 2024-02-07 RX ADMIN — APIXABAN 5 MG: 5 TABLET, FILM COATED ORAL at 20:40

## 2024-02-07 RX ADMIN — AMLODIPINE BESYLATE 10 MG: 5 TABLET ORAL at 08:10

## 2024-02-07 RX ADMIN — SODIUM CHLORIDE 75 ML/HR: 9 INJECTION, SOLUTION INTRAVENOUS at 02:24

## 2024-02-07 RX ADMIN — PYRIDOXINE HCL TAB 50 MG 100 MG: 50 TAB at 08:10

## 2024-02-07 RX ADMIN — SODIUM CHLORIDE 75 ML/HR: 9 INJECTION, SOLUTION INTRAVENOUS at 16:06

## 2024-02-07 RX ADMIN — VENLAFAXINE HYDROCHLORIDE 150 MG: 150 CAPSULE, EXTENDED RELEASE ORAL at 08:10

## 2024-02-07 RX ADMIN — LISINOPRIL 40 MG: 20 TABLET ORAL at 08:10

## 2024-02-07 RX ADMIN — ASPIRIN 81 MG CHEWABLE TABLET 81 MG: 81 TABLET CHEWABLE at 08:09

## 2024-02-07 RX ADMIN — Medication 10 ML: at 20:40

## 2024-02-07 RX ADMIN — DONEPEZIL HYDROCHLORIDE 10 MG: 10 TABLET, FILM COATED ORAL at 20:40

## 2024-02-07 RX ADMIN — CARVEDILOL 25 MG: 25 TABLET, FILM COATED ORAL at 17:02

## 2024-02-07 RX ADMIN — INSULIN DETEMIR 30 UNITS: 100 INJECTION, SOLUTION SUBCUTANEOUS at 14:17

## 2024-02-07 RX ADMIN — Medication 5 MG: at 20:40

## 2024-02-07 RX ADMIN — INSULIN LISPRO 7 UNITS: 100 INJECTION, SOLUTION INTRAVENOUS; SUBCUTANEOUS at 11:14

## 2024-02-07 RX ADMIN — PANTOPRAZOLE SODIUM 40 MG: 40 TABLET, DELAYED RELEASE ORAL at 08:11

## 2024-02-07 RX ADMIN — Medication 100 MCG: at 08:09

## 2024-02-07 RX ADMIN — INSULIN LISPRO 6 UNITS: 100 INJECTION, SOLUTION INTRAVENOUS; SUBCUTANEOUS at 08:09

## 2024-02-07 RX ADMIN — CARVEDILOL 25 MG: 25 TABLET, FILM COATED ORAL at 08:10

## 2024-02-07 RX ADMIN — INSULIN LISPRO 12 UNITS: 100 INJECTION, SOLUTION INTRAVENOUS; SUBCUTANEOUS at 20:40

## 2024-02-07 RX ADMIN — INSULIN LISPRO 8 UNITS: 100 INJECTION, SOLUTION INTRAVENOUS; SUBCUTANEOUS at 17:02

## 2024-02-07 NOTE — PLAN OF CARE
Goal Outcome Evaluation:  Plan of Care Reviewed With: patient        Progress: improving  Outcome Evaluation: Patient stable this shift without significant changes. Awaiting stress test

## 2024-02-07 NOTE — PROGRESS NOTES
[facility]   Hospitalist Progress Note  Date: 2024  Patient Name: Pastor Bartholomew  : 1950  MRN: [id number]  Date of admission: 2024  Consultants:   -Teleneurology  -Cardiology: Dr. 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 male with type 2 diabetes mellitus on insulin pump, essential hypertension and hyperlipidemia that presented to the ED with intractable nausea and vomiting associated with dizziness.  Patient was unable to take home antihypertensive medications due to vomiting, therefore, upon initial evaluation patient was noted to be tachycardic and significantly hypertensive.  Patient went into A-fib with RVR per EKG while in the ED.  Cardizem drip was started.  Hospitalist service contacted for further evaluation and management.  Teleneurology consulted.  CT head without any acute intracranial abnormalities.  MRI brain was obtained and no evidence of acute stroke identified.  Patient with paroxysmal atrial fibrillation during hospitalization.  Given new diagnosis of A-fib cardiology was consulted.  Patient started on Eliquis.  Patient was noted to have positive orthostatics during hospitalization, IV fluids given and orthostatic vital signs been improved.  Of note, CT chest imaging obtained during hospitalization did show a 4 mm noncalcified nodule in the left lung base, recommended that repeat CT scan to be completed within 12 months to monitor for progression/resolution.      Interval Followup:   No acute events overnight.  Patient without any acute complaints of chest pain or shortness of breath this morning.  Tolerating diet well.  Nursing with no additional acute issues to report.    Objective   Objective     Vitals:   Temp:  [97.7 °F (36.5 °C)-99.1 °F (37.3 °C)] 98.8 °F (37.1 °C)  Heart Rate:  [64-85] 84  Resp:  [18-20] 18  BP: (142-170)/(77-92) 148/81  Physical Exam   Gen: No acute distress, conversant,  pleasant, sitting up in bed  Resp: CTAB, No w/r/r, good aeration, no increase in work of breathing  Card: RRR, No m/r/g  Abd: Soft, Nontender, Nondistended, + bowel sounds    Result Review    Result Review:  I have personally reviewed the results as below and agree with these findings:  []  Laboratory:   CMP          2/5/2024    04:28 2/6/2024    04:15 2/7/2024    03:58   CMP   Glucose 128  229  286    BUN 12  15  16    Creatinine 0.84  0.84  0.82    EGFR 93.2  93.2  93.9    Sodium 143  139  137    Potassium 3.2  3.6  3.6    Chloride 104  102  102    Calcium 9.4  8.8  8.4    BUN/Creatinine Ratio 14.3  17.9  19.5    Anion Gap 13.5  13.5  10.9      CBC          2/5/2024    04:28 2/6/2024    04:15 2/7/2024    03:58   CBC   WBC 12.46  10.17  8.76    RBC 5.24  5.00  4.76    Hemoglobin 15.6  14.6  14.0    Hematocrit 44.3  42.8  40.3    MCV 84.5  85.6  84.7    MCH 29.8  29.2  29.4    MCHC 35.2  34.1  34.7    RDW 14.0  13.7  13.2    Platelets 256  230  210    Magnesium within normal limits.  Hyperglycemia noted.  []  Microbiology:   []  Radiology:   [x]  EKG/Telemetry:    []  Cardiology/Vascular:    []  Pathology:  []  Old records:  []  Other:    Assessment & Plan   Assessment / Plan     Assessment:  Paroxysmal atrial fibrillation with RVR, new diagnosis  Uncontrolled hypertension  Nausea and vomiting  Orthostatic  Hyperlipidemia  Type 2 diabetes mellitus on insulin pump with hyperglycemia  Obesity (BMI: 37.40)  Possible vertigo  Nodule left lung base 4 mm    Plan:  -Cardiology consulted and following, appreciate assistance and recommendations in the care of this patient.  -Continue Eliquis  -Continue amlodipine, carvedilol and lisinopril  -Start HCTZ  -Management discussed with cardiology.  Plan for noninvasive stress test tomorrow (02/08/2024)  -Continue IV fluids  -Start Levemir and increase SSI.  Monitor blood glucose level and SSI requirement and titrate insulin regimen accordingly.  Patient on insulin pump at home but  insulin pump had to be removed when patient had MRI done  -Management discussed with teleneurology.  Recommend ENT referral at time of discharge for further testing and VNG  -Will monitor electrolytes and renal function with BMP and magnesium level in the AM  -Will monitor WBC and Hgb with CBC in the AM  -Clinical course will dictate further management     DVT Prophylaxis: Eliquis  GI Prophylaxis: Pantoprazole  Diet: Cardiac/diabetic  Dispo: Home when medically appropriate for discharge  Code Status: Full code     Personally reviewed patients labs and imaging, discussed with patient and nurse at bedside. Discussed management with the following consultants: Cardiology.     Part of this note may be an electronic transcription/translation of spoken language to printed text using the Dragon dictation system.    DVT prophylaxis:  Medical and mechanical DVT prophylaxis orders are present.        CODE STATUS:   Code Status (Patient has no pulse and is not breathing): CPR (Attempt to Resuscitate)  Medical Interventions (Patient has pulse or is breathing): Full Support      Electronically signed by Shahzad Otero MD, 02/07/24, 1:57 PM EST.

## 2024-02-07 NOTE — PLAN OF CARE
Goal Outcome Evaluation:  Plan of Care Reviewed With: patient        Progress: no change  Outcome Evaluation: VSS. A&Ox4. No acute changes to patient condition. IV became occluded. New IV placed. No signs or symptoms of distress observed or expressed. Resting in bed with eyes closed and visible respirations.

## 2024-02-07 NOTE — PROGRESS NOTES
CARDIOLOGY  INPATIENT PROGRESS NOTE                [facility] UNIT    2/7/2024      PATIENT IDENTIFICATION:   Name:  Pastor Bartholomew      MRN:  [id number]     73 y.o.  male                 SUBJECTIVE:   Patient with no events overnight resting comfortably this morning.  OBJECTIVE:  Vitals:    02/07/24 1115 02/07/24 1222 02/07/24 1224 02/07/24 1226   BP: 158/88 166/91 155/84 148/81   BP Location: Left arm Left arm Left arm Left arm   Patient Position: Lying Lying Sitting Standing   Pulse: 85 80 76 84   Resp: 18      Temp: 98.8 °F (37.1 °C)      TempSrc: Oral      SpO2: 93% 94%     Weight:       Height:               Body mass index is 37.4 kg/m².    Intake/Output Summary (Last 24 hours) at 2/7/2024 1323  Last data filed at 2/7/2024 1115  Gross per 24 hour   Intake 2415 ml   Output 3250 ml   Net -835 ml       Telemetry: Atrial fibrillation rate controlled occasional ventricular couplet and triplet    Physical Exam  General Appearance:   no acute distress  Alert and oriented x3  HENT:   lips not cyanotic  Atraumatic  Neck:  No jvd   supple  Respiratory:  no respiratory distress  normal breath sounds  no rales  Cardiovascular:  Irregular irregular  no S3, no S4   no murmur  no rub  lower extremity edema: none    Skin:   warm, dry  No rashes      Allergies   Allergen Reactions    Codeine Nausea Only    Codeine Unknown - Low Severity    Semaglutide(0.25 Or 0.5mg-Dos) GI Intolerance     Scheduled meds:  amLODIPine, 10 mg, Oral, Q24H   And  lisinopril, 40 mg, Oral, Q24H  apixaban, 5 mg, Oral, BID  aspirin, 81 mg, Oral, Daily   Or  aspirin, 300 mg, Rectal, Daily  carvedilol, 25 mg, Oral, BID With Meals  donepezil, 10 mg, Oral, Nightly  [Held by provider] hydroCHLOROthiazide, 12.5 mg, Oral, Daily  insulin lispro, 2-9 Units, Subcutaneous, 4x Daily AC & at Bedtime  pantoprazole, 40 mg, Oral, Daily  rosuvastatin, 40 mg, Oral, Nightly  senna-docusate sodium, 2 tablet, Oral, BID  sodium chloride, 10  "mL, Intravenous, Q12H  sodium chloride, 10 mL, Intravenous, Q12H  venlafaxine XR, 150 mg, Oral, Daily  vitamin B-12, 100 mcg, Oral, Daily  pyridoxine, 100 mg, Oral, Daily      IV meds:                      dilTIAZem, 5-15 mg/hr, Last Rate: Stopped (02/05/24 0927)  sodium chloride, 75 mL/hr, Last Rate: 75 mL/hr (02/07/24 0743)      Data Review:  CBC          2/5/2024    04:28 2/6/2024    04:15 2/7/2024    03:58   CBC   WBC 12.46  10.17  8.76    RBC 5.24  5.00  4.76    Hemoglobin 15.6  14.6  14.0    Hematocrit 44.3  42.8  40.3    MCV 84.5  85.6  84.7    MCH 29.8  29.2  29.4    MCHC 35.2  34.1  34.7    RDW 14.0  13.7  13.2    Platelets 256  230  210      CMP          2/5/2024    04:28 2/6/2024    04:15 2/7/2024    03:58   CMP   Glucose 128  229  286    BUN 12  15  16    Creatinine 0.84  0.84  0.82    EGFR 93.2  93.2  93.9    Sodium 143  139  137    Potassium 3.2  3.6  3.6    Chloride 104  102  102    Calcium 9.4  8.8  8.4    BUN/Creatinine Ratio 14.3  17.9  19.5    Anion Gap 13.5  13.5  10.9       CARDIAC LABS:      Lab 02/06/24  0827 02/06/24  0415 02/05/24  0555 02/05/24  0428 02/04/24  1630   PROBNP  --   --   --   --  894.2   HSTROP T 33* 37* 36* 37* 27*        No results found for: \"DIGOXIN\"   Lab Results   Component Value Date    TSH 0.553 02/04/2024     Results from last 7 days   Lab Units 02/05/24  0428   CHOLESTEROL mg/dL 189   HDL CHOL mg/dL 38*     No results found for: \"POCTROP\"  Lab Results   Component Value Date    TROPONINT 33 (H) 02/06/2024   (  Lab Results   Component Value Date    MG 1.9 02/07/2024     Results for orders placed during the hospital encounter of 02/04/24    Adult Transthoracic Echo Complete W/ Cont if Necessary Per Protocol (With Agitated Saline)    Interpretation Summary    Technically difficult study due to poor acoustic windows.    Left ventricular systolic function is normal. Calculated left ventricular EF = 62%    Left ventricular diastolic function was indeterminate.    Cardiac " valves morphology was not well-visualized on the study.           ASSESSMENT:    Rapid atrial fibrillation    Essential hypertension        PLAN:  1.  Coreg 25 twice daily  2.  Eliquis 5 mg twice daily  3.  Add hydrochlorothiazide 12.5 for improved blood pressure control  4.  Noninvasive stress test in a.m.          Aaron Manuel MD  2/7/2024    13:23 EST

## 2024-02-07 NOTE — PLAN OF CARE
Goal Outcome Evaluation:            Patient wearing 2l nasal cannula. Cpap on standby at bedside. Will continue to monitor.

## 2024-02-07 NOTE — THERAPY EVALUATION
Patient Name: Pastor Bartholomew  : 1950    MRN: [id number]                              Today's Date: 2024       Admit Date: 2024    Visit Dx:     ICD-10-CM ICD-9-CM   1. Vertigo  R42 780.4   2. Persistent vomiting  R11.15 536.2   3. Atrial fibrillation with rapid ventricular response  I48.91 427.31   4. Poorly-controlled hypertension  I10 401.9   5. Dysphagia, unspecified type  R13.10 787.20   6. Difficulty in walking  R26.2 719.7   7. Decreased activities of daily living (ADL)  Z78.9 V49.89     Patient Active Problem List   Diagnosis    Type 2 diabetes mellitus with hyperglycemia, without long-term current use of insulin    Essential hypertension    Mixed hyperlipidemia    Long-term insulin use    Counseling for insulin pump    Insulin pump titration    Hypervitaminosis D     BERNICE (obstructive sleep apnea)    Nausea    Nausea and vomiting    Gastroesophageal reflux disease    Altered bowel habits    Leukomalacia, periventricular    Lacunar infarction    Snoring    Excessive daytime sleepiness    Class 2 obesity    Mild cognitive impairment    Rapid atrial fibrillation     Past Medical History:   Diagnosis Date    Capps esophagus     Diabetes     Hernia, inguinal     Hyperlipidemia     Hypertension     PONV (postoperative nausea and vomiting)     Sleep apnea     Type 2 diabetes mellitus      Past Surgical History:   Procedure Laterality Date    COLONOSCOPY      COLONOSCOPY N/A 2022    Procedure: COLONOSCOPY WITH POLYPECTOMY;  Surgeon: Artie Dickerson MD;  Location: [facility] ENDOSCOPY;  Service: Gastroenterology;  Laterality: N/A;  COLON POLYP    ENDOSCOPY N/A 2022    Procedure: ESOPHAGOGASTRODUODENOSCOPY WITH BX;  Surgeon: Artie Dickerson MD;  Location: [facility] ENDOSCOPY;  Service: Gastroenterology;  Laterality: N/A;  CAPPS'S ESOPHAGUS, HIATAL HERNIA    HERNIA REPAIR      KNEE SURGERY Bilateral     TRIGGER FINGER RELEASE Bilateral     x2 THUMBS      General Information   "     Row Name 02/07/24 1015          OT Time and Intention    Document Type evaluation  -AV     Mode of Treatment individual therapy;occupational therapy  -AV       Row Name 02/07/24 1015          General Information    Patient Profile Reviewed yes  -AV     Prior Level of Function independent:;ADL's;transfer;all household mobility  Independent ADL and \"very little\" home management tasks. stands to shower (walk-in). stands at sink to groom. comfort height commode. ambulates without assistive device. no home O2.  -AV     Existing Precautions/Restrictions fall  -AV     Barriers to Rehab none identified  -AV       Row Name 02/07/24 1015          Occupational Profile    Reason for Services/Referral (Occupational Profile) Patient is a 73 year old male admitted [facility] on [facility]/4/24 with nausea and vomiting. He is currently in PCU/ on room air. OT consulted to evaluate for ADL needs. No previous OT services for current condition.  -AV       Row Name 02/07/24 1015          Living Environment    People in Home spouse  -AV       Row Name 02/07/24 1015          Home Main Entrance    Number of Stairs, Main Entrance two  -AV       Row Name 02/07/24 1015          Cognition    Orientation Status (Cognition) --  alert, pleasant and cooperative. able to retain information and follow commands.  -AV               User Key  (r) = Recorded By, (t) = Taken By, (c) = Cosigned By      Initials Name Provider Type    Sami Cowan OT Occupational Therapist                     Mobility/ADL's       Row Name 02/07/24 1021          Transfers    Comment, (Transfers) independent  -AV       Row Name 02/07/24 1021          Activities of Daily Living    BADL Assessment/Intervention --  Independent all basic ADLs. primarily uses urinal-ambulates to bathroom for further toileting.  -AV               User Key  (r) = Recorded By, (t) = Taken By, (c) = Cosigned By      Initials Name Provider Type    Sami Cowan OT Occupational " Therapist                   Obj/Interventions       [facility] Name 02/07/24 1022          Sensory Assessment (Somatosensory)    Sensory Assessment (Somatosensory) UE sensation intact  -AV       [facility] Name 02/07/24 1022          Vision Assessment/Intervention    Visual Impairment/Limitations WFL;corrective lenses full-time  -AV       [facility] Name 02/07/24 1022          Range of Motion Comprehensive    General Range of Motion bilateral upper extremity ROM WFL  -AV     Comment, General Range of Motion AROM  -AV       Row Name 02/07/24 1022          Strength Comprehensive (MMT)    Comment, General Manual Muscle Testing (MMT) Assessment 5/5 bilateral upper extremities  -AV       [facility] Name 02/07/24 1022          Motor Skills    Motor Skills coordination;functional endurance  -AV     Coordination WFL  right dominant  -AV     Functional Endurance WFL basic ADLs  -AV       [facility] Name 02/07/24 1022          Balance    Comment, Balance independent  -AV               User Key  (r) = Recorded By, (t) = Taken By, (c) = Cosigned By      Initials Name Provider Type    Sami Cowan, RITO Occupational Therapist                   Goals/Plan    No documentation.                  Clinical Impression       [facility] Name 02/07/24 1023          Pain Assessment    Additional Documentation Pain Scale: FACES Pre/Post-Treatment (Group)  -AV       Row Name 02/07/24 1023          Pain Scale: FACES Pre/Post-Treatment    Pain: FACES Scale, Pretreatment 0-->no hurt  -AV     Posttreatment Pain Rating 0-->no hurt  -AV       Row Name 02/07/24 1023          Plan of Care Review    Plan of Care Reviewed With patient  -AV     Progress no change  -AV     Outcome Evaluation Skilled OT services are not indicated at this time as patient remains independent with basic ADL/ transfers. Will dc OT services with patient in agreement.  -AV       Row Name 02/07/24 1023          Therapy Assessment/Plan (OT)    Patient/Family Therapy Goal Statement (OT) NA  -AV     Rehab Potential  (OT) --  NA  -AV     Criteria for Skilled Therapeutic Interventions Met (OT) no problems identified which require skilled intervention  -AV     Therapy Frequency (OT) evaluation only  -AV       Row Name 02/07/24 1023          Therapy Plan Review/Discharge Plan (OT)    Equipment Needs Upon Discharge (OT) shower chair  -AV     Anticipated Discharge Disposition (OT) home  -AV       Row Name 02/07/24 1023          Vital Signs    O2 Delivery Pre Treatment room air  -AV     O2 Delivery Intra Treatment room air  -AV     O2 Delivery Post Treatment room air  -AV       Row Name 02/07/24 1023          Positioning and Restraints    Pre-Treatment Position in bed  -AV     Post Treatment Position bed  -AV     In Bed call light within reach;encouraged to call for assist  -AV               User Key  (r) = Recorded By, (t) = Taken By, (c) = Cosigned By      Initials Name Provider Type    Sami Cowan, OT Occupational Therapist                   Outcome Measures       Row Name 02/07/24 1024          How much help from another is currently needed...    Putting on and taking off regular lower body clothing? 4  -AV     Bathing (including washing, rinsing, and drying) 4  -AV     Toileting (which includes using toilet bed pan or urinal) 4  -AV     Putting on and taking off regular upper body clothing 4  -AV     Taking care of personal grooming (such as brushing teeth) 4  -AV     Eating meals 4  -AV     AM-PAC 6 Clicks Score (OT) 24  -AV       Row Name 02/07/24 0715          How much help from another person do you currently need...    Turning from your back to your side while in flat bed without using bedrails? 4  -GB     Moving from lying on back to sitting on the side of a flat bed without bedrails? 4  -GB     Moving to and from a bed to a chair (including a wheelchair)? 4  -GB     Standing up from a chair using your arms (e.g., wheelchair, bedside chair)? 4  -GB     Climbing 3-5 steps with a railing? 4  -GB     To walk in hospital  room? 4  -GB     AM-PAC 6 Clicks Score (PT) 24  -GB     Highest Level of Mobility Goal 8 --> Walked 250 feet or more  -GB       Row Name 02/07/24 1024          Functional Assessment    Outcome Measure Options AM-PAC 6 Clicks Daily Activity (OT);Optimal Instrument  -AV       Row Name 02/07/24 1024          Optimal Instrument    Optimal Instrument Optimal - 3  -AV     Bending/Stooping 1  -AV     Standing 1  -AV     Reaching 1  -AV     From the list, choose the 3 activities you would most like to be able to do without any difficulty Bending/stooping;Standing;Reaching  -AV     Total Score Optimal - 3 3  -AV               User Key  (r) = Recorded By, (t) = Taken By, (c) = Cosigned By      Initials Name Provider Type    GB Lilibeth Campos, RN Registered Nurse    Sami Cowan OT Occupational Therapist                    Occupational Therapy Education       Title: PT OT SLP Therapies (Done)       Topic: Occupational Therapy (Done)       Point: ADL training (Done)       Description:   Instruct learner(s) on proper safety adaptation and remediation techniques during self care or transfers.   Instruct in proper use of assistive devices.                  Learning Progress Summary             Patient Acceptance, E, VU by AV at 2/7/2024 1025    Comment: no OT needs at this time                                         User Key       Initials Effective Dates Name Provider Type Discipline     06/16/21 -  Sami Dumont OT Occupational Therapist OT                  OT Recommendation and Plan  Therapy Frequency (OT): evaluation only  Plan of Care Review  Plan of Care Reviewed With: patient  Progress: no change  Outcome Evaluation: Skilled OT services are not indicated at this time as patient remains independent with basic ADL/ transfers. Will dc OT services with patient in agreement.     Time Calculation:   Evaluation Complexity (OT)  Review Occupational Profile/Medical/Therapy History Complexity: expanded/moderate  complexity  Assessment, Occupational Performance/Identification of Deficit Complexity: 1-3 performance deficits  Clinical Decision Making Complexity (OT): problem focused assessment/low complexity  Overall Complexity of Evaluation (OT): low complexity     Time Calculation- OT       Row Name 02/07/24 1026             Time Calculation- OT    OT Received On 02/07/24  -AV         Untimed Charges    OT Eval/Re-eval Minutes 35  -AV         Total Minutes    Untimed Charges Total Minutes 35  -AV       Total Minutes 35  -AV                User Key  (r) = Recorded By, (t) = Taken By, (c) = Cosigned By      Initials Name Provider Type    AV Sami Dumont OT Occupational Therapist                  Therapy Charges for Today       Code Description Service Date Service Provider Modifiers Qty    [id number] HC OT EVAL LOW COMPLEXITY 3 2/7/2024 Sami Dumont OT GO 1                 Sami Dumont OT  2/7/2024

## 2024-02-07 NOTE — PLAN OF CARE
Goal Outcome Evaluation:  Plan of Care Reviewed With: patient        Progress: no change  Outcome Evaluation: Skilled OT services are not indicated at this time as patient remains independent with basic ADL/ transfers. Will dc OT services with patient in agreement.      Anticipated Discharge Disposition (OT): home

## 2024-02-07 NOTE — PLAN OF CARE
Goal Outcome Evaluation: Patient did not want to wear unit again tonight. Stated his nc is fine. Informed him he can have his home unit brought in. He said he is going home today. He is on 2L.

## 2024-02-08 ENCOUNTER — APPOINTMENT (OUTPATIENT)
Dept: NUCLEAR MEDICINE | Facility: HOSPITAL | Age: 74
End: 2024-02-08
Payer: MEDICARE

## 2024-02-08 ENCOUNTER — READMISSION MANAGEMENT (OUTPATIENT)
Dept: CALL CENTER | Facility: HOSPITAL | Age: 74
End: 2024-02-08
Payer: MEDICARE

## 2024-02-08 VITALS
RESPIRATION RATE: 20 BRPM | OXYGEN SATURATION: 96 % | HEIGHT: 72 IN | TEMPERATURE: 98.6 F | WEIGHT: 275.8 LBS | BODY MASS INDEX: 37.36 KG/M2 | DIASTOLIC BLOOD PRESSURE: 74 MMHG | SYSTOLIC BLOOD PRESSURE: 154 MMHG | HEART RATE: 104 BPM

## 2024-02-08 LAB
ANION GAP SERPL CALCULATED.3IONS-SCNC: 13.4 MMOL/L (ref 5–15)
BH CV IMMEDIATE POST TECH DATA BLOOD PRESSURE: NORMAL MMHG
BH CV IMMEDIATE POST TECH DATA HEART RATE: 133 BPM
BH CV IMMEDIATE POST TECH DATA OXYGEN SATS: 97 %
BH CV REST NUCLEAR ISOTOPE DOSE: 10.3 MCI
BH CV SIX MINUTE RECOVERY TECH DATA BLOOD PRESSURE: NORMAL
BH CV SIX MINUTE RECOVERY TECH DATA HEART RATE: 112 BPM
BH CV SIX MINUTE RECOVERY TECH DATA OXYGEN SATURATION: 94 %
BH CV STRESS BP STAGE 1: NORMAL
BH CV STRESS COMMENTS STAGE 1: NORMAL
BH CV STRESS DOSE REGADENOSON STAGE 1: 0.4
BH CV STRESS DURATION MIN STAGE 1: 0
BH CV STRESS DURATION SEC STAGE 1: 10
BH CV STRESS HR STAGE 1: 96
BH CV STRESS NUCLEAR ISOTOPE DOSE: 36 MCI
BH CV STRESS O2 STAGE 1: 96
BH CV STRESS PROTOCOL 1: NORMAL
BH CV STRESS RECOVERY BP: NORMAL MMHG
BH CV STRESS RECOVERY HR: 112 BPM
BH CV STRESS RECOVERY O2: 95 %
BH CV STRESS STAGE 1: 1
BH CV THREE MINUTE POST TECH DATA BLOOD PRESSURE: NORMAL MMHG
BH CV THREE MINUTE POST TECH DATA HEART RATE: 117 BPM
BH CV THREE MINUTE POST TECH DATA OXYGEN SATURATION: 97 %
BUN SERPL-MCNC: 13 MG/DL (ref 8–23)
BUN/CREAT SERPL: 16 (ref 7–25)
CALCIUM SPEC-SCNC: 8.8 MG/DL (ref 8.6–10.5)
CHLORIDE SERPL-SCNC: 104 MMOL/L (ref 98–107)
CO2 SERPL-SCNC: 22.6 MMOL/L (ref 22–29)
CREAT SERPL-MCNC: 0.81 MG/DL (ref 0.76–1.27)
DEPRECATED RDW RBC AUTO: 40.6 FL (ref 37–54)
EGFRCR SERPLBLD CKD-EPI 2021: 94.3 ML/MIN/1.73
ERYTHROCYTE [DISTWIDTH] IN BLOOD BY AUTOMATED COUNT: 13.2 % (ref 12.3–15.4)
GLUCOSE BLDC GLUCOMTR-MCNC: 343 MG/DL (ref 70–99)
GLUCOSE SERPL-MCNC: 184 MG/DL (ref 65–99)
HCT VFR BLD AUTO: 43 % (ref 37.5–51)
HGB BLD-MCNC: 15.1 G/DL (ref 13–17.7)
LV EF NUC BP: 40 %
MAGNESIUM SERPL-MCNC: 2.2 MG/DL (ref 1.6–2.4)
MAXIMAL PREDICTED HEART RATE: 149 BPM
MCH RBC QN AUTO: 29.6 PG (ref 26.6–33)
MCHC RBC AUTO-ENTMCNC: 35.1 G/DL (ref 31.5–35.7)
MCV RBC AUTO: 84.3 FL (ref 79–97)
PERCENT MAX PREDICTED HR: 89.26 %
PLATELET # BLD AUTO: 238 10*3/MM3 (ref 140–450)
PMV BLD AUTO: 11 FL (ref 6–12)
POTASSIUM SERPL-SCNC: 3.6 MMOL/L (ref 3.5–5.2)
QT INTERVAL: 394 MS
QT INTERVAL: 402 MS
QTC INTERVAL: 467 MS
QTC INTERVAL: 474 MS
RBC # BLD AUTO: 5.1 10*6/MM3 (ref 4.14–5.8)
SODIUM SERPL-SCNC: 140 MMOL/L (ref 136–145)
STRESS BASELINE BP: NORMAL MMHG
STRESS BASELINE HR: 88 BPM
STRESS O2 SAT REST: 94 %
STRESS PERCENT HR: 105 %
STRESS POST O2 SAT PEAK: 97 %
STRESS POST PEAK BP: NORMAL MMHG
STRESS POST PEAK HR: 133 BPM
STRESS TARGET HR: 127 BPM
WBC NRBC COR # BLD AUTO: 9.35 10*3/MM3 (ref 3.4–10.8)

## 2024-02-08 PROCEDURE — 25010000002 REGADENOSON 0.4 MG/5ML SOLUTION: Performed by: INTERNAL MEDICINE

## 2024-02-08 PROCEDURE — 93016 CV STRESS TEST SUPVJ ONLY: CPT | Performed by: NURSE PRACTITIONER

## 2024-02-08 PROCEDURE — A9502 TC99M TETROFOSMIN: HCPCS | Performed by: INTERNAL MEDICINE

## 2024-02-08 PROCEDURE — 0 TECHNETIUM TETROFOSMIN KIT: Performed by: INTERNAL MEDICINE

## 2024-02-08 PROCEDURE — 99239 HOSP IP/OBS DSCHRG MGMT >30: CPT | Performed by: INTERNAL MEDICINE

## 2024-02-08 PROCEDURE — 78452 HT MUSCLE IMAGE SPECT MULT: CPT | Performed by: INTERNAL MEDICINE

## 2024-02-08 PROCEDURE — 93018 CV STRESS TEST I&R ONLY: CPT | Performed by: INTERNAL MEDICINE

## 2024-02-08 PROCEDURE — 25010000002 DIGOXIN PER 500 MCG: Performed by: INTERNAL MEDICINE

## 2024-02-08 PROCEDURE — 80048 BASIC METABOLIC PNL TOTAL CA: CPT | Performed by: INTERNAL MEDICINE

## 2024-02-08 PROCEDURE — 94799 UNLISTED PULMONARY SVC/PX: CPT

## 2024-02-08 PROCEDURE — 63710000001 INSULIN LISPRO (HUMAN) PER 5 UNITS: Performed by: INTERNAL MEDICINE

## 2024-02-08 PROCEDURE — 85027 COMPLETE CBC AUTOMATED: CPT | Performed by: INTERNAL MEDICINE

## 2024-02-08 PROCEDURE — 93017 CV STRESS TEST TRACING ONLY: CPT

## 2024-02-08 PROCEDURE — 25810000003 SODIUM CHLORIDE 0.9 % SOLUTION

## 2024-02-08 PROCEDURE — 82948 REAGENT STRIP/BLOOD GLUCOSE: CPT

## 2024-02-08 PROCEDURE — 25010000002 HYDRALAZINE PER 20 MG

## 2024-02-08 PROCEDURE — 83735 ASSAY OF MAGNESIUM: CPT | Performed by: INTERNAL MEDICINE

## 2024-02-08 PROCEDURE — 99232 SBSQ HOSP IP/OBS MODERATE 35: CPT | Performed by: INTERNAL MEDICINE

## 2024-02-08 PROCEDURE — 63710000001 INSULIN DETEMIR PER 5 UNITS: Performed by: INTERNAL MEDICINE

## 2024-02-08 PROCEDURE — 78452 HT MUSCLE IMAGE SPECT MULT: CPT

## 2024-02-08 RX ORDER — HYDROCHLOROTHIAZIDE 25 MG/1
25 TABLET ORAL DAILY
Status: DISCONTINUED | OUTPATIENT
Start: 2024-02-09 | End: 2024-02-08 | Stop reason: HOSPADM

## 2024-02-08 RX ORDER — HYDRALAZINE HYDROCHLORIDE 20 MG/ML
10 INJECTION INTRAMUSCULAR; INTRAVENOUS ONCE
Status: COMPLETED | OUTPATIENT
Start: 2024-02-08 | End: 2024-02-08

## 2024-02-08 RX ORDER — DIGOXIN 0.25 MG/ML
500 INJECTION INTRAMUSCULAR; INTRAVENOUS ONCE
Status: COMPLETED | OUTPATIENT
Start: 2024-02-08 | End: 2024-02-08

## 2024-02-08 RX ORDER — REGADENOSON 0.08 MG/ML
0.4 INJECTION, SOLUTION INTRAVENOUS
Status: COMPLETED | OUTPATIENT
Start: 2024-02-08 | End: 2024-02-08

## 2024-02-08 RX ORDER — DIGOXIN 125 MCG
125 TABLET ORAL
Qty: 30 TABLET | Refills: 0 | Status: SHIPPED | OUTPATIENT
Start: 2024-02-09 | End: 2024-02-13 | Stop reason: SDUPTHER

## 2024-02-08 RX ORDER — HYDROCHLOROTHIAZIDE 25 MG/1
25 TABLET ORAL DAILY
Qty: 30 TABLET | Refills: 0 | Status: SHIPPED | OUTPATIENT
Start: 2024-02-09 | End: 2024-02-13 | Stop reason: SDUPTHER

## 2024-02-08 RX ORDER — DIGOXIN 125 MCG
125 TABLET ORAL
Status: DISCONTINUED | OUTPATIENT
Start: 2024-02-09 | End: 2024-02-08 | Stop reason: HOSPADM

## 2024-02-08 RX ADMIN — TETROFOSMIN 1 DOSE: 1.38 INJECTION, POWDER, LYOPHILIZED, FOR SOLUTION INTRAVENOUS at 08:20

## 2024-02-08 RX ADMIN — Medication 10 ML: at 09:10

## 2024-02-08 RX ADMIN — ASPIRIN 81 MG CHEWABLE TABLET 81 MG: 81 TABLET CHEWABLE at 09:09

## 2024-02-08 RX ADMIN — AMLODIPINE BESYLATE 10 MG: 5 TABLET ORAL at 09:10

## 2024-02-08 RX ADMIN — HYDRALAZINE HYDROCHLORIDE 10 MG: 20 INJECTION, SOLUTION INTRAMUSCULAR; INTRAVENOUS at 03:57

## 2024-02-08 RX ADMIN — PANTOPRAZOLE SODIUM 40 MG: 40 TABLET, DELAYED RELEASE ORAL at 09:09

## 2024-02-08 RX ADMIN — DIGOXIN 500 MCG: 250 INJECTION, SOLUTION INTRAMUSCULAR; INTRAVENOUS; PARENTERAL at 11:16

## 2024-02-08 RX ADMIN — APIXABAN 5 MG: 5 TABLET, FILM COATED ORAL at 09:10

## 2024-02-08 RX ADMIN — INSULIN LISPRO 10 UNITS: 100 INJECTION, SOLUTION INTRAVENOUS; SUBCUTANEOUS at 13:00

## 2024-02-08 RX ADMIN — INSULIN DETEMIR 30 UNITS: 100 INJECTION, SOLUTION SUBCUTANEOUS at 11:16

## 2024-02-08 RX ADMIN — VENLAFAXINE HYDROCHLORIDE 150 MG: 150 CAPSULE, EXTENDED RELEASE ORAL at 10:43

## 2024-02-08 RX ADMIN — Medication 100 MCG: at 10:44

## 2024-02-08 RX ADMIN — LISINOPRIL 40 MG: 20 TABLET ORAL at 09:09

## 2024-02-08 RX ADMIN — DOCUSATE SODIUM 50MG AND SENNOSIDES 8.6MG 2 TABLET: 8.6; 5 TABLET, FILM COATED ORAL at 11:15

## 2024-02-08 RX ADMIN — TETROFOSMIN 1 DOSE: 1.38 INJECTION, POWDER, LYOPHILIZED, FOR SOLUTION INTRAVENOUS at 07:35

## 2024-02-08 RX ADMIN — INSULIN DETEMIR 10 UNITS: 100 INJECTION, SOLUTION SUBCUTANEOUS at 13:00

## 2024-02-08 RX ADMIN — PYRIDOXINE HCL TAB 50 MG 100 MG: 50 TAB at 10:44

## 2024-02-08 RX ADMIN — REGADENOSON 0.4 MG: 0.08 INJECTION, SOLUTION INTRAVENOUS at 08:20

## 2024-02-08 RX ADMIN — SODIUM CHLORIDE 50 ML/HR: 9 INJECTION, SOLUTION INTRAVENOUS at 05:53

## 2024-02-08 NOTE — CONSULTS
Patient states he will be returning home today. Patient states he will restart his insulin pump as soon as he arrives home as he does not have the needed supplies at the hospital.

## 2024-02-08 NOTE — DISCHARGE SUMMARY
[facility]         HOSPITALIST  DISCHARGE SUMMARY    Patient Name: Pastor Bartholomew  : 1950  MRN: [id number]    Date of Admission: 2024  Date of Discharge:  24  Primary Care Physician: Shahzad Rashid PA    Consultants:  -Teleneurology  -Cardiology: Dr. Aaron Manuel    [facility] Problems:  Paroxysmal atrial fibrillation with RVR, new diagnosis  Uncontrolled hypertension  Nausea and vomiting  Orthostatic  Hyperlipidemia  Type 2 diabetes mellitus on insulin pump with hyperglycemia  Obesity (BMI: 37.40)  Possible vertigo  Nodule left lung base 4 mm    Hospital Course     Hospital Course:  Pastor Bartholomew is a 73 y.o. male  with type 2 diabetes mellitus on insulin pump, essential hypertension and hyperlipidemia that presented to the ED with intractable nausea and vomiting associated with dizziness.  Patient was unable to take home antihypertensive medications due to vomiting, therefore, upon initial evaluation patient was noted to be tachycardic and significantly hypertensive.  Patient went into A-fib with RVR per EKG while in the ED.  Cardizem drip was started.  Hospitalist service contacted for further evaluation and management.  Teleneurology consulted.  CT head without any acute intracranial abnormalities.  MRI brain was obtained and no evidence of acute stroke identified.  Patient with paroxysmal atrial fibrillation during hospitalization.  Given new diagnosis of A-fib cardiology was consulted.  Patient started on Eliquis.  Patient was noted to have positive orthostatics during hospitalization, IV fluids given and orthostatic vital signs been improved.  Ambulatory referral to ENT placed for further evaluation of vertigo after discharge.  Of note, CT chest imaging obtained during hospitalization did show a 4 mm noncalcified nodule in the left lung base, recommended that repeat CT scan to be completed within 12 months to monitor for progression/resolution.  Patient did have  a stress test done on 02/08/2024 for those noted to be normal per cardiology.  Cardiology recommended that patient's HCTZ dose be increased to 25 mg daily and patient also started on digoxin at time of discharge.  Patient is to follow-up with cardiology in 2 weeks after discharge.  On day of discharge patient hemodynamically stable and no additional inpatient evaluation or workup necessary this time, patient will discharge home with outpatient follow-up.    DISCHARGE Follow Up Recommendations for labs and diagnostics:   -Follow-up with PCP in 3 to 5 days  -Follow-up with cardiology in 2 weeks  -Ambulatory referral to ENT made at time of discharge    Day of Discharge     Vital Signs:  Temp:  [97.9 °F (36.6 °C)-99.5 °F (37.5 °C)] 99.5 °F (37.5 °C)  Heart Rate:  [] 97  Resp:  [16-22] 22  BP: (143-185)/(72-98) 167/86  Physical Exam:   Gen: No acute distress, sitting up in bed, pleasant, conversant  Resp: CTAB, No w/r/r, normal respiratory effort  Card: RRR, No m/r/g  Abd: Soft, Nontender, Nondistended, + bowel sounds     Discharge Details        Discharge Medications        New Medications        Instructions Start Date   apixaban 5 MG tablet tablet  Commonly known as: ELIQUIS   5 mg, Oral, 2 Times Daily      digoxin 125 MCG tablet  Commonly known as: LANOXIN   125 mcg, Oral, Daily Digoxin   Start Date: February 9, 2024            Changes to Medications        Instructions Start Date   hydroCHLOROthiazide 25 MG tablet  Commonly known as: HYDRODIURIL  What changed:   medication strength  how much to take   25 mg, Oral, Daily   Start Date: February 9, 2024     insulin regular 500 UNIT/ML CONCENTRATED injection  Commonly known as: HUMULIN R  What changed:   how much to take  how to take this  when to take this  additional instructions   50u daily with insulin pump             Continue These Medications        Instructions Start Date   amLODIPine-benazepril 10-40 MG per capsule  Commonly known as: LOTREL   1 capsule,  Oral, Daily      aspirin 81 MG EC tablet   1 tablet Daily.      carvedilol 25 MG tablet  Commonly known as: COREG   25 mg, Oral, 2 Times Daily With Meals      cetirizine 10 MG tablet  Commonly known as: zyrTEC   10 mg, Oral, Daily PRN      cholecalciferol 10 MCG (400 UNIT) tablet  Commonly known as: VITAMIN D3   400 Units, Oral, Daily      co-enzyme Q-10 30 MG capsule   30 mg, Oral, Daily      Contour Next Test test strip  Generic drug: glucose blood   Test 6 times daily DX: e11.65      Dexcom G6 Sensor   Does not apply, Every 10 Days      donepezil 10 MG tablet  Commonly known as: ARICEPT   10 mg, Oral, Nightly      fish oil 1000 MG capsule capsule   2 g, Oral, Daily With Breakfast      fluticasone 50 MCG/ACT nasal spray  Commonly known as: FLONASE   1 spray into the nostril(s) as directed by provider Daily As Needed.      hydrALAZINE 10 MG tablet  Commonly known as: APRESOLINE   10 mg, Oral, 3 Times Daily PRN      insulin patient supplied pump   Subcutaneous, Continuous, Type of Insulin: Humulin R 500 Type of Pump:ParentPlus/Diaspora [phone] Bolus amount: 50 units daily.  Basal amount : Max basal 5.50u/hr Correction factor:  Insulin to carbohydrate ratio: 12a-12p 10                                                12p-2p 10                                                 2p -12 a 7  Date of last site change: 2/3/24 Location of catheter: right side on stomach  Frequency of refill: approximately 3 days Last refill date: per pt 2/3/24  Target 12a-3a 130-150              3a-6a -100-120              6a-9p 100-120              9p-12a 130-150  Active Ins time : 5 hours  Basal Pattern Setup 27.3  Prescriber Shara Campbell Phone number [phone]                  L-Lysine 500 MG tablet tablet   1 tablet, Oral, Daily      Lancets misc   Check 5 times daily DX: e11.65      metFORMIN 500 MG tablet  Commonly known as: GLUCOPHAGE   Take 1 tablet by mouth 2 (Two) Times a Day With Meals.      multivitamin with minerals tablet  tablet   Take 1 tablet by mouth Daily.      ondansetron ODT 4 MG disintegrating tablet  Commonly known as: Zofran ODT   4 mg, Translingual, Every 8 Hours PRN      pantoprazole 40 MG EC tablet  Commonly known as: PROTONIX   40 mg, Oral, Daily      pyridoxine 100 MG tablet  Commonly known as: VITAMIN B-6   100 mg, Oral, Daily      rosuvastatin 40 MG tablet  Commonly known as: CRESTOR   40 mg, Oral, Nightly      venlafaxine  MG 24 hr capsule  Commonly known as: EFFEXOR-XR   Take 1 capsule by mouth Daily.      vitamin B-12 100 MCG tablet  Commonly known as: CYANOCOBALAMIN   100 mcg, Oral, Daily               Allergies   Allergen Reactions    Codeine Nausea Only    Codeine Unknown - Low Severity    Semaglutide(0.25 Or 0.5mg-Dos) GI Intolerance       Discharge Disposition:  Home or Self Care    Diet:  Hospital:  Diet Order   Procedures    Diet: Diabetic Diets, Cardiac Diets, Gastrointestinal Diets; Healthy Heart (2-3 Na+); Consistent Carbohydrate; Low Irritant; Texture: Regular Texture (IDDSI 7); Fluid Consistency: Thin (IDDSI 0)       Discharge Activity:   Activity Instructions       Activity as Tolerated              CODE STATUS:  Code Status and Medical Interventions:   Ordered at: 02/04/24 1835     Code Status (Patient has no pulse and is not breathing):    CPR (Attempt to Resuscitate)     Medical Interventions (Patient has pulse or is breathing):    Full Support       Future Appointments   Date Time Provider Department Center   2/26/2024  9:30 AM Phuc Mena DPM [facility] POD ETWN [facility]   3/11/2024 10:30 AM Bebeto Vazquez MD [facility] SLEEP CT [facility]   6/5/2024  9:45 AM Marilee Blunt APRN [facility] MOO ETWN [facility]       Additional Instructions for the Follow-ups that You Need to Schedule       Discharge Follow-up with PCP   As directed       Currently Documented PCP:    Shahzad Rashid PA    PCP Phone Number:    [phone]     Follow Up Details: Follow-up in 3-5 days        Discharge Follow-up with  Specified Provider: Dr. Aaron Manuel; 2 Weeks   As directed      To: Dr. Aaron Manuel   Follow Up: 2 Weeks                Pertinent  and/or Most Recent Results     PROCEDURES:   -Cardiac stress test (02/08/2024)    RADIOLOGY:  MRI Angiogram Head Without Contrast [[id number]] Anjel as Reviewed   Order Status: Completed Collected: 02/05/24 1235    Updated: 02/05/24 1239   Narrative:     PROCEDURE: MRI ANGIOGRAM HEAD WO CONTRAST     COMPARISON: CTs and MRIs same day and prior including prior CT angiograms of the head neck from  04/10/2023     INDICATIONS: NAUSEA X COUPLE DAYS.           TECHNIQUE: MR angiography was performed in the usual manner.  Multiplanar reconstructed 2D and 3D  images of the cerebral arteries were created and interpreted.       FINDINGS:  There is mild motion limitation.  Given limitations of the exam the anterior and posterior  circulation appear patent without evidence large vessel occlusion, significant stenosis or  formation.  More peripheral vessels of the middle cerebral arteries, anterior cerebral arteries and  posterior cerebral arteries are somewhat limited by motion      Impression:       1. Given limitations of the study no evidence of large vessel occlusion, significant central  stenosis or aneurysm formation.  2. Areas of stenosis within the intracranial vessels more peripherally seen on prior CT angiograms  are limited in their evaluation due to motion artifact            SARA RANDOLPH MD        Electronically Signed and Approved By: SARA RANDOLPH MD on 2/05/2024 at 12:35                   MRI Brain Without Contrast [[id number]] Anjel as Reviewed   Order Status: Completed Collected: 02/05/24 1231    Updated: 02/05/24 1234   Narrative:     PROCEDURE: MRI BRAIN WO CONTRAST     COMPARISON: [facility], , MRI BRAIN WO CONTRAST, 2/01/2023, 8:47.     INDICATIONS: NAUSEA X COUPLE DAYS.           TECHNIQUE: A variety of imaging planes and parameters were utilized for  visualization of suspected  pathology.  Images were performed without contrast.     FINDINGS:  There is motion limitation.     Diffusion-weighted imaging demonstrates no acute restriction abnormality.     Midline structures of the brain appear grossly unremarkable in appearance.  Pituitary and sellar  structures and craniocervical junction appear grossly unremarkable appearance.  The ventricles,  cisterns and sulci appear grossly stable from the comparison.  New small infarct with associated  encephalomalacia and surrounding gliosis in the cortical, subcortical region right frontal lobe  near the vertex noted.  Advanced patchy confluent FLAIR and T2 signal hyperintensity within the  supratentorial white matter demonstrates slight progression from the comparison.  Remote changes  right occipital infarct noted.  New, remote small lacunar infarct noted left side of the zachariah.    Minimal white matter changes compatible small-vessel ischemic disease in this age group noted.  The  there is a susceptibility artifact associated with the infarct in the right frontal lobe is well as  a few scattered areas are compatible with sequela of prior hemosiderin staining.  No acute  hemorrhage noted.  Major intracranial flow voids are grossly unremarkable.  Please see dedicated  imaging of MRA same day.  Cerebral pontine angle structures and inner ear structures are grossly  unremarkable in appearance.  Mastoid air cells are grossly clear.  Paranasal sinuses are grossly  clear.  Limited imaging of the orbits demonstrate no acute abnormality      Impression:       1. No acute ischemic change  2. Progression of chronic white matter changes compatible small-vessel ischemic disease in this age  group including interval areas of encephalomalacia associated with ischemic change in the right  frontal lobe.  3. No definite acute abnormality appreciated on mildly motion limited imaging               SARA RANDOLPH MD        Electronically  Signed and Approved By: SARA RANDOLPH MD on 2/05/2024 at 12:30                   MRI Angiogram Neck Without Contrast [[id number]] Anjel as Reviewed   Order Status: Completed Collected: 02/05/24 1239    Updated: 02/05/24 1243   Narrative:     PROCEDURE: MRI ANGIOGRAM NECK WO CONTRAST     COMPARISON: CT angiogram 04/10/2023     INDICATIONS: NAUSEA X COUPLE DAYS.     TECHNIQUE: MR angiography was performed without intravenous gadolinium, with creation and  interpretation of 2D and 3D/multi-planar images of the carotid and vertebral arteries. Unless  otherwise stated in this report, all vascular stenoses involving the internal carotid arteries  reported for this examination are derived by dividing the lesion diameter by the diameter of the  normal internal carotid artery more distally.     FINDINGS:  Study somewhat limited by body habitus and mild degree of motion evaluation of the vessels in the  inferior aspect of the neck are somewhat limited by artifact.  Vertebral arteries appear to be  codominant and relatively unremarkable with limited evaluation noted inferiorly on the left favored  to be artifact.  Left common carotid artery, bifurcation and visualized internal carotid artery are  unremarkable.  Narrowing of the proximal right internal carotid artery and bifurcation of  approximately 50% was better seen on prior CT angiogram comparisons      Impression:       1. Narrowing at the proximal right internal carotid artery and bifurcation of approximately 50ø.  2. No acute abnormality on limited imaging            SARA RANDOLPH MD        Electronically Signed and Approved By: SARA RANDOLPH MD on 2/05/2024 at 12:39                   CT Head Without Contrast [[id number]] Anjel as Reviewed   Order Status: Completed Collected: 02/04/24 1931    Updated: 02/04/24 1934   Narrative:     PROCEDURE: CT HEAD WO CONTRAST     COMPARISONS: 4/10/2023.     INDICATIONS: Dizziness; unspecified abdominal pain; vomiting.      PROTOCOL:   Standard CT imaging protocol performed.     RADIATION:   Total DLP: 1,018.2 mGy*cm.    MA and/or KV were/was adjusted to minimize radiation dose.     TECHNIQUE:  After obtaining the patient's consent, 494 CT images were obtained without non-ionic intravenous  contrast material.  Sagittal and coronal two-dimensional reformations are provided for review.     DISCUSSION:  A routine nonenhanced head CT was performed. No acute brain abnormality is identified. No acute  intracranial hemorrhage. No acute infarction. No acute skull fracture. No midline shift or acute  intracranial mass effect is seen.  Severe chronic small vessel ischemia/infarction is suspected.    Encephalomalacia involves the right frontal lobe and measures about 4.7 x 3.7 x 4.2 cm in  anteroposterior (AP), transverse, and craniocaudal extent, respectively, as seen on image 37 of  series 201, image 42 of series 202, image 30 of series 203, and adjacent images.  It includes  involvement of the right middle frontal gyrus.  It is probably within the distribution the right  middle cerebral artery (MCA) territory.  A chronic border-zone infarct is also possible but thought  to be less likely.  A similar finding was seen on prior 4/10/2023 studies.  Similar findings, such  as with encephalomalacia and possible chronic infarcts, may involve the bilateral posterior  cerebral hemispheres, particularly the occipital lobes, greater on the right than the left, also  probably present previously.  There are arterial calcifications. The extra-axial spaces and the  ventricular system are prominent, suggesting central atrophy.  No significant acute findings are  seen regarding the imaged orbits, paranasal sinuses, or middle ear clefts.  Chronic leftward nasal  septal deviation is seen.      Impression:       No acute brain abnormality is seen.  No acute intracranial hemorrhage.  No definite acute infarct.    No midline shift or acute intracranial herniation  syndrome.  Overall, probably little interval  change is seen since the prior 4/10/2023 studies.        Please note that portions of this note were completed with a voice recognition program.     GEOVANNA DAO JR, MD        Electronically Signed and Approved By: GEOVANNA DAO JR, MD on 2/04/2024 at 19:31                      CT Abdomen Pelvis With Contrast [[id number]] Anjel as Reviewed   Order Status: Completed Collected: 02/04/24 1559    Updated: 02/04/24 1602   Narrative:     PROCEDURE: CT ABDOMEN PELVIS W CONTRAST     COMPARISON: [facility], CT, ABDOMEN/PELVIS WITH CONTRAST, 4/19/2016, 5:02.     INDICATIONS: GENERALIZED Abdominal pain and vomiting     TECHNIQUE: After obtaining the patient's consent, CT images were created with non-ionic intravenous  contrast material.       PROTOCOL:   Standard imaging protocol performed     RADIATION:   DLP: 1778 mGy*cm    Automated exposure control was utilized to minimize radiation dose.  CONTRAST: 100 cc Isovue 370 I.V.  LABS:   eGFR: >60 ml/min/1.73m2     FINDINGS:        Lung bases:  4 mm noncalcified nodule at the left costophrenic angle image 1. Mild scarring noted  at the lung bases.  No free air is noted below the diaphragm.       Organs:  The liver, gallbladder, pancreas and adrenal glands are grossly unremarkable in  appearance.  The superior portion of the spleen is excluded from field of view of the study and is  otherwise unremarkable.  Mild perinephric stranding of the kidneys again noted kidneys are  otherwise unremarkable in appearance     GI tract:  There is diverticulosis without evidence of acute diverticulitis.  The appendix is  visualized and appears within normal limits.  Ileocecal valve is grossly unremarkable in  appearance.  Small area of possible epiploic appendagitis noted to a very mild degree in the left  lower quadrant.  The stomach and small bowel demonstrate no acute abnormality.  No suspicious  mesenteric adenopathy or fluid  collections are noted.  Small fat containing periumbilical hernia  and small ventral hernias noted.     Pelvis:  Streak artifact from right hip prosthesis causes some mild limitation of the pelvis.    Calcification is noted within the prostate.  The urinary bladder appears grossly unremarkable in  appearance.  No suspicious pelvic adenopathy or fluid collections are noted     Retroperitoneum:  Mild atherosclerotic changes are noted of the aorta which is normal in caliber.    There is no suspicious retroperitoneal adenopathy     Bones and soft tissues:  Multilevel degenerative changes noted of the spine.  Vertebral body  hemangioma noted at L3.  Right hip prosthesis noted.      Impression:       1. No acute intra-abdominal or intrapelvic abnormality noted.  Specifically no evidence of  obstruction or significant inflammatory change of the GI tract.  2. 4 mm noncalcified nodule left lung base likely post inflammatory or infectious in nature.    Optional follow-up at 12 months could be considered with a CT chest in a high risk patient  3. Diverticulosis without evidence of acute diverticulitis.  4. Other findings as above            SARA RANDOLPH MD        Electronically Signed and Approved By: SARA RANDOLPH MD on 2/04/2024 at 15:58         LAB RESULTS:      Lab 02/08/24  0416 02/07/24  0358 02/06/24  0415 02/05/24  0428 02/04/24  1027   WBC 9.35 8.76 10.17 12.46* 8.74   HEMOGLOBIN 15.1 14.0 14.6 15.6 16.3   HEMATOCRIT 43.0 40.3 42.8 44.3 47.3   PLATELETS 238 210 230 256 239   NEUTROS ABS  --   --   --  10.02* 6.73   IMMATURE GRANS (ABS)  --   --   --  0.05 0.02   LYMPHS ABS  --   --   --  1.66 1.50   MONOS ABS  --   --   --  0.65 0.39   EOS ABS  --   --   --  0.02 0.05   MCV 84.3 84.7 85.6 84.5 86.3   LACTATE  --   --   --   --  1.6         Lab 02/08/24  0416 02/07/24  0358 02/06/24  0415 02/05/24  0428 02/04/24  1630 02/04/24  1027   SODIUM 140 137 139 143  --  139   POTASSIUM 3.6 3.6 3.6 3.2*  --  3.7    CHLORIDE 104 102 102 104  --  99   CO2 22.6 24.1 23.5 25.5  --  25.7   ANION GAP 13.4 10.9 13.5 13.5  --  14.3   BUN 13 16 15 12  --  10   CREATININE 0.81 0.82 0.84 0.84  --  0.86   EGFR 94.3 93.9 93.2 93.2  --  92.6   GLUCOSE 184* 286* 229* 128*  --  249*   CALCIUM 8.8 8.4* 8.8 9.4  --  9.9   MAGNESIUM 2.2 1.9 2.0 2.0  --   --    HEMOGLOBIN A1C  --   --   --  7.40*  --   --    TSH  --   --   --   --  0.553  --          Lab 02/04/24  1027   TOTAL PROTEIN 7.6   ALBUMIN 4.7   GLOBULIN 2.9   ALT (SGPT) 25   AST (SGOT) 21   BILIRUBIN 0.8   ALK PHOS 98   LIPASE 33         Lab 02/06/24  0827 02/06/24  0415 02/05/24  0555 02/05/24  0428 02/04/24  1630   PROBNP  --   --   --   --  894.2   HSTROP T 33* 37* 36* 37* 27*         Lab 02/05/24  0428   CHOLESTEROL 189   LDL CHOL 125*   HDL CHOL 38*   TRIGLYCERIDES 147         Lab 02/05/24  0428   VITAMIN B 12 424         Brief Urine Lab Results  (Last result in the past 365 days)        Color   Clarity   Blood   Leuk Est   Nitrite   Protein   CREAT   Urine HCG        02/04/24 1128 Yellow   Clear   Trace   Negative   Negative   100 mg/dL (2+)                 Microbiology Results (last 10 days)       ** No results found for the last 240 hours. **                      Results for orders placed during the hospital encounter of 02/04/24    Adult Transthoracic Echo Complete W/ Cont if Necessary Per Protocol (With Agitated Saline)    Interpretation Summary    Technically difficult study due to poor acoustic windows.    Left ventricular systolic function is normal. Calculated left ventricular EF = 62%    Left ventricular diastolic function was indeterminate.    Cardiac valves morphology was not well-visualized on the study.      Time spent on Discharge including face to face service:  35 minutes    Electronically signed by Shahzad Otero MD, 02/08/24, 3:24 PM EST.

## 2024-02-08 NOTE — PLAN OF CARE
Goal Outcome Evaluation:  Plan of Care Reviewed With: patient        Progress: no change  Outcome Evaluation: Patient did not wear cpap last night. Unit is on standby in the room. Patient is currently on room air.

## 2024-02-08 NOTE — PROGRESS NOTES
CARDIOLOGY  INPATIENT PROGRESS NOTE                [facility] UNIT    2/8/2024      PATIENT IDENTIFICATION:   Name:  Pastor Bartholomew      MRN:  [id number]     73 y.o.  male                 SUBJECTIVE:   Patient with no complaints this morning denies any nuclear stress test this a.m.  OBJECTIVE:  Vitals:    02/07/24 2245 02/08/24 0249 02/08/24 0500 02/08/24 0707   BP: 156/91 (!) 185/98 163/92 171/91   BP Location: Right arm Left arm Right arm Right arm   Patient Position: Lying Lying Lying Sitting   Pulse: 75 74 88 117   Resp: 18 18  20   Temp: 99 °F (37.2 °C) 98 °F (36.7 °C)  98.4 °F (36.9 °C)   TempSrc: Oral Oral  Oral   SpO2: 97% 97%  98%   Weight:       Height:               Body mass index is 37.4 kg/m².    Intake/Output Summary (Last 24 hours) at 2/8/2024 0916  Last data filed at 2/8/2024 0600  Gross per 24 hour   Intake 2306.25 ml   Output 2650 ml   Net -343.75 ml       Telemetry: Atrial fibrillation reasonly controlled rate about 100s some mild RVR    Physical Exam  General Appearance:   no acute distress  Alert and oriented x3  HENT:   lips not cyanotic  Atraumatic  Neck:  No jvd   supple  Respiratory:  no respiratory distress  normal breath sounds  no rales  Cardiovascular:    Irregularly irregular  no S3, no S4   no murmur  no rub  lower extremity edema: none    Skin:   warm, dry  No rashes      Allergies   Allergen Reactions    Codeine Nausea Only    Codeine Unknown - Low Severity    Semaglutide(0.25 Or 0.5mg-Dos) GI Intolerance     Scheduled meds:  amLODIPine, 10 mg, Oral, Q24H   And  lisinopril, 40 mg, Oral, Q24H  apixaban, 5 mg, Oral, BID  aspirin, 81 mg, Oral, Daily   Or  aspirin, 300 mg, Rectal, Daily  carvedilol, 25 mg, Oral, BID With Meals  digoxin, 500 mcg, Intravenous, Once  [START ON 2/9/2024] digoxin, 125 mcg, Oral, Daily  donepezil, 10 mg, Oral, Nightly  hydroCHLOROthiazide, 12.5 mg, Oral, Daily  insulin detemir, 30 Units, Subcutaneous, Daily  insulin lispro, 3-14  "Units, Subcutaneous, 4x Daily AC & at Bedtime  pantoprazole, 40 mg, Oral, Daily  rosuvastatin, 40 mg, Oral, Nightly  senna-docusate sodium, 2 tablet, Oral, BID  sodium chloride, 10 mL, Intravenous, Q12H  sodium chloride, 10 mL, Intravenous, Q12H  venlafaxine XR, 150 mg, Oral, Daily  vitamin B-12, 100 mcg, Oral, Daily  pyridoxine, 100 mg, Oral, Daily      IV meds:                      dilTIAZem, 5-15 mg/hr, Last Rate: Stopped (02/05/24 0927)  sodium chloride, 50 mL/hr, Last Rate: 50 mL/hr (02/08/24 0553)      Data Review:  CBC          2/6/2024    04:15 2/7/2024    03:58 2/8/2024    04:16   CBC   WBC 10.17  8.76  9.35    RBC 5.00  4.76  5.10    Hemoglobin 14.6  14.0  15.1    Hematocrit 42.8  40.3  43.0    MCV 85.6  84.7  84.3    MCH 29.2  29.4  29.6    MCHC 34.1  34.7  35.1    RDW 13.7  13.2  13.2    Platelets 230  210  238      CMP          2/6/2024    04:15 2/7/2024    03:58 2/8/2024    04:16   CMP   Glucose 229  286  184    BUN 15  16  13    Creatinine 0.84  0.82  0.81    EGFR 93.2  93.9  94.3    Sodium 139  137  140    Potassium 3.6  3.6  3.6    Chloride 102  102  104    Calcium 8.8  8.4  8.8    BUN/Creatinine Ratio 17.9  19.5  16.0    Anion Gap 13.5  10.9  13.4       CARDIAC LABS:      Lab 02/06/24  0827 02/06/24  0415 02/05/24  0555 02/05/24  0428 02/04/24  1630   PROBNP  --   --   --   --  894.2   HSTROP T 33* 37* 36* 37* 27*        No results found for: \"DIGOXIN\"   Lab Results   Component Value Date    TSH 0.553 02/04/2024     Results from last 7 days   Lab Units 02/05/24  0428   CHOLESTEROL mg/dL 189   HDL CHOL mg/dL 38*     No results found for: \"POCTROP\"  Lab Results   Component Value Date    TROPONINT 33 (H) 02/06/2024   (  Lab Results   Component Value Date    MG 2.2 02/08/2024     Results for orders placed during the hospital encounter of 02/04/24    Adult Transthoracic Echo Complete W/ Cont if Necessary Per Protocol (With Agitated Saline)    Interpretation Summary    Technically difficult study due to " poor acoustic windows.    Left ventricular systolic function is normal. Calculated left ventricular EF = 62%    Left ventricular diastolic function was indeterminate.    Cardiac valves morphology was not well-visualized on the study.           ASSESSMENT:    Rapid atrial fibrillation    Essential hypertension        PLAN:  1.  Digoxin 0.5 x 1 and then start on oral 0.125 daily  2.  Increase hydrochlorothiazide to 25 daily for blood pressure control  3.  If stress test does not show any significant ischemia feel the patient could be discharged today from a cardiac standpoint follow-up in the next 1 to 2 weeks          Aaron Manuel MD  2/8/2024    09:16 EST

## 2024-02-08 NOTE — DISCHARGE INSTR - APPOINTMENTS
Patient needs to make a follow up with (PCP) in 1-2 weeks. [phone]  Patient needs to follow up with (Cardiology) on 2/13/24 @11:00am [phone]  Patient needs to follow up with (Ears,Nose and Throat) office will call with appointment. [phone]

## 2024-02-08 NOTE — CONSULTS
"Nutrition Services    Patient Name: Pastor Bartholomew  YOB: 1950  MRN: [id number]  Admission date: 2/4/2024      CLINICAL NUTRITION ASSESSMENT      Reason for Assessment  MST Score 2+     H&P:  Past Medical History:   Diagnosis Date    Paz esophagus     Diabetes     Hernia, inguinal     Hyperlipidemia     Hypertension     PONV (postoperative nausea and vomiting)     Sleep apnea     Type 2 diabetes mellitus         Current Problems:   Active Hospital Problems    Diagnosis     **Rapid atrial fibrillation     Essential hypertension         Nutrition/Diet History         Narrative   73 y.o. male admitted d/t intractable n/v and dizziness. PMH significant for T2Dm w/ insulin pump, HTN, and Paz esophagus.     Pt reportedly couldn't keep anything down for several days. Will here, pt has been tolerating PO diet, % x 2 days. Documented wt shows stability x ~ 1 yr.     Pt is at low risk per nutrition risk screening. No acute nutrition concerns or nutrition intervention at this time. RD will continue to follow and monitor per protocol.      Anthropometrics        Current Height, Weight Height: 182.9 cm (72\")  Weight: 125 kg (275 lb 12.7 oz)   Current BMI Body mass index is 37.4 kg/m².   BMI Classification Obese Class III   % %   Adjusted Body Weight (ABW) 92 kg   Weight Hx  Wt Readings from Last 30 Encounters:   02/05/24 0030 125 kg (275 lb 12.7 oz)   02/04/24 1021 127 kg (279 lb 1.6 oz)   02/01/24 0924 131 kg (288 lb 3.2 oz)   12/24/23 1358 126 kg (276 lb 10.8 oz)   12/13/23 0900 130 kg (285 lb 8 oz)   11/13/23 0833 129 kg (285 lb)   09/08/23 0919 127 kg (280 lb)   06/14/23 0850 126 kg (277 lb)   03/30/23 1056 122 kg (268 lb 3.2 oz)   11/07/22 0700 126 kg (276 lb 14.4 oz)   07/25/22 1316 127 kg (279 lb)   04/25/22 1335 121 kg (266 lb 12.1 oz)   03/31/22 0947 126 kg (278 lb)   11/23/21 0822 130 kg (286 lb 6.4 oz)   11/01/21 0900 132 kg (291 lb)   09/26/19 0935 121 kg (267 lb)   06/04/19 0953 " 119 kg (262 lb)   06/25/18 0000 118 kg (260 lb)   04/23/18 0000 118 kg (260 lb)          Wt Change Observation Stable x ~ 1 yr     Estimated/Assessed Needs  Estimated Needs based on: Adjusted Body Weight       Energy Requirements 25 kcal/kg    EST Needs (kcal/day) 2300       Protein Requirements 1.0 g/kg   EST Daily Needs (g/day) 92       Fluid Requirements 25 ml/kg IBW    Estimated Needs (mL/day) 2025     Labs/Medications         Pertinent Labs Reviewed.   Results from last 7 days   Lab Units 02/08/24 0416 02/07/24  0358 02/06/24 0415 02/05/24 0428 02/04/24  1027   SODIUM mmol/L 140 137 139   < > 139   POTASSIUM mmol/L 3.6 3.6 3.6   < > 3.7   CHLORIDE mmol/L 104 102 102   < > 99   CO2 mmol/L 22.6 24.1 23.5   < > 25.7   BUN mg/dL 13 16 15   < > 10   CREATININE mg/dL 0.81 0.82 0.84   < > 0.86   CALCIUM mg/dL 8.8 8.4* 8.8   < > 9.9   BILIRUBIN mg/dL  --   --   --   --  0.8   ALK PHOS U/L  --   --   --   --  98   ALT (SGPT) U/L  --   --   --   --  25   AST (SGOT) U/L  --   --   --   --  21   GLUCOSE mg/dL 184* 286* 229*   < > 249*    < > = values in this interval not displayed.     Results from last 7 days   Lab Units 02/08/24 0416 02/07/24 0358 02/06/24 0415 02/05/24 0428   MAGNESIUM mg/dL 2.2 1.9 2.0 2.0   HEMOGLOBIN g/dL 15.1 14.0 14.6 15.6   HEMATOCRIT % 43.0 40.3 42.8 44.3   TRIGLYCERIDES mg/dL  --   --   --  147     Coronavirus (COVID-19)   Date Value Ref Range Status   01/13/2021 DETECTED (A) NA Final     Comment:     The SARS-CoV-2 assay is a real-time, RT-PCR test intended  for the qualitative detection of nucleic acid from the  SARS-CoV-2 in respiratory specimens from individuals,  testing performed at [facility] reference lab.       Lab Results   Component Value Date    HGBA1C 7.40 (H) 02/05/2024         Pertinent Medications Reviewed.     Malnutrition Severity Assessment              Nutrition Diagnosis         Nutrition Dx Problem 1 No nutrition diagnosis at this time related to  N/A  as  evidenced by  N/A     Nutrition Intervention           Current Nutrition Orders & Evaluation of Intake       Current PO Diet Diet: Diabetic Diets, Cardiac Diets, Gastrointestinal Diets; Healthy Heart (2-3 Na+); Consistent Carbohydrate; Low Irritant; Texture: Regular Texture (IDDSI 7); Fluid Consistency: Thin (IDDSI 0)   Supplement No active supplement orders           Nutrition Intervention/Prescription        No nutrition intervention at this time.         Medical Nutrition Therapy/Nutrition Education          Learner     Readiness Patient  Education not indicated at this time     Method     Response N/A  N/A     Monitor/Evaluation        Monitor Per protocol, PO intake, Weight, GI status, POC/GOC     Nutrition Discharge Plan         No nutrition discharge needs identified at this time     Electronically signed by:  Jeanna Murillo RD  02/08/24 09:32 EST

## 2024-02-09 NOTE — OUTREACH NOTE
Prep Survey      Flowsheet Row Responses   [facility] patient discharged from? [facility]   Is LACE score < 7 ? No   Eligibility Readm Mgmt   Discharge diagnosis Paroxysmal atrial fibrillation with RVR, new diagnosis   Does the patient have one of the following disease processes/diagnoses(primary or secondary)? Other   Does the patient have Home health ordered? No   Is there a DME ordered? No   Prep survey completed? Yes            Fozia TAYLOR - Registered Nurse

## 2024-02-11 LAB
QT INTERVAL: 353 MS
QTC INTERVAL: 487 MS

## 2024-02-13 ENCOUNTER — READMISSION MANAGEMENT (OUTPATIENT)
Dept: CALL CENTER | Facility: HOSPITAL | Age: 74
End: 2024-02-13
Payer: MEDICARE

## 2024-02-13 ENCOUNTER — TELEPHONE (OUTPATIENT)
Dept: CARDIOLOGY | Facility: CLINIC | Age: 74
End: 2024-02-13

## 2024-02-13 ENCOUNTER — OFFICE VISIT (OUTPATIENT)
Dept: CARDIOLOGY | Facility: CLINIC | Age: 74
End: 2024-02-13
Payer: MEDICARE

## 2024-02-13 VITALS
DIASTOLIC BLOOD PRESSURE: 67 MMHG | HEART RATE: 64 BPM | HEIGHT: 72 IN | BODY MASS INDEX: 37.65 KG/M2 | WEIGHT: 278 LBS | SYSTOLIC BLOOD PRESSURE: 154 MMHG

## 2024-02-13 DIAGNOSIS — I10 ESSENTIAL HYPERTENSION: ICD-10-CM

## 2024-02-13 DIAGNOSIS — I48.0 PAROXYSMAL ATRIAL FIBRILLATION: Primary | ICD-10-CM

## 2024-02-13 PROBLEM — R11.0 NAUSEA: Status: RESOLVED | Noted: 2022-03-31 | Resolved: 2024-02-13

## 2024-02-13 PROBLEM — M16.9 OSTEOARTHRITIS OF HIP: Status: ACTIVE | Noted: 2018-04-26

## 2024-02-13 PROBLEM — R11.2 NAUSEA AND VOMITING: Status: RESOLVED | Noted: 2022-03-31 | Resolved: 2024-02-13

## 2024-02-13 PROBLEM — I48.91 RAPID ATRIAL FIBRILLATION: Status: RESOLVED | Noted: 2024-02-04 | Resolved: 2024-02-13

## 2024-02-13 PROBLEM — Z86.16 PERSONAL HISTORY OF COVID-19: Status: RESOLVED | Noted: 2021-03-02 | Resolved: 2024-02-13

## 2024-02-13 PROBLEM — G47.19 EXCESSIVE DAYTIME SLEEPINESS: Status: RESOLVED | Noted: 2023-12-13 | Resolved: 2024-02-13

## 2024-02-13 PROBLEM — R06.83 SNORING: Status: RESOLVED | Noted: 2023-12-13 | Resolved: 2024-02-13

## 2024-02-13 PROBLEM — J30.2 SEASONAL ALLERGIC RHINITIS: Status: ACTIVE | Noted: 2024-02-13

## 2024-02-13 PROBLEM — Z96.641 HISTORY OF TOTAL HIP ARTHROPLASTY, RIGHT: Chronic | Status: RESOLVED | Noted: 2023-02-01 | Resolved: 2024-02-13

## 2024-02-13 PROBLEM — E67.3 HYPERVITAMINOSIS D: Status: RESOLVED | Noted: 2019-06-05 | Resolved: 2024-02-13

## 2024-02-13 RX ORDER — DIGOXIN 125 MCG
125 TABLET ORAL
Qty: 90 TABLET | Refills: 1 | Status: SHIPPED | OUTPATIENT
Start: 2024-02-13

## 2024-02-13 RX ORDER — AMLODIPINE AND VALSARTAN 10; 320 MG/1; MG/1
1 TABLET ORAL DAILY
Qty: 30 TABLET | Refills: 3 | Status: SHIPPED | OUTPATIENT
Start: 2024-02-13

## 2024-02-13 RX ORDER — HYDROCHLOROTHIAZIDE 25 MG/1
25 TABLET ORAL DAILY
Qty: 90 TABLET | Refills: 1 | Status: SHIPPED | OUTPATIENT
Start: 2024-02-13

## 2024-02-14 ENCOUNTER — TRANSCRIBE ORDERS (OUTPATIENT)
Dept: LAB | Facility: HOSPITAL | Age: 74
End: 2024-02-14
Payer: MEDICARE

## 2024-02-14 ENCOUNTER — LAB (OUTPATIENT)
Dept: LAB | Facility: HOSPITAL | Age: 74
End: 2024-02-14
Payer: MEDICARE

## 2024-02-14 DIAGNOSIS — Z96.41 PRESENCE OF INSULIN PUMP: ICD-10-CM

## 2024-02-14 DIAGNOSIS — E11.9 DIABETES MELLITUS WITHOUT COMPLICATION: ICD-10-CM

## 2024-02-14 DIAGNOSIS — Z79.4 ENCOUNTER FOR LONG-TERM (CURRENT) USE OF INSULIN: ICD-10-CM

## 2024-02-14 DIAGNOSIS — I10 HYPERTENSION, ESSENTIAL: ICD-10-CM

## 2024-02-14 DIAGNOSIS — E78.5 HYPERLIPIDEMIA, UNSPECIFIED HYPERLIPIDEMIA TYPE: ICD-10-CM

## 2024-02-14 DIAGNOSIS — E11.9 DIABETES MELLITUS WITHOUT COMPLICATION: Primary | ICD-10-CM

## 2024-02-14 LAB
ALBUMIN SERPL-MCNC: 4.2 G/DL (ref 3.5–5.2)
ALBUMIN/GLOB SERPL: 1.8 G/DL
ALP SERPL-CCNC: 88 U/L (ref 39–117)
ALT SERPL W P-5'-P-CCNC: 22 U/L (ref 1–41)
ANION GAP SERPL CALCULATED.3IONS-SCNC: 12.7 MMOL/L (ref 5–15)
AST SERPL-CCNC: 25 U/L (ref 1–40)
BILIRUB SERPL-MCNC: 0.5 MG/DL (ref 0–1.2)
BUN SERPL-MCNC: 13 MG/DL (ref 8–23)
BUN/CREAT SERPL: 13.7 (ref 7–25)
CALCIUM SPEC-SCNC: 9.5 MG/DL (ref 8.6–10.5)
CHLORIDE SERPL-SCNC: 104 MMOL/L (ref 98–107)
CO2 SERPL-SCNC: 28.3 MMOL/L (ref 22–29)
CREAT SERPL-MCNC: 0.95 MG/DL (ref 0.76–1.27)
EGFRCR SERPLBLD CKD-EPI 2021: 85.6 ML/MIN/1.73
GLOBULIN UR ELPH-MCNC: 2.3 GM/DL
GLUCOSE SERPL-MCNC: 136 MG/DL (ref 65–99)
POTASSIUM SERPL-SCNC: 3.8 MMOL/L (ref 3.5–5.2)
PROT SERPL-MCNC: 6.5 G/DL (ref 6–8.5)
SODIUM SERPL-SCNC: 145 MMOL/L (ref 136–145)

## 2024-02-14 PROCEDURE — 80053 COMPREHEN METABOLIC PANEL: CPT

## 2024-02-14 PROCEDURE — 36415 COLL VENOUS BLD VENIPUNCTURE: CPT

## 2024-02-15 ENCOUNTER — TELEPHONE (OUTPATIENT)
Dept: CARDIOLOGY | Facility: CLINIC | Age: 74
End: 2024-02-15
Payer: MEDICARE

## 2024-02-27 ENCOUNTER — TELEPHONE (OUTPATIENT)
Dept: CARDIOLOGY | Facility: CLINIC | Age: 74
End: 2024-02-27
Payer: MEDICARE

## 2024-02-27 NOTE — TELEPHONE ENCOUNTER
----- Message from JOHNNA Sneed sent at 2/27/2024  1:55 AM EST -----  Holter monitor shows normal sinus rhythm with average heart rate 66 bpm.  No arrhythmia is noted, continue current medication and notify office if symptoms persist or worsen

## 2024-02-29 ENCOUNTER — TELEPHONE (OUTPATIENT)
Dept: CARDIOLOGY | Facility: CLINIC | Age: 74
End: 2024-02-29
Payer: MEDICARE

## 2024-02-29 DIAGNOSIS — I10 ESSENTIAL HYPERTENSION: Primary | ICD-10-CM

## 2024-02-29 RX ORDER — SPIRONOLACTONE AND HYDROCHLOROTHIAZIDE 25; 25 MG/1; MG/1
1 TABLET ORAL DAILY
Qty: 90 TABLET | Refills: 3 | Status: SHIPPED | OUTPATIENT
Start: 2024-02-29

## 2024-02-29 NOTE — TELEPHONE ENCOUNTER
----- Message from JOHNNA Sneed sent at 2/28/2024  4:38 PM EST -----  Blood pressures are still elevated, change hydrochlorothiazide to Aldactazide 25-25 mg daily. Continue to monitor blood pressure. Check BMP in 2 weeks  ----- Message -----  From: Annamarie Benson  Sent: 2/28/2024   1:43 PM EST  To: JOHNNA Sneed

## 2024-03-01 ENCOUNTER — TELEPHONE (OUTPATIENT)
Dept: CARDIOLOGY | Facility: CLINIC | Age: 74
End: 2024-03-01
Payer: MEDICARE

## 2024-03-01 NOTE — TELEPHONE ENCOUNTER
[facility] PT.  PT DECIDED NOT TO COMPLETE THIS PRESCRIPTION REQUEST UNTIL THEY FIGURE OUT IF IT IS LEGIT.

## 2024-03-01 NOTE — TELEPHONE ENCOUNTER
[facility] received a fax that requires your attention. The document has been indexed to the patient’s chart for your review.      Reason for sending: EXTERNAL MEDICAL RECORD NOTIFICATION     Documents Description: [facility] NEW RX REQ-3.1.24    Name of Sender: [id number]     Date Indexed: 3.1.24    PLEASE REVIEW FOR LEGITIMACY, I AM UNFAMILIAR WITH THIS PHARMACY/INTERMEDIARY AND I'M NOT SURE IF IT IS LEGITIMATE. THANK YOU!

## 2024-03-08 RX ORDER — AMLODIPINE AND VALSARTAN 10; 320 MG/1; MG/1
1 TABLET ORAL DAILY
Qty: 90 TABLET | Refills: 1 | Status: SHIPPED | OUTPATIENT
Start: 2024-03-08

## 2024-03-11 ENCOUNTER — OFFICE VISIT (OUTPATIENT)
Dept: SLEEP MEDICINE | Facility: HOSPITAL | Age: 74
End: 2024-03-11
Payer: MEDICARE

## 2024-03-11 VITALS
WEIGHT: 277.9 LBS | HEART RATE: 70 BPM | DIASTOLIC BLOOD PRESSURE: 56 MMHG | SYSTOLIC BLOOD PRESSURE: 112 MMHG | BODY MASS INDEX: 37.64 KG/M2 | HEIGHT: 72 IN | OXYGEN SATURATION: 97 %

## 2024-03-11 DIAGNOSIS — G47.33 OSA ON CPAP: Primary | ICD-10-CM

## 2024-03-11 DIAGNOSIS — E66.9 CLASS 2 OBESITY: ICD-10-CM

## 2024-03-11 PROCEDURE — 1159F MED LIST DOCD IN RCRD: CPT | Performed by: INTERNAL MEDICINE

## 2024-03-11 PROCEDURE — 1160F RVW MEDS BY RX/DR IN RCRD: CPT | Performed by: INTERNAL MEDICINE

## 2024-03-11 PROCEDURE — 3078F DIAST BP <80 MM HG: CPT | Performed by: INTERNAL MEDICINE

## 2024-03-11 PROCEDURE — 3074F SYST BP LT 130 MM HG: CPT | Performed by: INTERNAL MEDICINE

## 2024-03-11 PROCEDURE — 99213 OFFICE O/P EST LOW 20 MIN: CPT | Performed by: INTERNAL MEDICINE

## 2024-03-11 PROCEDURE — G0463 HOSPITAL OUTPT CLINIC VISIT: HCPCS

## 2024-03-11 RX ORDER — CIPROFLOXACIN 500 MG/1
1 TABLET, FILM COATED ORAL EVERY 12 HOURS SCHEDULED
COMMUNITY
Start: 2024-02-27

## 2024-03-11 RX ORDER — MAGNESIUM OXIDE 400 MG/1
400 TABLET ORAL DAILY
COMMUNITY

## 2024-03-11 NOTE — PROGRESS NOTES
"  [address] [address]  [place]   [place] [address]  Phone: [phone]  Fax: [phone]      SLEEP CLINIC FOLLOW UP PROGRESS NOTE.    Pastor Bartholomew  [id number]   1950  73 y.o.  male      PCP: Shahzad Rashid PA      Date of visit: 3/11/2024    Chief Complaint   Patient presents with    Sleep Apnea    Obesity       HPI:  This is a 73 y.o. years old patient is here for the management of obstructive sleep apnea.  Sleep apnea is mild in severity with a AHI of 11/hr. Patient is using positive airway pressure therapy with auto CPAP and the symptoms of sleep apnea have improved significantly on the therapy. Normally patient goes to bed at 11 PM and wakes up at 830 AM .  The patient wakes up 2-3 time(s) during the night and has no problem going back to sleep.  Feels refreshed after waking up.  He recently received ResMed new CPAP and is here for compliance check.  He reports that he is uses the machine on a regular basis and has no problems with the new machine.  Uses a fullface mask    Medications and allergies are reviewed by me and documented in the encounter.     SOCIAL (habits pertaining to sleep medicine)  History tobacco use:No   History of alcohol use: 0 per week  Caffeine use: 1     REVIEW OF SYSTEMS:   Pertaining positive symptoms are:  Echo Sleepiness Scale :Total score: 1       PHYSICAL EXAMINATION:  CONSTITUTIONAL:  Vitals:    03/11/24 0900   BP: 112/56   Pulse: 70   SpO2: 97%   Weight: 126 kg (277 lb 14.4 oz)   Height: 182.9 cm (72.01\")    Body mass index is 37.68 kg/m².   NOSE: nasal passages are clear, No deformities noted   RESP SYSTEM: Not in any respiratory distress, no chest deformities noted,   CARDIOVASULAR: No edema noted  NEURO: Oriented x 3, gait normal,  Mood and affect appeared appropriate      Data reviewed:  The Smart card downloaded on 3/11/2024 has been reviewed independently by me for compliance and discussed the data with the patient. "   Compliance; 100%  More than 4 hr use, 100%  Average use of the device 9 hours and 12 minutes per night  Residual AHI: 2.5 /hr (goal < 5.0 /hr)  Mask type: Fullface mask  Device: ResMed  DME: [employer]      ASSESSMENT AND PLAN:  Obstructive sleep apnea ( G 47.33).  The symptoms of sleep apnea have improved with the device and the treatment.  Patient's compliance with the device is excellent for treatment of sleep apnea.  I have independently reviewed the smart card down load and discussed with the patient the download data and encouarged the patient to continue to use the device.The residual AHI is acceptable. The device is benefiting the patient and the device is medically necessary.  Without proper control of sleep apnea and good compliance there is a increased risk for hypertension, diabetes mellitus and nonrestorative sleep with hypersomnia which can increase risk for motor vehicle accidents.  Untreated sleep apnea is also a risk factor for development of atrial fibrillation, pulmonary hypertension, insulin resistance and stroke. The patient is also instructed to get the supplies from the [facility] and and change them on a regular basis.  A prescription for supplies has been sent to the [facility].  I have also discussed the good sleep hygiene habits and adequate amount of sleep needed for good health.  Obesity  2 with BMI is Body mass index is 37.68 kg/m².. I have discuss the relationship between the weight and sleep apnea. The benefit of weight loss in reducing severity of sleep apnea was discussed. Discussed diet and exercise with the patient to achieve ideal BMI.  Atrial fibrillation  Hypertension  Return in about 1 year (around 3/11/2025) for with smart card down load. . Patient's questions were answered.    3/11/2024  Bebeto Vazquez MD  Sleep Medicine.  Medical Director,   [facility] sleep centers.

## 2024-03-13 ENCOUNTER — OFFICE VISIT (OUTPATIENT)
Dept: PODIATRY | Facility: CLINIC | Age: 74
End: 2024-03-13
Payer: MEDICARE

## 2024-03-13 VITALS
TEMPERATURE: 98.2 F | HEIGHT: 72 IN | OXYGEN SATURATION: 95 % | DIASTOLIC BLOOD PRESSURE: 71 MMHG | SYSTOLIC BLOOD PRESSURE: 158 MMHG | BODY MASS INDEX: 37.93 KG/M2 | HEART RATE: 79 BPM | WEIGHT: 280 LBS

## 2024-03-13 DIAGNOSIS — E11.65 TYPE 2 DIABETES MELLITUS WITH HYPERGLYCEMIA, WITH LONG-TERM CURRENT USE OF INSULIN: ICD-10-CM

## 2024-03-13 DIAGNOSIS — B35.1 ONYCHOMYCOSIS: Primary | ICD-10-CM

## 2024-03-13 DIAGNOSIS — Z79.4 TYPE 2 DIABETES MELLITUS WITH HYPERGLYCEMIA, WITH LONG-TERM CURRENT USE OF INSULIN: ICD-10-CM

## 2024-03-13 NOTE — PROGRESS NOTES
[facility] - PODIATRY    Today's Date: 03/13/24    Patient Name: Pastor Bartholomew  MRN: [id number]  CSN: [id number]  PCP: Shahzad Rashid PA,   Referring Provider: No ref. 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    Patient is a 73-year-old diabetic male presenting with bilateral foot issues.  Patient states his A1c is usually around 7, but his sugars have been elevated due to pump issues.  Patient was recently admitted for diverticulitis and A-fib.  He is overall doing well.    Patient denies any fevers, chills, nausea, vomiting, shortness of breath, nor any other constitutional signs nor symptoms.    No other pedal complaints at this time.    Past Medical History:   Diagnosis Date    Atrial fibrillation     Capps esophagus     Diabetes     Hernia, inguinal     History of total hip arthroplasty, right 02/01/2023    Hyperlipidemia     Hypertension     Hypervitaminosis D      PONV (postoperative nausea and vomiting)     Sleep apnea     Type 2 diabetes mellitus      Past Surgical History:   Procedure Laterality Date    COLONOSCOPY      COLONOSCOPY N/A 04/25/2022    Procedure: COLONOSCOPY WITH POLYPECTOMY;  Surgeon: Artie Dickerson MD;  Location: [facility] ENDOSCOPY;  Service: Gastroenterology;  Laterality: N/A;  COLON POLYP    ENDOSCOPY N/A 04/25/2022    Procedure: ESOPHAGOGASTRODUODENOSCOPY WITH BX;  Surgeon: Artie Dickerson MD;  Location: [facility] ENDOSCOPY;  Service: Gastroenterology;  Laterality: N/A;  CAPPS'S ESOPHAGUS, HIATAL HERNIA    HERNIA REPAIR      KNEE SURGERY Bilateral     TRIGGER FINGER RELEASE Bilateral     x2 THUMBS     Family History   Problem Relation Age of Onset    Diabetes Mother     Hypertension Mother     Heart attack Mother     COPD  Brother     Diabetes Maternal Grandmother     Colon cancer Neg Hx      Social History     Socioeconomic History    Marital status:    Tobacco Use    Smoking status: Never     Passive exposure: Yes    Smokeless tobacco: Never   Vaping Use    Vaping status: Never Used   Substance and Sexual Activity    Alcohol use: Never    Drug use: Never    Sexual activity: Defer     Allergies   Allergen Reactions    Codeine Nausea Only    Codeine Unknown - Low Severity    Semaglutide(0.25 Or 0.5mg-Dos) GI Intolerance     Current Outpatient Medications   Medication Sig Dispense Refill    amLODIPine-valsartan (EXFORGE)  MG per tablet TAKE 1 TABLET BY MOUTH EVERY DAY 90 tablet 1    apixaban (ELIQUIS) 5 MG tablet tablet Take 1 tablet by mouth 2 (Two) Times a Day. Indications: Atrial Fibrillation 180 tablet 1    carvedilol (COREG) 25 MG tablet Take 1 tablet by mouth 2 (Two) Times a Day With Meals.      cetirizine (zyrTEC) 10 MG tablet Take 1 tablet by mouth Daily As Needed.      cholecalciferol (VITAMIN D3) 10 MCG (400 UNIT) tablet Take 1 tablet by mouth Daily.      ciprofloxacin (CIPRO) 500 MG tablet Take 1 tablet by mouth Every 12 (Twelve) Hours.      co-enzyme Q-10 30 MG capsule Take 1 capsule by mouth Daily.      Continuous Blood Gluc Sensor (Dexcom G6 Sensor) Every 10 (Ten) Days.      CONTOUR NEXT TEST test strip Test 6 times daily DX: e11.65 200 each 5    digoxin (LANOXIN) 125 MCG tablet Take 1 tablet by mouth Daily. 90 tablet 1    donepezil (ARICEPT) 10 MG tablet Take 1 tablet by mouth Every Night. 90 tablet 1    insulin patient supplied pump Inject  under the skin into the appropriate area as directed Continuous. Type of Insulin: Humulin R 500  Type of Pump:Med[employer]/MiniMWoowUp [phone]  Bolus amount: 50 units daily.   Basal amount : Max basal 5.50u/hr  Correction factor:   Insulin to carbohydrate ratio: 12a-12p 10                                                 12p-2p 10                                                   2p -12 a 7    Date of last site change: 2/3/24  Location of catheter: right side on stomach    Frequency of refill: approximately 3 days  Last refill date: per pt 2/3/24    Target 12a-3a 130-150               3a-6a -100-120               6a-9p 100-120               9p-12a 130-150    Active Ins time : 5 hours    Basal Pattern Setup 27.3    Prescriber Shara Campbell  Phone number [phone]      insulin regular (HUMULIN R) 500 UNIT/ML CONCENTRATED injection 50u daily with insulin pump (Patient taking differently: Inject 10 unit marking on U-100 syringe under the skin into the appropriate area as directed Daily. 60u daily with insulin pump) 40 mL 3    L-Lysine 500 MG tablet tablet Take 1 tablet by mouth Daily.      Lancets misc Check 5 times daily DX: e11.65 500 each 1    magnesium oxide (MAG-OX) 400 MG tablet Take 1 tablet by mouth Daily.      metFORMIN (GLUCOPHAGE) 500 MG tablet Take 1 tablet by mouth 2 (Two) Times a Day With Meals.      multivitamin with minerals tablet tablet Take 1 tablet by mouth Daily.      Omega-3 Fatty Acids (fish oil) 1000 MG capsule capsule Take 2 capsules by mouth Daily With Breakfast.      ondansetron ODT (Zofran ODT) 4 MG disintegrating tablet Place 1 tablet on the tongue Every 8 (Eight) Hours As Needed for Nausea or Vomiting. 30 tablet 1    pantoprazole (PROTONIX) 40 MG EC tablet Take 1 tablet by mouth Daily.      pyridoxine (VITAMIN B-6) 100 MG tablet Take 1 tablet by mouth Daily.      rosuvastatin (CRESTOR) 40 MG tablet Take 1 tablet by mouth Every Night. 90 tablet 1    venlafaxine XR (EFFEXOR-XR) 150 MG 24 hr capsule Take 1 capsule by mouth Daily.      vitamin B-12 (CYANOCOBALAMIN) 100 MCG tablet Take 1 tablet by mouth Daily.      apixaban (ELIQUIS) 5 MG tablet tablet Take 1 tablet by mouth 2 (Two) Times a Day. (Patient not taking: Reported on 3/13/2024) 56 tablet 0     No current facility-administered medications for this visit.     Review of Systems   All other systems reviewed  and are negative.      OBJECTIVE     Vitals:    03/13/24 0905   BP: 158/71   Pulse: 79   Temp: 98.2 °F (36.8 °C)   SpO2: 95%       Body mass index is 37.96 kg/m².    Lab Results   Component Value Date    HGBA1C 7.40 (H) 02/05/2024       Lab Results   Component Value Date    GLUCOSE 136 (H) 02/14/2024    CALCIUM 9.5 02/14/2024     02/14/2024    K 3.8 02/14/2024    CO2 28.3 02/14/2024     02/14/2024    BUN 13 02/14/2024    CREATININE 0.95 02/14/2024    EGFRIFAFRI >60 12/19/2022    EGFRIFNONA 80 12/17/2021    BCR 13.7 02/14/2024    ANIONGAP 12.7 02/14/2024       Patient seen in no apparent distress.      PHYSICAL EXAM:     Foot/Ankle Exam    GENERAL  Appearance:  appears stated age  Orientation:  AAOx3  Affect:  appropriate  Gait:  unimpaired  Assistance:  independent  Right shoe gear: casual shoe  Left shoe gear: casual shoe    VASCULAR     Right Foot Vascularity   Normal vascular exam    Dorsalis pedis:  2+  Posterior tibial:  2+  Skin temperature:  warm  Edema grading:  None  CFT:  < 3 seconds  Pedal hair growth:  Present  Varicosities:  none     Left Foot Vascularity   Normal vascular exam    Dorsalis pedis:  2+  Posterior tibial:  2+  Skin temperature:  warm  Edema grading:  None  CFT:  < 3 seconds  Pedal hair growth:  Present  Varicosities:  none     NEUROLOGIC     Right Foot Neurologic   Normal sensation    Light touch sensation: normal  Vibratory sensation: normal  Hot/Cold sensation: normal  Protective Sensation using Rena Lara-Cailin Monofilament:   Sites intact: 10  Sites tested: 10     Left Foot Neurologic   Normal sensation    Light touch sensation: normal  Vibratory sensation: normal  Hot/Cold sensation:  normal  Protective Sensation using Rena Lara-Cailin Monofilament:   Sites intact: 10  Sites tested: 10    MUSCLE STRENGTH     Right Foot Muscle Strength   Foot dorsiflexion:  4  Foot plantar flexion:  4  Foot inversion:  4  Foot eversion:  4     Left Foot Muscle Strength   Foot dorsiflexion:   4  Foot plantar flexion:  4  Foot inversion:  4  Foot eversion:  4    RANGE OF MOTION     Right Foot Range of Motion   Foot and ankle ROM within normal limits       Left Foot Range of Motion   Foot and ankle ROM within normal limits      DERMATOLOGIC      Right Foot Dermatologic   Skin  Right foot skin is intact.      Left Foot Dermatologic   Skin  Left foot skin is intact.             ASSESSMENT/PLAN     Diagnoses and all orders for this visit:    1. Onychomycosis (Primary)    2. Type 2 diabetes mellitus with hyperglycemia, with long-term current use of insulin        Comprehensive lower extremity examination and evaluation was performed.    Discussed findings and treatment plan including risks, benefits, and treatment options with patient in detail. Patient agreed with treatment plan.    Medications and allergies reviewed.  Reviewed available blood glucose and HgB A1C lab values along with other pertinent labs.  These were discussed with the patient as to their importance of diabetic maintenance.    Diabetic foot exam performed and documented this date, compliant with CQM required standards. Detail of findings as noted in physical exam.  Lower extremity Neurologic exam for diabetic patient performed and documented this date, compliant with PQRS required standards. Detail of findings as noted in physical exam.  Advised patient importance of good routine lower extremity hygiene. Advised patient importance of evaluating for intact skin and pain free nail borders.  Advised patient to use mirror to evaluate plantar/ soles of feet for better visualization. Advised patient monitor and phone office to be seen if any cracking to skin, open lesions, painful nail borders or if nails become elongated prior to next visit. Advised patient importance of daily cleansing of lower extremities, followed by good skin cream to maintain normal hydration of skin. Also advised patient importance of close daily monitoring of blood sugar.  Advised to regulate diet and medications to maintain control of blood sugar in optimal range. Contact primary care provider if difficulties maintaining blood sugar levels.  Advised Patient of presence of Diabetes Mellitus condition.  Advised Patient risk of progression and worsening or improvement, then return of condition.  Will monitor condition for any change in future. Treat with most appropriate treatment pending status of condition.  Counseled and advised patient extensively on nature and ramifications of diabetes. Standard instructions given to patient for good diabetic foot care and maintenance. Advised importance of careful monitoring to avoid break down and complications secondary to diabetes. Advised patient importance of strict maintenance of blood sugar control. Advised patient of possible ominous results from neglect of condition, i.e.: amputation/ loss of digits, feet and legs, or even death.  Patient states understands counseling, will monitor closely, continue good hygiene and routine diabetic foot care. Patient will contact office if questions or problems.      An After Visit Summary was printed and given to the patient at discharge, including (if requested) any available informative/educational handouts regarding diagnosis, treatment, or medications. All questions were answered to patient/family satisfaction. Should symptoms fail to improve or worsen they agree to call or return to clinic or to go to the Emergency Department. Discussed the importance of following up with any needed screening tests/labs/specialist appointments and any requested follow-up recommended by me today. Importance of maintaining follow-up discussed and patient accepts that missed appointments can delay diagnosis and potentially lead to worsening of conditions.    Return in about 3 months (around 6/13/2024)., or sooner if acute issues arise.    This document has been electronically signed by Phuc Mena DPM on March  13, 2024 10:37 EDT

## 2024-03-20 ENCOUNTER — TELEPHONE (OUTPATIENT)
Dept: CARDIOLOGY | Facility: CLINIC | Age: 74
End: 2024-03-20
Payer: MEDICARE

## 2024-03-20 NOTE — TELEPHONE ENCOUNTER
[facility].  PT STATED THEY ARE NOT USING THE [facility].  STATED THEY DO NOT THINK IT IS LEGIT.  ADVISED TO NOT SEND RX TO THEM AND TO DISREGARD ANY FURTHER REQUESTS FROM THEM.

## 2024-03-20 NOTE — TELEPHONE ENCOUNTER
[facility] received a fax that requires your attention. The document has been indexed to the patient’s chart for your review.      Reason for sending: EXTERNAL MEDICAL RECORD NOTIFICATION     Documents Description: [facility]IS RX REQ-3.20.24    Name of Sender: [id number]     Date Indexed: 3.20.24    NOTE: UNSURE IF THIS IS LEGITIMATE, BUT PUTTING THROUGH JUST IN CASE

## 2024-03-26 ENCOUNTER — NURSE TRIAGE (OUTPATIENT)
Dept: CALL CENTER | Facility: HOSPITAL | Age: 74
End: 2024-03-26
Payer: MEDICARE

## 2024-03-26 NOTE — TELEPHONE ENCOUNTER
C/o sinus congestion, cough, hoarseness, runny nose. Patient has occasional chest tightness. Denies difficulty breathing. /78. T 97.2. Patient has new patient appointment scheduled in 3 days. Reviewed protocol with patient's wife. Appropriate to follow up as scheduled.    Reason for Disposition   [1] Sinus congestion as part of a cold AND [2] present < 10 days    Additional Information   Negative: SEVERE difficulty breathing (e.g., struggling for each breath, speaks in single words)   Negative: Sounds like a life-threatening emergency to the triager   Negative: [1] Sinus infection AND [2] taking an antibiotic AND [3] symptoms continue   Negative: [1] Difficulty breathing AND [2] not from stuffy nose (e.g., not relieved by cleaning out the nose)   Negative: [1] SEVERE headache AND [2] fever   Negative: [1] Redness or swelling on the cheek, forehead or around the eye AND [2] fever   Negative: Fever > 104 F (40 C)   Negative: Patient sounds very sick or weak to the triager   Negative: [1] SEVERE pain AND [2] not improved 2 hours after pain medicine   Negative: [1] Redness or swelling on the cheek, forehead or around the eye AND [2] no fever   Negative: [1] Fever > 101 F (38.3 C) AND [2] age > 60 years   Negative: [1] Fever > 100.0 F (37.8 C) AND [2] bedridden (e.g., CVA, chronic illness, recovering from surgery)   Negative: [1] Fever > 100.0 F (37.8 C) AND [2] diabetes mellitus or weak immune system (e.g., HIV positive, cancer chemo, splenectomy, organ transplant, chronic steroids)   Negative: Fever present > 3 days (72 hours)   Negative: [1] Fever returns after gone for over 24 hours AND [2] symptoms worse or not improved   Negative: [1] Sinus pain (not just congestion) AND [2] fever   Negative: Earache   Negative: [1] Sinus congestion (pressure, fullness) AND [2] present > 10 days   Negative: [1] Nasal discharge AND [2] present > 10 days   Negative: [1] Using nasal washes and pain medicine > 24 hours AND [2]  "sinus pain (around cheekbone or eye) persists   Negative: Lots of coughing    Answer Assessment - Initial Assessment Questions  1. LOCATION: \"Where does it hurt?\"       No pain  2. ONSET: \"When did the sinus pain start?\"  (e.g., hours, days)       1 week ago  3. SEVERITY: \"How bad is the pain?\"   (Scale 1-10; mild, moderate or severe)    - MILD (1-3): doesn't interfere with normal activities     - MODERATE (4-7): interferes with normal activities (e.g., work or school) or awakens from sleep    - SEVERE (8-10): excruciating pain and patient unable to do any normal activities         NA  4. RECURRENT SYMPTOM: \"Have you ever had sinus problems before?\" If Yes, ask: \"When was the last time?\" and \"What happened that time?\"       Frequent sinus infections  5. NASAL CONGESTION: \"Is the nose blocked?\" If Yes, ask: \"Can you open it or must you breathe through your mouth?\"      Yes   6. NASAL DISCHARGE: \"Do you have discharge from your nose?\" If so ask, \"What color?\"      Clear   7. FEVER: \"Do you have a fever?\" If Yes, ask: \"What is it, how was it measured, and when did it start?\"       No   8. OTHER SYMPTOMS: \"Do you have any other symptoms?\" (e.g., sore throat, cough, earache, difficulty breathing)      Cough, hoarseness  9. PREGNANCY: \"Is there any chance you are pregnant?\" \"When was your last menstrual period?\"      NA    Protocols used: Sinus Pain or Congestion-ADULT-AH    "

## 2024-03-27 ENCOUNTER — TELEPHONE (OUTPATIENT)
Dept: CARDIOLOGY | Facility: CLINIC | Age: 74
End: 2024-03-27
Payer: MEDICARE

## 2024-03-27 RX ORDER — NEBIVOLOL 10 MG/1
10 TABLET ORAL DAILY
Qty: 90 TABLET | Refills: 3 | Status: SHIPPED | OUTPATIENT
Start: 2024-03-27

## 2024-03-29 ENCOUNTER — OFFICE VISIT (OUTPATIENT)
Dept: FAMILY MEDICINE CLINIC | Facility: CLINIC | Age: 74
End: 2024-03-29
Payer: MEDICARE

## 2024-03-29 VITALS
OXYGEN SATURATION: 97 % | BODY MASS INDEX: 38.68 KG/M2 | HEIGHT: 72 IN | DIASTOLIC BLOOD PRESSURE: 65 MMHG | HEART RATE: 70 BPM | SYSTOLIC BLOOD PRESSURE: 159 MMHG | WEIGHT: 285.6 LBS

## 2024-03-29 DIAGNOSIS — Z79.4 TYPE 2 DIABETES MELLITUS WITH HYPERGLYCEMIA, WITH LONG-TERM CURRENT USE OF INSULIN: ICD-10-CM

## 2024-03-29 DIAGNOSIS — Z00.00 ENCOUNTER FOR ANNUAL WELLNESS EXAM IN MEDICARE PATIENT: Primary | ICD-10-CM

## 2024-03-29 DIAGNOSIS — E66.9 CLASS 2 OBESITY: ICD-10-CM

## 2024-03-29 DIAGNOSIS — Z23 NEED FOR ZOSTER VACCINE: ICD-10-CM

## 2024-03-29 DIAGNOSIS — E11.65 TYPE 2 DIABETES MELLITUS WITH HYPERGLYCEMIA, WITH LONG-TERM CURRENT USE OF INSULIN: ICD-10-CM

## 2024-03-29 DIAGNOSIS — I10 ESSENTIAL HYPERTENSION: ICD-10-CM

## 2024-03-29 DIAGNOSIS — Z76.89 ESTABLISHING CARE WITH NEW DOCTOR, ENCOUNTER FOR: ICD-10-CM

## 2024-03-29 DIAGNOSIS — J40 BRONCHITIS: ICD-10-CM

## 2024-03-29 RX ORDER — TRIAMCINOLONE ACETONIDE 40 MG/ML
60 INJECTION, SUSPENSION INTRA-ARTICULAR; INTRAMUSCULAR ONCE
Status: COMPLETED | OUTPATIENT
Start: 2024-03-29 | End: 2024-03-29

## 2024-03-29 RX ORDER — SPIRONOLACTONE AND HYDROCHLOROTHIAZIDE 25; 25 MG/1; MG/1
1 TABLET ORAL DAILY
COMMUNITY

## 2024-03-29 RX ORDER — ASCORBIC ACID 100 MG
1 TABLET,CHEWABLE ORAL DAILY
COMMUNITY

## 2024-03-29 RX ORDER — CEFDINIR 300 MG/1
300 CAPSULE ORAL 2 TIMES DAILY
Qty: 14 CAPSULE | Refills: 0 | Status: SHIPPED | OUTPATIENT
Start: 2024-03-29 | End: 2024-04-05

## 2024-03-29 RX ADMIN — TRIAMCINOLONE ACETONIDE 60 MG: 40 INJECTION, SUSPENSION INTRA-ARTICULAR; INTRAMUSCULAR at 16:05

## 2024-03-29 NOTE — PROGRESS NOTES
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    The following portions of the patient's history were reviewed and   updated as appropriate: allergies, current medications, past family history, past medical history, past social history, past surgical history, and problem list.    Compared to one year ago, the patient feels his physical   health is worse.    Compared to one year ago, the patient feels his mental   health is worse.    Recent Hospitalizations:  This patient has had a [facility] admission record on file within the last 365 days.    Current Medical Providers:  Patient Care Team:  Esthela Arceo APRN as PCP - General (Family Medicine)  Jen Bravo MD as Consulting Physician (Endocrinology)  Shara Campbell APRN (Nurse Practitioner)    Outpatient Medications Prior to Visit   Medication Sig Dispense Refill    amLODIPine-valsartan (EXFORGE)  MG per tablet TAKE 1 TABLET BY MOUTH EVERY DAY 90 tablet 1    apixaban (ELIQUIS) 5 MG tablet tablet Take 1 tablet by mouth 2 (Two) Times a Day. Indications: Atrial Fibrillation 180 tablet 1    Ascorbic Acid (Vitamin C) 100 MG chewable tablet Chew 1 tablet Daily.      cetirizine (zyrTEC) 10 MG tablet Take 1 tablet by mouth Daily As Needed.      cholecalciferol (VITAMIN D3) 10 MCG (400 UNIT) tablet Take 1 tablet by mouth Daily.      co-enzyme Q-10 30 MG capsule Take 1 capsule by mouth Daily.      Continuous Blood Gluc Sensor (Dexcom G6 Sensor) Every 10 (Ten) Days.      CONTOUR NEXT TEST test strip Test 6 times daily DX: e11.65 200 each 5    digoxin (LANOXIN) 125 MCG tablet Take 1 tablet by mouth Daily. 90 tablet 1    donepezil (ARICEPT) 10 MG tablet Take 1 tablet by mouth Every Night. 90 tablet 1    insulin patient supplied pump Inject  under the skin into the appropriate area as directed Continuous. Type of Insulin:  Humulin R 500  Type of Pump:Medtronic/[place] [phone]  Bolus amount: 50 units daily.   Basal amount : Max basal 5.50u/hr  Correction factor:   Insulin to carbohydrate ratio: 12a-12p 10                                                 12p-2p 10                                                  2p -12 a 7    Date of last site change: 2/3/24  Location of catheter: right side on stomach    Frequency of refill: approximately 3 days  Last refill date: per pt 2/3/24    Target 12a-3a 130-150               3a-6a -100-120               6a-9p 100-120               9p-12a 130-150    Active Ins time : 5 hours    Basal Pattern Setup 27.3    Prescriber Shara Campbell  Phone number [phone]      insulin regular (HUMULIN R) 500 UNIT/ML CONCENTRATED injection 50u daily with insulin pump (Patient taking differently: Inject 10 unit marking on U-100 syringe under the skin into the appropriate area as directed Daily. 60u daily with insulin pump) 40 mL 3    L-Lysine 500 MG tablet tablet Take 1 tablet by mouth Daily.      Lancets misc Check 5 times daily DX: e11.65 500 each 1    magnesium oxide (MAG-OX) 400 MG tablet Take 1 tablet by mouth Daily.      metFORMIN (GLUCOPHAGE) 500 MG tablet Take 1 tablet by mouth 2 (Two) Times a Day With Meals.      multivitamin with minerals tablet tablet Take 1 tablet by mouth Daily.      nebivolol (Bystolic) 10 MG tablet Take 1 tablet by mouth Daily. 90 tablet 3    Omega-3 Fatty Acids (fish oil) 1000 MG capsule capsule Take 1 capsule by mouth 2 (Two) Times a Day With Meals.      ondansetron ODT (Zofran ODT) 4 MG disintegrating tablet Place 1 tablet on the tongue Every 8 (Eight) Hours As Needed for Nausea or Vomiting. 30 tablet 1    pantoprazole (PROTONIX) 40 MG EC tablet Take 1 tablet by mouth Daily.      pyridoxine (VITAMIN B-6) 100 MG tablet Take 1 tablet by mouth Daily.      rosuvastatin (CRESTOR) 40 MG tablet Take 1 tablet by mouth Every Night. 90 tablet 1    spironolactone-hydrochlorothiazide  "(ALDACTAZIDE) 25-25 MG tablet Take 1 tablet by mouth Daily.      venlafaxine XR (EFFEXOR-XR) 150 MG 24 hr capsule Take 1 capsule by mouth Daily.      vitamin B-12 (CYANOCOBALAMIN) 100 MCG tablet Take 1 tablet by mouth Daily.      apixaban (ELIQUIS) 5 MG tablet tablet Take 1 tablet by mouth 2 (Two) Times a Day. 56 tablet 0    ciprofloxacin (CIPRO) 500 MG tablet Take 1 tablet by mouth Every 12 (Twelve) Hours.       No facility-administered medications prior to visit.       No opioid medication identified on active medication list. I have reviewed chart for other potential  high risk medication/s and harmful drug interactions in the elderly.        Aspirin is not on active medication list.  Aspirin use is contraindicated for this patient due to: current use of Eliquis.  .    Patient Active Problem List   Diagnosis    Type 2 diabetes mellitus with hyperglycemia, with long-term current use of insulin    Essential hypertension    Mixed hyperlipidemia    Long-term insulin use    Counseling for insulin pump    Insulin pump titration    BERNICE on CPAP    Gastroesophageal reflux disease    Altered bowel habits    Leukomalacia, periventricular    Lacunar infarction    Class 2 obesity    Mild cognitive impairment    Osteoarthritis of hip    Presence of insulin pump    Seasonal allergic rhinitis    Paroxysmal atrial fibrillation     Advance Care Planning   Advance Care Planning     Advance Directive is on file.  ACP discussion was declined by the patient. Patient has an advance directive in EMR which is still valid.      Objective    Vitals:    03/29/24 1305   BP: 159/65   BP Location: Left arm   Patient Position: Sitting   Cuff Size: Large Adult   Pulse: 70   SpO2: 97%   Weight: 130 kg (285 lb 9.6 oz)   Height: 182.9 cm (72\")     Estimated body mass index is 38.73 kg/m² as calculated from the following:    Height as of this encounter: 182.9 cm (72\").    Weight as of this encounter: 130 kg (285 lb 9.6 oz).           Does the " patient have evidence of cognitive impairment? Yes    Lab Results   Component Value Date    TRIG 147 02/05/2024    HDL 38 (L) 02/05/2024     (H) 02/05/2024    VLDL 26 02/05/2024    HGBA1C 7.40 (H) 02/05/2024        HEALTH RISK ASSESSMENT    Smoking Status:  Social History     Tobacco Use   Smoking Status Never    Passive exposure: Yes   Smokeless Tobacco Never     Alcohol Consumption:  Social History     Substance and Sexual Activity   Alcohol Use Never     Fall Risk Screen:    NKECHI Fall Risk Assessment was completed, and patient is at LOW risk for falls.Assessment completed on:3/29/2024    Depression Screening:      3/29/2024     1:01 PM   PHQ-2/PHQ-9 Depression Screening   Little Interest or Pleasure in Doing Things 3-->nearly every day   Feeling Down, Depressed or Hopeless 3-->nearly every day   Trouble Falling or Staying Asleep, or Sleeping Too Much 3-->nearly every day   Feeling Tired or Having Little Energy 3-->nearly every day   Poor Appetite or Overeating 0-->not at all   Feeling Bad about Yourself - or that You are a Failure or Have Let Yourself or Your Family Down 0-->not at all   Trouble Concentrating on Things, Such as Reading the Newspaper or Watching Television 0-->not at all   Moving or Speaking So Slowly that Other People Could Have Noticed? Or the Opposite - Being So Fidgety 0-->not at all   Thoughts that You Would be Better Off Dead or of Hurting Yourself in Some Way 1-->several days   PHQ-9: Brief Depression Severity Measure Score 13   If You Checked Off Any Problems, How Difficult Have These Problems Made It For You to Do Your Work, Take Care of Things at Home, or Get Along with Other People? somewhat difficult       Health Habits and Functional and Cognitive Screening:      3/29/2024     1:00 PM   Functional & Cognitive Status   Do you have difficulty preparing food and eating? No   Do you have difficulty bathing yourself, getting dressed or grooming yourself? No   Do you have  difficulty using the toilet? No   Do you have difficulty moving around from place to place? No   Do you have trouble with steps or getting out of a bed or a chair? No   Current Diet Low Carb Diet   Dental Exam Up to date   Eye Exam Not up to date   Exercise (times per week) 0 times per week   Current Exercises Include No Regular Exercise   Do you need help using the phone?  No   Are you deaf or do you have serious difficulty hearing?  Yes   Do you need help to go to places out of walking distance? No   Do you need help shopping? No   Do you need help preparing meals?  No   Do you need help with housework?  No   Do you need help with laundry? No   Do you need help taking your medications? No   Do you need help managing money? No   Do you ever drive or ride in a car without wearing a seat belt? No   Have you felt unusual stress, anger or loneliness in the last month? Yes   Who do you live with? Spouse   If you need help, do you have trouble finding someone available to you? No   Have you been bothered in the last four weeks by sexual problems? No   Do you have difficulty concentrating, remembering or making decisions? Yes       Age-appropriate Screening Schedule:  Refer to the list below for future screening recommendations based on patient's age, sex and/or medical conditions. Orders for these recommended tests are listed in the plan section. The patient has been provided with a written plan.    Health Maintenance   Topic Date Due    ZOSTER VACCINE (2 of 3) 10/11/2017    DIABETIC EYE EXAM  10/01/2023    COVID-19 Vaccine (4 - 2023-24 season) 04/11/2024 (Originally 9/1/2023)    Pneumococcal Vaccine 65+ (2 of 2 - PCV) 04/11/2024 (Originally 8/5/2023)    TDAP/TD VACCINES (1 - Tdap) 04/11/2024 (Originally 12/29/1969)    RSV Vaccine - Adults (1 - 1-dose 60+ series) 04/18/2024 (Originally 12/29/2012)    HEMOGLOBIN A1C  08/05/2024    LIPID PANEL  02/27/2025    URINE MICROALBUMIN  02/27/2025    ANNUAL WELLNESS VISIT   "03/29/2025    DIABETIC FOOT EXAM  03/29/2025    BMI FOLLOWUP  03/29/2025    GASTROSCOPY (EGD)  04/25/2025    COLORECTAL CANCER SCREENING  04/25/2027    HEPATITIS C SCREENING  Completed    INFLUENZA VACCINE  Completed                  CMS Preventative Services Quick Reference  Risk Factors Identified During Encounter  Depression/Dysphoria: Current medication is effective, no change recommended  Inadequate Social Support, Isolation, Loneliness, Lack of Transportation, Financial Difficulties, or Caregiver Stress: states he doesn't do much any more but wants to.   The above risks/problems have been discussed with the patient.  Pertinent information has been shared with the patient in the After Visit Summary.  An After Visit Summary and PPPS were made available to the patient.    Follow Up:   Next Medicare Wellness visit to be scheduled in 1 year.       Additional E&M Note during same encounter follows:  Patient has multiple medical problems which are significant and separately identifiable that require additional work above and beyond the Medicare Wellness Visit.      Chief Complaint  Establish Care, Sinus Problem (x2wks), Hoarse, Cough, and Medicare Wellness-subsequent    Subjective        Sinus pain, headache, productive cough, runny nose, congestion. This all started about 12 days ago.  States he has been taking dayquil and nyquil with no relief.     Cardiologist just changed BP meds and he will start the new one Monday.     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   HENT:  Positive for congestion, ear pain, postnasal drip and sinus pressure. Negative for sore throat.    Respiratory:  Positive for cough.        Objective   Vital Signs:  /65 (BP Location: Left arm, Patient Position: Sitting, Cuff Size: Large Adult)   Pulse 70   Ht 182.9 cm (72\")   Wt 130 kg (285 lb 9.6 oz)   SpO2 97%   BMI 38.73 kg/m²     Physical Exam  Vitals " and nursing note reviewed.   Constitutional:       Appearance: Normal appearance. He is obese.   HENT:      Right Ear: Tympanic membrane is bulging.      Left Ear: Tympanic membrane is bulging.      Nose:      Right Sinus: Frontal sinus tenderness present.      Left Sinus: Frontal sinus tenderness present.      Mouth/Throat:      Pharynx: Posterior oropharyngeal erythema present.   Cardiovascular:      Pulses: Normal pulses.      Heart sounds: Normal heart sounds.   Pulmonary:      Effort: Pulmonary effort is normal.      Breath sounds: Normal breath sounds.   Skin:     General: Skin is warm and dry.   Neurological:      General: No focal deficit present.      Mental Status: He is alert and oriented to person, place, and time.   Psychiatric:         Mood and Affect: Mood normal.         Behavior: Behavior normal.                         Assessment and Plan   Diagnoses and all orders for this visit:    1. Encounter for annual wellness exam in Medicare patient (Primary)    2. Establishing care with new doctor, encounter for    3. Essential hypertension  Assessment & Plan:  Hypertension is stable and controlled  Continue current treatment regimen.  Blood pressure will be reassessed in 3 months.      4. Type 2 diabetes mellitus with hyperglycemia, with long-term current use of insulin  Assessment & Plan:  Diabetes is stable.   Continue current treatment regimen.  Diabetes will be reassessed in 3 months      5. Class 2 obesity  Assessment & Plan:  Patient's (Body mass index is 38.73 kg/m².) indicates that they are morbidly/severely obese (BMI > 40 or > 35 with obesity - related health condition) with health conditions that include hypertension and diabetes mellitus . Weight is unchanged. BMI  is above average; BMI management plan is completed. We discussed portion control and increasing exercise.       6. Bronchitis  -     triamcinolone acetonide (KENALOG-40) injection 60 mg  -     cefdinir (OMNICEF) 300 MG capsule; Take  1 capsule by mouth 2 (Two) Times a Day for 7 days.  Dispense: 14 capsule; Refill: 0    7. Need for zoster vaccine  -     Zoster Vac Recomb Adjuvanted 50 MCG/0.5ML reconstituted suspension; Inject 0.5 mL into the appropriate muscle as directed by prescriber 1 (One) Time for 1 dose.  Dispense: 1 each; Refill: 0             Follow Up   Return if symptoms worsen or fail to improve.  Patient was given instructions and counseling regarding his condition or for health maintenance advice. Please see specific information pulled into the AVS if appropriate.

## 2024-03-29 NOTE — ASSESSMENT & PLAN NOTE
Patient's (Body mass index is 38.73 kg/m².) indicates that they are morbidly/severely obese (BMI > 40 or > 35 with obesity - related health condition) with health conditions that include hypertension and diabetes mellitus . Weight is unchanged. BMI  is above average; BMI management plan is completed. We discussed portion control and increasing exercise.

## 2024-04-01 ENCOUNTER — PATIENT ROUNDING (BHMG ONLY) (OUTPATIENT)
Dept: FAMILY MEDICINE CLINIC | Facility: CLINIC | Age: 74
End: 2024-04-01
Payer: MEDICARE

## 2024-04-19 ENCOUNTER — OFFICE VISIT (OUTPATIENT)
Dept: FAMILY MEDICINE CLINIC | Facility: CLINIC | Age: 74
End: 2024-04-19
Payer: MEDICARE

## 2024-04-19 ENCOUNTER — LAB (OUTPATIENT)
Dept: LAB | Facility: HOSPITAL | Age: 74
End: 2024-04-19
Payer: MEDICARE

## 2024-04-19 VITALS
HEART RATE: 80 BPM | HEIGHT: 72 IN | TEMPERATURE: 98 F | SYSTOLIC BLOOD PRESSURE: 158 MMHG | RESPIRATION RATE: 18 BRPM | WEIGHT: 276 LBS | BODY MASS INDEX: 37.38 KG/M2 | OXYGEN SATURATION: 100 % | DIASTOLIC BLOOD PRESSURE: 80 MMHG

## 2024-04-19 DIAGNOSIS — E66.01 CLASS 2 SEVERE OBESITY DUE TO EXCESS CALORIES WITH SERIOUS COMORBIDITY AND BODY MASS INDEX (BMI) OF 37.0 TO 37.9 IN ADULT: ICD-10-CM

## 2024-04-19 DIAGNOSIS — I10 ESSENTIAL HYPERTENSION: Chronic | ICD-10-CM

## 2024-04-19 DIAGNOSIS — R11.2 NAUSEA AND VOMITING, UNSPECIFIED VOMITING TYPE: ICD-10-CM

## 2024-04-19 DIAGNOSIS — R11.2 NAUSEA AND VOMITING, UNSPECIFIED VOMITING TYPE: Primary | ICD-10-CM

## 2024-04-19 PROBLEM — Z46.81 COUNSELING FOR INSULIN PUMP: Status: RESOLVED | Noted: 2019-06-05 | Resolved: 2024-04-19

## 2024-04-19 PROBLEM — E66.812 CLASS 2 OBESITY: Status: RESOLVED | Noted: 2023-12-13 | Resolved: 2024-04-19

## 2024-04-19 PROBLEM — E66.9 CLASS 2 OBESITY: Status: RESOLVED | Noted: 2023-12-13 | Resolved: 2024-04-19

## 2024-04-19 PROBLEM — E66.812 CLASS 2 SEVERE OBESITY DUE TO EXCESS CALORIES WITH SERIOUS COMORBIDITY AND BODY MASS INDEX (BMI) OF 37.0 TO 37.9 IN ADULT: Status: ACTIVE | Noted: 2024-04-19

## 2024-04-19 PROCEDURE — 84100 ASSAY OF PHOSPHORUS: CPT

## 2024-04-19 PROCEDURE — 80053 COMPREHEN METABOLIC PANEL: CPT

## 2024-04-19 PROCEDURE — 85025 COMPLETE CBC W/AUTO DIFF WBC: CPT

## 2024-04-19 PROCEDURE — 36415 COLL VENOUS BLD VENIPUNCTURE: CPT

## 2024-04-19 PROCEDURE — 83735 ASSAY OF MAGNESIUM: CPT

## 2024-04-19 RX ORDER — METOCLOPRAMIDE 5 MG/1
5 TABLET ORAL
Qty: 120 TABLET | Refills: 0 | Status: SHIPPED | OUTPATIENT
Start: 2024-04-19

## 2024-04-19 NOTE — PROGRESS NOTES
"Chief Complaint  Abdominal Pain and Diarrhea (X1 day)    Subjective        Pastor Bartholomew presents to [facility]  History of Present Illness  The patient's here today for an acute visit and for management of his chronic medical conditions.  He has type 2 diabetes, seasonal allergies, hyperlipidemia, history of stroke, GERD, hypertension, morbid obesity    He has been having worsening abdominal issues for the past 4 months. He says that 1 hour after he eats lunch he vomits. He is having dyspepsia. He is having vomiting today in the room.       Objective   Vital Signs:  /80 (BP Location: Left arm, Patient Position: Sitting)   Pulse 80   Temp 98 °F (36.7 °C) (Temporal)   Resp 18   Ht 182.9 cm (72\")   Wt 125 kg (276 lb)   SpO2 100%   BMI 37.43 kg/m²   Estimated body mass index is 37.43 kg/m² as calculated from the following:    Height as of this encounter: 182.9 cm (72\").    Weight as of this encounter: 125 kg (276 lb).               Physical Exam  Vitals reviewed.   Constitutional:       Appearance: He is well-developed. He is morbidly obese.   HENT:      Head: Normocephalic and atraumatic.      Right Ear: External ear normal.      Left Ear: External ear normal.      Mouth/Throat:      Pharynx: No oropharyngeal exudate.   Eyes:      Conjunctiva/sclera: Conjunctivae normal.      Pupils: Pupils are equal, round, and reactive to light.   Neck:      Vascular: No carotid bruit.   Cardiovascular:      Rate and Rhythm: Normal rate and regular rhythm.      Heart sounds: No murmur heard.     No friction rub. No gallop.   Pulmonary:      Effort: Pulmonary effort is normal.      Breath sounds: Normal breath sounds. No wheezing or rhonchi.   Abdominal:      General: There is no distension.   Skin:     General: Skin is warm and dry.   Neurological:      Mental Status: He is alert and oriented to person, place, and time.      Cranial Nerves: No cranial nerve deficit.      Motor: No " weakness.   Psychiatric:         Mood and Affect: Mood and affect normal.         Behavior: Behavior normal.         Thought Content: Thought content normal.         Judgment: Judgment normal.        Result Review :      CMP          2/7/2024    03:58 2/8/2024    04:16 2/14/2024    09:13   CMP   Glucose 286  184  136    BUN 16  13  13    Creatinine 0.82  0.81  0.95    EGFR 93.9  94.3  85.6    Sodium 137  140  145    Potassium 3.6  3.6  3.8    Chloride 102  104  104    Calcium 8.4  8.8  9.5    Total Protein   6.5    Albumin   4.2    Globulin   2.3    Total Bilirubin   0.5    Alkaline Phosphatase   88    AST (SGOT)   25    ALT (SGPT)   22    Albumin/Globulin Ratio   1.8    BUN/Creatinine Ratio 19.5  16.0  13.7    Anion Gap 10.9  13.4  12.7      CBC          2/7/2024    03:58 2/8/2024    04:16 2/27/2024    11:59   CBC   WBC 8.76  9.35  8.52       RBC 4.76  5.10  4.91       Hemoglobin 14.0  15.1  14.7       Hematocrit 40.3  43.0  40.8       MCV 84.7  84.3  83.1       MCH 29.4  29.6  29.9       MCHC 34.7  35.1  36.0       RDW 13.2  13.2  13.9       Platelets 210  238  227          Details          This result is from an external source.             Lipid Panel          7/24/2023    09:48 11/3/2023    08:58 2/5/2024    04:28   Lipid Panel   Total Cholesterol 136  136  189    Triglycerides 215  203  147    HDL Cholesterol 32  34  38    VLDL Cholesterol 36  34  26    LDL Cholesterol  68  68  125    LDL/HDL Ratio 1.91  1.81  3.20      TSH          7/24/2023    09:48 11/3/2023    08:58 2/4/2024    16:30   TSH   TSH 0.776  1.010  0.553                   Assessment and Plan     Diagnoses and all orders for this visit:    1. Nausea and vomiting, unspecified vomiting type (Primary)  Assessment & Plan:  He may have gastroparesis. He was given a referral back to GI. He was started on Reglan to see if it will help with his symptoms.     Orders:  -     Ambulatory Referral to Gastroenterology  -     NM HIDA Scan With Pharmacological  Intervention; Future  -     Comprehensive metabolic panel; Future  -     Magnesium; Future  -     CBC w AUTO Differential; Future  -     Phosphorus; Future  -     metoclopramide (Reglan) 5 MG tablet; Take 1 tablet by mouth 4 (Four) Times a Day Before Meals & at Bedtime.  Dispense: 120 tablet; Refill: 0    2. Class 2 severe obesity due to excess calories with serious comorbidity and body mass index (BMI) of 37.0 to 37.9 in adult  Assessment & Plan:  Patient's (Body mass index is 37.43 kg/m².) indicates that they are morbidly/severely obese (BMI > 40 or > 35 with obesity - related health condition) with health conditions that include hypertension, diabetes mellitus, and dyslipidemias . Weight is improving with lifestyle modifications. BMI  is above average; BMI management plan is completed. We discussed low calorie, low carb based diet program, portion control, and increasing exercise.       3. Essential hypertension  Assessment & Plan:  Hypertension is stable and controlled  Continue current treatment regimen.  Dietary sodium restriction.  Weight loss.  Blood pressure will be reassessed in 6 months.               Follow Up     Return in 4 weeks (on 5/17/2024), or if symptoms worsen or fail to improve.  Patient was given instructions and counseling regarding his condition or for health maintenance advice. Please see specific information pulled into the AVS if appropriate.

## 2024-04-20 LAB
ALBUMIN SERPL-MCNC: 4.9 G/DL (ref 3.5–5.2)
ALBUMIN/GLOB SERPL: 1.8 G/DL
ALP SERPL-CCNC: 104 U/L (ref 39–117)
ALT SERPL W P-5'-P-CCNC: 19 U/L (ref 1–41)
ANION GAP SERPL CALCULATED.3IONS-SCNC: 13 MMOL/L (ref 5–15)
AST SERPL-CCNC: 23 U/L (ref 1–40)
BASOPHILS # BLD AUTO: 0.06 10*3/MM3 (ref 0–0.2)
BASOPHILS NFR BLD AUTO: 0.5 % (ref 0–1.5)
BILIRUB SERPL-MCNC: 0.6 MG/DL (ref 0–1.2)
BUN SERPL-MCNC: 11 MG/DL (ref 8–23)
BUN/CREAT SERPL: 10.7 (ref 7–25)
CALCIUM SPEC-SCNC: 10 MG/DL (ref 8.6–10.5)
CHLORIDE SERPL-SCNC: 99 MMOL/L (ref 98–107)
CO2 SERPL-SCNC: 29 MMOL/L (ref 22–29)
CREAT SERPL-MCNC: 1.03 MG/DL (ref 0.76–1.27)
DEPRECATED RDW RBC AUTO: 43.8 FL (ref 37–54)
EGFRCR SERPLBLD CKD-EPI 2021: 77.7 ML/MIN/1.73
EOSINOPHIL # BLD AUTO: 0.07 10*3/MM3 (ref 0–0.4)
EOSINOPHIL NFR BLD AUTO: 0.5 % (ref 0.3–6.2)
ERYTHROCYTE [DISTWIDTH] IN BLOOD BY AUTOMATED COUNT: 13.6 % (ref 12.3–15.4)
GLOBULIN UR ELPH-MCNC: 2.7 GM/DL
GLUCOSE SERPL-MCNC: 166 MG/DL (ref 65–99)
HCT VFR BLD AUTO: 47.6 % (ref 37.5–51)
HGB BLD-MCNC: 16 G/DL (ref 13–17.7)
IMM GRANULOCYTES # BLD AUTO: 0.07 10*3/MM3 (ref 0–0.05)
IMM GRANULOCYTES NFR BLD AUTO: 0.5 % (ref 0–0.5)
LYMPHOCYTES # BLD AUTO: 2.19 10*3/MM3 (ref 0.7–3.1)
LYMPHOCYTES NFR BLD AUTO: 16.7 % (ref 19.6–45.3)
MAGNESIUM SERPL-MCNC: 2.1 MG/DL (ref 1.6–2.4)
MCH RBC QN AUTO: 30 PG (ref 26.6–33)
MCHC RBC AUTO-ENTMCNC: 33.6 G/DL (ref 31.5–35.7)
MCV RBC AUTO: 89.3 FL (ref 79–97)
MONOCYTES # BLD AUTO: 0.59 10*3/MM3 (ref 0.1–0.9)
MONOCYTES NFR BLD AUTO: 4.5 % (ref 5–12)
NEUTROPHILS NFR BLD AUTO: 10.14 10*3/MM3 (ref 1.7–7)
NEUTROPHILS NFR BLD AUTO: 77.3 % (ref 42.7–76)
NRBC BLD AUTO-RTO: 0 /100 WBC (ref 0–0.2)
PHOSPHATE SERPL-MCNC: 4.3 MG/DL (ref 2.5–4.5)
PLATELET # BLD AUTO: 313 10*3/MM3 (ref 140–450)
PMV BLD AUTO: 11.6 FL (ref 6–12)
POTASSIUM SERPL-SCNC: 3.9 MMOL/L (ref 3.5–5.2)
PROT SERPL-MCNC: 7.6 G/DL (ref 6–8.5)
RBC # BLD AUTO: 5.33 10*6/MM3 (ref 4.14–5.8)
SODIUM SERPL-SCNC: 141 MMOL/L (ref 136–145)
WBC NRBC COR # BLD AUTO: 13.12 10*3/MM3 (ref 3.4–10.8)

## 2024-04-20 NOTE — ASSESSMENT & PLAN NOTE
He may have gastroparesis. He was given a referral back to GI. He was started on Reglan to see if it will help with his symptoms.

## 2024-04-20 NOTE — ASSESSMENT & PLAN NOTE
Patient's (Body mass index is 37.43 kg/m².) indicates that they are morbidly/severely obese (BMI > 40 or > 35 with obesity - related health condition) with health conditions that include hypertension, diabetes mellitus, and dyslipidemias . Weight is improving with lifestyle modifications. BMI  is above average; BMI management plan is completed. We discussed low calorie, low carb based diet program, portion control, and increasing exercise.

## 2024-04-22 ENCOUNTER — TELEPHONE (OUTPATIENT)
Dept: FAMILY MEDICINE CLINIC | Facility: CLINIC | Age: 74
End: 2024-04-22
Payer: MEDICARE

## 2024-04-22 NOTE — TELEPHONE ENCOUNTER
Caller: KAMI MERCADO    Relationship: Emergency Contact    Best call back number: [phone]    What test was performed: BLOOD WORK     When was the test performed: 4.19.24    Where was the test performed: [facility]

## 2024-04-23 NOTE — TELEPHONE ENCOUNTER
Caller: AKMI MERCADO    Relationship to patient: Emergency Contact    Best call back number: [phone]    CALLER STATED THAT SHE IS REQUESTING A CALL BACK WHEN LABS ARE READ.     What test was performed: BLOOD WORK      When was the test performed: 4.19.24     Where was the test performed: [facility]

## 2024-04-29 ENCOUNTER — TELEPHONE (OUTPATIENT)
Dept: CARDIOLOGY | Facility: CLINIC | Age: 74
End: 2024-04-29
Payer: MEDICARE

## 2024-04-29 RX ORDER — PANTOPRAZOLE SODIUM 40 MG/1
40 TABLET, DELAYED RELEASE ORAL DAILY
Qty: 90 TABLET | Refills: 1 | Status: SHIPPED | OUTPATIENT
Start: 2024-04-29

## 2024-04-29 NOTE — TELEPHONE ENCOUNTER
Caller: KAMI MERCADO     Relationship:SPOUSE     Callback number: [phone]    Is it ok to leave a message: [x] Yes [] No    Requested medication for samples: ELIQUIS 5 MG     How much medication does the patient currently have left: 1 WEEK     Who will be picking up the samples: KAMI OR PATIENT    Do you need information about patient financial assistance for this medication: [] Yes [x] No    Additional details provided:

## 2024-04-29 NOTE — TELEPHONE ENCOUNTER
Caller: ROGELIO KAMI    Relationship: Emergency Contact    Best call back number: [phone]     Requested Prescriptions:   Requested Prescriptions     Pending Prescriptions Disp Refills    pantoprazole (PROTONIX) 40 MG EC tablet       Sig: Take 1 tablet by mouth Daily.        Pharmacy where request should be sent: [facility]/PHARMACY #[id number] - [place], [place] - [address] [facility] - [phone]  - [phone] FX     Last office visit with prescribing clinician: 3/29/2024   Last telemedicine visit with prescribing clinician: Visit date not found   Next office visit with prescribing clinician: 5/8/2024     Additional details provided by patient: PATIENT HAS ENOUGH MEDICATION TO GET HIM THROUGH THIS WEEK.     Does the patient have less than a 3 day supply:  [] Yes  [x] No    Would you like a call back once the refill request has been completed: [] Yes [] No    If the office needs to give you a call back, can they leave a voicemail: [] Yes [] No    Malgorzata Masterson Rep   04/29/24 15:10 EDT

## 2024-04-30 NOTE — TELEPHONE ENCOUNTER
PT IS REQUESTING ELIQUIS SAMPLES.  SAMPLES PROVIDED AND LEFT AT  FOR .  LAVELLE DIAZ WAS ADVISED PT NEEDS $728.34 TO MEET 3% OUT OF POCKET PHARMACY EXPENSES.  PT NOTIFIED AND IS GOING TO CHECK WITH THE PHARMACY.     ELIQUIS 5 MG QTY # 56  LOT # [id number] X 4  EXP JEFF 2025 X 4

## 2024-04-30 NOTE — TELEPHONE ENCOUNTER
PT WIFE CALLED AND STATED SHE SPOKE WITH THE PHARMACY.  SHE REQUESTED TO SEND ELIQUIS SCRIPT FOR HALF OF A MONTH SUPPLY TO [facility].  PT WILL GET OUT OF POCKET EXPENSE STATEMENT AND TURN INTO OFFICE.  ONCE RECEIVED, I WILL SUBMIT TO [employer] FOR ADDITIONAL REVIEW.

## 2024-05-01 ENCOUNTER — TELEPHONE (OUTPATIENT)
Dept: CARDIOLOGY | Facility: CLINIC | Age: 74
End: 2024-05-01
Payer: MEDICARE

## 2024-05-01 NOTE — TELEPHONE ENCOUNTER
RECEIVED ELIQUIS PAP OUT OF POCKET EXPENSE STATEMENTS FROM PT AND FAXED TO [employer] FOR ADDITIONAL REVIEW.  SCANNED A COPY INTO MEDIA.  WAITING ON A DETERMINATION.

## 2024-05-03 NOTE — TELEPHONE ENCOUNTER
[employer] PAP APPROVAL LETTER RECEIVED VIA FAX AND SCANNED IN MEDIA.  PT NOTIFIED.    APPROVED 05/02/24-12/31/24

## 2024-05-08 ENCOUNTER — OFFICE VISIT (OUTPATIENT)
Dept: FAMILY MEDICINE CLINIC | Facility: CLINIC | Age: 74
End: 2024-05-08
Payer: MEDICARE

## 2024-05-08 VITALS
BODY MASS INDEX: 37.93 KG/M2 | OXYGEN SATURATION: 98 % | HEART RATE: 56 BPM | DIASTOLIC BLOOD PRESSURE: 59 MMHG | HEIGHT: 72 IN | SYSTOLIC BLOOD PRESSURE: 131 MMHG | TEMPERATURE: 97.1 F | WEIGHT: 280.08 LBS

## 2024-05-08 DIAGNOSIS — R11.2 NAUSEA AND VOMITING, UNSPECIFIED VOMITING TYPE: ICD-10-CM

## 2024-05-08 DIAGNOSIS — I10 ESSENTIAL HYPERTENSION: Chronic | ICD-10-CM

## 2024-05-08 DIAGNOSIS — F32.A DEPRESSION, UNSPECIFIED DEPRESSION TYPE: ICD-10-CM

## 2024-05-08 DIAGNOSIS — Z79.4 TYPE 2 DIABETES MELLITUS WITH HYPERGLYCEMIA, WITH LONG-TERM CURRENT USE OF INSULIN: ICD-10-CM

## 2024-05-08 DIAGNOSIS — E66.01 CLASS 2 SEVERE OBESITY DUE TO EXCESS CALORIES WITH SERIOUS COMORBIDITY AND BODY MASS INDEX (BMI) OF 37.0 TO 37.9 IN ADULT: Primary | ICD-10-CM

## 2024-05-08 DIAGNOSIS — E11.65 TYPE 2 DIABETES MELLITUS WITH HYPERGLYCEMIA, WITH LONG-TERM CURRENT USE OF INSULIN: ICD-10-CM

## 2024-05-08 PROCEDURE — 99214 OFFICE O/P EST MOD 30 MIN: CPT

## 2024-05-08 PROCEDURE — 1126F AMNT PAIN NOTED NONE PRSNT: CPT

## 2024-05-08 PROCEDURE — 3078F DIAST BP <80 MM HG: CPT

## 2024-05-08 PROCEDURE — 3051F HG A1C>EQUAL 7.0%<8.0%: CPT

## 2024-05-08 PROCEDURE — 3075F SYST BP GE 130 - 139MM HG: CPT

## 2024-05-08 RX ORDER — METOCLOPRAMIDE 5 MG/1
5 TABLET ORAL
Qty: 120 TABLET | Refills: 5 | Status: SHIPPED | OUTPATIENT
Start: 2024-05-08

## 2024-05-08 RX ORDER — VENLAFAXINE HYDROCHLORIDE 150 MG/1
150 CAPSULE, EXTENDED RELEASE ORAL DAILY
Qty: 90 CAPSULE | Refills: 3 | Status: SHIPPED | OUTPATIENT
Start: 2024-05-08

## 2024-05-10 ENCOUNTER — PATIENT ROUNDING (BHMG ONLY) (OUTPATIENT)
Dept: FAMILY MEDICINE CLINIC | Facility: CLINIC | Age: 74
End: 2024-05-10
Payer: MEDICARE

## 2024-05-15 ENCOUNTER — HOSPITAL ENCOUNTER (OUTPATIENT)
Dept: NUCLEAR MEDICINE | Facility: HOSPITAL | Age: 74
Discharge: HOME | End: 2024-05-15
Admitting: FAMILY MEDICINE
Payer: MEDICARE

## 2024-05-15 DIAGNOSIS — R11.2 NAUSEA AND VOMITING, UNSPECIFIED VOMITING TYPE: ICD-10-CM

## 2024-05-15 PROCEDURE — A9537 TC99M MEBROFENIN: HCPCS | Performed by: FAMILY MEDICINE

## 2024-05-15 PROCEDURE — 78227 HEPATOBIL SYST IMAGE W/DRUG: CPT

## 2024-05-15 PROCEDURE — 0 TECHNETIUM TC 99M MEBROFENIN KIT: Performed by: FAMILY MEDICINE

## 2024-05-15 RX ORDER — KIT FOR THE PREPARATION OF TECHNETIUM TC 99M MEBROFENIN 45 MG/10ML
1 INJECTION, POWDER, LYOPHILIZED, FOR SOLUTION INTRAVENOUS
Status: COMPLETED | OUTPATIENT
Start: 2024-05-15 | End: 2024-05-15

## 2024-05-15 RX ADMIN — MEBROFENIN 1 DOSE: 45 INJECTION, POWDER, LYOPHILIZED, FOR SOLUTION INTRAVENOUS at 13:00

## 2024-05-20 ENCOUNTER — TRANSCRIBE ORDERS (OUTPATIENT)
Dept: LAB | Facility: HOSPITAL | Age: 74
End: 2024-05-20
Payer: MEDICARE

## 2024-05-20 ENCOUNTER — LAB (OUTPATIENT)
Dept: LAB | Facility: HOSPITAL | Age: 74
End: 2024-05-20
Payer: MEDICARE

## 2024-05-20 DIAGNOSIS — Z79.4 ENCOUNTER FOR LONG-TERM (CURRENT) USE OF INSULIN: ICD-10-CM

## 2024-05-20 DIAGNOSIS — E11.9 DIABETES MELLITUS WITHOUT COMPLICATION: Primary | ICD-10-CM

## 2024-05-20 DIAGNOSIS — Z96.41 INSULIN PUMP STATUS: ICD-10-CM

## 2024-05-20 DIAGNOSIS — I65.21 OCCLUSION OF RIGHT CAROTID ARTERY: ICD-10-CM

## 2024-05-20 DIAGNOSIS — E78.5 HYPERLIPIDEMIA, UNSPECIFIED HYPERLIPIDEMIA TYPE: ICD-10-CM

## 2024-05-20 DIAGNOSIS — I10 ESSENTIAL HYPERTENSION, MALIGNANT: ICD-10-CM

## 2024-05-20 DIAGNOSIS — E11.9 DIABETES MELLITUS WITHOUT COMPLICATION: ICD-10-CM

## 2024-05-20 LAB
ALBUMIN SERPL-MCNC: 4.8 G/DL (ref 3.5–5.2)
ALBUMIN UR-MCNC: 8.9 MG/DL
ALBUMIN/GLOB SERPL: 2 G/DL
ALP SERPL-CCNC: 77 U/L (ref 39–117)
ALT SERPL W P-5'-P-CCNC: 24 U/L (ref 1–41)
ANION GAP SERPL CALCULATED.3IONS-SCNC: 9 MMOL/L (ref 5–15)
AST SERPL-CCNC: 17 U/L (ref 1–40)
BILIRUB SERPL-MCNC: 0.4 MG/DL (ref 0–1.2)
BUN SERPL-MCNC: 14 MG/DL (ref 8–23)
BUN/CREAT SERPL: 14.1 (ref 7–25)
CALCIUM SPEC-SCNC: 9.7 MG/DL (ref 8.6–10.5)
CHLORIDE SERPL-SCNC: 101 MMOL/L (ref 98–107)
CHOLEST SERPL-MCNC: 145 MG/DL (ref 0–200)
CO2 SERPL-SCNC: 31 MMOL/L (ref 22–29)
CREAT SERPL-MCNC: 0.99 MG/DL (ref 0.76–1.27)
CREAT UR-MCNC: 140 MG/DL
EGFRCR SERPLBLD CKD-EPI 2021: 81.4 ML/MIN/1.73
GLOBULIN UR ELPH-MCNC: 2.4 GM/DL
GLUCOSE SERPL-MCNC: 102 MG/DL (ref 65–99)
HBA1C MFR BLD: 8.9 % (ref 4.8–5.6)
HDLC SERPL-MCNC: 36 MG/DL (ref 40–60)
LDLC SERPL CALC-MCNC: 71 MG/DL (ref 0–100)
LDLC/HDLC SERPL: 1.76 {RATIO}
MICROALBUMIN/CREAT UR: 63.6 MG/G (ref 0–29)
POTASSIUM SERPL-SCNC: 4.4 MMOL/L (ref 3.5–5.2)
PROT SERPL-MCNC: 7.2 G/DL (ref 6–8.5)
SODIUM SERPL-SCNC: 141 MMOL/L (ref 136–145)
TRIGL SERPL-MCNC: 228 MG/DL (ref 0–150)
TSH SERPL DL<=0.05 MIU/L-ACNC: 1.04 UIU/ML (ref 0.27–4.2)
VIT B12 BLD-MCNC: 554 PG/ML (ref 211–946)
VLDLC SERPL-MCNC: 38 MG/DL (ref 5–40)

## 2024-05-20 PROCEDURE — 83036 HEMOGLOBIN GLYCOSYLATED A1C: CPT

## 2024-05-20 PROCEDURE — 84443 ASSAY THYROID STIM HORMONE: CPT

## 2024-05-20 PROCEDURE — 82570 ASSAY OF URINE CREATININE: CPT

## 2024-05-20 PROCEDURE — 80061 LIPID PANEL: CPT

## 2024-05-20 PROCEDURE — 82607 VITAMIN B-12: CPT

## 2024-05-20 PROCEDURE — 80053 COMPREHEN METABOLIC PANEL: CPT

## 2024-05-20 PROCEDURE — 36415 COLL VENOUS BLD VENIPUNCTURE: CPT

## 2024-05-20 PROCEDURE — 82043 UR ALBUMIN QUANTITATIVE: CPT

## 2024-06-05 ENCOUNTER — OFFICE VISIT (OUTPATIENT)
Dept: NEUROLOGY | Facility: CLINIC | Age: 74
End: 2024-06-05
Payer: MEDICARE

## 2024-06-05 ENCOUNTER — OFFICE VISIT (OUTPATIENT)
Dept: PODIATRY | Facility: CLINIC | Age: 74
End: 2024-06-05
Payer: MEDICARE

## 2024-06-05 VITALS
SYSTOLIC BLOOD PRESSURE: 166 MMHG | HEART RATE: 68 BPM | WEIGHT: 282 LBS | OXYGEN SATURATION: 96 % | HEIGHT: 72 IN | BODY MASS INDEX: 38.19 KG/M2 | TEMPERATURE: 97.8 F | DIASTOLIC BLOOD PRESSURE: 76 MMHG

## 2024-06-05 VITALS
HEIGHT: 72 IN | BODY MASS INDEX: 38.32 KG/M2 | DIASTOLIC BLOOD PRESSURE: 64 MMHG | HEART RATE: 61 BPM | SYSTOLIC BLOOD PRESSURE: 161 MMHG | WEIGHT: 282.9 LBS

## 2024-06-05 DIAGNOSIS — M79.671 PAIN IN BOTH FEET: ICD-10-CM

## 2024-06-05 DIAGNOSIS — M79.672 PAIN IN BOTH FEET: ICD-10-CM

## 2024-06-05 DIAGNOSIS — B35.1 ONYCHOMYCOSIS: ICD-10-CM

## 2024-06-05 DIAGNOSIS — E11.65 TYPE 2 DIABETES MELLITUS WITH HYPERGLYCEMIA, WITH LONG-TERM CURRENT USE OF INSULIN: Primary | ICD-10-CM

## 2024-06-05 DIAGNOSIS — Z79.4 TYPE 2 DIABETES MELLITUS WITH HYPERGLYCEMIA, WITH LONG-TERM CURRENT USE OF INSULIN: Primary | ICD-10-CM

## 2024-06-05 DIAGNOSIS — G31.84 MILD COGNITIVE IMPAIRMENT: Primary | ICD-10-CM

## 2024-06-05 DIAGNOSIS — I63.81 LACUNAR INFARCTION: ICD-10-CM

## 2024-06-05 RX ORDER — DONEPEZIL HYDROCHLORIDE 10 MG/1
10 TABLET, FILM COATED ORAL NIGHTLY
Qty: 90 TABLET | Refills: 1 | Status: SHIPPED | OUTPATIENT
Start: 2024-06-05

## 2024-06-05 RX ORDER — ROSUVASTATIN CALCIUM 40 MG/1
40 TABLET, COATED ORAL NIGHTLY
Qty: 90 TABLET | Refills: 1 | Status: SHIPPED | OUTPATIENT
Start: 2024-06-05

## 2024-06-05 RX ORDER — MEMANTINE HYDROCHLORIDE 10 MG/1
TABLET ORAL
Qty: 180 TABLET | Refills: 1 | Status: SHIPPED | OUTPATIENT
Start: 2024-06-05

## 2024-06-05 NOTE — PROGRESS NOTES
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  Feels as if memory has been stable since previous visit.  Tolerating donepezil without side effect.     Interval History:   States he's been experiencing short term memory difficulty for the past 2 years.  Presents to the office with wife.  She will tell him something and he asks him the same question several times.  Difficulty remembering names.  Wife states he's quicker to anger or more irritable lately.        Objective   Vital Signs:   /64   Pulse 61   Ht 182.9 cm (72\")   Wt 128 kg (282 lb 14.4 oz)   BMI 38.37 kg/m²     Physical Exam  HENT:      Head: Normocephalic.   Pulmonary:      Effort: Pulmonary effort is normal.   Neurological:      Mental Status: He is alert and oriented to person, place, and time.      Sensory: Sensation is intact.      Motor: Motor function is intact.      Coordination: Coordination is intact.      Deep Tendon Reflexes: Reflexes are normal and symmetric.        Neurologic Exam     Mental Status   Oriented to person, place, and time.        Result Review :   CBC:  Lab Results   Component Value Date    WBC 13.12 (H) 04/19/2024    RBC 5.33 04/19/2024    HGB 16.0 04/19/2024    HCT 47.6 04/19/2024    MCV 89.3 04/19/2024    MCH 30.0 04/19/2024    MCHC 33.6 04/19/2024    RDW 13.6 04/19/2024     04/19/2024     CMP:  Lab Results   Component Value Date     (H) 12/19/2022    BUN 14 05/20/2024    CREATININE 0.99 05/20/2024    EGFRIFNONA 80 12/17/2021    EGFRIFAFRI >60 12/19/2022     05/20/2024    K 4.4 05/20/2024     05/20/2024    CALCIUM 9.7 05/20/2024    PROTENTOTREF 6.6 09/03/2019    ALBUMIN 4.8 05/20/2024    LABGLOBREF 1.8 09/03/2019    BILITOT 0.4 05/20/2024    ALKPHOS 77 05/20/2024    AST 17 05/20/2024    ALT 24 05/20/2024     LIPID PANEL:  Lab Results "   Component Value Date    CHLPL 130 06/01/2021    TRIG 228 (H) 05/20/2024    HDL 36 (L) 05/20/2024    VLDL 38 05/20/2024    LDL 71 05/20/2024    LDLHDL 1.76 05/20/2024     HGBA1C(MOST RECENT):  Lab Results   Component Value Date    HGBA1C 8.90 (H) 05/20/2024     B12:   Lab Results   Component Value Date    IWUVFLBA78 554 05/20/2024      FOLATE:   Lab Results   Component Value Date    FOLATE 17.70 07/24/2023     TSH/FREE T4:   Lab Results   Component Value Date    TSH 1.040 05/20/2024    FREET4 0.87 (L) 11/03/2023                Assessment and Plan    Diagnoses and all orders for this visit:    1. Mild cognitive impairment (Primary)  Assessment & Plan:  Continue donepezil and add memantine to maximize memory support.       2. Lacunar infarction  Assessment & Plan:  Continue aspirin and rosuvastatin.      Discussed signs and symptoms of stroke including, but not limited to, visual disturbances, weakness of one side of the body, facial droop, cognitive changes, slurred speech, imbalance and dizziness.  Instructed patient to call 911 and get emergent medical treatment immediately at the onset of those symptoms.   Patient verbalized understanding.       Other orders  -     donepezil (ARICEPT) 10 MG tablet; Take 1 tablet by mouth Every Night.  Dispense: 90 tablet; Refill: 1  -     memantine (NAMENDA) 10 MG tablet; 1/2 tablet at night for 1 wk, then 1/2 tablet BID for 1 wk, then 1/2 in the AM and 1 tablet in PM for 1 week, then 1 tablet BID thereafter  Dispense: 180 tablet; Refill: 1  -     rosuvastatin (CRESTOR) 40 MG tablet; Take 1 tablet by mouth Every Night.  Dispense: 90 tablet; Refill: 1        Follow Up   Return in about 6 months (around 12/5/2024) for memory f/u, stroke f/u.  Patient was given instructions and counseling regarding his condition or for health maintenance advice. Please see specific information pulled into the AVS if appropriate.

## 2024-06-05 NOTE — PROGRESS NOTES
[facility] - PODIATRY    Today's Date: 06/05/24    Patient Name: Pastor Bartholomew  MRN: [id number]  CSN: [id number]  PCP: Esthela Arceo APRN,   Referring Provider: No ref. 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    Patient is a 73-year-old diabetic male presenting with bilateral foot issues.  Patient states his A1c is usually around 7, but his sugars have been elevated due to pump issues.  Patient was recently admitted for diverticulitis and A-fib.  He is overall doing well.    Patient denies any fevers, chills, nausea, vomiting, shortness of breath, nor any other constitutional signs nor symptoms.    No other pedal complaints at this time.    Past Medical History:   Diagnosis Date    Atrial fibrillation     Capps esophagus     Diabetes     Hernia, inguinal     History of total hip arthroplasty, right 02/01/2023    Hyperlipidemia     Hypertension     Hypervitaminosis D      PONV (postoperative nausea and vomiting)     Sleep apnea     Type 2 diabetes mellitus      Past Surgical History:   Procedure Laterality Date    COLONOSCOPY      COLONOSCOPY N/A 04/25/2022    Procedure: COLONOSCOPY WITH POLYPECTOMY;  Surgeon: Artie Dickerson MD;  Location: [facility] ENDOSCOPY;  Service: Gastroenterology;  Laterality: N/A;  COLON POLYP    ENDOSCOPY N/A 04/25/2022    Procedure: ESOPHAGOGASTRODUODENOSCOPY WITH BX;  Surgeon: Artie Dickerson MD;  Location: [facility] ENDOSCOPY;  Service: Gastroenterology;  Laterality: N/A;  CAPPS'S ESOPHAGUS, HIATAL HERNIA    HERNIA REPAIR      KNEE SURGERY Bilateral     TRIGGER FINGER RELEASE Bilateral     x2 THUMBS     Family History   Problem Relation Age of Onset    Diabetes Mother     Hypertension Mother     Heart attack Mother     COPD Brother      Diabetes Maternal Grandmother     Colon cancer Neg Hx      Social History     Socioeconomic History    Marital status:    Tobacco Use    Smoking status: Never     Passive exposure: Yes    Smokeless tobacco: Never   Vaping Use    Vaping status: Never Used   Substance and Sexual Activity    Alcohol use: Never    Drug use: Never    Sexual activity: Defer     Allergies   Allergen Reactions    Codeine Nausea Only    Semaglutide(0.25 Or 0.5mg-Dos) GI Intolerance     Current Outpatient Medications   Medication Sig Dispense Refill    amLODIPine-valsartan (EXFORGE)  MG per tablet TAKE 1 TABLET BY MOUTH EVERY DAY 90 tablet 1    apixaban (ELIQUIS) 5 MG tablet tablet Take 1 tablet by mouth 2 (Two) Times a Day. Indications: Atrial Fibrillation 30 tablet 0    Ascorbic Acid (Vitamin C) 100 MG chewable tablet Chew 1 tablet Daily.      cetirizine (zyrTEC) 10 MG tablet Take 1 tablet by mouth Daily As Needed.      cholecalciferol (VITAMIN D3) 10 MCG (400 UNIT) tablet Take 1 tablet by mouth Daily.      co-enzyme Q-10 30 MG capsule Take 1 capsule by mouth Daily.      Continuous Blood Gluc Sensor (Dexcom G6 Sensor) Every 10 (Ten) Days.      CONTOUR NEXT TEST test strip Test 6 times daily DX: e11.65 200 each 5    digoxin (LANOXIN) 125 MCG tablet Take 1 tablet by mouth Daily. 90 tablet 1    donepezil (ARICEPT) 10 MG tablet Take 1 tablet by mouth Every Night. 90 tablet 1    insulin patient supplied pump Inject  under the skin into the appropriate area as directed Continuous. Type of Insulin: Humulin R 500  Type of Pump:Medtronic/MiniMRidango [id number]  Bolus amount: 50 units daily.   Basal amount : Max basal 5.50u/hr  Correction factor:   Insulin to carbohydrate ratio: 12a-12p 10                                                 12p-2p 10                                                  2p -12 a 7    Date of last site change: 2/3/24  Location of catheter: right side on stomach    Frequency of refill: approximately 3  days  Last refill date: per pt 2/3/24    Target 12a-3a 130-150               3a-6a -100-120               6a-9p 100-120               9p-12a 130-150    Active Ins time : 5 hours    Basal Pattern Setup 27.3    Prescriber Shara Campbell  Phone number [phone]      insulin regular (HUMULIN R) 500 UNIT/ML CONCENTRATED injection 50u daily with insulin pump (Patient taking differently: Inject 10 unit marking on U-100 syringe under the skin into the appropriate area as directed Daily. 60u daily with insulin pump) 40 mL 3    L-Lysine 500 MG tablet tablet Take 1 tablet by mouth Daily.      Lancets misc Check 5 times daily DX: e11.65 500 each 1    magnesium oxide (MAG-OX) 400 MG tablet Take 1 tablet by mouth Daily.      metFORMIN (GLUCOPHAGE) 500 MG tablet Take 1 tablet by mouth 2 (Two) Times a Day With Meals.      metoclopramide (REGLAN) 5 MG tablet TAKE 1 TABLET BY MOUTH 4 (FOUR) TIMES A DAY BEFORE MEALS & AT BEDTIME. 120 tablet 5    multivitamin with minerals tablet tablet Take 1 tablet by mouth Daily.      nebivolol (Bystolic) 10 MG tablet Take 1 tablet by mouth Daily. 90 tablet 3    Omega-3 Fatty Acids (fish oil) 1000 MG capsule capsule Take 1 capsule by mouth 2 (Two) Times a Day With Meals.      ondansetron ODT (Zofran ODT) 4 MG disintegrating tablet Place 1 tablet on the tongue Every 8 (Eight) Hours As Needed for Nausea or Vomiting. 30 tablet 1    pantoprazole (PROTONIX) 40 MG EC tablet Take 1 tablet by mouth Daily. 90 tablet 1    rosuvastatin (CRESTOR) 40 MG tablet Take 1 tablet by mouth Every Night. 90 tablet 1    spironolactone-hydrochlorothiazide (ALDACTAZIDE) 25-25 MG tablet Take 1 tablet by mouth Daily.      venlafaxine XR (EFFEXOR-XR) 150 MG 24 hr capsule Take 1 capsule by mouth Daily. 90 capsule 3    vitamin B-12 (CYANOCOBALAMIN) 100 MCG tablet Take 1 tablet by mouth Daily.       No current facility-administered medications for this visit.     Review of Systems   All other systems reviewed and are  negative.      OBJECTIVE     Vitals:    06/05/24 0842   BP: 166/76   Pulse: 68   Temp: 97.8 °F (36.6 °C)   SpO2: 96%       Body mass index is 38.25 kg/m².    Lab Results   Component Value Date    HGBA1C 8.90 (H) 05/20/2024       Lab Results   Component Value Date    GLUCOSE 102 (H) 05/20/2024    CALCIUM 9.7 05/20/2024     05/20/2024    K 4.4 05/20/2024    CO2 31.0 (H) 05/20/2024     05/20/2024    BUN 14 05/20/2024    CREATININE 0.99 05/20/2024    EGFRIFAFRI >60 12/19/2022    EGFRIFNONA 80 12/17/2021    BCR 14.1 05/20/2024    ANIONGAP 9.0 05/20/2024       Patient seen in no apparent distress.      PHYSICAL EXAM:     Foot/Ankle Exam    GENERAL  Appearance:  appears stated age  Orientation:  AAOx3  Affect:  appropriate  Gait:  unimpaired  Assistance:  independent  Right shoe gear: casual shoe  Left shoe gear: casual shoe    VASCULAR     Right Foot Vascularity   Normal vascular exam    Dorsalis pedis:  2+  Posterior tibial:  2+  Skin temperature:  warm  Edema grading:  None  CFT:  < 3 seconds  Pedal hair growth:  Present  Varicosities:  none     Left Foot Vascularity   Normal vascular exam    Dorsalis pedis:  2+  Posterior tibial:  2+  Skin temperature:  warm  Edema grading:  None  CFT:  < 3 seconds  Pedal hair growth:  Present  Varicosities:  none     NEUROLOGIC     Right Foot Neurologic   Normal sensation    Light touch sensation: normal  Vibratory sensation: normal  Hot/Cold sensation: normal  Protective Sensation using [place] Monofilament:   Sites intact: 10  Sites tested: 10     Left Foot Neurologic   Normal sensation    Light touch sensation: normal  Vibratory sensation: normal  Hot/Cold sensation:  normal  Protective Sensation using [place] Monofilament:   Sites intact: 10  Sites tested: 10    MUSCLE STRENGTH     Right Foot Muscle Strength   Foot dorsiflexion:  4  Foot plantar flexion:  4  Foot inversion:  4  Foot eversion:  4     Left Foot Muscle Strength   Foot dorsiflexion:   4  Foot plantar flexion:  4  Foot inversion:  4  Foot eversion:  4    RANGE OF MOTION     Right Foot Range of Motion   Foot and ankle ROM within normal limits       Left Foot Range of Motion   Foot and ankle ROM within normal limits      DERMATOLOGIC      Right Foot Dermatologic   Skin  Right foot skin is intact.      Left Foot Dermatologic   Skin  Left foot skin is intact.             ASSESSMENT/PLAN     Diagnoses and all orders for this visit:    1. Type 2 diabetes mellitus with hyperglycemia, with long-term current use of insulin (Primary)    2. Pain in both feet    3. Onychomycosis        Comprehensive lower extremity examination and evaluation was performed.    Discussed findings and treatment plan including risks, benefits, and treatment options with patient in detail. Patient agreed with treatment plan.    Medications and allergies reviewed.  Reviewed available blood glucose and HgB A1C lab values along with other pertinent labs.  These were discussed with the patient as to their importance of diabetic maintenance.    Diabetic foot exam performed and documented this date, compliant with CQM required standards. Detail of findings as noted in physical exam.  Lower extremity Neurologic exam for diabetic patient performed and documented this date, compliant with PQRS required standards. Detail of findings as noted in physical exam.  Advised patient importance of good routine lower extremity hygiene. Advised patient importance of evaluating for intact skin and pain free nail borders.  Advised patient to use mirror to evaluate plantar/ soles of feet for better visualization. Advised patient monitor and phone office to be seen if any cracking to skin, open lesions, painful nail borders or if nails become elongated prior to next visit. Advised patient importance of daily cleansing of lower extremities, followed by good skin cream to maintain normal hydration of skin. Also advised patient importance of close daily  monitoring of blood sugar. Advised to regulate diet and medications to maintain control of blood sugar in optimal range. Contact primary care provider if difficulties maintaining blood sugar levels.  Advised Patient of presence of Diabetes Mellitus condition.  Advised Patient risk of progression and worsening or improvement, then return of condition.  Will monitor condition for any change in future. Treat with most appropriate treatment pending status of condition.  Counseled and advised patient extensively on nature and ramifications of diabetes. Standard instructions given to patient for good diabetic foot care and maintenance. Advised importance of careful monitoring to avoid break down and complications secondary to diabetes. Advised patient importance of strict maintenance of blood sugar control. Advised patient of possible ominous results from neglect of condition, i.e.: amputation/ loss of digits, feet and legs, or even death.  Patient states understands counseling, will monitor closely, continue good hygiene and routine diabetic foot care. Patient will contact office if questions or problems.      An After Visit Summary was printed and given to the patient at discharge, including (if requested) any available informative/educational handouts regarding diagnosis, treatment, or medications. All questions were answered to patient/family satisfaction. Should symptoms fail to improve or worsen they agree to call or return to clinic or to go to the Emergency Department. Discussed the importance of following up with any needed screening tests/labs/specialist appointments and any requested follow-up recommended by me today. Importance of maintaining follow-up discussed and patient accepts that missed appointments can delay diagnosis and potentially lead to worsening of conditions.    Return in about 3 months (around 9/5/2024), or if symptoms worsen or fail to improve., or sooner if acute issues arise.    This  document has been electronically signed by Phuc Mena DPM on June 5, 2024 09:05 EDT

## 2024-07-26 NOTE — PROGRESS NOTES
Chief Complaint   Nausea (Ongoing since December ) and Vomiting (Ongoing since December)    History of Present Illness       Pastor Bartholomew is a 73 y.o. male who presents to [facility] for follow-up with a history of nausea, vomiting, reflux, diabetes, history of H. pylori, Paz's esophagus, hiatal hernia and chronic gastritis.  Patient reports since December that he has had ongoing nausea and vomiting that comes in spells every couple weeks with belching and then vomiting.  He reports flushing and sweating during those moments.  He does have a history of Paz's esophagus.  Patient has been a diabetic for 31 years.  He was placed on Reglan by his primary care in May and reports that this did improve his symptoms.  Patient continues on Reglan for suspected gastroparesis and Protonix for reflux.  Patient has Zofran available as needed for nausea and vomiting.  Patient denies fever, weight loss, night sweats, melena, hematochezia, hematemesis.    Most recent labs- 5/20/24    NM HIDA SCAN- 5/15/24  No evidence for acute cholecystitis.  Normal gallbladder ejection fraction. Patient had no symptoms after  Ensure was given. 62%    CT Abdomen Pelvis With Contrast- 2/4/24  1. No acute intra-abdominal or intrapelvic abnormality noted.  Specifically no evidence of   obstruction or significant inflammatory change of the GI tract.  2. 4 mm noncalcified nodule left lung base likely post inflammatory or infectious in nature.    Optional follow-up at 12 months could be considered with a CT chest in a high risk patient  3. Diverticulosis without evidence of acute diverticulitis.  4. 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 male who presents to [facility] for follow-up with a history of nausea, vomiting, reflux, diabetes, hypertension.  Patient presenting for follow-up after endoscopy.  We reviewed endoscopy and  pathology at this time.  Patient treated for H. pylori at the end of April.  At the last office visit patient was given Zofran to use as needed and continued on Protonix.  May 11th completed treatment for h. Pylori and reports resolution of his symptoms of nausea vomiting and reflux.  He continues on Protonix with good control of his reflux.  Patient denies fever, nausea, vomiting, weight loss, night sweats, melena, hematochezia, hematemesis.     Endoscopy: Review of the patient's most recent EGD and colonoscopy performed by Dr. Dickerson on 04.25.2022 4 mm polyp in the ascending colon.  Short segment of Paz's esophagus noted in the lower third of the esophagus, small hiatal hernia, stomach normal duodenum normal.  Biopsies consistent with chronic active gastritis, H. pylori.  Tubular adenoma colon biopsy.  Repeat: 5 years.  Repeat EGD in 3 years.  Results       Result Review :   The following data was reviewed by: JOHNNA Campo on 07/30/2024:    CMP          2/14/2024    09:13 4/19/2024    15:50 5/20/2024    09:41   CMP   Glucose 136  166  102    BUN 13  11  14    Creatinine 0.95  1.03  0.99    EGFR 85.6  77.7  81.4    Sodium 145  141  141    Potassium 3.8  3.9  4.4    Chloride 104  99  101    Calcium 9.5  10.0  9.7    Total Protein 6.5  7.6  7.2    Albumin 4.2  4.9  4.8    Globulin 2.3  2.7  2.4    Total Bilirubin 0.5  0.6  0.4    Alkaline Phosphatase 88  104  77    AST (SGOT) 25  23  17    ALT (SGPT) 22  19  24    Albumin/Globulin Ratio 1.8  1.8  2.0    BUN/Creatinine Ratio 13.7  10.7  14.1    Anion Gap 12.7  13.0  9.0      CBC          2/8/2024    04:16 2/27/2024    11:59 4/19/2024    15:50   CBC   WBC 9.35  8.52     13.12    RBC 5.10  4.91     5.33    Hemoglobin 15.1  14.7     16.0    Hematocrit 43.0  40.8     47.6    MCV 84.3  83.1     89.3    MCH 29.6  29.9     30.0    MCHC 35.1  36.0     33.6    RDW 13.2  13.9     13.6    Platelets 238  227     313       Details          This result is from an external  "source.               Iron Profile No results found for: \"IRON\", \"TIBC\", \"LABIRON\", \"TRANSFERRIN\"  Ferritin No results found for: \"FERRITIN\"            Past Medical History       Past Medical History:   Diagnosis Date    Atrial fibrillation     Capps esophagus     Clotting disorder 2/5/2024    Coronary artery disease Feb 5 2024    AFib    Diabetes     GERD (gastroesophageal reflux disease) 2000    Hernia, inguinal     History of total hip arthroplasty, right 02/01/2023    Hyperlipidemia     Hypertension     Hypervitaminosis D      PONV (postoperative nausea and vomiting)     Sleep apnea     Type 2 diabetes mellitus        Past Surgical History:   Procedure Laterality Date    COLONOSCOPY      COLONOSCOPY N/A 04/25/2022    Procedure: COLONOSCOPY WITH POLYPECTOMY;  Surgeon: Artie Dickerson MD;  Location: [facility] ENDOSCOPY;  Service: Gastroenterology;  Laterality: N/A;  COLON POLYP    ENDOSCOPY N/A 04/25/2022    Procedure: ESOPHAGOGASTRODUODENOSCOPY WITH BX;  Surgeon: Artie Dickerson MD;  Location: [facility] ENDOSCOPY;  Service: Gastroenterology;  Laterality: N/A;  CAPPS'S ESOPHAGUS, HIATAL HERNIA    HERNIA REPAIR      KNEE SURGERY Bilateral     TOTAL HIP ARTHROPLASTY Right     TRIGGER FINGER RELEASE Bilateral     x2 THUMBS    UPPER GASTROINTESTINAL ENDOSCOPY  2022         Current Outpatient Medications:     amLODIPine-valsartan (EXFORGE)  MG per tablet, TAKE 1 TABLET BY MOUTH EVERY DAY, Disp: 90 tablet, Rfl: 1    apixaban (ELIQUIS) 5 MG tablet tablet, Take 1 tablet by mouth 2 (Two) Times a Day. Indications: Atrial Fibrillation, Disp: 30 tablet, Rfl: 0    Ascorbic Acid (Vitamin C) 100 MG chewable tablet, Chew 1 tablet Daily., Disp: , Rfl:     cetirizine (zyrTEC) 10 MG tablet, Take 1 tablet by mouth Daily As Needed., Disp: , Rfl:     cholecalciferol (VITAMIN D3) 10 MCG (400 UNIT) tablet, Take 1 tablet by mouth Daily., Disp: , Rfl:     co-enzyme Q-10 30 MG capsule, Take 1 capsule by mouth Daily., " Disp: , Rfl:     Continuous Blood Gluc Sensor (Dexcom G6 Sensor), Every 10 (Ten) Days., Disp: , Rfl:     CONTOUR NEXT TEST test strip, Test 6 times daily DX: e11.65, Disp: 200 each, Rfl: 5    digoxin (LANOXIN) 125 MCG tablet, Take 1 tablet by mouth Daily., Disp: 90 tablet, Rfl: 1    donepezil (ARICEPT) 10 MG tablet, Take 1 tablet by mouth Every Night., Disp: 90 tablet, Rfl: 1    insulin patient supplied pump, Inject  under the skin into the appropriate area as directed Continuous. Type of Insulin: Humulin R 500 Type of Pump:[employer]oright [phone] Bolus amount: 50 units daily.  Basal amount : Max basal 5.50u/hr Correction factor:  Insulin to carbohydrate ratio: 12a-12p 10                                                12p-2p 10                                                 2p -12 a 7  Date of last site change: 2/3/24 Location of catheter: right side on stomach  Frequency of refill: approximately 3 days Last refill date: per pt 2/3/24  Target 12a-3a 130-150              3a-6a -100-120              6a-9p 100-120              9p-12a 130-150  Active Ins time : 5 hours  Basal Pattern Setup 27.3  Prescriber Shara Campbell Phone number [phone], Disp: , Rfl:     insulin regular (HUMULIN R) 500 UNIT/ML CONCENTRATED injection, 50u daily with insulin pump (Patient taking differently: Inject 10 unit marking on U-100 syringe under the skin into the appropriate area as directed Daily. 60u daily with insulin pump), Disp: 40 mL, Rfl: 3    L-Lysine 500 MG tablet tablet, Take 1 tablet by mouth Daily., Disp: , Rfl:     Lancets misc, Check 5 times daily DX: e11.65, Disp: 500 each, Rfl: 1    magnesium oxide (MAG-OX) 400 MG tablet, Take 1 tablet by mouth Daily., Disp: , Rfl:     memantine (NAMENDA) 10 MG tablet, 1/2 tablet at night for 1 wk, then 1/2 tablet BID for 1 wk, then 1/2 in the AM and 1 tablet in PM for 1 week, then 1 tablet BID thereafter, Disp: 180 tablet, Rfl: 1    metFORMIN (GLUCOPHAGE) 500 MG tablet, Take 1  "tablet by mouth 2 (Two) Times a Day With Meals., Disp: , Rfl:     metoclopramide (REGLAN) 5 MG tablet, TAKE 1 TABLET BY MOUTH 4 (FOUR) TIMES A DAY BEFORE MEALS & AT BEDTIME., Disp: 120 tablet, Rfl: 5    multivitamin with minerals tablet tablet, Take 1 tablet by mouth Daily., Disp: , Rfl:     nebivolol (Bystolic) 10 MG tablet, Take 1 tablet by mouth Daily., Disp: 90 tablet, Rfl: 3    Omega-3 Fatty Acids (fish oil) 1000 MG capsule capsule, Take 1 capsule by mouth 2 (Two) Times a Day With Meals., Disp: , Rfl:     ondansetron ODT (Zofran ODT) 4 MG disintegrating tablet, Place 1 tablet on the tongue Every 8 (Eight) Hours As Needed for Nausea or Vomiting., Disp: 30 tablet, Rfl: 1    pantoprazole (PROTONIX) 40 MG EC tablet, Take 1 tablet by mouth Daily., Disp: 90 tablet, Rfl: 1    rosuvastatin (CRESTOR) 40 MG tablet, Take 1 tablet by mouth Every Night., Disp: 90 tablet, Rfl: 1    spironolactone-hydrochlorothiazide (ALDACTAZIDE) 25-25 MG tablet, Take 1 tablet by mouth Daily., Disp: , Rfl:     venlafaxine XR (EFFEXOR-XR) 150 MG 24 hr capsule, Take 1 capsule by mouth Daily., Disp: 90 capsule, Rfl: 3    vitamin B-12 (CYANOCOBALAMIN) 100 MCG tablet, Take 1 tablet by mouth Daily., Disp: , Rfl:      Allergies   Allergen Reactions    Codeine Nausea Only    Semaglutide(0.25 Or 0.5mg-Dos) GI Intolerance       Family History   Problem Relation Age of Onset    Diabetes Mother     Hypertension Mother     Heart attack Mother     COPD Brother     Diabetes Maternal Grandmother     Colon cancer Neg Hx         Social History     Social History Narrative    ** Merged History Encounter **            Objective       Vital Signs:   /74 (BP Location: Right arm, Patient Position: Sitting, Cuff Size: Adult)   Pulse 77   Ht 182.9 cm (72\")   Wt 132 kg (290 lb 8 oz)   SpO2 97%   BMI 39.40 kg/m²       Physical Exam  Constitutional:       Appearance: Normal appearance.   Pulmonary:      Effort: Pulmonary effort is normal.   Neurological:     "  General: No focal deficit present.      Mental Status: He is alert and oriented to person, place, and time.   Psychiatric:         Mood and Affect: Mood normal.         Behavior: Behavior normal.           Assessment & Plan          Assessment and Plan    Diagnoses and all orders for this visit:    1. Type 2 diabetes mellitus with hyperglycemia, with long-term current use of insulin (Primary)  -     NM Gastric Emptying; Future  -     Case Request; Standing  -     Verify NPO; Standing  -     Obtain Informed Consent; Standing  -     Follow Anesthesia Guidelines / Protocol; Standing  -     Case Request    2. Gastroesophageal reflux disease, unspecified whether esophagitis present  -     NM Gastric Emptying; Future  -     Case Request; Standing  -     Verify NPO; Standing  -     Obtain Informed Consent; Standing  -     Follow Anesthesia Guidelines / Protocol; Standing  -     Case Request    3. Nausea and vomiting, unspecified vomiting type  -     NM Gastric Emptying; Future  -     Case Request; Standing  -     Verify NPO; Standing  -     Obtain Informed Consent; Standing  -     Follow Anesthesia Guidelines / Protocol; Standing  -     Case Request    4. Paz's esophagus without dysplasia  -     NM Gastric Emptying; Future  -     Case Request; Standing  -     Verify NPO; Standing  -     Obtain Informed Consent; Standing  -     Follow Anesthesia Guidelines / Protocol; Standing  -     Case Request    5. History of Helicobacter pylori infection  -     NM Gastric Emptying; Future  -     Case Request; Standing  -     Verify NPO; Standing  -     Obtain Informed Consent; Standing  -     Follow Anesthesia Guidelines / Protocol; Standing  -     Case Request    6. Hiatal hernia  -     NM Gastric Emptying; Future  -     Case Request; Standing  -     Verify NPO; Standing  -     Obtain Informed Consent; Standing  -     Follow Anesthesia Guidelines / Protocol; Standing  -     Case Request    7. Chronic superficial gastritis without  bleeding  -     NM Gastric Emptying; Future  -     Case Request; Standing  -     Verify NPO; Standing  -     Obtain Informed Consent; Standing  -     Follow Anesthesia Guidelines / Protocol; Standing  -     Case Request      73-year-old male presenting to the office today for follow-up with a history of nausea, vomiting, reflux, diabetes, history of H. pylori, Paz's esophagus, hiatal hernia and chronic gastritis.  Patient reports since December that he has had ongoing nausea and vomiting that comes in spells every couple weeks with belching and then vomiting.  I have recommended that the patient undergo further evaluation with an EGD.  I have discussed this procedure in detail with the patient.  I have discussed the risks, benefits and alternatives.  I have discussed the risk of anesthesia, bleeding and perforation.  Patient understands these risks, benefits and alternatives and wishes to proceed.  I will schedule him at his earliest convenience.  We will seek cardiac clearance related Eliquis with Dr. Manuel.  I have also ordered a gastric emptying study for evaluation of the patient's nausea and vomiting with history of diabetes.  Patient will plan to continue Reglan and Protonix as prescribed.  Patient will follow-up in office after endoscopy.            Follow Up       Follow Up   Return for Follow up after endoscopy in office.  Patient was given instructions and counseling regarding his condition or for health maintenance advice. Please see specific information pulled into the AVS if appropriate.

## 2024-07-26 NOTE — H&P (VIEW-ONLY)
Chief Complaint   Nausea (Ongoing since December ) and Vomiting (Ongoing since December)    History of Present Illness       Pastor Bartholomew is a 73 y.o. male who presents to [facility] for follow-up with a history of nausea, vomiting, reflux, diabetes, history of H. pylori, Paz's esophagus, hiatal hernia and chronic gastritis.  Patient reports since December that he has had ongoing nausea and vomiting that comes in spells every couple weeks with belching and then vomiting.  He reports flushing and sweating during those moments.  He does have a history of Paz's esophagus.  Patient has been a diabetic for 31 years.  He was placed on Reglan by his primary care in May and reports that this did improve his symptoms.  Patient continues on Reglan for suspected gastroparesis and Protonix for reflux.  Patient has Zofran available as needed for nausea and vomiting.  Patient denies fever, weight loss, night sweats, melena, hematochezia, hematemesis.    Most recent labs- 5/20/24    NM HIDA SCAN- 5/15/24  No evidence for acute cholecystitis.  Normal gallbladder ejection fraction. Patient had no symptoms after  Ensure was given. 62%    CT Abdomen Pelvis With Contrast- 2/4/24  1. No acute intra-abdominal or intrapelvic abnormality noted.  Specifically no evidence of   obstruction or significant inflammatory change of the GI tract.  2. 4 mm noncalcified nodule left lung base likely post inflammatory or infectious in nature.    Optional follow-up at 12 months could be considered with a CT chest in a high risk patient  3. Diverticulosis without evidence of acute diverticulitis.  4. 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 male who presents to [facility] for follow-up with a history of nausea, vomiting, reflux, diabetes, hypertension.  Patient presenting for follow-up after endoscopy.  We reviewed endoscopy and  pathology at this time.  Patient treated for H. pylori at the end of April.  At the last office visit patient was given Zofran to use as needed and continued on Protonix.  May 11th completed treatment for h. Pylori and reports resolution of his symptoms of nausea vomiting and reflux.  He continues on Protonix with good control of his reflux.  Patient denies fever, nausea, vomiting, weight loss, night sweats, melena, hematochezia, hematemesis.     Endoscopy: Review of the patient's most recent EGD and colonoscopy performed by Dr. Dickerson on 04.25.2022 4 mm polyp in the ascending colon.  Short segment of Paz's esophagus noted in the lower third of the esophagus, small hiatal hernia, stomach normal duodenum normal.  Biopsies consistent with chronic active gastritis, H. pylori.  Tubular adenoma colon biopsy.  Repeat: 5 years.  Repeat EGD in 3 years.  Results       Result Review :   The following data was reviewed by: JOHNNA Campo on 07/30/2024:    CMP          2/14/2024    09:13 4/19/2024    15:50 5/20/2024    09:41   CMP   Glucose 136  166  102    BUN 13  11  14    Creatinine 0.95  1.03  0.99    EGFR 85.6  77.7  81.4    Sodium 145  141  141    Potassium 3.8  3.9  4.4    Chloride 104  99  101    Calcium 9.5  10.0  9.7    Total Protein 6.5  7.6  7.2    Albumin 4.2  4.9  4.8    Globulin 2.3  2.7  2.4    Total Bilirubin 0.5  0.6  0.4    Alkaline Phosphatase 88  104  77    AST (SGOT) 25  23  17    ALT (SGPT) 22  19  24    Albumin/Globulin Ratio 1.8  1.8  2.0    BUN/Creatinine Ratio 13.7  10.7  14.1    Anion Gap 12.7  13.0  9.0      CBC          2/8/2024    04:16 2/27/2024    11:59 4/19/2024    15:50   CBC   WBC 9.35  8.52     13.12    RBC 5.10  4.91     5.33    Hemoglobin 15.1  14.7     16.0    Hematocrit 43.0  40.8     47.6    MCV 84.3  83.1     89.3    MCH 29.6  29.9     30.0    MCHC 35.1  36.0     33.6    RDW 13.2  13.9     13.6    Platelets 238  227     313       Details          This result is from an external  "source.               Iron Profile No results found for: \"IRON\", \"TIBC\", \"LABIRON\", \"TRANSFERRIN\"  Ferritin No results found for: \"FERRITIN\"            Past Medical History       Past Medical History:   Diagnosis Date    Atrial fibrillation     Capps esophagus     Clotting disorder 2/5/2024    Coronary artery disease Feb 5 2024    AFib    Diabetes     GERD (gastroesophageal reflux disease) 2000    Hernia, inguinal     History of total hip arthroplasty, right 02/01/2023    Hyperlipidemia     Hypertension     Hypervitaminosis D      PONV (postoperative nausea and vomiting)     Sleep apnea     Type 2 diabetes mellitus        Past Surgical History:   Procedure Laterality Date    COLONOSCOPY      COLONOSCOPY N/A 04/25/2022    Procedure: COLONOSCOPY WITH POLYPECTOMY;  Surgeon: Artie Dickerson MD;  Location: [facility] ENDOSCOPY;  Service: Gastroenterology;  Laterality: N/A;  COLON POLYP    ENDOSCOPY N/A 04/25/2022    Procedure: ESOPHAGOGASTRODUODENOSCOPY WITH BX;  Surgeon: Artie Dickerson MD;  Location: [facility] ENDOSCOPY;  Service: Gastroenterology;  Laterality: N/A;  CAPPS'S ESOPHAGUS, HIATAL HERNIA    HERNIA REPAIR      KNEE SURGERY Bilateral     TOTAL HIP ARTHROPLASTY Right     TRIGGER FINGER RELEASE Bilateral     x2 THUMBS    UPPER GASTROINTESTINAL ENDOSCOPY  2022         Current Outpatient Medications:     amLODIPine-valsartan (EXFORGE)  MG per tablet, TAKE 1 TABLET BY MOUTH EVERY DAY, Disp: 90 tablet, Rfl: 1    apixaban (ELIQUIS) 5 MG tablet tablet, Take 1 tablet by mouth 2 (Two) Times a Day. Indications: Atrial Fibrillation, Disp: 30 tablet, Rfl: 0    Ascorbic Acid (Vitamin C) 100 MG chewable tablet, Chew 1 tablet Daily., Disp: , Rfl:     cetirizine (zyrTEC) 10 MG tablet, Take 1 tablet by mouth Daily As Needed., Disp: , Rfl:     cholecalciferol (VITAMIN D3) 10 MCG (400 UNIT) tablet, Take 1 tablet by mouth Daily., Disp: , Rfl:     co-enzyme Q-10 30 MG capsule, Take 1 capsule by mouth Daily., " Disp: , Rfl:     Continuous Blood Gluc Sensor (Dexcom G6 Sensor), Every 10 (Ten) Days., Disp: , Rfl:     CONTOUR NEXT TEST test strip, Test 6 times daily DX: e11.65, Disp: 200 each, Rfl: 5    digoxin (LANOXIN) 125 MCG tablet, Take 1 tablet by mouth Daily., Disp: 90 tablet, Rfl: 1    donepezil (ARICEPT) 10 MG tablet, Take 1 tablet by mouth Every Night., Disp: 90 tablet, Rfl: 1    insulin patient supplied pump, Inject  under the skin into the appropriate area as directed Continuous. Type of Insulin: Humulin R 500 Type of Pump:Me-Mover[employer] [phone] Bolus amount: 50 units daily.  Basal amount : Max basal 5.50u/hr Correction factor:  Insulin to carbohydrate ratio: 12a-12p 10                                                12p-2p 10                                                 2p -12 a 7  Date of last site change: 2/3/24 Location of catheter: right side on stomach  Frequency of refill: approximately 3 days Last refill date: per pt 2/3/24  Target 12a-3a 130-150              3a-6a -100-120              6a-9p 100-120              9p-12a 130-150  Active Ins time : 5 hours  Basal Pattern Setup 27.3  Prescriber Shara Campbell Phone number [phone], Disp: , Rfl:     insulin regular (HUMULIN R) 500 UNIT/ML CONCENTRATED injection, 50u daily with insulin pump (Patient taking differently: Inject 10 unit marking on U-100 syringe under the skin into the appropriate area as directed Daily. 60u daily with insulin pump), Disp: 40 mL, Rfl: 3    L-Lysine 500 MG tablet tablet, Take 1 tablet by mouth Daily., Disp: , Rfl:     Lancets misc, Check 5 times daily DX: e11.65, Disp: 500 each, Rfl: 1    magnesium oxide (MAG-OX) 400 MG tablet, Take 1 tablet by mouth Daily., Disp: , Rfl:     memantine (NAMENDA) 10 MG tablet, 1/2 tablet at night for 1 wk, then 1/2 tablet BID for 1 wk, then 1/2 in the AM and 1 tablet in PM for 1 week, then 1 tablet BID thereafter, Disp: 180 tablet, Rfl: 1    metFORMIN (GLUCOPHAGE) 500 MG tablet, Take 1  "tablet by mouth 2 (Two) Times a Day With Meals., Disp: , Rfl:     metoclopramide (REGLAN) 5 MG tablet, TAKE 1 TABLET BY MOUTH 4 (FOUR) TIMES A DAY BEFORE MEALS & AT BEDTIME., Disp: 120 tablet, Rfl: 5    multivitamin with minerals tablet tablet, Take 1 tablet by mouth Daily., Disp: , Rfl:     nebivolol (Bystolic) 10 MG tablet, Take 1 tablet by mouth Daily., Disp: 90 tablet, Rfl: 3    Omega-3 Fatty Acids (fish oil) 1000 MG capsule capsule, Take 1 capsule by mouth 2 (Two) Times a Day With Meals., Disp: , Rfl:     ondansetron ODT (Zofran ODT) 4 MG disintegrating tablet, Place 1 tablet on the tongue Every 8 (Eight) Hours As Needed for Nausea or Vomiting., Disp: 30 tablet, Rfl: 1    pantoprazole (PROTONIX) 40 MG EC tablet, Take 1 tablet by mouth Daily., Disp: 90 tablet, Rfl: 1    rosuvastatin (CRESTOR) 40 MG tablet, Take 1 tablet by mouth Every Night., Disp: 90 tablet, Rfl: 1    spironolactone-hydrochlorothiazide (ALDACTAZIDE) 25-25 MG tablet, Take 1 tablet by mouth Daily., Disp: , Rfl:     venlafaxine XR (EFFEXOR-XR) 150 MG 24 hr capsule, Take 1 capsule by mouth Daily., Disp: 90 capsule, Rfl: 3    vitamin B-12 (CYANOCOBALAMIN) 100 MCG tablet, Take 1 tablet by mouth Daily., Disp: , Rfl:      Allergies   Allergen Reactions    Codeine Nausea Only    Semaglutide(0.25 Or 0.5mg-Dos) GI Intolerance       Family History   Problem Relation Age of Onset    Diabetes Mother     Hypertension Mother     Heart attack Mother     COPD Brother     Diabetes Maternal Grandmother     Colon cancer Neg Hx         Social History     Social History Narrative    ** Merged History Encounter **            Objective       Vital Signs:   /74 (BP Location: Right arm, Patient Position: Sitting, Cuff Size: Adult)   Pulse 77   Ht 182.9 cm (72\")   Wt 132 kg (290 lb 8 oz)   SpO2 97%   BMI 39.40 kg/m²       Physical Exam  Constitutional:       Appearance: Normal appearance.   Pulmonary:      Effort: Pulmonary effort is normal.   Neurological:     "  General: No focal deficit present.      Mental Status: He is alert and oriented to person, place, and time.   Psychiatric:         Mood and Affect: Mood normal.         Behavior: Behavior normal.           Assessment & Plan          Assessment and Plan    Diagnoses and all orders for this visit:    1. Type 2 diabetes mellitus with hyperglycemia, with long-term current use of insulin (Primary)  -     NM Gastric Emptying; Future  -     Case Request; Standing  -     Verify NPO; Standing  -     Obtain Informed Consent; Standing  -     Follow Anesthesia Guidelines / Protocol; Standing  -     Case Request    2. Gastroesophageal reflux disease, unspecified whether esophagitis present  -     NM Gastric Emptying; Future  -     Case Request; Standing  -     Verify NPO; Standing  -     Obtain Informed Consent; Standing  -     Follow Anesthesia Guidelines / Protocol; Standing  -     Case Request    3. Nausea and vomiting, unspecified vomiting type  -     NM Gastric Emptying; Future  -     Case Request; Standing  -     Verify NPO; Standing  -     Obtain Informed Consent; Standing  -     Follow Anesthesia Guidelines / Protocol; Standing  -     Case Request    4. Paz's esophagus without dysplasia  -     NM Gastric Emptying; Future  -     Case Request; Standing  -     Verify NPO; Standing  -     Obtain Informed Consent; Standing  -     Follow Anesthesia Guidelines / Protocol; Standing  -     Case Request    5. History of Helicobacter pylori infection  -     NM Gastric Emptying; Future  -     Case Request; Standing  -     Verify NPO; Standing  -     Obtain Informed Consent; Standing  -     Follow Anesthesia Guidelines / Protocol; Standing  -     Case Request    6. Hiatal hernia  -     NM Gastric Emptying; Future  -     Case Request; Standing  -     Verify NPO; Standing  -     Obtain Informed Consent; Standing  -     Follow Anesthesia Guidelines / Protocol; Standing  -     Case Request    7. Chronic superficial gastritis without  bleeding  -     NM Gastric Emptying; Future  -     Case Request; Standing  -     Verify NPO; Standing  -     Obtain Informed Consent; Standing  -     Follow Anesthesia Guidelines / Protocol; Standing  -     Case Request      73-year-old male presenting to the office today for follow-up with a history of nausea, vomiting, reflux, diabetes, history of H. pylori, Paz's esophagus, hiatal hernia and chronic gastritis.  Patient reports since December that he has had ongoing nausea and vomiting that comes in spells every couple weeks with belching and then vomiting.  I have recommended that the patient undergo further evaluation with an EGD.  I have discussed this procedure in detail with the patient.  I have discussed the risks, benefits and alternatives.  I have discussed the risk of anesthesia, bleeding and perforation.  Patient understands these risks, benefits and alternatives and wishes to proceed.  I will schedule him at his earliest convenience.  We will seek cardiac clearance related Eliquis with Dr. Manuel.  I have also ordered a gastric emptying study for evaluation of the patient's nausea and vomiting with history of diabetes.  Patient will plan to continue Reglan and Protonix as prescribed.  Patient will follow-up in office after endoscopy.            Follow Up       Follow Up   Return for Follow up after endoscopy in office.  Patient was given instructions and counseling regarding his condition or for health maintenance advice. Please see specific information pulled into the AVS if appropriate.

## 2024-07-30 ENCOUNTER — OFFICE VISIT (OUTPATIENT)
Dept: GASTROENTEROLOGY | Facility: CLINIC | Age: 74
End: 2024-07-30
Payer: MEDICARE

## 2024-07-30 ENCOUNTER — TELEPHONE (OUTPATIENT)
Dept: GASTROENTEROLOGY | Facility: CLINIC | Age: 74
End: 2024-07-30
Payer: MEDICARE

## 2024-07-30 VITALS
OXYGEN SATURATION: 97 % | BODY MASS INDEX: 39.35 KG/M2 | HEIGHT: 72 IN | DIASTOLIC BLOOD PRESSURE: 74 MMHG | WEIGHT: 290.5 LBS | HEART RATE: 77 BPM | SYSTOLIC BLOOD PRESSURE: 132 MMHG

## 2024-07-30 DIAGNOSIS — Z79.4 TYPE 2 DIABETES MELLITUS WITH HYPERGLYCEMIA, WITH LONG-TERM CURRENT USE OF INSULIN: Primary | ICD-10-CM

## 2024-07-30 DIAGNOSIS — K29.30 CHRONIC SUPERFICIAL GASTRITIS WITHOUT BLEEDING: ICD-10-CM

## 2024-07-30 DIAGNOSIS — Z86.19 HISTORY OF HELICOBACTER PYLORI INFECTION: ICD-10-CM

## 2024-07-30 DIAGNOSIS — E11.65 TYPE 2 DIABETES MELLITUS WITH HYPERGLYCEMIA, WITH LONG-TERM CURRENT USE OF INSULIN: Primary | ICD-10-CM

## 2024-07-30 DIAGNOSIS — R11.2 NAUSEA AND VOMITING, UNSPECIFIED VOMITING TYPE: ICD-10-CM

## 2024-07-30 DIAGNOSIS — K44.9 HIATAL HERNIA: ICD-10-CM

## 2024-07-30 DIAGNOSIS — K21.9 GASTROESOPHAGEAL REFLUX DISEASE, UNSPECIFIED WHETHER ESOPHAGITIS PRESENT: ICD-10-CM

## 2024-07-30 DIAGNOSIS — K22.70 BARRETT'S ESOPHAGUS WITHOUT DYSPLASIA: ICD-10-CM

## 2024-07-30 PROCEDURE — 1159F MED LIST DOCD IN RCRD: CPT | Performed by: NURSE PRACTITIONER

## 2024-07-30 PROCEDURE — 3078F DIAST BP <80 MM HG: CPT | Performed by: NURSE PRACTITIONER

## 2024-07-30 PROCEDURE — 3075F SYST BP GE 130 - 139MM HG: CPT | Performed by: NURSE PRACTITIONER

## 2024-07-30 PROCEDURE — 1160F RVW MEDS BY RX/DR IN RCRD: CPT | Performed by: NURSE PRACTITIONER

## 2024-07-30 PROCEDURE — 99214 OFFICE O/P EST MOD 30 MIN: CPT | Performed by: NURSE PRACTITIONER

## 2024-07-30 NOTE — TELEPHONE ENCOUNTER
7/30/2024    Dear DR CUTLER,     Patient: Pastor Bartholomew   YOB: 1950        This patient is waiting to have an Esophagogastroduodenoscopy which I will perform at [facility] on 08/09/24 .     Our records indicate this patient is currently taking ELIQUIS. This procedure requires the patient to suspend their anticoagulant medication prior to surgery.     Please respond to this request noting your recommendations. You may contact our office at [phone] Option 1 with any questions. I appreciate your prompt response in this matter.     Please return this form to our office no later than two weeks prior to the procedure date listed above. Please return form to [phone].     ____ I approve my patient to stop taking their ELIQUIS 2 days prior to the scheduled procedure.    ____ I do NOT approve my patient to stop taking their ELIQUIS at this time.      ____ I approve my patient from a Cardiac  standpoint    ____ I do NOT approve my patient from a Cardiac  standpoint at this time      Please specify clearance expiration date:____________________________________      Approving physician name (please print): _____________________________________________      Approving physician signature: ________________________________ Date:________________    Sincerely,  [facility] - Gastroenterology   Dr. EARLE ARREOLA          Please fax approval or denial to our office as soon as possible.

## 2024-07-30 NOTE — PATIENT INSTRUCTIONS
Food Choices for Gastroesophageal Reflux Disease, Adult  When you have gastroesophageal reflux disease (GERD), the foods you eat and your eating habits are very important. Choosing the right foods can help ease your discomfort. Think about working with a food expert (dietitian) to help you make good choices.  What are tips for following this plan?  Reading food labels  Look for foods that are low in saturated fat. Foods that may help with your symptoms include:  Foods that have less than 5% of daily value (DV) of fat.  Foods that have 0 grams of trans fat.  Cooking  Do not garsia your food.  Cook your food by baking, steaming, grilling, or broiling. These are all methods that do not need a lot of fat for cooking.  To add flavor, try to use herbs that are low in spice and acidity.  Meal planning    Choose healthy foods that are low in fat, such as:  Fruits and vegetables.  Whole grains.  Low-fat dairy products.  Lean meats, fish, and poultry.  Eat small meals often instead of eating 3 large meals each day. Eat your meals slowly in a place where you are relaxed. Avoid bending over or lying down until 2-3 hours after eating.  Limit high-fat foods such as fatty meats or fried foods.  Limit your intake of fatty foods, such as oils, butter, and shortening.  Avoid the following as told by your doctor:  Foods that cause symptoms. These may be different for different people. Keep a food diary to keep track of foods that cause symptoms.  Alcohol.  Drinking a lot of liquid with meals.  Eating meals during the 2-3 hours before bed.  Lifestyle  Stay at a healthy weight. Ask your doctor what weight is healthy for you. If you need to lose weight, work with your doctor to do so safely.  Exercise for at least 30 minutes on 5 or more days each week, or as told by your doctor.  Wear loose-fitting clothes.  Do not smoke or use any products that contain nicotine or tobacco. If you need help quitting, ask your doctor.  Sleep with the head  of your bed higher than your feet. Use a wedge under the mattress or blocks under the bed frame to raise the head of the bed.  Chew sugar-free gum after meals.  What foods should eat?    Eat a healthy, well-balanced diet of fruits, vegetables, whole grains, low-fat dairy products, lean meats, fish, and poultry. Each person is different.  Foods that may cause symptoms in one person may not cause any symptoms in another person. Work with your doctor to find foods that are safe for you.  The items listed above may not be a complete list of what you can eat and drink. Contact a food expert for more options.  What foods should I avoid?  Limiting some of these foods may help in managing the symptoms of GERD. Everyone is different. Talk with a food expert or your doctor to help you find the exact foods to avoid, if any.  Fruits  Any fruits prepared with added fat. Any fruits that cause symptoms. For some people, this may include citrus fruits, such as oranges, grapefruit, pineapple, and te.  Vegetables  Deep-fried vegetables. French fries. Any vegetables prepared with added fat. Any vegetables that cause symptoms. For some people, this may include tomatoes and tomato products, chili peppers, onions and garlic, and horseradish.  Grains  Pastries or quick breads with added fat.  Meats and other proteins  High-fat meats, such as fatty beef or pork, hot dogs, ribs, ham, sausage, salami, and [place]. Fried meat or protein, including fried fish and fried chicken. Nuts and nut butters, in large amounts.  Dairy  Whole milk and chocolate milk. Sour cream. Cream. Ice cream. Cream cheese. Milkshakes.  Fats and oils  Butter. Margarine. Shortening. Ghee.  Beverages  Coffee and tea, with or without caffeine. Carbonated beverages. Sodas. Energy drinks. Fruit juice made with acidic fruits, such as orange or grapefruit. Tomato juice. Alcoholic drinks.  Sweets and desserts  Chocolate and cocoa. Donuts.  Seasonings and condiments  Pepper.  Peppermint and spearmint. Added salt. Any condiments, herbs, or seasonings that cause symptoms. For some people, this may include ramos, hot sauce, or vinegar-based salad dressings.  The items listed above may not be a complete list of what you should not eat and drink. Contact a food expert for more options.  Questions to ask your doctor  Diet and lifestyle changes are often the first steps that are taken to manage symptoms of GERD. If diet and lifestyle changes do not help, talk with your doctor about taking medicines.  Where to find more information  [employer]: aboutgerd.org  Summary  When you have GERD, food and lifestyle choices are very important in easing your symptoms.  Eat small meals often instead of 3 large meals a day. Eat your meals slowly and in a place where you are relaxed.  Avoid bending over or lying down until 2-3 hours after eating.  Limit high-fat foods such as fatty meats or fried foods.  This information is not intended to replace advice given to you by your health care provider. Make sure you discuss any questions you have with your health care provider.  Document Revised: 06/28/2021 Document Reviewed: 06/28/2021  Elsevier Patient Education © 2024 [employer]  Gastroesophageal Reflux Disease, Adult  Gastroesophageal reflux (WILLIAM) happens when acid from the stomach flows up into the tube that connects the mouth and the stomach (esophagus). Normally, food travels down the esophagus and stays in the stomach to be digested. However, when a person has WILLIAM, food and stomach acid sometimes move back up into the esophagus. If this becomes a more serious problem, the person may be diagnosed with a disease called gastroesophageal reflux disease (GERD). GERD occurs when the reflux:  Happens often.  Causes frequent or severe symptoms.  Causes problems such as damage to the esophagus.  When stomach acid comes in contact with the esophagus, the acid may cause  inflammation in the esophagus. Over time, GERD may create small holes (ulcers) in the lining of the esophagus.  What are the causes?  This condition is caused by a problem with the muscle between the esophagus and the stomach (lower esophageal sphincter, or LES). Normally, the LES muscle closes after food passes through the esophagus to the stomach. When the LES is weakened or abnormal, it does not close properly, and that allows food and stomach acid to go back up into the esophagus.  The LES can be weakened by certain dietary substances, medicines, and medical conditions, including:  Tobacco use.  Pregnancy.  Having a hiatal hernia.  Alcohol use.  Certain foods and beverages, such as coffee, chocolate, onions, and peppermint.  What increases the risk?  You are more likely to develop this condition if you:  Have an increased body weight.  Have a connective tissue disorder.  Take NSAIDs, such as ibuprofen.  What are the signs or symptoms?  Symptoms of this condition include:  Heartburn.  Difficult or painful swallowing and the feeling of having a lump in the throat.  A bitter taste in the mouth.  Bad breath and having a large amount of saliva.  Having an upset or bloated stomach and belching.  Chest pain. Different conditions can cause chest pain. Make sure you see your health care provider if you experience chest pain.  Shortness of breath or wheezing.  Ongoing (chronic) cough or a nighttime cough.  Wearing away of tooth enamel.  Weight loss.  How is this diagnosed?  This condition may be diagnosed based on a medical history and a physical exam. To determine if you have mild or severe GERD, your health care provider may also monitor how you respond to treatment. You may also have tests, including:  A test to examine your stomach and esophagus with a small camera (endoscopy).  A test that measures the acidity level in your esophagus.  A test that measures how much pressure is on your esophagus.  A barium swallow or  modified barium swallow test to show the shape, size, and functioning of your esophagus.  How is this treated?  Treatment for this condition may vary depending on how severe your symptoms are. Your health care provider may recommend:  Changes to your diet.  Medicine.  Surgery.  The goal of treatment is to help relieve your symptoms and to prevent complications.  Follow these instructions at home:  Eating and drinking    Follow a diet as recommended by your health care provider. This may involve avoiding foods and drinks such as:  Coffee and tea, with or without caffeine.  Drinks that contain alcohol.  Energy drinks and sports drinks.  Carbonated drinks or sodas.  Chocolate and cocoa.  Peppermint and mint flavorings.  Garlic and onions.  Horseradish.  Spicy and acidic foods, including peppers, chili powder, ramos powder, vinegar, hot sauces, and barbecue sauce.  Citrus fruit juices and citrus fruits, such as oranges, te, and limes.  Tomato-based foods, such as red sauce, chili, salsa, and pizza with red sauce.  Fried and fatty foods, such as donuts, french fries, potato chips, and high-fat dressings.  High-fat meats, such as hot dogs and fatty cuts of red and white meats, such as rib eye steak, sausage, ham, and [place].  High-fat dairy items, such as whole milk, butter, and cream cheese.  Eat small, frequent meals instead of large meals.  Avoid drinking large amounts of liquid with your meals.  Avoid eating meals during the 2-3 hours before bedtime.  Avoid lying down right after you eat.  Do not exercise right after you eat.  Lifestyle    Do not use any products that contain nicotine or tobacco. These products include cigarettes, chewing tobacco, and vaping devices, such as e-cigarettes. If you need help quitting, ask your health care provider.  Try to reduce your stress by using methods such as yoga or meditation. If you need help reducing stress, ask your health care provider.  If you are overweight, reduce your  weight to an amount that is healthy for you. Ask your health care provider for guidance about a safe weight loss goal.  General instructions  Pay attention to any changes in your symptoms.  Take over-the-counter and prescription medicines only as told by your health care provider. Do not take aspirin, ibuprofen, or other NSAIDs unless your health care provider told you to take these medicines.  Wear loose-fitting clothing. Do not wear anything tight around your waist that causes pressure on your abdomen.  Raise (elevate) the head of your bed about 6 inches (15 cm). You can use a wedge to do this.  Avoid bending over if this makes your symptoms worse.  Keep all follow-up visits. This is important.  Contact a health care provider if:  You have:  New symptoms.  Unexplained weight loss.  Difficulty swallowing or it hurts to swallow.  Wheezing or a persistent cough.  A hoarse voice.  Your symptoms do not improve with treatment.  Get help right away if:  You have sudden pain in your arms, neck, jaw, teeth, or back.  You suddenly feel sweaty, dizzy, or light-headed.  You have chest pain or shortness of breath.  You vomit and the vomit is green, yellow, or black, or it looks like blood or coffee grounds.  You faint.  You have stool that is red, bloody, or black.  You cannot swallow, drink, or eat.  These symptoms may represent a serious problem that is an emergency. Do not wait to see if the symptoms will go away. Get medical help right away. Call your local emergency services (911 in the [place] Do not drive yourself to the hospital.  Summary  Gastroesophageal reflux happens when acid from the stomach flows up into the esophagus. GERD is a disease in which the reflux happens often, causes frequent or severe symptoms, or causes problems such as damage to the esophagus.  Treatment for this condition may vary depending on how severe your symptoms are. Your health care provider may recommend diet and lifestyle changes,  medicine, or surgery.  Contact a health care provider if you have new or worsening symptoms.  Take over-the-counter and prescription medicines only as told by your health care provider. Do not take aspirin, ibuprofen, or other NSAIDs unless your health care provider told you to do so.  Keep all follow-up visits as told by your health care provider. This is important.  This information is not intended to replace advice given to you by your health care provider. Make sure you discuss any questions you have with your health care provider.  Document Revised: 06/28/2021 Document Reviewed: 06/28/2021  [employer] Patient Education © 2024 [employer]  Helicobacter Pylori Infection  Helicobacter pylori infection is a bacterial infection in the stomach. Long-term (chronic) infection can cause stomach irritation (gastritis), ulcers in the stomach (gastric ulcers), and ulcers in the upper part of the intestine (duodenal ulcers). Having this infection may also increase your risk of stomach cancer and a type of white blood cell cancer (lymphoma) that affects the stomach.  What are the causes?  This infection is caused by the Helicobacter pylori (H. pylori) bacteria. Many healthy people have this bacteria in their stomach lining. The bacteria may also spread from person to person through contact with stool (feces) or saliva. It is not known why some people develop ulcers, gastritis, or cancer from the bacteria.  What increases the risk?  You are more likely to develop this condition if you:  Have family members with the infection.  Live with many other people, such as in a dormitory.  What are the signs or symptoms?  Most people with this infection do not have any symptoms. If you do have symptoms, they may include:  Heartburn.  Stomach pain.  Nausea.  Vomiting. The vomit may be bloody because of ulcers.  Loss of appetite.  Bad breath.  How is this diagnosed?  This condition may be diagnosed based on:  Your symptoms and medical  history.  A physical exam.  Blood tests.  Stool tests.  A breath test.  A procedure that involves placing a tube with a camera on the end of it down your throat to examine your stomach and upper intestine (upper endoscopy).  Removing and testing a tissue sample from the stomach lining (biopsy). A biopsy may be taken during an upper endoscopy.  How is this treated?    This condition is treated by taking a combination of medicines (triple therapy) for several weeks. Triple therapy includes one medicine to reduce the amount of acid in your stomach and two types of antibiotic medicines. This treatment may reduce your risk of cancer.  You may need to be tested for H. pylori again after treatment. In some cases, the treatment may need to be repeated if your treatment did not get rid of all the bacteria.  Follow these instructions at home:    Take over-the-counter and prescription medicines only as told by your health care provider.  Take your antibiotic medicine as told by your health care provider. Do not stop taking the antibiotics even if you start to feel better.  Return to your normal activities as told by your health care provider. Ask your health care provider what activities are safe for you.  Take steps to prevent future infections:  Wash your hands often with soap and water for at least 20 seconds. If soap and water are not available, use hand .  Do not eat food or drink water that may have had contact with stool or saliva.  Keep all follow-up visits. This is important. You may need tests to make sure your treatment worked.  Contact a health care provider if your symptoms:  Do not get better with treatment.  Return after treatment.  Summary  Helicobacter pylori infection is a stomach infection caused by the Helicobacter pylori (H. pylori) bacteria.  This infection can cause stomach irritation (gastritis), ulcers in the stomach (gastric ulcers), and ulcers in the upper part of the intestine (duodenal  ulcers).  This condition is treated by taking a combination of medicines (triple therapy) for several weeks.  Take your antibiotic medicine as told by your health care provider. Do not stop taking the antibiotics even if you start to feel better.  This information is not intended to replace advice given to you by your health care provider. Make sure you discuss any questions you have with your health care provider.  Document Revised: 07/06/2022 Document Reviewed: 07/06/2022  [employer] Patient Education © 2024 [employer]

## 2024-08-05 ENCOUNTER — PROCEDURE VISIT (OUTPATIENT)
Dept: OTOLARYNGOLOGY | Facility: CLINIC | Age: 74
End: 2024-08-05
Payer: MEDICARE

## 2024-08-05 ENCOUNTER — OFFICE VISIT (OUTPATIENT)
Dept: OTOLARYNGOLOGY | Facility: CLINIC | Age: 74
End: 2024-08-05
Payer: MEDICARE

## 2024-08-05 VITALS — WEIGHT: 288.8 LBS | HEIGHT: 72 IN | TEMPERATURE: 97.3 F | BODY MASS INDEX: 39.12 KG/M2

## 2024-08-05 DIAGNOSIS — H90.A21 SENSORINEURAL HEARING LOSS (SNHL) OF RIGHT EAR WITH RESTRICTED HEARING OF LEFT EAR: ICD-10-CM

## 2024-08-05 DIAGNOSIS — H90.A22 SENSORINEURAL HEARING LOSS (SNHL) OF LEFT EAR WITH RESTRICTED HEARING OF RIGHT EAR: ICD-10-CM

## 2024-08-05 DIAGNOSIS — H90.3 BILATERAL HIGH FREQUENCY SENSORINEURAL HEARING LOSS: Primary | ICD-10-CM

## 2024-08-05 DIAGNOSIS — R42 VERTIGO: ICD-10-CM

## 2024-08-05 DIAGNOSIS — H93.12 TINNITUS, LEFT: ICD-10-CM

## 2024-08-05 DIAGNOSIS — R42 VERTIGO: Primary | ICD-10-CM

## 2024-08-05 PROCEDURE — 92567 TYMPANOMETRY: CPT | Performed by: AUDIOLOGIST

## 2024-08-05 PROCEDURE — 99204 OFFICE O/P NEW MOD 45 MIN: CPT | Performed by: OTOLARYNGOLOGY

## 2024-08-05 PROCEDURE — 1159F MED LIST DOCD IN RCRD: CPT | Performed by: OTOLARYNGOLOGY

## 2024-08-05 PROCEDURE — 1160F RVW MEDS BY RX/DR IN RCRD: CPT | Performed by: OTOLARYNGOLOGY

## 2024-08-05 PROCEDURE — 92557 COMPREHENSIVE HEARING TEST: CPT | Performed by: AUDIOLOGIST

## 2024-08-05 NOTE — PATIENT INSTRUCTIONS
1.  Audiogram obtained today shows SRT of 15 bilaterally and discrimination scores 100% on the right and 93% on the left.  Tympanograms are type a also bilaterally.  Pure-tone show right mild sensorineural hearing loss at 2000 Hz sloping to severe sensorineural loss from 3000 to 8000 Hz left ear mild conductive loss in the low-frequency but mild sensorineural hearing loss at 2000 Hz sloping again to severe sensorineural hearing loss at 3000 to 8000 Hz.  2.  Much of this high-frequency loss is due to hunting without protection and noise exposure.  3.  I discussed the importance of hearing protection at all times.  Also recommended starting hearing aids fund.  4.  I recommend follow-up in a year with another audiogram for comparison.

## 2024-08-05 NOTE — PROGRESS NOTES
AUDIOMETRIC EVALUATION      Name:  Pastor Bartholomew  :  1950  Age:  73 y.o.  Date of Evaluation:  2024       History:  Mr. Bartholomew is seen today for a hearing evaluation due to trim noise exposure.    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 Willard.    Tympanometry (226 Hz):  Right: Type A  Left: Type A    IMPRESSIONS:  Pure tone thresholds for the right ear shows a mild sensorineural hearing loss at 2K hertz.  A severe loss at 3K-8K hertz.  Pure tone thresholds for the left ear shows a mild conductive loss at 0.5 K and 1K hertz.  A mild sensorineural hearing loss at 2K hertz.  A severe sensorineural loss at 3K-8K hertz.  Patient was counseled with regard to the findings.    Amplification needs:  Patient could benefit from amplification if they feel increased communication difficulties.    RECOMMENDATIONS/PLAN:  Follow-up recommendations of Dr. Willard  Practice good communication strategies to assist with everyday listening (eye contact with speakers, reduce background noise, encourage others to communicate clearly and slowly).  Discussed results and recommendations with patient. Questions were addressed and the patient was encouraged to contact our department should concerns arise.          Fei Beaver M.S, [facility]  Licensed Audiologist

## 2024-08-05 NOTE — PROGRESS NOTES
Patient Name: Pastor Bartholomew   Visit Date: 08/05/2024   Patient ID: [id number]  Provider: Sivakumar Willard MD    Sex: male  Location: [facility]   YOB: 1950  Location Address: [address] [place] [address], [address] [place] [address],?[place]?[id number]    Primary Care Provider Tj ArceossJOHNNA lutz  Location Phone: [phone]    Referring Provider: Shahzad Otero MD        Chief Complaint  LEFT EAR CHECK    History of Present Illness  Pastor Bartholomew is a 73 y.o. male who presents to [facility] for LEFT EAR CHECK.  Patient was seen at the hospital for vertigo in February of this year.  He also had an MRI of the brain which showed no evidence of any ischemic changes or any other pathology other than the fact that he had progression of chronic white matter changes compatible with small vessel ischemic disease.  Otherwise patient was referred here for further evaluation management.  Fortunately his vertigo has resolved and he has not had any issues since.  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  He also has intermittent tinnitus which is more so on the left.  Some high-frequency hearing loss has associated with noise exposure of the past.  He denies any other otologic symptoms such as otorrhea or otalgia.  There is also no prior history of ear surgery or ear trauma.    Past Medical History:   Diagnosis Date    Atrial fibrillation     Paz esophagus     Clotting disorder 2/5/2024    Coronary artery disease Feb 5 2024    AFib    Diabetes     Dizziness Feb5. 2024    GERD (gastroesophageal reflux disease) 2000    Hernia, inguinal     History of total hip arthroplasty, right 02/01/2023    HL (hearing loss) 1997    Hyperlipidemia     Hypertension     Hypervitaminosis D      PONV (postoperative nausea and vomiting)     Sleep apnea     Type 2 diabetes mellitus        Past Surgical History:   Procedure Laterality Date    COLONOSCOPY       COLONOSCOPY N/A 04/25/2022    Procedure: COLONOSCOPY WITH POLYPECTOMY;  Surgeon: Artie Dickerson MD;  Location: [facility] ENDOSCOPY;  Service: Gastroenterology;  Laterality: N/A;  COLON POLYP    ENDOSCOPY N/A 04/25/2022    Procedure: ESOPHAGOGASTRODUODENOSCOPY WITH BX;  Surgeon: Artie Dickerson MD;  Location: [facility] ENDOSCOPY;  Service: Gastroenterology;  Laterality: N/A;  CAPPS'S ESOPHAGUS, HIATAL HERNIA    HERNIA REPAIR      KNEE SURGERY Bilateral     TOTAL HIP ARTHROPLASTY Right     TRIGGER FINGER RELEASE Bilateral     x2 THUMBS    UPPER GASTROINTESTINAL ENDOSCOPY  2022         Current Outpatient Medications:     amLODIPine-valsartan (EXFORGE)  MG per tablet, TAKE 1 TABLET BY MOUTH EVERY DAY, Disp: 90 tablet, Rfl: 1    apixaban (ELIQUIS) 5 MG tablet tablet, Take 1 tablet by mouth 2 (Two) Times a Day. Indications: Atrial Fibrillation, Disp: 30 tablet, Rfl: 0    Ascorbic Acid (Vitamin C) 100 MG chewable tablet, Chew 1 tablet Daily., Disp: , Rfl:     cetirizine (zyrTEC) 10 MG tablet, Take 1 tablet by mouth Daily As Needed., Disp: , Rfl:     cholecalciferol (VITAMIN D3) 10 MCG (400 UNIT) tablet, Take 1 tablet by mouth Daily., Disp: , Rfl:     co-enzyme Q-10 30 MG capsule, Take 1 capsule by mouth Daily., Disp: , Rfl:     Continuous Blood Gluc Sensor (Dexcom G6 Sensor), Every 10 (Ten) Days., Disp: , Rfl:     CONTOUR NEXT TEST test strip, Test 6 times daily DX: e11.65, Disp: 200 each, Rfl: 5    digoxin (LANOXIN) 125 MCG tablet, Take 1 tablet by mouth Daily., Disp: 90 tablet, Rfl: 1    donepezil (ARICEPT) 10 MG tablet, Take 1 tablet by mouth Every Night., Disp: 90 tablet, Rfl: 1    insulin patient supplied pump, Inject  under the skin into the appropriate area as directed Continuous. Type of Insulin: Humulin R 500 Type of Pump:[employer]/MOAEC [id number] Bolus amount: 50 units daily.  Basal amount : Max basal 5.50u/hr Correction factor:  Insulin to carbohydrate ratio: 12a-12p 10                                                 12p-2p 10                                                 2p -12 a 7  Date of last site change: 2/3/24 Location of catheter: right side on stomach  Frequency of refill: approximately 3 days Last refill date: per pt 2/3/24  Target 12a-3a 130-150              3a-6a -100-120              6a-9p 100-120              9p-12a 130-150  Active Ins time : 5 hours  Basal Pattern Setup 27.3  Prescriber Shara Campbell Phone number [phone], Disp: , Rfl:     insulin regular (HUMULIN R) 500 UNIT/ML CONCENTRATED injection, 50u daily with insulin pump (Patient taking differently: Inject 10 unit marking on U-100 syringe under the skin into the appropriate area as directed Daily. 60u daily with insulin pump), Disp: 40 mL, Rfl: 3    L-Lysine 500 MG tablet tablet, Take 1 tablet by mouth Daily., Disp: , Rfl:     Lancets misc, Check 5 times daily DX: e11.65, Disp: 500 each, Rfl: 1    magnesium oxide (MAG-OX) 400 MG tablet, Take 1 tablet by mouth Daily., Disp: , Rfl:     memantine (NAMENDA) 10 MG tablet, 1/2 tablet at night for 1 wk, then 1/2 tablet BID for 1 wk, then 1/2 in the AM and 1 tablet in PM for 1 week, then 1 tablet BID thereafter, Disp: 180 tablet, Rfl: 1    metFORMIN (GLUCOPHAGE) 500 MG tablet, Take 1 tablet by mouth 2 (Two) Times a Day With Meals., Disp: , Rfl:     metoclopramide (REGLAN) 5 MG tablet, TAKE 1 TABLET BY MOUTH 4 (FOUR) TIMES A DAY BEFORE MEALS & AT BEDTIME., Disp: 120 tablet, Rfl: 5    multivitamin with minerals tablet tablet, Take 1 tablet by mouth Daily., Disp: , Rfl:     nebivolol (Bystolic) 10 MG tablet, Take 1 tablet by mouth Daily., Disp: 90 tablet, Rfl: 3    Omega-3 Fatty Acids (fish oil) 1000 MG capsule capsule, Take 1 capsule by mouth 2 (Two) Times a Day With Meals., Disp: , Rfl:     ondansetron ODT (Zofran ODT) 4 MG disintegrating tablet, Place 1 tablet on the tongue Every 8 (Eight) Hours As Needed for Nausea or Vomiting., Disp: 30 tablet, Rfl: 1    pantoprazole (PROTONIX)  "40 MG EC tablet, Take 1 tablet by mouth Daily., Disp: 90 tablet, Rfl: 1    rosuvastatin (CRESTOR) 40 MG tablet, Take 1 tablet by mouth Every Night., Disp: 90 tablet, Rfl: 1    spironolactone-hydrochlorothiazide (ALDACTAZIDE) 25-25 MG tablet, Take 1 tablet by mouth Daily., Disp: , Rfl:     venlafaxine XR (EFFEXOR-XR) 150 MG 24 hr capsule, Take 1 capsule by mouth Daily., Disp: 90 capsule, Rfl: 3    vitamin B-12 (CYANOCOBALAMIN) 100 MCG tablet, Take 1 tablet by mouth Daily., Disp: , Rfl:      Allergies   Allergen Reactions    Codeine Nausea Only    Semaglutide(0.25 Or 0.5mg-Dos) GI Intolerance       Social History     Tobacco Use    Smoking status: Never     Passive exposure: Yes    Smokeless tobacco: Never   Vaping Use    Vaping status: Never Used   Substance Use Topics    Alcohol use: Never    Drug use: Never        Objective     Vital Signs:   Vitals:    08/05/24 1311   Temp: 97.3 °F (36.3 °C)   TempSrc: Temporal   Weight: 131 kg (288 lb 12.8 oz)   Height: 182.9 cm (72\")       Tobacco Use: Medium Risk (8/5/2024)    Patient History     Smoking Tobacco Use: Never     Smokeless Tobacco Use: Never     Passive Exposure: Yes         Physical Exam    Constitutional   Appearance  well developed, well-nourished, alert and in no acute distress, voice clear and strong    Head   Inspection  no deformities or lesions      Face   Inspection  no facial lesions; House-Brackmann I/VI bilaterally   Palpation  no TMJ crepitus nor  muscle tenderness bilaterally     Eyes/Vision   Visual Fields  extraocular movements are intact, no spontaneous or gaze-induced nystagmus  Conjunctivae  clear   Sclerae  clear   Pupils and Irises  pupils equal, round, and reactive to light.   Nystagmus  not present     Ears, Nose, Mouth and Throat  Ears  External Ears  appearance within normal limits, no lesions present   Otoscopic Examination  tympanic membrane appearance within normal limits bilaterally without perforations, well-aerated middle " ears   Hearing  intact to conversational voice both ears   Tunning fork testing    Rinne:  Wong:    Nose  External Nose  appearance normal   Intranasal Exam  mucosa within normal limits, vestibules normal, no intranasal lesions present, septum midline, sinuses non tender to percussion   Modified Gadsden Test:    Oral Cavity  Oral Mucosa  oral mucosa normal without pallor or cyanosis   Lips  lip appearance normal   Teeth  normal dentition for age   Gums  gums pink, non-swollen, no bleeding present   Tongue  tongue appearance normal; normal mobility   Palate  hard palate normal, soft palate appearance normal with symmetric mobility     Throat  Oropharynx  no inflammation or lesions present, tonsils within normal limits   Hypopharynx  appearance within normal limits   Larynx  voice normal     Neck  Inspection/Palpation  normal appearance, no masses or tenderness, trachea midline; thyroid size normal, nontender, no nodules or masses present on palpation     Lymphatic  Neck  no lymphadenopathy present   Supraclavicular Nodes  no lymphadenopathy present   Preauricular Nodes  no lymphadenopathy present     Respiratory  Respiratory Effort  breathing unlabored   Inspection of Chest  normal appearance, no retractions     Musculoskeletal   Cervical back: Normal range of motion and neck supple.      Skin and Subcutaneous Tissue  General Inspection  Regarding face and neck - there are no rashes present, no lesions present, and no areas of discoloration     Neurologic  Cranial Nerves  cranial nerves II-XII are grossly intact bilaterally   Gait and Station  normal gait, able to stand without diffculty    Psychiatric  Judgement and Insight  judgment and insight intact   Mood and Affect  mood normal, affect appropriate       RESULTS REVIEWED    I have reviewed the following information:   [x]  Previous Internal Note  []  Previous External Note:   [x]  Ordered Tests & Results:      Pathology:   Lab Results   Component Value Date     CASEREPORT  04/25/2022     Surgical Pathology Report                         Case: [id number]                                  Authorizing Provider:  Artie Dickerson MD  Collected:           04/25/2022 03:48 PM          Ordering Location:     [facility] Received:            04/26/2022 05:47 AM                                 SUITES                                                                       Pathologist:           Tre Reyes MD                                                            Specimens:   1) - Large Intestine, Right / Ascending Colon, ASCENDING COLON POLYP                                2) - Gastric, Antrum, GASTRIC ANTRUM BX                                                             3) - GE Junction, GE JUNCTION BX                                                           CLININFO  04/25/2022      Comment:      Nausea  Nausea and vomiting, intractability of vomiting not specified, unspecified vomiting type  Gastroesophageal reflux disease, unspecified whether esophagitis present  Encounter for screening for malignant neoplasm of colon  Altered bowel habits      FINALDX  04/25/2022     1. Ascending colon polyp, biopsy:    - Tubular adenoma      2. Stomach, antrum, biopsy:    - Gastric antrum mucosa with chronic active gastritis    - Positive for Helicobacter pylori on immunohistochemical stain    - Negative for intestinal metaplasia, dysplasia and malignancy      3. Gastroesophageal junction, biopsy:    - Squamocolumnar mucosa with mild changes suggestive of reflux esophagitis    - Negative for intestinal metaplasia, dysplasia and malignancy          GROSSDES  04/25/2022     1. Large Intestine, Right / Ascending Colon.  Ascending colon polyp: Received in formalin are 2 fragments of pink soft tissue filtered and measuring 0.5 cm in greatest aggregate.  All 1A.  2. Gastric, Antrum.  Gastric antrum biopsy: Received in formalin are 2 irregular fragments of light tan friable  soft tissue filtered and measuring 0.5 cm in greatest aggregate dimension.  All 2A.  3. GE Junction.  GE junction biopsy: Received in formalin are 2 irregular fragments of pink soft tissue filtered and measuring 0.7 cm in greatest aggregate dimension.  All 3A.  CRE      MICRO  04/25/2022      Comment:      Microscopic examination performed.         TSH   Date Value Ref Range Status   05/20/2024 1.040 0.270 - 4.200 uIU/mL Final     T3, Free   Date Value Ref Range Status   09/03/2019 2.7 2.0 - 4.4 pg/mL Final     Free T4   Date Value Ref Range Status   11/03/2023 0.87 (L) 0.93 - 1.70 ng/dL Final     Calcium   Date Value Ref Range Status   05/20/2024 9.7 8.6 - 10.5 mg/dL Final   12/19/2022 9.7 8.4 - 10.2 mg/dL Final     25 Hydroxy, Vitamin D   Date Value Ref Range Status   09/03/2019 37.9 30.0 - 100.0 ng/ml Final     Comment:     Reference Range for Total Vitamin D 25(OH)  Deficiency <20.0 ng/mL  Insufficiency 21-29 ng/mL  Sufficiency  ng/mL  Toxicity >100 ng/ml       Thyroid Peroxidase Antibody   Date Value Ref Range Status   09/03/2019 9 0 - 34 IU/mL Final       NM HIDA SCAN WITH PHARMACOLOGICAL INTERVENTION    Result Date: 5/15/2024  No evidence for acute cholecystitis.  Normal gallbladder ejection fraction. Patient had no symptoms after Ensure was given. 62%   Electronically Signed By-TIERRA MARIE MD On:5/15/2024 3:30 PM        I have discussed the interpretation of the above results with the patient.    Procedures          Assessment and Plan   Diagnoses and all orders for this visit:    1. Bilateral high frequency sensorineural hearing loss (Primary)  -     Comprehensive Hearing Test; Future  -     Comprehensive Hearing Test; Future    2. Tinnitus, left  -     Comprehensive Hearing Test; Future  -     Comprehensive Hearing Test; Future    3. Vertigo  -     Comprehensive Hearing Test; Future        (H90.3) Bilateral high frequency sensorineural hearing loss - Plan: Comprehensive Hearing Test,  Comprehensive Hearing Test    (H93.12) Tinnitus, left - Plan: Comprehensive Hearing Test, Comprehensive Hearing Test    (R42) Vertigo - Plan: Comprehensive Hearing Test     Pastor Bartholomew  reports that he has never smoked. He has been exposed to tobacco smoke. He has never used smokeless tobacco.       Plan:  Patient Instructions   1.  Audiogram obtained today shows SRT of 15 bilaterally and discrimination scores 100% on the right and 93% on the left.  Tympanograms are type a also bilaterally.  Pure-tone show right mild sensorineural hearing loss at 2000 Hz sloping to severe sensorineural loss from 3000 to 8000 Hz left ear mild conductive loss in the low-frequency but mild sensorineural hearing loss at 2000 Hz sloping again to severe sensorineural hearing loss at 3000 to 8000 Hz.  2.  Much of this high-frequency loss is due to hunting without protection and noise exposure.  3.  I discussed the importance of hearing protection at all times.  Also recommended starting hearing aids fund.  4.  I recommend follow-up in a year with another audiogram for comparison.      Follow Up   Return in about 1 year (around 8/5/2025), or Follow-up 1 year with audiogram.  Patient was given instructions and counseling regarding his condition or for health maintenance advice. Please see specific information pulled into the AVS if appropriate.

## 2024-08-06 ENCOUNTER — PATIENT ROUNDING (BHMG ONLY) (OUTPATIENT)
Dept: OTOLARYNGOLOGY | Facility: CLINIC | Age: 74
End: 2024-08-06
Payer: MEDICARE

## 2024-08-06 NOTE — PRE-PROCEDURE INSTRUCTIONS
"Instructed on date and arrival time of 1100. Instructed that arrival time is not procedure time but allows time to prepare for procedure. Come to entrance \"C\".  Must have  over age 18 to drive home.  May have two visitors; however, children under 12 must stay in waiting room.  Discussed diet/NPO.  May take medications as usual except for blood thinners, diabetic medications, or weight loss medications.  Verbalized understanding of instructions given.  Instructed to call for questions or concerns.  Hold Eliquis for 2 days prior to procedure.  Cardiac and blood thinner clearances noted in chart.  "

## 2024-08-08 ENCOUNTER — ANESTHESIA EVENT (OUTPATIENT)
Dept: GASTROENTEROLOGY | Facility: HOSPITAL | Age: 74
End: 2024-08-08
Payer: MEDICARE

## 2024-08-08 NOTE — ANESTHESIA PREPROCEDURE EVALUATION
Anesthesia Evaluation     Patient summary reviewed and Nursing notes reviewed   history of anesthetic complications:  PONV  NPO Solid Status: > 8 hours  NPO Liquid Status: > 8 hours           Airway   Mallampati: II  TM distance: >3 FB  Neck ROM: full  Possible difficult intubation and Large neck circumference  Dental - normal exam     Pulmonary     breath sounds clear to auscultation  (+) ,sleep apnea on CPAP  Cardiovascular - normal exam  Exercise tolerance: good (4-7 METS)    ECG reviewed  PT is on anticoagulation therapy  Rhythm: regular  Rate: normal    (+) hypertension well controlled, CAD, dysrhythmias Atrial Fib, Paroxysmal Atrial Fib, hyperlipidemia      Neuro/Psych  (+) dizziness/light headedness  GI/Hepatic/Renal/Endo    (+) obesity, morbid obesity, hiatal hernia, GERD well controlled, diabetes mellitus type 2 poorly controlled    Musculoskeletal     Abdominal   (+) obese    Abdomen: soft.   Substance History      OB/GYN          Other   arthritis,     ROS/Med Hx Other: EKG 02/13/24: HR 64, SR, RBBB    ECHO 02/05/24:   ·  Technically difficult study due to poor acoustic windows.  ·  Left ventricular systolic function is normal. Calculated left ventricular EF = 62%  ·  Left ventricular diastolic function was indeterminate.  ·  Cardiac valves morphology was not well-visualized on the study.    Received cardiac clearance with moderate risks from Dr. Manuel on 07/31/24    Last dose Eliquis  - 8/06      Phys Exam Other: Dexcom on upper abdomen - insulin pump is off and under the bed               Anesthesia Plan    ASA 3     general   total IV anesthesia  (Total IV Anesthesia    Patient understands anesthesia not responsible for dental damage.  )  intravenous induction     Anesthetic plan, risks, benefits, and alternatives have been provided, discussed and informed consent has been obtained with: patient and spouse/significant other.  Pre-procedure education provided  Plan discussed with CRNA.      CODE STATUS:

## 2024-08-09 ENCOUNTER — HOSPITAL ENCOUNTER (OUTPATIENT)
Facility: HOSPITAL | Age: 74
Setting detail: HOSPITAL OUTPATIENT SURGERY
Discharge: HOME | End: 2024-08-09
Attending: INTERNAL MEDICINE | Admitting: INTERNAL MEDICINE
Payer: MEDICARE

## 2024-08-09 ENCOUNTER — ANESTHESIA (OUTPATIENT)
Dept: GASTROENTEROLOGY | Facility: HOSPITAL | Age: 74
End: 2024-08-09
Payer: MEDICARE

## 2024-08-09 VITALS
WEIGHT: 285.27 LBS | TEMPERATURE: 98.8 F | BODY MASS INDEX: 38.69 KG/M2 | DIASTOLIC BLOOD PRESSURE: 69 MMHG | RESPIRATION RATE: 17 BRPM | HEART RATE: 61 BPM | OXYGEN SATURATION: 97 % | SYSTOLIC BLOOD PRESSURE: 145 MMHG

## 2024-08-09 DIAGNOSIS — K22.70 BARRETT'S ESOPHAGUS WITHOUT DYSPLASIA: ICD-10-CM

## 2024-08-09 DIAGNOSIS — K44.9 HIATAL HERNIA: ICD-10-CM

## 2024-08-09 DIAGNOSIS — Z79.4 TYPE 2 DIABETES MELLITUS WITH HYPERGLYCEMIA, WITH LONG-TERM CURRENT USE OF INSULIN: ICD-10-CM

## 2024-08-09 DIAGNOSIS — E11.65 TYPE 2 DIABETES MELLITUS WITH HYPERGLYCEMIA, WITH LONG-TERM CURRENT USE OF INSULIN: ICD-10-CM

## 2024-08-09 DIAGNOSIS — Z86.19 HISTORY OF HELICOBACTER PYLORI INFECTION: ICD-10-CM

## 2024-08-09 DIAGNOSIS — R11.2 NAUSEA AND VOMITING, UNSPECIFIED VOMITING TYPE: ICD-10-CM

## 2024-08-09 DIAGNOSIS — K29.30 CHRONIC SUPERFICIAL GASTRITIS WITHOUT BLEEDING: ICD-10-CM

## 2024-08-09 DIAGNOSIS — K21.9 GASTROESOPHAGEAL REFLUX DISEASE, UNSPECIFIED WHETHER ESOPHAGITIS PRESENT: ICD-10-CM

## 2024-08-09 LAB — GLUCOSE BLDC GLUCOMTR-MCNC: 269 MG/DL (ref 70–99)

## 2024-08-09 PROCEDURE — 88342 IMHCHEM/IMCYTCHM 1ST ANTB: CPT | Performed by: INTERNAL MEDICINE

## 2024-08-09 PROCEDURE — 88305 TISSUE EXAM BY PATHOLOGIST: CPT | Performed by: INTERNAL MEDICINE

## 2024-08-09 PROCEDURE — 25010000002 PROPOFOL 10 MG/ML EMULSION: Performed by: NURSE ANESTHETIST, CERTIFIED REGISTERED

## 2024-08-09 PROCEDURE — 25810000003 LACTATED RINGERS PER 1000 ML

## 2024-08-09 PROCEDURE — 82948 REAGENT STRIP/BLOOD GLUCOSE: CPT

## 2024-08-09 PROCEDURE — 43239 EGD BIOPSY SINGLE/MULTIPLE: CPT | Performed by: INTERNAL MEDICINE

## 2024-08-09 RX ORDER — PROPOFOL 10 MG/ML
VIAL (ML) INTRAVENOUS AS NEEDED
Status: DISCONTINUED | OUTPATIENT
Start: 2024-08-09 | End: 2024-08-09 | Stop reason: SURG

## 2024-08-09 RX ORDER — SODIUM CHLORIDE, SODIUM LACTATE, POTASSIUM CHLORIDE, CALCIUM CHLORIDE 600; 310; 30; 20 MG/100ML; MG/100ML; MG/100ML; MG/100ML
30 INJECTION, SOLUTION INTRAVENOUS CONTINUOUS
Status: DISCONTINUED | OUTPATIENT
Start: 2024-08-09 | End: 2024-08-09 | Stop reason: HOSPADM

## 2024-08-09 RX ORDER — LIDOCAINE HYDROCHLORIDE 20 MG/ML
INJECTION, SOLUTION EPIDURAL; INFILTRATION; INTRACAUDAL; PERINEURAL AS NEEDED
Status: DISCONTINUED | OUTPATIENT
Start: 2024-08-09 | End: 2024-08-09 | Stop reason: SURG

## 2024-08-09 RX ADMIN — PROPOFOL 250 MCG/KG/MIN: 10 INJECTION, EMULSION INTRAVENOUS at 12:41

## 2024-08-09 RX ADMIN — LIDOCAINE HYDROCHLORIDE 40 MG: 20 INJECTION, SOLUTION INTRAVENOUS at 12:41

## 2024-08-09 RX ADMIN — PROPOFOL 50 MG: 10 INJECTION, EMULSION INTRAVENOUS at 12:41

## 2024-08-09 RX ADMIN — SODIUM CHLORIDE, POTASSIUM CHLORIDE, SODIUM LACTATE AND CALCIUM CHLORIDE 30 ML/HR: 600; 310; 30; 20 INJECTION, SOLUTION INTRAVENOUS at 11:53

## 2024-08-09 NOTE — ANESTHESIA POSTPROCEDURE EVALUATION
Patient: Pastor Bartholomew    Procedure Summary       Date: 08/09/24 Room / Location: [facility] ENDOSCOPY 2 / [facility] ENDOSCOPY    Anesthesia Start: 1238 Anesthesia Stop: 1259    Procedure: ESOPHAGOGASTRODUODENOSCOPY WITH BIOPSIES Diagnosis:       Type 2 diabetes mellitus with hyperglycemia, with long-term current use of insulin      Gastroesophageal reflux disease, unspecified whether esophagitis present      Nausea and vomiting, unspecified vomiting type      Paz's esophagus without dysplasia      History of Helicobacter pylori infection      Hiatal hernia      Chronic superficial gastritis without bleeding      (Type 2 diabetes mellitus with hyperglycemia, with long-term current use of insulin [E11.65, Z79.4])      (Gastroesophageal reflux disease, unspecified whether esophagitis present [K21.9])      (Nausea and vomiting, unspecified vomiting type [R11.2])      (Paz's esophagus without dysplasia [K22.70])      (History of Helicobacter pylori infection [Z86.19])      (Hiatal hernia [K44.9])      (Chronic superficial gastritis without bleeding [K29.30])    Surgeons: Artie Dickerson MD Provider: Judy Stokes CRNA    Anesthesia Type: general ASA Status: 3            Anesthesia Type: general    Vitals  Vitals Value Taken Time   /69 08/09/24 1317   Temp 37.1 °C (98.8 °F) 08/09/24 1316   Pulse 59 08/09/24 1318   Resp 17 08/09/24 1316   SpO2 95 % 08/09/24 1318   Vitals shown include unfiled device data.        Post Anesthesia Care and Evaluation    Patient location during evaluation: bedside  Patient participation: complete - patient participated  Level of consciousness: awake  Pain management: adequate    Airway patency: patent  Anesthetic complications: No anesthetic complications  PONV Status: controlled  Cardiovascular status: acceptable and stable  Respiratory status: acceptable

## 2024-08-12 LAB
CYTO UR: NORMAL
LAB AP CASE REPORT: NORMAL
LAB AP CLINICAL INFORMATION: NORMAL
LAB AP SPECIAL STAINS: NORMAL
PATH REPORT.FINAL DX SPEC: NORMAL
PATH REPORT.GROSS SPEC: NORMAL

## 2024-08-19 ENCOUNTER — TELEPHONE (OUTPATIENT)
Dept: GASTROENTEROLOGY | Facility: CLINIC | Age: 74
End: 2024-08-19
Payer: MEDICARE

## 2024-08-19 NOTE — TELEPHONE ENCOUNTER
Hub staff attempted to follow warm transfer process and was unsuccessful     Caller: Pastor Bartholomew    Relationship to patient: Self    Best call back number:  [phone]    Patient is needing: PT RETURNING CALL TO OFFICE, OK TO LEAVE A DETAILED MESSAGE ON VM.

## 2024-08-19 NOTE — TELEPHONE ENCOUNTER
----- Message from Terri Loo sent at 8/12/2024  2:56 PM EDT -----  Paz's esophagus noted on biopsies repeat EGD in 2 years for surveillance.    Minimize use of Reglan as discussed with Dr. Dickerson.  Proceed with gastric emptying study.      Gastroparesis diet recommendations:  1) Eat smaller meals more frequently.  2) Eat low-fiber forms of high fiber foods, such as well cooked fruits and vegetables rather than raw fruits and vegetables.  3) Choose mostly low-fat foods, but if you can tolerate them, add small servings of fatty foods to your diet.   4) Avoid fibrous fruits and vegetables, such as oranges and broccoli.  5) If liquids are easier for you to ingest, try soups and pureed foods.   6) Drink water throughout each meal.  7) Try gentle exercise after you eat, such as going for a walk.

## 2024-08-19 NOTE — TELEPHONE ENCOUNTER
Attempted to contact patient, left voicemail message to return call. Provided direct contact information.    GES scheduled for 08.26.24 at 0800.    Follow up appointment with JOHNNA Campo on 10.24.24 at 1000.    Care Gap already updated:  2 year EGD recall in place

## 2024-08-21 ENCOUNTER — OFFICE VISIT (OUTPATIENT)
Dept: CARDIOLOGY | Facility: CLINIC | Age: 74
End: 2024-08-21
Payer: MEDICARE

## 2024-08-21 VITALS
HEART RATE: 57 BPM | DIASTOLIC BLOOD PRESSURE: 65 MMHG | WEIGHT: 286.6 LBS | HEIGHT: 72 IN | BODY MASS INDEX: 38.82 KG/M2 | SYSTOLIC BLOOD PRESSURE: 148 MMHG

## 2024-08-21 DIAGNOSIS — I48.0 PAROXYSMAL ATRIAL FIBRILLATION: Primary | ICD-10-CM

## 2024-08-21 DIAGNOSIS — I48.0 PAF (PAROXYSMAL ATRIAL FIBRILLATION): ICD-10-CM

## 2024-08-21 DIAGNOSIS — I10 ESSENTIAL HYPERTENSION: Chronic | ICD-10-CM

## 2024-08-21 NOTE — TELEPHONE ENCOUNTER
Patient stopped by Dr. Dickerson's office:    Spoke to patient and informed of JOHNNA Campo result note and recommendations. Verified patient understanding.    Provided Gastroparesis diet information.    Patient stated he is aware of and ready for GES on Monday, 08.26.24 and is aware of Dr. Dickerson's directive to hold Reglan as discussed prior to GES.

## 2024-08-21 NOTE — PROGRESS NOTES
Chief Complaint  Follow-up and Atrial Fibrillation    Subjective    Patient has had issues with generalized fatigue and lack of energy.  Also sleeping a lot during the day.  He does report symptoms a pressure-like sensation on his chest when he wakes up in the morning.  He denies symptomatic tachycardia  Past Medical History:   Diagnosis Date    Abnormal ECG 4/20/2023    Clogged artery    Atrial fibrillation     Paz esophagus     Clotting disorder 2/5/2024    Congenital heart disease 4/5/2024    Coronary artery disease Feb 5 2024    AFib    Diabetes     Dizziness Feb5. 2024    GERD (gastroesophageal reflux disease) 2000    Hernia, inguinal     History of total hip arthroplasty, right 02/01/2023    HL (hearing loss) 1997    Hyperlipidemia     Hypertension     Hypervitaminosis D      PONV (postoperative nausea and vomiting)     Sleep apnea     Type 2 diabetes mellitus          Current Outpatient Medications:     amLODIPine-valsartan (EXFORGE)  MG per tablet, TAKE 1 TABLET BY MOUTH EVERY DAY, Disp: 90 tablet, Rfl: 1    apixaban (ELIQUIS) 5 MG tablet tablet, Take 1 tablet by mouth 2 (Two) Times a Day. Indications: Atrial Fibrillation, Disp: 30 tablet, Rfl: 0    Ascorbic Acid (Vitamin C) 100 MG chewable tablet, Chew 1 tablet Daily., Disp: , Rfl:     cetirizine (zyrTEC) 10 MG tablet, Take 1 tablet by mouth Daily As Needed., Disp: , Rfl:     co-enzyme Q-10 30 MG capsule, Take 1 capsule by mouth Daily., Disp: , Rfl:     Continuous Blood Gluc Sensor (Dexcom G6 Sensor), Every 10 (Ten) Days., Disp: , Rfl:     CONTOUR NEXT TEST test strip, Test 6 times daily DX: e11.65, Disp: 200 each, Rfl: 5    digoxin (LANOXIN) 125 MCG tablet, Take 1 tablet by mouth Daily., Disp: 90 tablet, Rfl: 1    donepezil (ARICEPT) 10 MG tablet, Take 1 tablet by mouth Every Night., Disp: 90 tablet, Rfl: 1    insulin patient supplied pump, Inject  under the skin into the appropriate area as directed Continuous. Type of Insulin: Humulin R 500  Type of Pump:[employer]SABIA [phone] Bolus amount: 50 units daily.  Basal amount : Max basal 5.50u/hr Correction factor:  Insulin to carbohydrate ratio: 12a-12p 10                                                12p-2p 10                                                 2p -12 a 7  Date of last site change: 2/3/24 Location of catheter: right side on stomach  Frequency of refill: approximately 3 days Last refill date: per pt 2/3/24  [employer] 12a-3a 130-150              3a-6a -100-120              6a-9p 100-120              9p-12a 130-150  Active Ins time : 5 hours  Basal Pattern Setup 27.3  Prescriber Shara Campbell Phone number [phone], Disp: , Rfl:     insulin regular (HUMULIN R) 500 UNIT/ML CONCENTRATED injection, 50u daily with insulin pump (Patient taking differently: Inject 10 unit marking on U-100 syringe under the skin into the appropriate area as directed Daily. 60u daily with insulin pump), Disp: 40 mL, Rfl: 3    L-Lysine 500 MG tablet tablet, Take 1 tablet by mouth Daily., Disp: , Rfl:     Lancets misc, Check 5 times daily DX: e11.65, Disp: 500 each, Rfl: 1    magnesium oxide (MAG-OX) 400 MG tablet, Take 1 tablet by mouth Daily., Disp: , Rfl:     memantine (NAMENDA) 10 MG tablet, 1/2 tablet at night for 1 wk, then 1/2 tablet BID for 1 wk, then 1/2 in the AM and 1 tablet in PM for 1 week, then 1 tablet BID thereafter, Disp: 180 tablet, Rfl: 1    metFORMIN (GLUCOPHAGE) 500 MG tablet, Take 1 tablet by mouth 2 (Two) Times a Day With Meals., Disp: , Rfl:     metoclopramide (REGLAN) 5 MG tablet, TAKE 1 TABLET BY MOUTH 4 (FOUR) TIMES A DAY BEFORE MEALS & AT BEDTIME., Disp: 120 tablet, Rfl: 5    multivitamin with minerals tablet tablet, Take 1 tablet by mouth Daily., Disp: , Rfl:     nebivolol (Bystolic) 10 MG tablet, Take 1 tablet by mouth Daily., Disp: 90 tablet, Rfl: 3    Omega-3 Fatty Acids (fish oil) 1000 MG capsule capsule, Take 1 capsule by mouth 2 (Two) Times a Day With Meals., Disp: , Rfl:      "ondansetron ODT (Zofran ODT) 4 MG disintegrating tablet, Place 1 tablet on the tongue Every 8 (Eight) Hours As Needed for Nausea or Vomiting., Disp: 30 tablet, Rfl: 1    pantoprazole (PROTONIX) 40 MG EC tablet, Take 1 tablet by mouth Daily., Disp: 90 tablet, Rfl: 1    rosuvastatin (CRESTOR) 40 MG tablet, Take 1 tablet by mouth Every Night., Disp: 90 tablet, Rfl: 1    spironolactone-hydrochlorothiazide (ALDACTAZIDE) 25-25 MG tablet, Take 1 tablet by mouth Daily., Disp: , Rfl:     venlafaxine XR (EFFEXOR-XR) 150 MG 24 hr capsule, Take 1 capsule by mouth Daily., Disp: 90 capsule, Rfl: 3    vitamin B-12 (CYANOCOBALAMIN) 100 MCG tablet, Take 1 tablet by mouth Daily. (Patient not taking: Reported on 8/21/2024), Disp: , Rfl:     Medications Discontinued During This Encounter   Medication Reason    cholecalciferol (VITAMIN D3) 10 MCG (400 UNIT) tablet *Therapy completed     Allergies   Allergen Reactions    Codeine Nausea Only    Semaglutide(0.25 Or 0.5mg-Dos) GI Intolerance        Social History     Tobacco Use    Smoking status: Never     Passive exposure: Yes    Smokeless tobacco: Never   Vaping Use    Vaping status: Never Used   Substance Use Topics    Alcohol use: Never    Drug use: Never       Family History   Problem Relation Age of Onset    Diabetes Mother     Hypertension Mother     Heart attack Mother     COPD Brother     Diabetes Maternal Grandmother     Heart attack Sister         Quad bypass    Heart attack Brother         AFib    Colon cancer Neg Hx         Objective     /65   Pulse 57   Ht 182.9 cm (72.01\")   Wt 130 kg (286 lb 9.6 oz)   BMI 38.86 kg/m²       Physical Exam    General Appearance:   no acute distress  Alert and oriented x3  HENT:   lips not cyanotic  Atraumatic  Neck:  No jvd   supple  Respiratory:  no respiratory distress  normal breath sounds  no rales  Cardiovascular:  Regular rate and rhythm  no S3, no S4   no murmur  no rub  Extremities  No cyanosis  lower extremity edema: none  " "  Skin:   warm, dry  No rashes      Result Review :     proBNP   Date Value Ref Range Status   02/04/2024 894.2 0.0 - 900.0 pg/mL Final     CMP          2/14/2024    09:13 4/19/2024    15:50 5/20/2024    09:41   CMP   Glucose 136  166  102    BUN 13  11  14    Creatinine 0.95  1.03  0.99    EGFR 85.6  77.7  81.4    Sodium 145  141  141    Potassium 3.8  3.9  4.4    Chloride 104  99  101    Calcium 9.5  10.0  9.7    Total Protein 6.5  7.6  7.2    Albumin 4.2  4.9  4.8    Globulin 2.3  2.7  2.4    Total Bilirubin 0.5  0.6  0.4    Alkaline Phosphatase 88  104  77    AST (SGOT) 25  23  17    ALT (SGPT) 22  19  24    Albumin/Globulin Ratio 1.8  1.8  2.0    BUN/Creatinine Ratio 13.7  10.7  14.1    Anion Gap 12.7  13.0  9.0      CBC w/diff          2/8/2024    04:16 2/27/2024    11:59 4/19/2024    15:50   CBC w/Diff   WBC 9.35  8.52     13.12    RBC 5.10  4.91     5.33    Hemoglobin 15.1  14.7     16.0    Hematocrit 43.0  40.8     47.6    MCV 84.3  83.1     89.3    MCH 29.6  29.9     30.0    MCHC 35.1  36.0     33.6    RDW 13.2  13.9     13.6    Platelets 238  227     313    Neutrophil Rel %  68.7     77.3    Immature Granulocyte Rel %  0.1     0.5    Lymphocyte Rel %  23.0     16.7    Monocyte Rel %  6.5     4.5    Eosinophil Rel %  1.2     0.5    Basophil Rel %  0.5     0.5       Details          This result is from an external source.              Lab Results   Component Value Date    TSH 1.040 05/20/2024      Lab Results   Component Value Date    FREET4 0.87 (L) 11/03/2023      No results found for: \"DDIMERQUANT\"  Magnesium   Date Value Ref Range Status   04/19/2024 2.1 1.6 - 2.4 mg/dL Final      No results found for: \"DIGOXIN\"   Lab Results   Component Value Date    TROPONINT 33 (H) 02/06/2024           Lipid Panel          11/3/2023    08:58 2/5/2024    04:28 5/20/2024    09:41   Lipid Panel   Total Cholesterol 136  189  145    Triglycerides 203  147  228    HDL Cholesterol 34  38  36    VLDL Cholesterol 34  26  38  " "  LDL Cholesterol  68  125  71    LDL/HDL Ratio 1.81  3.20  1.76      No results found for: \"POCTROP\"  Results for orders placed during the hospital encounter of 02/04/24    Stress Test With Myocardial Perfusion One Day    Interpretation Summary    Myocardial perfusion imaging indicates a normal myocardial perfusion study with no evidence of ischemia. Impressions are consistent with a low risk study.    Left ventricular ejection fraction is mildly reduced (Calculated EF = 40%) although likely not accurate given irregular heartbeat and issues with gating due to underlying atrial fibrillation    Findings consistent with a normal ECG stress test.    Results for orders placed during the hospital encounter of 02/04/24    Adult Transthoracic Echo Complete W/ Cont if Necessary Per Protocol (With Agitated Saline)    Interpretation Summary    Technically difficult study due to poor acoustic windows.    Left ventricular systolic function is normal. Calculated left ventricular EF = 62%    Left ventricular diastolic function was indeterminate.    Cardiac valves morphology was not well-visualized on the study.                 Diagnoses and all orders for this visit:    1. Paroxysmal atrial fibrillation (Primary)  -     Holter Monitor - 72 Hour Up To 15 Days; Future    2. PAF (paroxysmal atrial fibrillation)  Assessment & Plan:  Patient with general issues with fatigability lack of energy not sure this is related to atrial fibrillation or not recommended a weeklong monitor and correlating his symptoms to whether or not he was heart rate was in A-fib and if so what his rate was doing to better decide about any further modifications to his treatment.  Continue his Eliquis 5 twice daily for CVA prevention      3. Essential hypertension  Assessment & Plan:  Blood pressure is well-controlled continue with Exforge 10/320 mg daily               Follow Up     Return in about 6 months (around 2/21/2025) for Follow with Alexa Connolly EKG " with F/U.          Patient was given instructions and counseling regarding his condition or for health maintenance advice. Please see specific information pulled into the AVS if appropriate.

## 2024-08-21 NOTE — ASSESSMENT & PLAN NOTE
Patient with general issues with fatigability lack of energy not sure this is related to atrial fibrillation or not recommended a weeklong monitor and correlating his symptoms to whether or not he was heart rate was in A-fib and if so what his rate was doing to better decide about any further modifications to his treatment.  Continue his Eliquis 5 twice daily for CVA prevention

## 2024-08-26 ENCOUNTER — HOSPITAL ENCOUNTER (OUTPATIENT)
Dept: NUCLEAR MEDICINE | Facility: HOSPITAL | Age: 74
Discharge: HOME | End: 2024-08-26
Payer: MEDICARE

## 2024-08-26 DIAGNOSIS — K29.30 CHRONIC SUPERFICIAL GASTRITIS WITHOUT BLEEDING: ICD-10-CM

## 2024-08-26 DIAGNOSIS — E11.65 TYPE 2 DIABETES MELLITUS WITH HYPERGLYCEMIA, WITH LONG-TERM CURRENT USE OF INSULIN: ICD-10-CM

## 2024-08-26 DIAGNOSIS — K44.9 HIATAL HERNIA: ICD-10-CM

## 2024-08-26 DIAGNOSIS — Z79.4 TYPE 2 DIABETES MELLITUS WITH HYPERGLYCEMIA, WITH LONG-TERM CURRENT USE OF INSULIN: ICD-10-CM

## 2024-08-26 DIAGNOSIS — R11.2 NAUSEA AND VOMITING, UNSPECIFIED VOMITING TYPE: ICD-10-CM

## 2024-08-26 DIAGNOSIS — K21.9 GASTROESOPHAGEAL REFLUX DISEASE, UNSPECIFIED WHETHER ESOPHAGITIS PRESENT: ICD-10-CM

## 2024-08-26 DIAGNOSIS — Z86.19 HISTORY OF HELICOBACTER PYLORI INFECTION: ICD-10-CM

## 2024-08-26 DIAGNOSIS — K22.70 BARRETT'S ESOPHAGUS WITHOUT DYSPLASIA: ICD-10-CM

## 2024-09-04 ENCOUNTER — OFFICE VISIT (OUTPATIENT)
Dept: FAMILY MEDICINE CLINIC | Facility: CLINIC | Age: 74
End: 2024-09-04
Payer: MEDICARE

## 2024-09-04 ENCOUNTER — TRANSCRIBE ORDERS (OUTPATIENT)
Dept: LAB | Facility: HOSPITAL | Age: 74
End: 2024-09-04
Payer: MEDICARE

## 2024-09-04 ENCOUNTER — LAB (OUTPATIENT)
Dept: LAB | Facility: HOSPITAL | Age: 74
End: 2024-09-04
Payer: MEDICARE

## 2024-09-04 VITALS
BODY MASS INDEX: 39.2 KG/M2 | HEART RATE: 63 BPM | DIASTOLIC BLOOD PRESSURE: 60 MMHG | OXYGEN SATURATION: 97 % | TEMPERATURE: 98.4 F | HEIGHT: 72 IN | SYSTOLIC BLOOD PRESSURE: 128 MMHG | WEIGHT: 289.4 LBS

## 2024-09-04 DIAGNOSIS — E11.9 DIABETES MELLITUS WITHOUT COMPLICATION: ICD-10-CM

## 2024-09-04 DIAGNOSIS — E55.9 VITAMIN D DEFICIENCY: ICD-10-CM

## 2024-09-04 DIAGNOSIS — E29.1 HYPOGONADISM IN MALE: ICD-10-CM

## 2024-09-04 DIAGNOSIS — E78.5 HYPERLIPIDEMIA, UNSPECIFIED HYPERLIPIDEMIA TYPE: ICD-10-CM

## 2024-09-04 DIAGNOSIS — I10 ESSENTIAL HYPERTENSION: ICD-10-CM

## 2024-09-04 DIAGNOSIS — Z12.5 SCREENING FOR PROSTATE CANCER: ICD-10-CM

## 2024-09-04 DIAGNOSIS — Z79.4 TYPE 2 DIABETES MELLITUS WITH HYPERGLYCEMIA, WITH LONG-TERM CURRENT USE OF INSULIN: ICD-10-CM

## 2024-09-04 DIAGNOSIS — Z96.41 INSULIN PUMP STATUS: ICD-10-CM

## 2024-09-04 DIAGNOSIS — R53.83 OTHER FATIGUE: ICD-10-CM

## 2024-09-04 DIAGNOSIS — E11.65 TYPE 2 DIABETES MELLITUS WITH HYPERGLYCEMIA, WITH LONG-TERM CURRENT USE OF INSULIN: ICD-10-CM

## 2024-09-04 DIAGNOSIS — D50.9 IRON DEFICIENCY ANEMIA, UNSPECIFIED IRON DEFICIENCY ANEMIA TYPE: ICD-10-CM

## 2024-09-04 DIAGNOSIS — E11.9 DIABETES MELLITUS WITHOUT COMPLICATION: Primary | ICD-10-CM

## 2024-09-04 DIAGNOSIS — E29.1 HYPOGONADISM IN MALE: Primary | ICD-10-CM

## 2024-09-04 LAB
25(OH)D3 SERPL-MCNC: 44.2 NG/ML (ref 30–100)
ALBUMIN SERPL-MCNC: 4.6 G/DL (ref 3.5–5.2)
ALBUMIN/GLOB SERPL: 2 G/DL
ALP SERPL-CCNC: 84 U/L (ref 39–117)
ALT SERPL W P-5'-P-CCNC: 30 U/L (ref 1–41)
ANION GAP SERPL CALCULATED.3IONS-SCNC: 13.4 MMOL/L (ref 5–15)
AST SERPL-CCNC: 31 U/L (ref 1–40)
BASOPHILS # BLD AUTO: 0.04 10*3/MM3 (ref 0–0.2)
BASOPHILS NFR BLD AUTO: 0.4 % (ref 0–1.5)
BILIRUB SERPL-MCNC: 0.5 MG/DL (ref 0–1.2)
BUN SERPL-MCNC: 13 MG/DL (ref 8–23)
BUN/CREAT SERPL: 12.5 (ref 7–25)
CALCIUM SPEC-SCNC: 10 MG/DL (ref 8.6–10.5)
CHLORIDE SERPL-SCNC: 99 MMOL/L (ref 98–107)
CO2 SERPL-SCNC: 25.6 MMOL/L (ref 22–29)
CREAT SERPL-MCNC: 1.04 MG/DL (ref 0.76–1.27)
DEPRECATED RDW RBC AUTO: 45.1 FL (ref 37–54)
EGFRCR SERPLBLD CKD-EPI 2021: 76.8 ML/MIN/1.73
EOSINOPHIL # BLD AUTO: 0.07 10*3/MM3 (ref 0–0.4)
EOSINOPHIL NFR BLD AUTO: 0.8 % (ref 0.3–6.2)
ERYTHROCYTE [DISTWIDTH] IN BLOOD BY AUTOMATED COUNT: 13.8 % (ref 12.3–15.4)
GLOBULIN UR ELPH-MCNC: 2.3 GM/DL
GLUCOSE SERPL-MCNC: 210 MG/DL (ref 65–99)
HBA1C MFR BLD: 8.7 % (ref 4.8–5.6)
HCT VFR BLD AUTO: 45.2 % (ref 37.5–51)
HGB BLD-MCNC: 15.4 G/DL (ref 13–17.7)
IMM GRANULOCYTES # BLD AUTO: 0.05 10*3/MM3 (ref 0–0.05)
IMM GRANULOCYTES NFR BLD AUTO: 0.6 % (ref 0–0.5)
IRON 24H UR-MRATE: 89 MCG/DL (ref 59–158)
IRON SATN MFR SERPL: 23 % (ref 20–50)
LYMPHOCYTES # BLD AUTO: 1.74 10*3/MM3 (ref 0.7–3.1)
LYMPHOCYTES NFR BLD AUTO: 19.6 % (ref 19.6–45.3)
MCH RBC QN AUTO: 30.9 PG (ref 26.6–33)
MCHC RBC AUTO-ENTMCNC: 34.1 G/DL (ref 31.5–35.7)
MCV RBC AUTO: 90.6 FL (ref 79–97)
MONOCYTES # BLD AUTO: 0.55 10*3/MM3 (ref 0.1–0.9)
MONOCYTES NFR BLD AUTO: 6.2 % (ref 5–12)
NEUTROPHILS NFR BLD AUTO: 6.45 10*3/MM3 (ref 1.7–7)
NEUTROPHILS NFR BLD AUTO: 72.4 % (ref 42.7–76)
NRBC BLD AUTO-RTO: 0 /100 WBC (ref 0–0.2)
PLATELET # BLD AUTO: 244 10*3/MM3 (ref 140–450)
PMV BLD AUTO: 11.4 FL (ref 6–12)
POTASSIUM SERPL-SCNC: 4.3 MMOL/L (ref 3.5–5.2)
PROT SERPL-MCNC: 6.9 G/DL (ref 6–8.5)
PSA SERPL-MCNC: 0.15 NG/ML (ref 0–4)
RBC # BLD AUTO: 4.99 10*6/MM3 (ref 4.14–5.8)
SODIUM SERPL-SCNC: 138 MMOL/L (ref 136–145)
TIBC SERPL-MCNC: 395 MCG/DL (ref 298–536)
TRANSFERRIN SERPL-MCNC: 265 MG/DL (ref 200–360)
WBC NRBC COR # BLD AUTO: 8.9 10*3/MM3 (ref 3.4–10.8)

## 2024-09-04 PROCEDURE — 1126F AMNT PAIN NOTED NONE PRSNT: CPT | Performed by: NURSE PRACTITIONER

## 2024-09-04 PROCEDURE — 83540 ASSAY OF IRON: CPT

## 2024-09-04 PROCEDURE — 1159F MED LIST DOCD IN RCRD: CPT | Performed by: NURSE PRACTITIONER

## 2024-09-04 PROCEDURE — 84403 ASSAY OF TOTAL TESTOSTERONE: CPT

## 2024-09-04 PROCEDURE — G0103 PSA SCREENING: HCPCS

## 2024-09-04 PROCEDURE — 80053 COMPREHEN METABOLIC PANEL: CPT

## 2024-09-04 PROCEDURE — 1160F RVW MEDS BY RX/DR IN RCRD: CPT | Performed by: NURSE PRACTITIONER

## 2024-09-04 PROCEDURE — 99214 OFFICE O/P EST MOD 30 MIN: CPT | Performed by: NURSE PRACTITIONER

## 2024-09-04 PROCEDURE — 84466 ASSAY OF TRANSFERRIN: CPT

## 2024-09-04 PROCEDURE — 82306 VITAMIN D 25 HYDROXY: CPT

## 2024-09-04 PROCEDURE — 83036 HEMOGLOBIN GLYCOSYLATED A1C: CPT

## 2024-09-04 PROCEDURE — 36415 COLL VENOUS BLD VENIPUNCTURE: CPT

## 2024-09-04 PROCEDURE — 3074F SYST BP LT 130 MM HG: CPT | Performed by: NURSE PRACTITIONER

## 2024-09-04 PROCEDURE — 3052F HG A1C>EQUAL 8.0%<EQUAL 9.0%: CPT | Performed by: NURSE PRACTITIONER

## 2024-09-04 PROCEDURE — 3078F DIAST BP <80 MM HG: CPT | Performed by: NURSE PRACTITIONER

## 2024-09-04 PROCEDURE — 85025 COMPLETE CBC W/AUTO DIFF WBC: CPT

## 2024-09-04 PROCEDURE — 84402 ASSAY OF FREE TESTOSTERONE: CPT

## 2024-09-04 NOTE — ASSESSMENT & PLAN NOTE
Controlled  Reviewed labs and medications   Continue medications as prescribed   T2DM diet  Reg exercise/activity   Reg DM foot and eye exams disc  Keep all Fu with endocrinology

## 2024-09-04 NOTE — ASSESSMENT & PLAN NOTE
Order for labs today   Disc proper sleep hygiene, weight loss, healthy diet   Disc possible medications that may cause fatigue

## 2024-09-04 NOTE — ASSESSMENT & PLAN NOTE
Patient's (Body mass index is 39.24 kg/m².) indicates that they are morbidly/severely obese (BMI > 40 or > 35 with obesity - related health condition) with health conditions that include hypertension, diabetes mellitus, and dyslipidemias . Weight is unchanged. BMI  is above average; BMI management plan is completed. We discussed portion control and increasing exercise.

## 2024-09-04 NOTE — PROGRESS NOTES
"Chief Complaint  Fatigue (Pt states he has had some fatigue for about two months.)    Subjective        Pastor Bartholomew presents to [facility]  History of Present Illness  Pastor is being seen in the office today for fatigue and labs to be drawn. He states that the fatigue has lasted for several years now, off and on. He states that most of the days he is only able to get up from bed and to the chair. He states that he would like some labs drawn. He has been fasting this morning and states that he can have them drawn today. He asked about his Hbg A1c because his blood sugar is \"all over the place.\" Pt has a lab order from Gilda Campbell, endocrinology in [place]. He states that his blood sugar can be anywhere from 130 to 300. He states that he adjusts his pump as directed and he watches his carb and sugar intake. But the numbers keep climbing.  He has no other issues or concerns today. Gilda Crodwer, JOHNNA Student     Reviewed all recent labs and medications.  Fatigue  This is a chronic problem. The current episode started more than 1 year ago. The problem occurs constantly. The problem has been unchanged. Associated symptoms include fatigue. Nothing aggravates the symptoms. He has tried relaxation for the symptoms. The treatment provided no relief.       Objective   Vital Signs:  /60 (BP Location: Left arm, Patient Position: Sitting, Cuff Size: Adult)   Pulse 63   Temp 98.4 °F (36.9 °C) (Temporal)   Ht 182.9 cm (72.01\")   Wt 131 kg (289 lb 6.4 oz)   SpO2 97%   BMI 39.24 kg/m²   Estimated body mass index is 39.24 kg/m² as calculated from the following:    Height as of this encounter: 182.9 cm (72.01\").    Weight as of this encounter: 131 kg (289 lb 6.4 oz).            Physical Exam  Vitals reviewed.   Constitutional:       General: He is not in acute distress.  HENT:      Head: Normocephalic.      Right Ear: Tympanic membrane normal.      Left Ear: Tympanic membrane " normal.      Nose: Nose normal.      Mouth/Throat:      Pharynx: Oropharynx is clear. No posterior oropharyngeal erythema.   Eyes:      General: No scleral icterus.     Extraocular Movements: Extraocular movements intact.      Conjunctiva/sclera: Conjunctivae normal.      Pupils: Pupils are equal, round, and reactive to light.   Cardiovascular:      Rate and Rhythm: Normal rate and regular rhythm.      Pulses: Normal pulses.      Heart sounds: Normal heart sounds.   Pulmonary:      Effort: Pulmonary effort is normal.      Breath sounds: Normal breath sounds.   Abdominal:      General: Bowel sounds are normal.      Palpations: Abdomen is soft.   Musculoskeletal:         General: Normal range of motion.      Cervical back: Normal range of motion and neck supple.   Skin:     General: Skin is warm and dry.   Neurological:      General: No focal deficit present.      Mental Status: He is alert and oriented to person, place, and time.   Psychiatric:         Mood and Affect: Mood normal.         Behavior: Behavior normal.         Thought Content: Thought content normal.         Judgment: Judgment normal.        Result Review :    Common labs          2/27/2024    11:59 4/19/2024    15:50 5/20/2024    09:41 5/20/2024    10:19   Common Labs   Glucose  166  102     BUN  11  14     Creatinine  1.03  0.99     Sodium  141  141     Potassium  3.9  4.4     Chloride  99  101     Calcium  10.0  9.7     Albumin  4.9  4.8     Total Bilirubin  0.6  0.4     Alkaline Phosphatase  104  77     AST (SGOT)  23  17     ALT (SGPT)  19  24     WBC 8.52     13.12      Hemoglobin 14.7     16.0      Hematocrit 40.8     47.6      Platelets 227     313      Total Cholesterol   145     Triglycerides   228     HDL Cholesterol   36     LDL Cholesterol    71     Hemoglobin A1C   8.90     Microalbumin, Urine    8.9       Details          This result is from an external source.             Data reviewed : Consultant notes gastro, cardiology             Assessment and Plan   Diagnoses and all orders for this visit:    1. Hypogonadism in male (Primary)  -     Testosterone, Free, Total; Future    2. Essential hypertension  Assessment & Plan:  Hypertension is improving with treatment.  Continue current treatment regimen.  Dietary sodium restriction.  Weight loss.  Regular aerobic exercise.  Blood pressure will be reassessed at the next regular appointment.        3. Screening for prostate cancer  -     PSA Screen; Future    4. Vitamin D deficiency  -     Vitamin D 25 hydroxy; Future    5. Iron deficiency anemia, unspecified iron deficiency anemia type  -     Iron and TIBC; Future    6. Other fatigue  Assessment & Plan:  Order for labs today   Disc proper sleep hygiene, weight loss, healthy diet   Disc possible medications that may cause fatigue      7. Type 2 diabetes mellitus with hyperglycemia, with long-term current use of insulin  Assessment & Plan:  Controlled  Reviewed labs and medications   Continue medications as prescribed   T2DM diet  Reg exercise/activity   Reg DM foot and eye exams disc  Keep all Fu with endocrinology        Other orders  -     Cancel: TSH; Future  -     Cancel: Lipid Panel; Future             Follow Up   No follow-ups on file.  Patient was given instructions and counseling regarding his condition or for health maintenance advice. Please see specific information pulled into the AVS if appropriate.

## 2024-09-06 RX ORDER — DIGOXIN 125 MCG
125 TABLET ORAL DAILY
Qty: 90 TABLET | Refills: 1 | Status: SHIPPED | OUTPATIENT
Start: 2024-09-06

## 2024-09-09 ENCOUNTER — LAB (OUTPATIENT)
Dept: LAB | Facility: HOSPITAL | Age: 74
End: 2024-09-09
Payer: MEDICARE

## 2024-09-09 ENCOUNTER — TELEPHONE (OUTPATIENT)
Dept: CARDIOLOGY | Facility: CLINIC | Age: 74
End: 2024-09-09
Payer: MEDICARE

## 2024-09-09 DIAGNOSIS — I48.0 PAROXYSMAL ATRIAL FIBRILLATION: ICD-10-CM

## 2024-09-09 DIAGNOSIS — I48.0 PAROXYSMAL ATRIAL FIBRILLATION: Primary | ICD-10-CM

## 2024-09-09 DIAGNOSIS — I10 ESSENTIAL HYPERTENSION: ICD-10-CM

## 2024-09-09 LAB
ANION GAP SERPL CALCULATED.3IONS-SCNC: 10 MMOL/L (ref 5–15)
BUN SERPL-MCNC: 14 MG/DL (ref 8–23)
BUN/CREAT SERPL: 13 (ref 7–25)
CALCIUM SPEC-SCNC: 9.6 MG/DL (ref 8.6–10.5)
CHLORIDE SERPL-SCNC: 101 MMOL/L (ref 98–107)
CO2 SERPL-SCNC: 26 MMOL/L (ref 22–29)
CREAT SERPL-MCNC: 1.08 MG/DL (ref 0.76–1.27)
DIGOXIN SERPL-MCNC: 0.7 NG/ML (ref 0.6–1.2)
EGFRCR SERPLBLD CKD-EPI 2021: 73.4 ML/MIN/1.73
GLUCOSE SERPL-MCNC: 251 MG/DL (ref 65–99)
POTASSIUM SERPL-SCNC: 4.7 MMOL/L (ref 3.5–5.2)
SODIUM SERPL-SCNC: 137 MMOL/L (ref 136–145)

## 2024-09-09 PROCEDURE — 80048 BASIC METABOLIC PNL TOTAL CA: CPT

## 2024-09-09 PROCEDURE — 80162 ASSAY OF DIGOXIN TOTAL: CPT

## 2024-09-09 PROCEDURE — 36415 COLL VENOUS BLD VENIPUNCTURE: CPT

## 2024-09-09 NOTE — TELEPHONE ENCOUNTER
----- Message from Alexa Mohsen sent at 9/9/2024 11:46 AM EDT -----  Holter shows zero episodes of atrial fibrillation. Average heart rate is 60.  There were 4 patient-triggered events: Normal Sinus Rhythm (1), Sinus Bradycardia (2), Normal Sinus Rhythm with PAC (1).  Check digoxin level. Follow up as scheduled. Notify office if symptoms persist/worsen

## 2024-09-10 ENCOUNTER — HOSPITAL ENCOUNTER (OUTPATIENT)
Dept: NUCLEAR MEDICINE | Facility: HOSPITAL | Age: 74
Discharge: HOME | End: 2024-09-10
Payer: MEDICARE

## 2024-09-10 LAB
TESTOST FREE SERPL-MCNC: 1.6 PG/ML (ref 6.6–18.1)
TESTOST SERPL-MCNC: 168 NG/DL (ref 264–916)

## 2024-09-10 PROCEDURE — 0 TECHNETIUM SULFUR COLLOID: Performed by: NURSE PRACTITIONER

## 2024-09-10 PROCEDURE — 78264 GASTRIC EMPTYING IMG STUDY: CPT

## 2024-09-10 PROCEDURE — A9541 TC99M SULFUR COLLOID: HCPCS | Performed by: NURSE PRACTITIONER

## 2024-09-10 RX ADMIN — TECHNETIUM TC 99M SULFUR COLLOID 1 DOSE: KIT at 07:35

## 2024-09-11 ENCOUNTER — OFFICE VISIT (OUTPATIENT)
Dept: PODIATRY | Facility: CLINIC | Age: 74
End: 2024-09-11
Payer: MEDICARE

## 2024-09-11 VITALS
DIASTOLIC BLOOD PRESSURE: 55 MMHG | BODY MASS INDEX: 39.18 KG/M2 | WEIGHT: 289 LBS | HEART RATE: 61 BPM | OXYGEN SATURATION: 94 % | SYSTOLIC BLOOD PRESSURE: 158 MMHG | TEMPERATURE: 98.1 F

## 2024-09-11 DIAGNOSIS — E11.65 TYPE 2 DIABETES MELLITUS WITH HYPERGLYCEMIA, WITH LONG-TERM CURRENT USE OF INSULIN: ICD-10-CM

## 2024-09-11 DIAGNOSIS — M79.672 PAIN IN BOTH FEET: Primary | ICD-10-CM

## 2024-09-11 DIAGNOSIS — Z79.4 TYPE 2 DIABETES MELLITUS WITH HYPERGLYCEMIA, WITH LONG-TERM CURRENT USE OF INSULIN: ICD-10-CM

## 2024-09-11 DIAGNOSIS — M79.671 PAIN IN BOTH FEET: Primary | ICD-10-CM

## 2024-09-11 DIAGNOSIS — B35.1 ONYCHOMYCOSIS: ICD-10-CM

## 2024-09-11 PROCEDURE — 1160F RVW MEDS BY RX/DR IN RCRD: CPT | Performed by: PODIATRIST

## 2024-09-11 PROCEDURE — 3078F DIAST BP <80 MM HG: CPT | Performed by: PODIATRIST

## 2024-09-11 PROCEDURE — 1159F MED LIST DOCD IN RCRD: CPT | Performed by: PODIATRIST

## 2024-09-11 PROCEDURE — 11721 DEBRIDE NAIL 6 OR MORE: CPT | Performed by: PODIATRIST

## 2024-09-11 PROCEDURE — 3077F SYST BP >= 140 MM HG: CPT | Performed by: PODIATRIST

## 2024-09-11 NOTE — PROGRESS NOTES
[facility] - PODIATRY    Today's Date: 09/11/24    Patient Name: Pastor Bartholomew  MRN: [id number]  CSN: [id number]  PCP: Esthela Arceo APRN,   Referring Provider: No ref. 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    Patient is a 73-year-old diabetic male presenting with bilateral foot issues.  Patient states his A1c is usually around 7, but his sugars have been elevated due to pump issues.  Patient was recently admitted for diverticulitis and A-fib.  He is overall doing well.    Patient denies any fevers, chills, nausea, vomiting, shortness of breath, nor any other constitutional signs nor symptoms.    No other pedal complaints at this time.    Past Medical History:   Diagnosis Date    Abnormal ECG 4/20/2023    Clogged artery    Allergic 1988    Atrial fibrillation     Paz esophagus     Clotting disorder 2/5/2024    Congenital heart disease 4/5/2024    Coronary artery disease Feb 5 2024    AFib    Depression 1995    Diabetes     Diverticulosis 2/6/2024    Dizziness Feb5. 2024    GERD (gastroesophageal reflux disease) 2000    Hernia, inguinal     History of total hip arthroplasty, right 02/01/2023    HL (hearing loss) 1997    Hyperlipidemia     Hypertension     Hypervitaminosis D      PONV (postoperative nausea and vomiting)     Sleep apnea     Type 2 diabetes mellitus      Past Surgical History:   Procedure Laterality Date    COLONOSCOPY      COLONOSCOPY N/A 04/25/2022    Procedure: COLONOSCOPY WITH POLYPECTOMY;  Surgeon: Artie Dickerson MD;  Location: [facility] ENDOSCOPY;  Service: Gastroenterology;  Laterality: N/A;  COLON POLYP    ENDOSCOPY N/A 04/25/2022    Procedure: ESOPHAGOGASTRODUODENOSCOPY WITH BX;  Surgeon: Artie Dickerson MD;  Location: [facility] ENDOSCOPY;  Service:  Gastroenterology;  Laterality: N/A;  CAPPS'S ESOPHAGUS, HIATAL HERNIA    ENDOSCOPY N/A 08/09/2024    Procedure: ESOPHAGOGASTRODUODENOSCOPY WITH BIOPSIES;  Surgeon: Artie Dickerson MD;  Location: [facility] ENDOSCOPY;  Service: Gastroenterology;  Laterality: N/A;  HIATAL HERNIA, BARRETTS    HERNIA REPAIR      JOINT REPLACEMENT  2/28/2023    KNEE SURGERY Bilateral     TOTAL HIP ARTHROPLASTY Right     TRIGGER FINGER RELEASE Bilateral     x2 THUMBS    UPPER GASTROINTESTINAL ENDOSCOPY  2022     Family History   Problem Relation Age of Onset    Diabetes Mother     Hypertension Mother     Heart attack Mother     COPD Brother     Diabetes Maternal Grandmother     Heart attack Sister         Quad bypass    Heart attack Brother         AFib    Colon cancer Neg Hx      Social History     Socioeconomic History    Marital status:    Tobacco Use    Smoking status: Never     Passive exposure: Yes    Smokeless tobacco: Never   Vaping Use    Vaping status: Never Used   Substance and Sexual Activity    Alcohol use: Never    Drug use: Never    Sexual activity: Not Currently     Partners: Female     Birth control/protection: Condom     Allergies   Allergen Reactions    Codeine Nausea Only    Semaglutide(0.25 Or 0.5mg-Dos) GI Intolerance     Current Outpatient Medications   Medication Sig Dispense Refill    amLODIPine-valsartan (EXFORGE)  MG per tablet TAKE 1 TABLET BY MOUTH EVERY DAY 90 tablet 1    apixaban (ELIQUIS) 5 MG tablet tablet Take 1 tablet by mouth 2 (Two) Times a Day. Indications: Atrial Fibrillation 30 tablet 0    Ascorbic Acid (Vitamin C) 100 MG chewable tablet Chew 1 tablet Daily.      cetirizine (zyrTEC) 10 MG tablet Take 1 tablet by mouth Daily As Needed.      co-enzyme Q-10 30 MG capsule Take 1 capsule by mouth Daily.      Continuous Blood Gluc Sensor (Dexcom G6 Sensor) Every 10 (Ten) Days.      CONTOUR NEXT TEST test strip Test 6 times daily DX: e11.65 200 each 5    digoxin (LANOXIN) 125 MCG tablet  TAKE 1 TABLET BY MOUTH EVERY DAY 90 tablet 1    donepezil (ARICEPT) 10 MG tablet Take 1 tablet by mouth Every Night. 90 tablet 1    insulin patient supplied pump Inject  under the skin into the appropriate area as directed Continuous. Type of Insulin: Humulin R 500  Type of Pump:Medtronic/BeMo [phone]  Bolus amount: 50 units daily.   Basal amount : Max basal 5.50u/hr  Correction factor:   Insulin to carbohydrate ratio: 12a-12p 10                                                 12p-2p 10                                                  2p -12 a 7    Date of last site change: 2/3/24  Location of catheter: right side on stomach    Frequency of refill: approximately 3 days  Last refill date: per pt 2/3/24    Target 12a-3a 130-150               3a-6a -100-120               6a-9p 100-120               9p-12a 130-150    Active Ins time : 5 hours    Basal Pattern Setup 27.3    Prescriber Shara Campbell  Phone number [phone]      insulin regular (HUMULIN R) 500 UNIT/ML CONCENTRATED injection 50u daily with insulin pump (Patient taking differently: Inject 10 unit marking on U-100 syringe under the skin into the appropriate area as directed Daily. 60u daily with insulin pump) 40 mL 3    L-Lysine 500 MG tablet tablet Take 1 tablet by mouth Daily.      Lancets misc Check 5 times daily DX: e11.65 500 each 1    magnesium oxide (MAG-OX) 400 MG tablet Take 1 tablet by mouth Daily.      memantine (NAMENDA) 10 MG tablet 1/2 tablet at night for 1 wk, then 1/2 tablet BID for 1 wk, then 1/2 in the AM and 1 tablet in PM for 1 week, then 1 tablet BID thereafter 180 tablet 1    metFORMIN (GLUCOPHAGE) 500 MG tablet Take 1 tablet by mouth 2 (Two) Times a Day With Meals.      metoclopramide (REGLAN) 5 MG tablet TAKE 1 TABLET BY MOUTH 4 (FOUR) TIMES A DAY BEFORE MEALS & AT BEDTIME. 120 tablet 5    multivitamin with minerals tablet tablet Take 1 tablet by mouth Daily.      nebivolol (Bystolic) 10 MG tablet Take 1 tablet by mouth Daily.  90 tablet 3    Omega-3 Fatty Acids (fish oil) 1000 MG capsule capsule Take 1 capsule by mouth 2 (Two) Times a Day With Meals.      ondansetron ODT (Zofran ODT) 4 MG disintegrating tablet Place 1 tablet on the tongue Every 8 (Eight) Hours As Needed for Nausea or Vomiting. 30 tablet 1    pantoprazole (PROTONIX) 40 MG EC tablet Take 1 tablet by mouth Daily. 90 tablet 1    rosuvastatin (CRESTOR) 40 MG tablet Take 1 tablet by mouth Every Night. 90 tablet 1    spironolactone-hydrochlorothiazide (ALDACTAZIDE) 25-25 MG tablet Take 1 tablet by mouth Daily.      venlafaxine XR (EFFEXOR-XR) 150 MG 24 hr capsule Take 1 capsule by mouth Daily. 90 capsule 3     No current facility-administered medications for this visit.     Review of Systems   All other systems reviewed and are negative.      OBJECTIVE     Vitals:    09/11/24 0840   BP: 158/55   Pulse: 61   Temp: 98.1 °F (36.7 °C)   SpO2: 94%       Body mass index is 39.18 kg/m².    Lab Results   Component Value Date    HGBA1C 8.70 (H) 09/04/2024       Lab Results   Component Value Date    GLUCOSE 251 (H) 09/09/2024    CALCIUM 9.6 09/09/2024     09/09/2024    K 4.7 09/09/2024    CO2 26.0 09/09/2024     09/09/2024    BUN 14 09/09/2024    CREATININE 1.08 09/09/2024    EGFRIFAFRI >60 12/19/2022    EGFRIFNONA 80 12/17/2021    BCR 13.0 09/09/2024    ANIONGAP 10.0 09/09/2024       Patient seen in no apparent distress.      PHYSICAL EXAM:     Foot/Ankle Exam    GENERAL  Appearance:  appears stated age  Orientation:  AAOx3  Affect:  appropriate  Gait:  unimpaired  Assistance:  independent  Right shoe gear: casual shoe  Left shoe gear: casual shoe    VASCULAR     Right Foot Vascularity   Normal vascular exam    Dorsalis pedis:  2+  Posterior tibial:  2+  Skin temperature:  warm  Edema grading:  None  CFT:  < 3 seconds  Pedal hair growth:  Present  Varicosities:  none     Left Foot Vascularity   Normal vascular exam    Dorsalis pedis:  2+  Posterior tibial:  2+  Skin  temperature:  warm  Edema grading:  None  CFT:  < 3 seconds  Pedal hair growth:  Present  Varicosities:  none     NEUROLOGIC     Right Foot Neurologic   Normal sensation    Light touch sensation: normal  Vibratory sensation: normal  Hot/Cold sensation: normal  Protective Sensation using Peculiar-Cailin Monofilament:   Sites intact: 10  Sites tested: 10     Left Foot Neurologic   Normal sensation    Light touch sensation: normal  Vibratory sensation: normal  Hot/Cold sensation:  normal  Protective Sensation using Peculiar-Cailin Monofilament:   Sites intact: 10  Sites tested: 10    MUSCLE STRENGTH     Right Foot Muscle Strength   Foot dorsiflexion:  4  Foot plantar flexion:  4  Foot inversion:  4  Foot eversion:  4     Left Foot Muscle Strength   Foot dorsiflexion:  4  Foot plantar flexion:  4  Foot inversion:  4  Foot eversion:  4    RANGE OF MOTION     Right Foot Range of Motion   Foot and ankle ROM within normal limits       Left Foot Range of Motion   Foot and ankle ROM within normal limits      DERMATOLOGIC      Right Foot Dermatologic   Skin  Right foot skin is intact.      Left Foot Dermatologic   Skin  Left foot skin is intact.             ASSESSMENT/PLAN     Diagnoses and all orders for this visit:    1. Pain in both feet (Primary)    2. Type 2 diabetes mellitus with hyperglycemia, with long-term current use of insulin    3. Onychomycosis        Comprehensive lower extremity examination and evaluation was performed.    Toenails 1, 2, 3, 4, 5 on Right and 1, 2, 3, 4, 5 on Left were debrided with nail nippers.  Patient tolerated procedure well without complications.    Discussed findings and treatment plan including risks, benefits, and treatment options with patient in detail. Patient agreed with treatment plan.    Medications and allergies reviewed.  Reviewed available blood glucose and HgB A1C lab values along with other pertinent labs.  These were discussed with the patient as to their importance of  diabetic maintenance.    Diabetic foot exam performed and documented this date, compliant with CQM required standards. Detail of findings as noted in physical exam.  Lower extremity Neurologic exam for diabetic patient performed and documented this date, compliant with PQRS required standards. Detail of findings as noted in physical exam.  Advised patient importance of good routine lower extremity hygiene. Advised patient importance of evaluating for intact skin and pain free nail borders.  Advised patient to use mirror to evaluate plantar/ soles of feet for better visualization. Advised patient monitor and phone office to be seen if any cracking to skin, open lesions, painful nail borders or if nails become elongated prior to next visit. Advised patient importance of daily cleansing of lower extremities, followed by good skin cream to maintain normal hydration of skin. Also advised patient importance of close daily monitoring of blood sugar. Advised to regulate diet and medications to maintain control of blood sugar in optimal range. Contact primary care provider if difficulties maintaining blood sugar levels.  Advised Patient of presence of Diabetes Mellitus condition.  Advised Patient risk of progression and worsening or improvement, then return of condition.  Will monitor condition for any change in future. Treat with most appropriate treatment pending status of condition.  Counseled and advised patient extensively on nature and ramifications of diabetes. Standard instructions given to patient for good diabetic foot care and maintenance. Advised importance of careful monitoring to avoid break down and complications secondary to diabetes. Advised patient importance of strict maintenance of blood sugar control. Advised patient of possible ominous results from neglect of condition, i.e.: amputation/ loss of digits, feet and legs, or even death.  Patient states understands counseling, will monitor closely, continue  good hygiene and routine diabetic foot care. Patient will contact office if questions or problems.      An After Visit Summary was printed and given to the patient at discharge, including (if requested) any available informative/educational handouts regarding diagnosis, treatment, or medications. All questions were answered to patient/family satisfaction. Should symptoms fail to improve or worsen they agree to call or return to clinic or to go to the Emergency Department. Discussed the importance of following up with any needed screening tests/labs/specialist appointments and any requested follow-up recommended by me today. Importance of maintaining follow-up discussed and patient accepts that missed appointments can delay diagnosis and potentially lead to worsening of conditions.    No follow-ups on file., or sooner if acute issues arise.    This document has been electronically signed by Phuc Mena DPM on September 11, 2024 09:07 EDT

## 2024-09-12 ENCOUNTER — TELEPHONE (OUTPATIENT)
Dept: GASTROENTEROLOGY | Facility: CLINIC | Age: 74
End: 2024-09-12
Payer: MEDICARE

## 2024-09-20 ENCOUNTER — OFFICE VISIT (OUTPATIENT)
Dept: FAMILY MEDICINE CLINIC | Facility: CLINIC | Age: 74
End: 2024-09-20
Payer: MEDICARE

## 2024-09-20 VITALS
HEART RATE: 58 BPM | SYSTOLIC BLOOD PRESSURE: 134 MMHG | TEMPERATURE: 97.3 F | WEIGHT: 294.4 LBS | BODY MASS INDEX: 39.88 KG/M2 | RESPIRATION RATE: 18 BRPM | OXYGEN SATURATION: 96 % | HEIGHT: 72 IN | DIASTOLIC BLOOD PRESSURE: 77 MMHG

## 2024-09-20 DIAGNOSIS — Z79.899 MEDICATION MANAGEMENT: ICD-10-CM

## 2024-09-20 DIAGNOSIS — E29.1 HYPOGONADISM MALE: Primary | ICD-10-CM

## 2024-09-20 LAB
AMPHET+METHAMPHET UR QL: NEGATIVE
AMPHETAMINE INTERNAL CONTROL: NORMAL
AMPHETAMINES UR QL: NEGATIVE
BARBITURATE INTERNAL CONTROL: NORMAL
BARBITURATES UR QL SCN: NEGATIVE
BENZODIAZ UR QL SCN: NEGATIVE
BENZODIAZEPINE INTERNAL CONTROL: NORMAL
BUPRENORPHINE INTERNAL CONTROL: NORMAL
BUPRENORPHINE SERPL-MCNC: NEGATIVE NG/ML
CANNABINOIDS SERPL QL: NEGATIVE
COCAINE INTERNAL CONTROL: NORMAL
COCAINE UR QL: NEGATIVE
EXPIRATION DATE: NORMAL
Lab: NORMAL
MDMA (ECSTASY) INTERNAL CONTROL: NORMAL
MDMA UR QL SCN: NEGATIVE
METHADONE INTERNAL CONTROL: NORMAL
METHADONE UR QL SCN: NEGATIVE
METHAMPHETAMINE INTERNAL CONTROL: NORMAL
MORPHINE INTERNAL CONTROL: NORMAL
MORPHINE/OPIATES SCREEN, URINE: NEGATIVE
OXYCODONE INTERNAL CONTROL: NORMAL
OXYCODONE UR QL SCN: NEGATIVE
PCP UR QL SCN: NEGATIVE
PHENCYCLIDINE INTERNAL CONTROL: NORMAL
THC INTERNAL CONTROL: NORMAL

## 2024-09-20 PROCEDURE — 3078F DIAST BP <80 MM HG: CPT

## 2024-09-20 PROCEDURE — 80305 DRUG TEST PRSMV DIR OPT OBS: CPT

## 2024-09-20 PROCEDURE — 1126F AMNT PAIN NOTED NONE PRSNT: CPT

## 2024-09-20 PROCEDURE — 3052F HG A1C>EQUAL 8.0%<EQUAL 9.0%: CPT

## 2024-09-20 PROCEDURE — 99213 OFFICE O/P EST LOW 20 MIN: CPT

## 2024-09-20 PROCEDURE — 3075F SYST BP GE 130 - 139MM HG: CPT

## 2024-09-20 PROCEDURE — 96372 THER/PROPH/DIAG INJ SC/IM: CPT

## 2024-09-20 RX ORDER — TESTOSTERONE CYPIONATE 200 MG/ML
100 INJECTION, SOLUTION INTRAMUSCULAR
Status: SHIPPED | OUTPATIENT
Start: 2024-09-20

## 2024-09-20 RX ORDER — TESTOSTERONE CYPIONATE 200 MG/ML
100 INJECTION, SOLUTION INTRAMUSCULAR
Qty: 2 ML | Refills: 0 | Status: SHIPPED | OUTPATIENT
Start: 2024-09-20

## 2024-09-20 RX ADMIN — TESTOSTERONE CYPIONATE 100 MG: 200 INJECTION, SOLUTION INTRAMUSCULAR at 12:27

## 2024-10-04 ENCOUNTER — CLINICAL SUPPORT (OUTPATIENT)
Dept: FAMILY MEDICINE CLINIC | Facility: CLINIC | Age: 74
End: 2024-10-04
Payer: MEDICARE

## 2024-10-04 DIAGNOSIS — E29.1 HYPOGONADISM MALE: ICD-10-CM

## 2024-10-04 DIAGNOSIS — E29.1 HYPOGONADISM MALE: Primary | ICD-10-CM

## 2024-10-04 PROCEDURE — 96372 THER/PROPH/DIAG INJ SC/IM: CPT

## 2024-10-04 RX ORDER — TESTOSTERONE CYPIONATE 200 MG/ML
100 INJECTION, SOLUTION INTRAMUSCULAR
Qty: 2 ML | Refills: 0 | OUTPATIENT
Start: 2024-10-04

## 2024-10-04 RX ORDER — TESTOSTERONE CYPIONATE 200 MG/ML
INJECTION, SOLUTION INTRAMUSCULAR
Qty: 2 ML | Refills: 0 | Status: SHIPPED | OUTPATIENT
Start: 2024-10-18

## 2024-10-04 RX ADMIN — TESTOSTERONE CYPIONATE 100 MG: 200 INJECTION, SOLUTION INTRAMUSCULAR at 12:06

## 2024-10-07 DIAGNOSIS — F32.A DEPRESSION, UNSPECIFIED DEPRESSION TYPE: ICD-10-CM

## 2024-10-07 RX ORDER — VENLAFAXINE HYDROCHLORIDE 150 MG/1
150 CAPSULE, EXTENDED RELEASE ORAL DAILY
Qty: 90 CAPSULE | Refills: 3 | Status: SHIPPED | OUTPATIENT
Start: 2024-10-07

## 2024-10-07 NOTE — TELEPHONE ENCOUNTER
Caller: KAMI MERCADO NO VERBAL ON FILE    Relationship: Emergency Contact    Best call back number:     [phone]       Requested Prescriptions:   Requested Prescriptions     Pending Prescriptions Disp Refills    venlafaxine XR (EFFEXOR-XR) 150 MG 24 hr capsule 90 capsule 3     Sig: Take 1 capsule by mouth Daily.        Pharmacy where request should be sent: [facility]Y #[id number] - [place], [place] - [address] [facility] [phone] [facility] [phone] FX     Last office visit with prescribing clinician: 9/20/2024   Last telemedicine visit with prescribing clinician: Visit date not found   Next office visit with prescribing clinician: 11/7/2024     Additional details provided by patient:     Does the patient have less than a 3 day supply:  [] Yes  [] No    Would you like a call back once the refill request has been completed: [] Yes [] No    If the office needs to give you a call back, can they leave a voicemail: [] Yes [] No    Malgorzata Smith Rep   10/07/24 08:58 EDT

## 2024-10-18 ENCOUNTER — CLINICAL SUPPORT (OUTPATIENT)
Dept: FAMILY MEDICINE CLINIC | Facility: CLINIC | Age: 74
End: 2024-10-18
Payer: MEDICARE

## 2024-10-18 DIAGNOSIS — E29.1 HYPOGONADISM IN MALE: Primary | ICD-10-CM

## 2024-10-18 PROCEDURE — 96372 THER/PROPH/DIAG INJ SC/IM: CPT

## 2024-10-18 RX ADMIN — TESTOSTERONE CYPIONATE 100 MG: 200 INJECTION, SOLUTION INTRAMUSCULAR at 13:57

## 2024-10-22 NOTE — PROGRESS NOTES
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 pylori, Paz's esophagus, hiatal hernia and chronic gastritis.  Patient presenting for follow-up after EGD and gastric emptying study.  We have reviewed EGD and pathology as well as gastric emptying study.  Patient had Paz's esophagus noted repeat EGD planned for 2026.  Patient's gastric emptying study was normal.  Patient is on Reglan which he has reduced from 4 times daily to twice daily and reports that this has improved his nausea.  Patient has not tried to come off of Reglan.  We have discussed the side effects associated with this medication.  He does have Zofran available if needed for nausea and vomiting.  Patient continues Protonix 40 mg daily for reflux control.  Overall patient reports that he is feeling well.  Patient denies fever, nausea, vomiting, weight loss, night sweats, melena, hematochezia, hematemesis.    Most recent labs- 9/9/24    NM Gastric Emptying- 9/10/24 normal    EGD: Review of the patient's most recent EGD performed by Dr. Dickerson on 8/9/24 Paz's esophagus noted.  Biopsies consistent with intestinal metaplasia.  Repeat 2 years.     NM HIDA SCAN- 5/15/24  No evidence for acute cholecystitis.  Normal gallbladder ejection fraction. Patient had no symptoms after  Ensure was given. 62%     CT Abdomen Pelvis With Contrast- 2/4/24  1. No acute intra-abdominal or intrapelvic abnormality noted.  Specifically no evidence of   obstruction or significant inflammatory change of the GI tract.  2. 4 mm noncalcified nodule left lung base likely post inflammatory or infectious in nature.    Optional follow-up at 12 months could be considered with a CT chest  in a high risk patient  3. Diverticulosis without evidence of acute diverticulitis.  4. Other findings as above    Endoscopy: Review of the patient's most recent EGD and colonoscopy performed by Dr. Dickerson on 04.25.2022 4 mm polyp in the ascending colon.  Short segment of Paz's esophagus noted in the lower third of the esophagus, small hiatal hernia, stomach normal duodenum normal.  Biopsies consistent with chronic active gastritis, H. pylori.  Tubular adenoma colon biopsy.  Repeat: 5 years.  Repeat EGD in 3 years.  Results       Result Review :   The following data was reviewed by: JOHNNA Campo on 10/24/2024:    CMP          5/20/2024    09:41 9/4/2024    10:40 9/9/2024    14:35   CMP   Glucose 102  210  251    BUN 14  13  14    Creatinine 0.99  1.04  1.08    EGFR 81.4  76.8  73.4    Sodium 141  138  137    Potassium 4.4  4.3  4.7    Chloride 101  99  101    Calcium 9.7  10.0  9.6    Total Protein 7.2  6.9     Albumin 4.8  4.6     Globulin 2.4  2.3     Total Bilirubin 0.4  0.5     Alkaline Phosphatase 77  84     AST (SGOT) 17  31     ALT (SGPT) 24  30     Albumin/Globulin Ratio 2.0  2.0     BUN/Creatinine Ratio 14.1  12.5  13.0    Anion Gap 9.0  13.4  10.0      CBC          2/27/2024    11:59 4/19/2024    15:50 9/4/2024    10:40   CBC   WBC 8.52     13.12  8.90    RBC 4.91     5.33  4.99    Hemoglobin 14.7     16.0  15.4    Hematocrit 40.8     47.6  45.2    MCV 83.1     89.3  90.6    MCH 29.9     30.0  30.9    MCHC 36.0     33.6  34.1    RDW 13.9     13.6  13.8    Platelets 227     313  244       Details          This result is from an external source.               Iron Profile   Iron   Date Value Ref Range Status   09/04/2024 89 59 - 158 mcg/dL Final     TIBC   Date Value Ref Range Status   09/04/2024 395 298 - 536 mcg/dL Final     Iron Saturation (TSAT)   Date Value Ref Range Status   09/04/2024 23 20 - 50 % Final     Transferrin   Date Value Ref Range Status   09/04/2024 265 200 - 360 mg/dL Final  "    Ferritin No results found for: \"FERRITIN\"            Past Medical History       Past Medical History:   Diagnosis Date    Abnormal ECG 4/20/2023    Clogged artery    Allergic 1988    Atrial fibrillation     Capps esophagus     Clotting disorder 2/5/2024    Colon polyp     Congenital heart disease 4/5/2024    Coronary artery disease Feb 5 2024    AFib    Depression 1995    Diabetes     Diverticulitis of colon     Diverticulosis 2/6/2024    Dizziness Feb5. 2024    GERD (gastroesophageal reflux disease) 2000    Hernia, inguinal     History of total hip arthroplasty, right 02/01/2023    HL (hearing loss) 1997    Hyperlipidemia     Hypertension     Hypervitaminosis D      PONV (postoperative nausea and vomiting)     Sleep apnea     Type 2 diabetes mellitus        Past Surgical History:   Procedure Laterality Date    COLONOSCOPY      COLONOSCOPY N/A 04/25/2022    Procedure: COLONOSCOPY WITH POLYPECTOMY;  Surgeon: Artie Dickerson MD;  Location: [facility] ENDOSCOPY;  Service: Gastroenterology;  Laterality: N/A;  COLON POLYP    ENDOSCOPY N/A 04/25/2022    Procedure: ESOPHAGOGASTRODUODENOSCOPY WITH BX;  Surgeon: Artie Dickerson MD;  Location: [facility] ENDOSCOPY;  Service: Gastroenterology;  Laterality: N/A;  CAPPS'S ESOPHAGUS, HIATAL HERNIA    ENDOSCOPY N/A 08/09/2024    Procedure: ESOPHAGOGASTRODUODENOSCOPY WITH BIOPSIES;  Surgeon: Artie Dickerson MD;  Location: [facility] ENDOSCOPY;  Service: Gastroenterology;  Laterality: N/A;  HIATAL HERNIA, BARRETTS    HERNIA REPAIR      JOINT REPLACEMENT  2/28/2023    KNEE SURGERY Bilateral     TOTAL HIP ARTHROPLASTY Right     TRIGGER FINGER RELEASE Bilateral     x2 THUMBS    UPPER GASTROINTESTINAL ENDOSCOPY  2022         Current Outpatient Medications:     amLODIPine-valsartan (EXFORGE)  MG per tablet, TAKE 1 TABLET BY MOUTH EVERY DAY, Disp: 90 tablet, Rfl: 1    apixaban (ELIQUIS) 5 MG tablet tablet, Take 1 tablet by mouth 2 (Two) Times a Day. Indications: " Atrial Fibrillation, Disp: 30 tablet, Rfl: 0    Ascorbic Acid (Vitamin C) 100 MG chewable tablet, Chew 1 tablet Daily., Disp: , Rfl:     cetirizine (zyrTEC) 10 MG tablet, Take 1 tablet by mouth Daily As Needed., Disp: , Rfl:     co-enzyme Q-10 30 MG capsule, Take 1 capsule by mouth Daily., Disp: , Rfl:     Continuous Blood Gluc Sensor (Dexcom G6 Sensor), Every 10 (Ten) Days., Disp: , Rfl:     CONTOUR NEXT TEST test strip, Test 6 times daily DX: e11.65, Disp: 200 each, Rfl: 5    digoxin (LANOXIN) 125 MCG tablet, TAKE 1 TABLET BY MOUTH EVERY DAY, Disp: 90 tablet, Rfl: 1    donepezil (ARICEPT) 10 MG tablet, Take 1 tablet by mouth Every Night., Disp: 90 tablet, Rfl: 1    insulin patient supplied pump, Inject  under the skin into the appropriate area as directed Continuous. Type of Insulin: Humulin R 500 Type of Pump:[employer] [phone] Bolus amount: 50 units daily.  Basal amount : Max basal 5.50u/hr Correction factor:  Insulin to carbohydrate ratio: 12a-12p 10                                                12p-2p 10                                                 2p -12 a 7  Date of last site change: 2/3/24 Location of catheter: right side on stomach  Frequency of refill: approximately 3 days Last refill date: per pt 2/3/24  Target 12a-3a 130-150              3a-6a -100-120              6a-9p 100-120              9p-12a 130-150  Active Ins time : 5 hours  Basal Pattern Setup 27.3  Prescriber Shara Campbell Phone number [phone], Disp: , Rfl:     insulin regular (HUMULIN R) 500 UNIT/ML CONCENTRATED injection, 50u daily with insulin pump (Patient taking differently: Inject 10 unit marking on U-100 syringe under the skin into the appropriate area as directed Daily. 60u daily with insulin pump), Disp: 40 mL, Rfl: 3    L-Lysine 500 MG tablet tablet, Take 1 tablet by mouth Daily., Disp: , Rfl:     Lancets misc, Check 5 times daily DX: e11.65, Disp: 500 each, Rfl: 1    magnesium oxide (MAG-OX) 400 MG tablet, Take 1  tablet by mouth Daily., Disp: , Rfl:     memantine (NAMENDA) 10 MG tablet, 1/2 tablet at night for 1 wk, then 1/2 tablet BID for 1 wk, then 1/2 in the AM and 1 tablet in PM for 1 week, then 1 tablet BID thereafter, Disp: 180 tablet, Rfl: 1    metFORMIN (GLUCOPHAGE) 500 MG tablet, Take 1 tablet by mouth 2 (Two) Times a Day With Meals., Disp: , Rfl:     multivitamin with minerals tablet tablet, Take 1 tablet by mouth Daily., Disp: , Rfl:     nebivolol (Bystolic) 10 MG tablet, Take 1 tablet by mouth Daily., Disp: 90 tablet, Rfl: 3    Omega-3 Fatty Acids (fish oil) 1000 MG capsule capsule, Take 1 capsule by mouth 2 (Two) Times a Day With Meals., Disp: , Rfl:     ondansetron ODT (Zofran ODT) 4 MG disintegrating tablet, Place 1 tablet on the tongue Every 8 (Eight) Hours As Needed for Nausea or Vomiting., Disp: 30 tablet, Rfl: 1    pantoprazole (PROTONIX) 40 MG EC tablet, Take 1 tablet by mouth Daily., Disp: 90 tablet, Rfl: 1    rosuvastatin (CRESTOR) 40 MG tablet, Take 1 tablet by mouth Every Night., Disp: 90 tablet, Rfl: 1    spironolactone-hydrochlorothiazide (ALDACTAZIDE) 25-25 MG tablet, Take 1 tablet by mouth Daily., Disp: , Rfl:     Testosterone Cypionate (DEPOTESTOTERONE CYPIONATE) 200 MG/ML injection, INJECT 0.5 ML INTO THE APPROPRIATE MUSCLE AS DIRECTED BY PRESCRIBER EVERY 14 DAYS, Disp: 2 mL, Rfl: 0    venlafaxine XR (EFFEXOR-XR) 150 MG 24 hr capsule, Take 1 capsule by mouth Daily., Disp: 90 capsule, Rfl: 3    metoclopramide (REGLAN) 5 MG tablet, TAKE 1 TABLET BY MOUTH 4 (FOUR) TIMES A DAY BEFORE MEALS & AT BEDTIME., Disp: 120 tablet, Rfl: 5    Current Facility-Administered Medications:     Testosterone Cypionate (DEPOTESTOTERONE CYPIONATE) injection 100 mg, 100 mg, Intramuscular, Q14 Days, Chewning, Esthela, APRN, 100 mg at 10/18/24 1357     Allergies   Allergen Reactions    Codeine Nausea Only    Semaglutide(0.25 Or 0.5mg-Dos) GI Intolerance       Family History   Problem Relation Age of Onset    Diabetes  "Mother     Hypertension Mother     Heart attack Mother     COPD Brother     Diabetes Maternal Grandmother     Heart attack Sister         Quad bypass    Heart attack Brother         AFib    Colon cancer Neg Hx         Social History     Social History Narrative    ** Merged History Encounter **            Objective     Vital Signs:   /62 (BP Location: Left arm, Patient Position: Sitting, Cuff Size: Large Adult)   Pulse 59   Ht 182.9 cm (72.01\")   Wt 136 kg (299 lb 14.4 oz)   SpO2 97%   BMI 40.66 kg/m²       Physical Exam  Constitutional:       Appearance: Normal appearance. He is obese.   Pulmonary:      Effort: Pulmonary effort is normal.   Neurological:      General: No focal deficit present.      Mental Status: He is alert and oriented to person, place, and time.   Psychiatric:         Mood and Affect: Mood normal.         Behavior: Behavior normal.           Assessment & Plan          Assessment and Plan    Diagnoses and all orders for this visit:    1. Type 2 diabetes mellitus with hyperglycemia, with long-term current use of insulin (Primary)    2. Gastroesophageal reflux disease, unspecified whether esophagitis present    3. Nausea and vomiting, unspecified vomiting type    4. Paz's esophagus without dysplasia    5. Chronic superficial gastritis without bleeding    6. Hiatal hernia    7. History of Helicobacter pylori infection      73-year-old male presenting the office today for follow-up with a history of nausea, vomiting, reflux, diabetes, H. pylori, Paz's esophagus, hiatal hernia and chronic gastritis.  Patient presenting for follow-up after EGD and gastric emptying study.  We have reviewed EGD and pathology as well as gastric emptying study.  Patient had Paz's esophagus noted repeat EGD planned for 2026.  Patient's gastric emptying study was normal.  Patient is on Reglan which he has reduced from 4 times daily to twice daily and reports that this has improved his nausea.  Patient " has not tried to come off of Reglan.  We have discussed the side effects associated with this medication.  He does have Zofran available if needed for nausea and vomiting.  Patient continues Protonix 40 mg daily for reflux control.  Overall patient reports that he is feeling well.  Patient will follow-up in office in 1 year.  Patient agreeable to this plan will call with any questions or concerns.          Follow Up       Follow Up   Return in about 1 year (around 10/24/2025).  Patient was given instructions and counseling regarding his condition or for health maintenance advice. Please see specific information pulled into the AVS if appropriate.

## 2024-10-24 ENCOUNTER — OFFICE VISIT (OUTPATIENT)
Dept: GASTROENTEROLOGY | Facility: CLINIC | Age: 74
End: 2024-10-24
Payer: MEDICARE

## 2024-10-24 VITALS
DIASTOLIC BLOOD PRESSURE: 62 MMHG | HEART RATE: 59 BPM | OXYGEN SATURATION: 97 % | WEIGHT: 299.9 LBS | SYSTOLIC BLOOD PRESSURE: 172 MMHG | HEIGHT: 72 IN | BODY MASS INDEX: 40.62 KG/M2

## 2024-10-24 DIAGNOSIS — K21.9 GASTROESOPHAGEAL REFLUX DISEASE, UNSPECIFIED WHETHER ESOPHAGITIS PRESENT: ICD-10-CM

## 2024-10-24 DIAGNOSIS — K29.30 CHRONIC SUPERFICIAL GASTRITIS WITHOUT BLEEDING: ICD-10-CM

## 2024-10-24 DIAGNOSIS — K44.9 HIATAL HERNIA: ICD-10-CM

## 2024-10-24 DIAGNOSIS — Z86.19 HISTORY OF HELICOBACTER PYLORI INFECTION: ICD-10-CM

## 2024-10-24 DIAGNOSIS — K22.70 BARRETT'S ESOPHAGUS WITHOUT DYSPLASIA: ICD-10-CM

## 2024-10-24 DIAGNOSIS — E11.65 TYPE 2 DIABETES MELLITUS WITH HYPERGLYCEMIA, WITH LONG-TERM CURRENT USE OF INSULIN: Primary | ICD-10-CM

## 2024-10-24 DIAGNOSIS — Z79.4 TYPE 2 DIABETES MELLITUS WITH HYPERGLYCEMIA, WITH LONG-TERM CURRENT USE OF INSULIN: Primary | ICD-10-CM

## 2024-10-24 DIAGNOSIS — R11.2 NAUSEA AND VOMITING, UNSPECIFIED VOMITING TYPE: ICD-10-CM

## 2024-11-01 ENCOUNTER — CLINICAL SUPPORT (OUTPATIENT)
Dept: FAMILY MEDICINE CLINIC | Facility: CLINIC | Age: 74
End: 2024-11-01
Payer: MEDICARE

## 2024-11-01 DIAGNOSIS — E29.1 HYPOGONADISM IN MALE: Primary | ICD-10-CM

## 2024-11-01 PROCEDURE — 96372 THER/PROPH/DIAG INJ SC/IM: CPT

## 2024-11-01 RX ADMIN — TESTOSTERONE CYPIONATE 100 MG: 200 INJECTION, SOLUTION INTRAMUSCULAR at 11:52

## 2024-11-04 ENCOUNTER — TELEPHONE (OUTPATIENT)
Dept: CARDIOLOGY | Facility: CLINIC | Age: 74
End: 2024-11-04
Payer: MEDICARE

## 2024-11-04 RX ORDER — AMLODIPINE AND VALSARTAN 10; 320 MG/1; MG/1
1 TABLET ORAL DAILY
Qty: 90 TABLET | Refills: 1 | Status: SHIPPED | OUTPATIENT
Start: 2024-11-04

## 2024-11-04 NOTE — TELEPHONE ENCOUNTER
[facility] received a fax that requires your attention. The document has been indexed to the patient’s chart for your review.      Reason for sending: EXTERNAL MEDICAL RECORD NOTIFICATION     Documents Description: ASSISTANCE ENDING-[facility] PAF-11.4.24    Name of Sender: BRISTOL DIAZ SQUIBB PAF     Date Indexed: 11.4.24

## 2024-11-07 ENCOUNTER — OFFICE VISIT (OUTPATIENT)
Dept: FAMILY MEDICINE CLINIC | Facility: CLINIC | Age: 74
End: 2024-11-07
Payer: MEDICARE

## 2024-11-07 VITALS
BODY MASS INDEX: 40.9 KG/M2 | SYSTOLIC BLOOD PRESSURE: 154 MMHG | HEART RATE: 58 BPM | DIASTOLIC BLOOD PRESSURE: 86 MMHG | OXYGEN SATURATION: 97 % | WEIGHT: 302 LBS | TEMPERATURE: 98 F | HEIGHT: 72 IN

## 2024-11-07 DIAGNOSIS — I65.21 ATHEROSCLEROSIS OF RIGHT CAROTID ARTERY: ICD-10-CM

## 2024-11-07 DIAGNOSIS — I10 ESSENTIAL HYPERTENSION: Chronic | ICD-10-CM

## 2024-11-07 DIAGNOSIS — E29.1 HYPOGONADISM IN MALE: Primary | ICD-10-CM

## 2024-11-07 PROCEDURE — 99214 OFFICE O/P EST MOD 30 MIN: CPT

## 2024-11-07 PROCEDURE — 1126F AMNT PAIN NOTED NONE PRSNT: CPT

## 2024-11-07 PROCEDURE — 3079F DIAST BP 80-89 MM HG: CPT

## 2024-11-07 PROCEDURE — G0009 ADMIN PNEUMOCOCCAL VACCINE: HCPCS

## 2024-11-07 PROCEDURE — 3077F SYST BP >= 140 MM HG: CPT

## 2024-11-07 PROCEDURE — G0008 ADMIN INFLUENZA VIRUS VAC: HCPCS

## 2024-11-07 PROCEDURE — 90677 PCV20 VACCINE IM: CPT

## 2024-11-07 PROCEDURE — 90662 IIV NO PRSV INCREASED AG IM: CPT

## 2024-11-07 PROCEDURE — 3052F HG A1C>EQUAL 8.0%<EQUAL 9.0%: CPT

## 2024-11-07 NOTE — PROGRESS NOTES
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      He has no complaints today. He has hypertension and hypogonadism and is doing well on all his medications. We will recheck his testosterone at the beginning of December and make sure that is working to bring his levels up.      History      Past Medical History:   Diagnosis Date    Abnormal ECG 4/20/2023    Clogged artery    Allergic 1988    Atrial fibrillation     Capps esophagus     Clotting disorder 2/5/2024    Colon polyp     Congenital heart disease 4/5/2024    Coronary artery disease Feb 5 2024    AFib    Depression 1995    Diabetes     Diverticulitis of colon     Diverticulosis 2/6/2024    Dizziness Feb5. 2024    GERD (gastroesophageal reflux disease) 2000    Hernia, inguinal     History of total hip arthroplasty, right 02/01/2023    HL (hearing loss) 1997    Hyperlipidemia     Hypertension     Hypervitaminosis D      PONV (postoperative nausea and vomiting)     Sleep apnea     Type 2 diabetes mellitus        Past Surgical History:   Procedure Laterality Date    COLONOSCOPY      COLONOSCOPY N/A 04/25/2022    Procedure: COLONOSCOPY WITH POLYPECTOMY;  Surgeon: Artie Dickerson MD;  Location: [facility] ENDOSCOPY;  Service: Gastroenterology;  Laterality: N/A;  COLON POLYP    ENDOSCOPY N/A 04/25/2022    Procedure: ESOPHAGOGASTRODUODENOSCOPY WITH BX;  Surgeon: Artie Dickerson MD;  Location: [facility] ENDOSCOPY;  Service: Gastroenterology;  Laterality: N/A;  CAPPS'S ESOPHAGUS, HIATAL HERNIA    ENDOSCOPY N/A 08/09/2024    Procedure: ESOPHAGOGASTRODUODENOSCOPY WITH BIOPSIES;  Surgeon: Artie Dickerson MD;  Location: [facility] ENDOSCOPY;  Service: Gastroenterology;  Laterality: N/A;  HIATAL HERNIA, BARRETTS    HERNIA REPAIR      JOINT REPLACEMENT  2/28/2023    KNEE SURGERY Bilateral     TOTAL HIP  ARTHROPLASTY Right     TRIGGER FINGER RELEASE Bilateral     x2 THUMBS    UPPER GASTROINTESTINAL ENDOSCOPY  2022       Family History   Problem Relation Age of Onset    Diabetes Mother     Hypertension Mother     Heart attack Mother     COPD Brother     Diabetes Maternal Grandmother     Heart attack Sister         Quad bypass    Heart attack Brother         AFib    Colon cancer Neg Hx         Current Medications        Current Outpatient Medications:     amLODIPine-valsartan (EXFORGE)  MG per tablet, TAKE 1 TABLET BY MOUTH EVERY DAY, Disp: 90 tablet, Rfl: 1    apixaban (ELIQUIS) 5 MG tablet tablet, Take 1 tablet by mouth 2 (Two) Times a Day. Indications: Atrial Fibrillation, Disp: 30 tablet, Rfl: 0    Ascorbic Acid (Vitamin C) 100 MG chewable tablet, Chew 1 tablet Daily., Disp: , Rfl:     cetirizine (zyrTEC) 10 MG tablet, Take 1 tablet by mouth Daily As Needed., Disp: , Rfl:     co-enzyme Q-10 30 MG capsule, Take 1 capsule by mouth Daily., Disp: , Rfl:     Continuous Blood Gluc Sensor (Dexcom G6 Sensor), Every 10 (Ten) Days., Disp: , Rfl:     CONTOUR NEXT TEST test strip, Test 6 times daily DX: e11.65, Disp: 200 each, Rfl: 5    digoxin (LANOXIN) 125 MCG tablet, TAKE 1 TABLET BY MOUTH EVERY DAY, Disp: 90 tablet, Rfl: 1    donepezil (ARICEPT) 10 MG tablet, Take 1 tablet by mouth Every Night., Disp: 90 tablet, Rfl: 1    insulin patient supplied pump, Inject  under the skin into the appropriate area as directed Continuous. Type of Insulin: Humulin R 500 Type of Pump:[employer] [phone] Bolus amount: 50 units daily.  Basal amount : Max basal 5.50u/hr Correction factor:  Insulin to carbohydrate ratio: 12a-12p 10                                                12p-2p 10                                                 2p -12 a 7  Date of last site change: 2/3/24 Location of catheter: right side on stomach  Frequency of refill: approximately 3 days Last refill date: per pt 2/3/24  Target 12a-3a 130-150               3a-6a -100-120              6a-9p 100-120              9p-12a 130-150  Active Ins time : 5 hours  Basal Pattern Setup 27.3  Prescriber Shara Campbell Phone number [phone], Disp: , Rfl:     insulin regular (HUMULIN R) 500 UNIT/ML CONCENTRATED injection, 50u daily with insulin pump (Patient taking differently: Inject 10 unit marking on U-100 syringe under the skin into the appropriate area as directed Daily. 60u daily with insulin pump), Disp: 40 mL, Rfl: 3    L-Lysine 500 MG tablet tablet, Take 1 tablet by mouth Daily., Disp: , Rfl:     Lancets misc, Check 5 times daily DX: e11.65, Disp: 500 each, Rfl: 1    magnesium oxide (MAG-OX) 400 MG tablet, Take 1 tablet by mouth Daily., Disp: , Rfl:     memantine (NAMENDA) 10 MG tablet, 1/2 tablet at night for 1 wk, then 1/2 tablet BID for 1 wk, then 1/2 in the AM and 1 tablet in PM for 1 week, then 1 tablet BID thereafter, Disp: 180 tablet, Rfl: 1    metFORMIN (GLUCOPHAGE) 500 MG tablet, Take 1 tablet by mouth 2 (Two) Times a Day With Meals., Disp: , Rfl:     multivitamin with minerals tablet tablet, Take 1 tablet by mouth Daily., Disp: , Rfl:     nebivolol (Bystolic) 10 MG tablet, Take 1 tablet by mouth Daily., Disp: 90 tablet, Rfl: 3    Omega-3 Fatty Acids (fish oil) 1000 MG capsule capsule, Take 1 capsule by mouth 2 (Two) Times a Day With Meals., Disp: , Rfl:     ondansetron ODT (Zofran ODT) 4 MG disintegrating tablet, Place 1 tablet on the tongue Every 8 (Eight) Hours As Needed for Nausea or Vomiting., Disp: 30 tablet, Rfl: 1    pantoprazole (PROTONIX) 40 MG EC tablet, Take 1 tablet by mouth Daily., Disp: 90 tablet, Rfl: 1    rosuvastatin (CRESTOR) 40 MG tablet, Take 1 tablet by mouth Every Night., Disp: 90 tablet, Rfl: 1    spironolactone-hydrochlorothiazide (ALDACTAZIDE) 25-25 MG tablet, Take 1 tablet by mouth Daily., Disp: , Rfl:     Testosterone Cypionate (DEPOTESTOTERONE CYPIONATE) 200 MG/ML injection, INJECT 0.5 ML INTO THE APPROPRIATE MUSCLE AS DIRECTED BY  "PRESCRIBER EVERY 14 DAYS, Disp: 2 mL, Rfl: 0    venlafaxine XR (EFFEXOR-XR) 150 MG 24 hr capsule, Take 1 capsule by mouth Daily., Disp: 90 capsule, Rfl: 3    Current Facility-Administered Medications:     Testosterone Cypionate (DEPOTESTOTERONE CYPIONATE) injection 100 mg, 100 mg, Intramuscular, Q14 Days, Esthela Arceo APRN, 100 mg at 11/01/24 1152     Allergies     Allergies   Allergen Reactions    Codeine Nausea Only    Semaglutide(0.25 Or 0.5mg-Dos) GI Intolerance       Social History       Social History     Social History Narrative    ** Merged History Encounter **            Immunizations     Immunization:  Immunization History   Administered Date(s) Administered    COVID-19 (PFIZER) Purple Cap Monovalent 03/01/2021, 03/08/2021, 03/29/2021    Fluzone High-Dose 65+YRS 11/07/2024    Fluzone High-Dose 65+yrs 10/18/2020, 11/15/2021, 11/10/2022, 12/05/2023    Pneumococcal Conjugate 20-Valent (PCV20) 11/07/2024    Pneumococcal Polysaccharide (PPSV23) 01/06/2010, 08/05/2022    Shingrix 04/10/2024, 06/26/2024    Zostavax 08/16/2017          Objective     Objective     Vital Signs:   /86 (BP Location: Right arm, Patient Position: Sitting, Cuff Size: Adult)   Pulse 58   Temp 98 °F (36.7 °C) (Temporal)   Ht 182.9 cm (72\")   Wt (!) 137 kg (302 lb)   SpO2 97%   BMI 40.96 kg/m²       Physical Exam  Constitutional:       Appearance: Normal appearance.   HENT:      Nose: Nose normal.      Mouth/Throat:      Mouth: Mucous membranes are moist.   Cardiovascular:      Rate and Rhythm: Normal rate and regular rhythm.   Pulmonary:      Effort: Pulmonary effort is normal.   Skin:     General: Skin is warm and dry.   Neurological:      General: No focal deficit present.      Mental Status: He is alert and oriented to person, place, and time.   Psychiatric:         Mood and Affect: Mood normal.         Behavior: Behavior normal.         Results    The following data was reviewed by: JOHNNA Machado on " 11/07/2024:             Assessment and Plan        Assessment and Plan    Diagnoses and all orders for this visit:    1. Hypogonadism in male (Primary)  -     Testosterone; Future    2. Atherosclerosis of right carotid artery  -     MRI Angiogram Neck Without Contrast; Future    3. Essential hypertension  Assessment & Plan:  Hypertension is stable and controlled  Continue current treatment regimen.  Blood pressure will be reassessed in 6 months.      Other orders  -     Pneumococcal Conjugate Vaccine 20-Valent (PCV20)  -     Fluzone High-Dose 65+yrs (2226-6652)              Follow Up        Follow Up   Return in about 6 months (around 5/7/2025).  Patient was given instructions and counseling regarding his condition or for health maintenance advice. Please see specific information pulled into the AVS if appropriate.    Answers submitted by the patient for this visit:  Other (Submitted on 11/1/2024)  Please describe your symptoms.: None  Have you had these symptoms before?: Yes  How long have you been having these symptoms?: Greater than 2 weeks  Please list any medications you are currently taking for this condition.: Follow up  Please describe any probable cause for these symptoms. : Follow up  Primary Reason for Visit (Submitted on 11/1/2024)  What is the primary reason for your visit?: Problem Not Listed

## 2024-11-15 ENCOUNTER — CLINICAL SUPPORT (OUTPATIENT)
Dept: FAMILY MEDICINE CLINIC | Facility: CLINIC | Age: 74
End: 2024-11-15
Payer: MEDICARE

## 2024-11-15 DIAGNOSIS — E29.1 HYPOGONADISM MALE: ICD-10-CM

## 2024-11-15 DIAGNOSIS — E29.1 HYPOGONADISM MALE: Primary | ICD-10-CM

## 2024-11-15 PROCEDURE — 96372 THER/PROPH/DIAG INJ SC/IM: CPT

## 2024-11-15 RX ORDER — TESTOSTERONE CYPIONATE 200 MG/ML
INJECTION, SOLUTION INTRAMUSCULAR
Qty: 2 ML | Refills: 0 | Status: SHIPPED | OUTPATIENT
Start: 2024-11-15

## 2024-11-15 RX ADMIN — TESTOSTERONE CYPIONATE 100 MG: 200 INJECTION, SOLUTION INTRAMUSCULAR at 15:34

## 2024-12-02 ENCOUNTER — OFFICE VISIT (OUTPATIENT)
Dept: SLEEP MEDICINE | Facility: HOSPITAL | Age: 74
End: 2024-12-02
Payer: MEDICARE

## 2024-12-02 VITALS
SYSTOLIC BLOOD PRESSURE: 176 MMHG | OXYGEN SATURATION: 97 % | BODY MASS INDEX: 40.17 KG/M2 | DIASTOLIC BLOOD PRESSURE: 62 MMHG | HEART RATE: 70 BPM | HEIGHT: 72 IN | WEIGHT: 296.6 LBS

## 2024-12-02 DIAGNOSIS — E66.01 CLASS 3 SEVERE OBESITY DUE TO EXCESS CALORIES WITHOUT SERIOUS COMORBIDITY WITH BODY MASS INDEX (BMI) OF 40.0 TO 44.9 IN ADULT: ICD-10-CM

## 2024-12-02 DIAGNOSIS — E66.813 CLASS 3 SEVERE OBESITY DUE TO EXCESS CALORIES WITHOUT SERIOUS COMORBIDITY WITH BODY MASS INDEX (BMI) OF 40.0 TO 44.9 IN ADULT: ICD-10-CM

## 2024-12-02 DIAGNOSIS — G47.33 OSA ON CPAP: Primary | ICD-10-CM

## 2024-12-02 PROCEDURE — 3077F SYST BP >= 140 MM HG: CPT | Performed by: INTERNAL MEDICINE

## 2024-12-02 PROCEDURE — 99213 OFFICE O/P EST LOW 20 MIN: CPT | Performed by: INTERNAL MEDICINE

## 2024-12-02 PROCEDURE — 1160F RVW MEDS BY RX/DR IN RCRD: CPT | Performed by: INTERNAL MEDICINE

## 2024-12-02 PROCEDURE — 3078F DIAST BP <80 MM HG: CPT | Performed by: INTERNAL MEDICINE

## 2024-12-02 PROCEDURE — G0463 HOSPITAL OUTPT CLINIC VISIT: HCPCS

## 2024-12-02 PROCEDURE — 1159F MED LIST DOCD IN RCRD: CPT | Performed by: INTERNAL MEDICINE

## 2024-12-02 NOTE — PROGRESS NOTES
"  [facility]  Sleep medicine  [address] [place] [address]  [place]   [place] [address]  Phone: [phone]  Fax: [phone]      SLEEP CLINIC FOLLOW UP PROGRESS NOTE.    Pastor Bartholomew  [id number]   1950  73 y.o.  male      PCP: Esthela Arceo APRN      Date of visit: 12/2/2024    Chief Complaint   Patient presents with    Sleep Apnea    Obesity       HPI:  This is a 73 y.o. years old patient is here for the management of obstructive sleep apnea.  Sleep apnea is mild in severity with a AHI of 11/hr. Patient is using positive airway pressure therapy with auto CPAP and the symptoms of sleep apnea have improved significantly on the therapy. Normally patient goes to bed at 11 PM and wakes up at 830 AM .  The patient wakes up 2-3 time(s) during the night and has no problem going back to sleep.  Feels refreshed after waking up.  He recently received ResMed new CPAP and is here for compliance check.  He reports that he is uses the machine on a regular basis and has no problems with the new machine.  Uses a fullface mask    Medications and allergies are reviewed by me and documented in the encounter.     SOCIAL (habits pertaining to sleep medicine)  History tobacco use:No   History of alcohol use: 0 per week  Caffeine use: 1     REVIEW OF SYSTEMS:   Pertaining positive symptoms are:  Kent Sleepiness Scale :Total score: 14       PHYSICAL EXAMINATION:  CONSTITUTIONAL:  Vitals:    12/02/24 1100   BP: 176/62   Pulse: 70   SpO2: 97%   Weight: 135 kg (296 lb 9.6 oz)   Height: 182.9 cm (72.01\")    Body mass index is 40.22 kg/m².   NOSE: nasal passages are clear, No deformities noted   RESP SYSTEM: Not in any respiratory distress, no chest deformities noted,   CARDIOVASULAR: No edema noted  NEURO: Oriented x 3, gait normal,  Mood and affect appeared appropriate      Data reviewed:  The Smart card downloaded on 12/2/2024 has been reviewed independently by me for compliance and discussed the data with " the patient.   Compliance; 100%  More than 4 hr use, 100%  Average use of the device 9 hours and 44 minutes per night  Residual AHI: 6 /hr (goal < 5.0 /hr)  Mask type: Fullface mask  Device: ResMed  DME: [facility]      ASSESSMENT AND PLAN:  Obstructive sleep apnea ( G 47.33).  The symptoms of sleep apnea have improved with the device and the treatment.  Patient's compliance with the device is excellent for treatment of sleep apnea.  I have independently reviewed the smart card down load and discussed with the patient the download data and encouarged the patient to continue to use the device.The residual AHI is acceptable. The device is benefiting the patient and the device is medically necessary.  Without proper control of sleep apnea and good compliance there is a increased risk for hypertension, diabetes mellitus and nonrestorative sleep with hypersomnia which can increase risk for motor vehicle accidents.  Untreated sleep apnea is also a risk factor for development of atrial fibrillation, pulmonary hypertension, insulin resistance and stroke. The patient is also instructed to get the supplies from the "[employer] and and change them on a regular basis.  A prescription for supplies has been sent to the "[employer].  I have also discussed the good sleep hygiene habits and adequate amount of sleep needed for good health.  Obesity  2 with BMI is Body mass index is 40.22 kg/m².. I have discuss the relationship between the weight and sleep apnea. The benefit of weight loss in reducing severity of sleep apnea was discussed. Discussed diet and exercise with the patient to achieve ideal BMI.  Atrial fibrillation  Hypertension  Return in about 1 year (around 12/2/2025) for with smart card down load. . Patient's questions were answered.    12/2/2024  Bebeto Vazquez MD  Sleep Medicine.  Medical Director,   [facility] sleep centers.

## 2024-12-03 ENCOUNTER — LAB (OUTPATIENT)
Dept: LAB | Facility: HOSPITAL | Age: 74
End: 2024-12-03
Payer: MEDICARE

## 2024-12-03 ENCOUNTER — CLINICAL SUPPORT (OUTPATIENT)
Dept: FAMILY MEDICINE CLINIC | Facility: CLINIC | Age: 74
End: 2024-12-03
Payer: MEDICARE

## 2024-12-03 DIAGNOSIS — I48.0 PAROXYSMAL ATRIAL FIBRILLATION: Primary | ICD-10-CM

## 2024-12-03 DIAGNOSIS — E29.1 HYPOGONADISM IN MALE: ICD-10-CM

## 2024-12-03 DIAGNOSIS — I10 ESSENTIAL HYPERTENSION: Primary | ICD-10-CM

## 2024-12-03 DIAGNOSIS — E29.1 HYPOGONADISM IN MALE: Primary | ICD-10-CM

## 2024-12-03 LAB — TESTOST SERPL-MCNC: 240 NG/DL (ref 193–740)

## 2024-12-03 PROCEDURE — 36415 COLL VENOUS BLD VENIPUNCTURE: CPT

## 2024-12-03 PROCEDURE — 84403 ASSAY OF TOTAL TESTOSTERONE: CPT

## 2024-12-03 PROCEDURE — 96372 THER/PROPH/DIAG INJ SC/IM: CPT

## 2024-12-03 RX ORDER — NEBIVOLOL 10 MG/1
10 TABLET ORAL DAILY
Qty: 90 TABLET | Refills: 3 | Status: SHIPPED | OUTPATIENT
Start: 2024-12-03

## 2024-12-03 RX ORDER — ROSUVASTATIN CALCIUM 40 MG/1
40 TABLET, COATED ORAL NIGHTLY
Qty: 90 TABLET | Refills: 1 | OUTPATIENT
Start: 2024-12-03

## 2024-12-03 RX ORDER — DONEPEZIL HYDROCHLORIDE 10 MG/1
10 TABLET, FILM COATED ORAL NIGHTLY
Qty: 90 TABLET | Refills: 1 | OUTPATIENT
Start: 2024-12-03

## 2024-12-03 RX ORDER — DIGOXIN 125 MCG
125 TABLET ORAL DAILY
Qty: 90 TABLET | Refills: 1 | Status: SHIPPED | OUTPATIENT
Start: 2024-12-03

## 2024-12-03 RX ORDER — MEMANTINE HYDROCHLORIDE 10 MG/1
TABLET ORAL
Qty: 180 TABLET | Refills: 1 | OUTPATIENT
Start: 2024-12-03

## 2024-12-03 RX ORDER — PANTOPRAZOLE SODIUM 40 MG/1
40 TABLET, DELAYED RELEASE ORAL DAILY
Qty: 90 TABLET | Refills: 1 | Status: SHIPPED | OUTPATIENT
Start: 2024-12-03

## 2024-12-03 RX ORDER — SPIRONOLACTONE AND HYDROCHLOROTHIAZIDE 25; 25 MG/1; MG/1
1 TABLET ORAL DAILY
Qty: 90 TABLET | Refills: 0 | Status: SHIPPED | OUTPATIENT
Start: 2024-12-03

## 2024-12-03 RX ADMIN — TESTOSTERONE CYPIONATE 100 MG: 200 INJECTION, SOLUTION INTRAMUSCULAR at 13:16

## 2024-12-04 ENCOUNTER — OFFICE VISIT (OUTPATIENT)
Dept: PODIATRY | Facility: CLINIC | Age: 74
End: 2024-12-04
Payer: MEDICARE

## 2024-12-04 VITALS
SYSTOLIC BLOOD PRESSURE: 183 MMHG | HEART RATE: 66 BPM | TEMPERATURE: 96.6 F | HEIGHT: 72 IN | OXYGEN SATURATION: 95 % | WEIGHT: 296 LBS | BODY MASS INDEX: 40.09 KG/M2 | DIASTOLIC BLOOD PRESSURE: 70 MMHG

## 2024-12-04 DIAGNOSIS — E11.65 TYPE 2 DIABETES MELLITUS WITH HYPERGLYCEMIA, WITH LONG-TERM CURRENT USE OF INSULIN: ICD-10-CM

## 2024-12-04 DIAGNOSIS — Z79.4 TYPE 2 DIABETES MELLITUS WITH HYPERGLYCEMIA, WITH LONG-TERM CURRENT USE OF INSULIN: ICD-10-CM

## 2024-12-04 DIAGNOSIS — B35.1 ONYCHOMYCOSIS: ICD-10-CM

## 2024-12-04 DIAGNOSIS — M79.672 PAIN IN BOTH FEET: Primary | ICD-10-CM

## 2024-12-04 DIAGNOSIS — M79.671 PAIN IN BOTH FEET: Primary | ICD-10-CM

## 2024-12-05 ENCOUNTER — OFFICE VISIT (OUTPATIENT)
Dept: NEUROLOGY | Facility: CLINIC | Age: 74
End: 2024-12-05
Payer: MEDICARE

## 2024-12-05 VITALS
SYSTOLIC BLOOD PRESSURE: 172 MMHG | HEIGHT: 72 IN | HEART RATE: 58 BPM | WEIGHT: 293.9 LBS | BODY MASS INDEX: 39.81 KG/M2 | DIASTOLIC BLOOD PRESSURE: 62 MMHG

## 2024-12-05 DIAGNOSIS — I63.81 LACUNAR INFARCTION: ICD-10-CM

## 2024-12-05 DIAGNOSIS — E66.813 CLASS 3 SEVERE OBESITY DUE TO EXCESS CALORIES WITHOUT SERIOUS COMORBIDITY WITH BODY MASS INDEX (BMI) OF 40.0 TO 44.9 IN ADULT: ICD-10-CM

## 2024-12-05 DIAGNOSIS — G31.84 MILD COGNITIVE IMPAIRMENT: Primary | ICD-10-CM

## 2024-12-05 DIAGNOSIS — E66.01 CLASS 3 SEVERE OBESITY DUE TO EXCESS CALORIES WITHOUT SERIOUS COMORBIDITY WITH BODY MASS INDEX (BMI) OF 40.0 TO 44.9 IN ADULT: ICD-10-CM

## 2024-12-05 RX ORDER — ROSUVASTATIN CALCIUM 40 MG/1
40 TABLET, COATED ORAL NIGHTLY
Qty: 90 TABLET | Refills: 1 | Status: SHIPPED | OUTPATIENT
Start: 2024-12-05

## 2024-12-05 RX ORDER — DONEPEZIL HYDROCHLORIDE 10 MG/1
10 TABLET, FILM COATED ORAL NIGHTLY
Qty: 90 TABLET | Refills: 1 | Status: SHIPPED | OUTPATIENT
Start: 2024-12-05

## 2024-12-05 NOTE — PROGRESS NOTES
"Chief Complaint  Neurologic Problem    Subjective          Pastor Bartholomew presents to [facility]ERY  History of Present Illness    History of Present Illness  The patient is a 73-year-old male who presents to the office for follow-up for mild cognitive impairment. He remains on donepezil nightly for memory augmentation and has a history of lacunar infarct. He is on Eliquis for vascular risk reduction. He is accompanied by an adult female.    He reports that his memory has been stable, but the accompanying adult female notes a slight decline. She mentions that he often forgets about his doctor's appointments, requiring her to remind him multiple times.    His blood sugar level is currently 309. He is in the process of acquiring a new insulin pump, which is expected to hold more insulin and automatically adjust the dosage. This new pump is currently at the doctor's office, awaiting a training session for him.    He is also receiving testosterone injections.       Objective   Vital Signs:   /62   Pulse 58   Ht 182.9 cm (72\")   Wt 133 kg (293 lb 14.4 oz)   BMI 39.86 kg/m²     Physical Exam  HENT:      Head: Normocephalic.   Pulmonary:      Effort: Pulmonary effort is normal.   Neurological:      Mental Status: He is alert and oriented to person, place, and time.      Sensory: Sensation is intact.      Motor: Motor function is intact.      Coordination: Coordination is intact.      Deep Tendon Reflexes: Reflexes are normal and symmetric.        Neurological Exam  Mental Status  Alert. Oriented to person, place, and time.    Sensory  Normal sensation.    Reflexes  Deep tendon reflexes are 2+ and symmetric in all four extremities.    Coordination    Finger-to-nose, rapid alternating movements and heel-to-shin normal bilaterally without dysmetria.      Result Review :   CBC:  Lab Results   Component Value Date    WBC 8.90 09/04/2024    RBC 4.99 09/04/2024    HGB 15.4 " 09/04/2024    HCT 45.2 09/04/2024    MCV 90.6 09/04/2024    MCH 30.9 09/04/2024    MCHC 34.1 09/04/2024    RDW 13.8 09/04/2024     09/04/2024     CMP:  Lab Results   Component Value Date     (H) 12/19/2022    BUN 14 09/09/2024    CREATININE 1.08 09/09/2024    EGFRIFNONA 80 12/17/2021    EGFRIFAFRI >60 12/19/2022     09/09/2024    K 4.7 09/09/2024     09/09/2024    CALCIUM 9.6 09/09/2024    PROTENTOTREF 6.6 09/03/2019    ALBUMIN 4.6 09/04/2024    LABGLOBREF 1.8 09/03/2019    BILITOT 0.5 09/04/2024    ALKPHOS 84 09/04/2024    AST 31 09/04/2024    ALT 30 09/04/2024     LIPID PANEL:  Lab Results   Component Value Date    CHLPL 130 06/01/2021    TRIG 228 (H) 05/20/2024    HDL 36 (L) 05/20/2024    VLDL 38 05/20/2024    LDL 71 05/20/2024    LDLHDL 1.76 05/20/2024     HGBA1C(MOST RECENT):  Lab Results   Component Value Date    HGBA1C 8.70 (H) 09/04/2024               Assessment and Plan    Diagnoses and all orders for this visit:    1. Mild cognitive impairment (Primary)    2. Lacunar infarction    3. Class 3 severe obesity due to excess calories without serious comorbidity with body mass index (BMI) of 40.0 to 44.9 in adult    Other orders  -     rosuvastatin (CRESTOR) 40 MG tablet; Take 1 tablet by mouth Every Night.  Dispense: 90 tablet; Refill: 1  -     donepezil (ARICEPT) 10 MG tablet; Take 1 tablet by mouth Every Night.  Dispense: 90 tablet; Refill: 1        Assessment & Plan  1. Mild cognitive impairment.  Memory issues may be exacerbated by elevated blood sugar levels. Donepezil will be continued for memory support. Any significant changes in memory should be reported promptly.    2. History of lacunar infarct.  He is on Eliquis for vascular risk reduction and will continue this medication.    3. Hyperlipidemia.  LDL is within goal at 71. Rosuvastatin will be continued for cholesterol management.    4. Diabetes Mellitus.  A1c is elevated at 8.7. He is working with the diabetes team to  improve glycemic control. He is getting a new insulin pump that is expected to automatically dose better. Blood sugar levels will be closely monitored.    5. Hypertension.  Blood pressure is elevated today at 172/62. Blood pressure management will be closely monitored and adjusted as needed.    Follow-up  Return in 6 months for follow-up.         Follow Up   Return in about 6 months (around 6/5/2025) for memory f/u.  Patient was given instructions and counseling regarding his condition or for health maintenance advice. Please see specific information pulled into the AVS if appropriate.       Patient or patient representative verbalized consent for the use of Ambient Listening during the visit with  JOHNNA Savage for chart documentation. 12/9/2024  10:31 EST

## 2024-12-06 NOTE — PROGRESS NOTES
[facility] - PODIATRY    Today's Date: 12/06/24    Patient Name: Pastor Bartholomew  MRN: [id number]  CSN: [id number]  PCP: Esthela Arceo APRN,   Referring Provider: No ref. 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    Patient is a 73-year-old diabetic male presenting with bilateral foot issues.  Patient states his A1c is usually around 7, but his sugars have been elevated due to pump issues.  Patient was recently admitted for diverticulitis and A-fib.  He is overall doing well.    Patient denies any fevers, chills, nausea, vomiting, shortness of breath, nor any other constitutional signs nor symptoms.    No other pedal complaints at this time.    Past Medical History:   Diagnosis Date    Abnormal ECG 4/20/2023    Clogged artery    Allergic 1988    Atrial fibrillation     Paz esophagus     Clotting disorder 2/5/2024    Colon polyp     Congenital heart disease 4/5/2024    Coronary artery disease Feb 5 2024    AFib    Depression 1995    Diabetes     Diverticulitis of colon     Diverticulosis 2/6/2024    Dizziness Feb5. 2024    GERD (gastroesophageal reflux disease) 2000    Hernia, inguinal     History of total hip arthroplasty, right 02/01/2023    HL (hearing loss) 1997    Hyperlipidemia     Hypertension     Hypervitaminosis D      Neuropathy in diabetes 1990    Plantar fasciitis 2019    PONV (postoperative nausea and vomiting)     Sleep apnea     Type 2 diabetes mellitus      Past Surgical History:   Procedure Laterality Date    COLONOSCOPY      COLONOSCOPY N/A 04/25/2022    Procedure: COLONOSCOPY WITH POLYPECTOMY;  Surgeon: Artie Dickerson MD;  Location: [facility] ENDOSCOPY;  Service: Gastroenterology;  Laterality: N/A;  COLON POLYP    ENDOSCOPY N/A 04/25/2022    Procedure:  ESOPHAGOGASTRODUODENOSCOPY WITH BX;  Surgeon: Artie Dickerson MD;  Location: [facility] ENDOSCOPY;  Service: Gastroenterology;  Laterality: N/A;  CAPPS'S ESOPHAGUS, HIATAL HERNIA    ENDOSCOPY N/A 08/09/2024    Procedure: ESOPHAGOGASTRODUODENOSCOPY WITH BIOPSIES;  Surgeon: Artie Dickerson MD;  Location: [facility] ENDOSCOPY;  Service: Gastroenterology;  Laterality: N/A;  HIATAL HERNIA, BARRETTS    HERNIA REPAIR      JOINT REPLACEMENT  2/28/2023    KNEE SURGERY Bilateral     TOTAL HIP ARTHROPLASTY Right     TRIGGER FINGER RELEASE Bilateral     x2 THUMBS    UPPER GASTROINTESTINAL ENDOSCOPY  2022     Family History   Problem Relation Age of Onset    Diabetes Mother     Hypertension Mother     Heart attack Mother     COPD Brother     Diabetes Maternal Grandmother     Heart attack Sister         Quad bypass    Heart attack Brother         AFib    Colon cancer Neg Hx      Social History     Socioeconomic History    Marital status:    Tobacco Use    Smoking status: Never     Passive exposure: Yes    Smokeless tobacco: Never   Vaping Use    Vaping status: Never Used   Substance and Sexual Activity    Alcohol use: Never    Drug use: Never    Sexual activity: Not Currently     Partners: Female     Birth control/protection: Condom     Allergies   Allergen Reactions    Codeine Nausea Only    Semaglutide(0.25 Or 0.5mg-Dos) GI Intolerance     Current Outpatient Medications   Medication Sig Dispense Refill    amLODIPine-valsartan (EXFORGE)  MG per tablet TAKE 1 TABLET BY MOUTH EVERY DAY 90 tablet 1    apixaban (ELIQUIS) 5 MG tablet tablet Take 1 tablet by mouth 2 (Two) Times a Day. Indications: Atrial Fibrillation 30 tablet 0    Ascorbic Acid (Vitamin C) 100 MG chewable tablet Chew 1 tablet Daily.      cetirizine (zyrTEC) 10 MG tablet Take 1 tablet by mouth Daily As Needed.      co-enzyme Q-10 30 MG capsule Take 1 capsule by mouth Daily.      Continuous Blood Gluc Sensor (Dexcom G6 Sensor) Every 10 (Ten) Days.       CONTOUR NEXT TEST test strip Test 6 times daily DX: e11.65 200 each 5    digoxin (LANOXIN) 125 MCG tablet Take 1 tablet by mouth Daily. 90 tablet 1    donepezil (ARICEPT) 10 MG tablet Take 1 tablet by mouth Every Night. 90 tablet 1    insulin patient supplied pump Inject  under the skin into the appropriate area as directed Continuous. Type of Insulin: Humulin R 500  Type of Pump:[employer]tronic/[employer] Referrals [phone]  Bolus amount: 50 units daily.   Basal amount : Max basal 5.50u/hr  Correction factor:   Insulin to carbohydrate ratio: 12a-12p 10                                                 12p-2p 10                                                  2p -12 a 7    Date of last site change: 2/3/24  Location of catheter: right side on stomach    Frequency of refill: approximately 3 days  Last refill date: per pt 2/3/24    Target 12a-3a 130-150               3a-6a -100-120               6a-9p 100-120               9p-12a 130-150    Active Ins time : 5 hours    Basal Pattern Setup 27.3    Prescriber Shara Campbell  Phone number [phone]      insulin regular (HUMULIN R) 500 UNIT/ML CONCENTRATED injection 50u daily with insulin pump (Patient taking differently: Inject 10 unit marking on U-100 syringe under the skin into the appropriate area as directed Daily. 60u daily with insulin pump) 40 mL 3    L-Lysine 500 MG tablet tablet Take 1 tablet by mouth Daily.      Lancets misc Check 5 times daily DX: e11.65 500 each 1    memantine (NAMENDA) 10 MG tablet 1/2 tablet at night for 1 wk, then 1/2 tablet BID for 1 wk, then 1/2 in the AM and 1 tablet in PM for 1 week, then 1 tablet BID thereafter 180 tablet 1    metFORMIN (GLUCOPHAGE) 500 MG tablet Take 1 tablet by mouth 2 (Two) Times a Day With Meals.      multivitamin with minerals tablet tablet Take 1 tablet by mouth Daily.      nebivolol (Bystolic) 10 MG tablet Take 1 tablet by mouth Daily. 90 tablet 3    Omega-3 Fatty Acids (fish oil) 1000 MG capsule capsule Take 1 capsule by  mouth 2 (Two) Times a Day With Meals.      ondansetron ODT (Zofran ODT) 4 MG disintegrating tablet Place 1 tablet on the tongue Every 8 (Eight) Hours As Needed for Nausea or Vomiting. 30 tablet 1    pantoprazole (PROTONIX) 40 MG EC tablet Take 1 tablet by mouth Daily. 90 tablet 1    rosuvastatin (CRESTOR) 40 MG tablet Take 1 tablet by mouth Every Night. 90 tablet 1    spironolactone-hydrochlorothiazide (ALDACTAZIDE) 25-25 MG tablet Take 1 tablet by mouth Daily. 90 tablet 0    Testosterone Cypionate (DEPOTESTOTERONE CYPIONATE) 200 MG/ML injection INJECT 0.5ML INTO THE APPROPRIATE MUSCLE AS DIRECTED EVERY 14 DAYS 2 mL 0    venlafaxine XR (EFFEXOR-XR) 150 MG 24 hr capsule Take 1 capsule by mouth Daily. 90 capsule 3     Current Facility-Administered Medications   Medication Dose Route Frequency Provider Last Rate Last Admin    Testosterone Cypionate (DEPOTESTOTERONE CYPIONATE) injection 100 mg  100 mg Intramuscular Q14 Days Esthela Arceo APRN   100 mg at 12/03/24 1316     Review of Systems   All other systems reviewed and are negative.      OBJECTIVE     Vitals:    12/04/24 0912   BP: (!) 183/70   Pulse: 66   Temp: 96.6 °F (35.9 °C)   SpO2: 95%       Body mass index is 40.13 kg/m².    Lab Results   Component Value Date    HGBA1C 8.70 (H) 09/04/2024       Lab Results   Component Value Date    GLUCOSE 251 (H) 09/09/2024    CALCIUM 9.6 09/09/2024     09/09/2024    K 4.7 09/09/2024    CO2 26.0 09/09/2024     09/09/2024    BUN 14 09/09/2024    CREATININE 1.08 09/09/2024    EGFRIFAFRI >60 12/19/2022    EGFRIFNONA 80 12/17/2021    BCR 13.0 09/09/2024    ANIONGAP 10.0 09/09/2024       Patient seen in no apparent distress.      PHYSICAL EXAM:     Foot/Ankle Exam    GENERAL  Appearance:  appears stated age  Orientation:  AAOx3  Affect:  appropriate  Gait:  unimpaired  Assistance:  independent  Right shoe gear: casual shoe  Left shoe gear: casual shoe    VASCULAR     Right Foot Vascularity   Normal vascular exam     Dorsalis pedis:  2+  Posterior tibial:  2+  Skin temperature:  warm  Edema grading:  None  CFT:  < 3 seconds  Pedal hair growth:  Present  Varicosities:  none     Left Foot Vascularity   Normal vascular exam    Dorsalis pedis:  2+  Posterior tibial:  2+  Skin temperature:  warm  Edema grading:  None  CFT:  < 3 seconds  Pedal hair growth:  Present  Varicosities:  none     NEUROLOGIC     Right Foot Neurologic   Normal sensation    Light touch sensation: normal  Vibratory sensation: normal  Hot/Cold sensation: normal  Protective Sensation using Lagrange-Cailin Monofilament:   Sites intact: 10  Sites tested: 10     Left Foot Neurologic   Normal sensation    Light touch sensation: normal  Vibratory sensation: normal  Hot/Cold sensation:  normal  Protective Sensation using Lagrange-Cailin Monofilament:   Sites intact: 10  Sites tested: 10    MUSCLE STRENGTH     Right Foot Muscle Strength   Foot dorsiflexion:  4  Foot plantar flexion:  4  Foot inversion:  4  Foot eversion:  4     Left Foot Muscle Strength   Foot dorsiflexion:  4  Foot plantar flexion:  4  Foot inversion:  4  Foot eversion:  4    RANGE OF MOTION     Right Foot Range of Motion   Foot and ankle ROM within normal limits       Left Foot Range of Motion   Foot and ankle ROM within normal limits      DERMATOLOGIC      Right Foot Dermatologic   Skin  Right foot skin is intact.      Left Foot Dermatologic   Skin  Left foot skin is intact.             ASSESSMENT/PLAN     Diagnoses and all orders for this visit:    1. Pain in both feet (Primary)    2. Type 2 diabetes mellitus with hyperglycemia, with long-term current use of insulin    3. Onychomycosis        Comprehensive lower extremity examination and evaluation was performed.    Toenails 1, 2, 3, 4, 5 on Right and 1, 2, 3, 4, 5 on Left were debrided with nail nippers.  Patient tolerated procedure well without complications.    Discussed findings and treatment plan including risks, benefits, and treatment  options with patient in detail. Patient agreed with treatment plan.    Medications and allergies reviewed.  Reviewed available blood glucose and HgB A1C lab values along with other pertinent labs.  These were discussed with the patient as to their importance of diabetic maintenance.    Diabetic foot exam performed and documented this date, compliant with CQM required standards. Detail of findings as noted in physical exam.  Lower extremity Neurologic exam for diabetic patient performed and documented this date, compliant with PQRS required standards. Detail of findings as noted in physical exam.  Advised patient importance of good routine lower extremity hygiene. Advised patient importance of evaluating for intact skin and pain free nail borders.  Advised patient to use mirror to evaluate plantar/ soles of feet for better visualization. Advised patient monitor and phone office to be seen if any cracking to skin, open lesions, painful nail borders or if nails become elongated prior to next visit. Advised patient importance of daily cleansing of lower extremities, followed by good skin cream to maintain normal hydration of skin. Also advised patient importance of close daily monitoring of blood sugar. Advised to regulate diet and medications to maintain control of blood sugar in optimal range. Contact primary care provider if difficulties maintaining blood sugar levels.  Advised Patient of presence of Diabetes Mellitus condition.  Advised Patient risk of progression and worsening or improvement, then return of condition.  Will monitor condition for any change in future. Treat with most appropriate treatment pending status of condition.  Counseled and advised patient extensively on nature and ramifications of diabetes. Standard instructions given to patient for good diabetic foot care and maintenance. Advised importance of careful monitoring to avoid break down and complications secondary to diabetes. Advised patient  importance of strict maintenance of blood sugar control. Advised patient of possible ominous results from neglect of condition, i.e.: amputation/ loss of digits, feet and legs, or even death.  Patient states understands counseling, will monitor closely, continue good hygiene and routine diabetic foot care. Patient will contact office if questions or problems.      An After Visit Summary was printed and given to the patient at discharge, including (if requested) any available informative/educational handouts regarding diagnosis, treatment, or medications. All questions were answered to patient/family satisfaction. Should symptoms fail to improve or worsen they agree to call or return to clinic or to go to the Emergency Department. Discussed the importance of following up with any needed screening tests/labs/specialist appointments and any requested follow-up recommended by me today. Importance of maintaining follow-up discussed and patient accepts that missed appointments can delay diagnosis and potentially lead to worsening of conditions.    Return in about 3 months (around 3/4/2025)., or sooner if acute issues arise.    This document has been electronically signed by Phuc Mena DPM on December 6, 2024 08:30 EST

## 2024-12-18 ENCOUNTER — LAB (OUTPATIENT)
Dept: LAB | Facility: HOSPITAL | Age: 74
End: 2024-12-18
Payer: MEDICARE

## 2024-12-18 ENCOUNTER — TRANSCRIBE ORDERS (OUTPATIENT)
Dept: LAB | Facility: HOSPITAL | Age: 74
End: 2024-12-18
Payer: MEDICARE

## 2024-12-18 DIAGNOSIS — Z96.41 INSULIN PUMP STATUS: ICD-10-CM

## 2024-12-18 DIAGNOSIS — Z96.41 INSULIN PUMP STATUS: Primary | ICD-10-CM

## 2024-12-18 DIAGNOSIS — E11.9 DIABETES MELLITUS WITHOUT COMPLICATION: ICD-10-CM

## 2024-12-18 DIAGNOSIS — E78.5 HYPERLIPIDEMIA, UNSPECIFIED HYPERLIPIDEMIA TYPE: ICD-10-CM

## 2024-12-18 LAB
ALBUMIN SERPL-MCNC: 4.1 G/DL (ref 3.5–5.2)
ALBUMIN UR-MCNC: 17.2 MG/DL
ALBUMIN/GLOB SERPL: 1.5 G/DL
ALP SERPL-CCNC: 90 U/L (ref 39–117)
ALT SERPL W P-5'-P-CCNC: 15 U/L (ref 1–41)
ANION GAP SERPL CALCULATED.3IONS-SCNC: 9.9 MMOL/L (ref 5–15)
AST SERPL-CCNC: 19 U/L (ref 1–40)
BILIRUB SERPL-MCNC: 0.4 MG/DL (ref 0–1.2)
BUN SERPL-MCNC: 10 MG/DL (ref 8–23)
BUN/CREAT SERPL: 10.1 (ref 7–25)
CALCIUM SPEC-SCNC: 9.2 MG/DL (ref 8.6–10.5)
CHLORIDE SERPL-SCNC: 108 MMOL/L (ref 98–107)
CHOLEST SERPL-MCNC: 99 MG/DL (ref 0–200)
CO2 SERPL-SCNC: 25.1 MMOL/L (ref 22–29)
CREAT SERPL-MCNC: 0.99 MG/DL (ref 0.76–1.27)
CREAT UR-MCNC: 139.4 MG/DL
EGFRCR SERPLBLD CKD-EPI 2021: 81.4 ML/MIN/1.73
GLOBULIN UR ELPH-MCNC: 2.7 GM/DL
GLUCOSE SERPL-MCNC: 110 MG/DL (ref 65–99)
HBA1C MFR BLD: 7.7 % (ref 4.8–5.6)
HDLC SERPL-MCNC: 26 MG/DL (ref 40–60)
LDLC SERPL CALC-MCNC: 46 MG/DL (ref 0–100)
LDLC/HDLC SERPL: 1.59 {RATIO}
MICROALBUMIN/CREAT UR: 123.4 MG/G (ref 0–29)
POTASSIUM SERPL-SCNC: 3.8 MMOL/L (ref 3.5–5.2)
PROT SERPL-MCNC: 6.8 G/DL (ref 6–8.5)
SODIUM SERPL-SCNC: 143 MMOL/L (ref 136–145)
TRIGL SERPL-MCNC: 158 MG/DL (ref 0–150)
TSH SERPL DL<=0.05 MIU/L-ACNC: 0.81 UIU/ML (ref 0.27–4.2)
VIT B12 BLD-MCNC: 746 PG/ML (ref 211–946)
VLDLC SERPL-MCNC: 27 MG/DL (ref 5–40)

## 2024-12-18 PROCEDURE — 82043 UR ALBUMIN QUANTITATIVE: CPT

## 2024-12-18 PROCEDURE — 82607 VITAMIN B-12: CPT

## 2024-12-18 PROCEDURE — 83036 HEMOGLOBIN GLYCOSYLATED A1C: CPT

## 2024-12-18 PROCEDURE — 82570 ASSAY OF URINE CREATININE: CPT

## 2024-12-18 PROCEDURE — 80061 LIPID PANEL: CPT

## 2024-12-18 PROCEDURE — 80053 COMPREHEN METABOLIC PANEL: CPT

## 2024-12-18 PROCEDURE — 84443 ASSAY THYROID STIM HORMONE: CPT

## 2024-12-18 PROCEDURE — 36415 COLL VENOUS BLD VENIPUNCTURE: CPT

## 2024-12-19 DIAGNOSIS — E29.1 HYPOGONADISM MALE: ICD-10-CM

## 2024-12-19 RX ORDER — TESTOSTERONE CYPIONATE 200 MG/ML
INJECTION, SOLUTION INTRAMUSCULAR
Qty: 2 ML | Refills: 0 | Status: SHIPPED | OUTPATIENT
Start: 2024-12-19

## 2024-12-20 ENCOUNTER — CLINICAL SUPPORT (OUTPATIENT)
Dept: FAMILY MEDICINE CLINIC | Facility: CLINIC | Age: 74
End: 2024-12-20
Payer: MEDICARE

## 2024-12-20 DIAGNOSIS — E29.1 HYPOGONADISM IN MALE: Primary | ICD-10-CM

## 2024-12-20 PROCEDURE — 96372 THER/PROPH/DIAG INJ SC/IM: CPT

## 2024-12-20 RX ADMIN — TESTOSTERONE CYPIONATE 100 MG: 200 INJECTION, SOLUTION INTRAMUSCULAR at 13:05

## 2025-01-03 ENCOUNTER — CLINICAL SUPPORT (OUTPATIENT)
Dept: FAMILY MEDICINE CLINIC | Facility: CLINIC | Age: 75
End: 2025-01-03
Payer: MEDICARE

## 2025-01-03 DIAGNOSIS — E29.1 HYPOGONADISM IN MALE: Primary | ICD-10-CM

## 2025-01-03 PROCEDURE — 96372 THER/PROPH/DIAG INJ SC/IM: CPT

## 2025-01-03 RX ADMIN — TESTOSTERONE CYPIONATE 100 MG: 200 INJECTION, SOLUTION INTRAMUSCULAR at 14:34

## 2025-01-31 DIAGNOSIS — E29.1 HYPOGONADISM MALE: ICD-10-CM

## 2025-02-04 ENCOUNTER — TELEPHONE (OUTPATIENT)
Dept: FAMILY MEDICINE CLINIC | Facility: CLINIC | Age: 75
End: 2025-02-04
Payer: MEDICARE

## 2025-02-05 RX ORDER — TESTOSTERONE CYPIONATE 200 MG/ML
INJECTION, SOLUTION INTRAMUSCULAR
Qty: 2 ML | Refills: 0 | Status: SHIPPED | OUTPATIENT
Start: 2025-02-05

## 2025-02-07 ENCOUNTER — CLINICAL SUPPORT (OUTPATIENT)
Dept: FAMILY MEDICINE CLINIC | Facility: CLINIC | Age: 75
End: 2025-02-07
Payer: MEDICARE

## 2025-02-07 DIAGNOSIS — E29.1 HYPOGONADISM IN MALE: Primary | ICD-10-CM

## 2025-02-07 PROCEDURE — 96372 THER/PROPH/DIAG INJ SC/IM: CPT

## 2025-02-07 RX ORDER — MEMANTINE HYDROCHLORIDE 10 MG/1
TABLET ORAL
Qty: 180 TABLET | Refills: 1 | Status: SHIPPED | OUTPATIENT
Start: 2025-02-07

## 2025-02-07 RX ADMIN — TESTOSTERONE CYPIONATE 100 MG: 200 INJECTION, SOLUTION INTRAMUSCULAR at 12:27

## 2025-02-12 ENCOUNTER — HOSPITAL ENCOUNTER (OUTPATIENT)
Dept: MRI IMAGING | Facility: HOSPITAL | Age: 75
Discharge: HOME | End: 2025-02-12
Payer: MEDICARE

## 2025-02-12 DIAGNOSIS — I65.21 ATHEROSCLEROSIS OF RIGHT CAROTID ARTERY: ICD-10-CM

## 2025-02-12 DIAGNOSIS — I10 ESSENTIAL HYPERTENSION: Primary | ICD-10-CM

## 2025-02-12 PROCEDURE — 70547 MR ANGIOGRAPHY NECK W/O DYE: CPT

## 2025-02-12 RX ORDER — SPIRONOLACTONE AND HYDROCHLOROTHIAZIDE 25; 25 MG/1; MG/1
1 TABLET ORAL DAILY
Qty: 90 TABLET | Refills: 0 | Status: SHIPPED | OUTPATIENT
Start: 2025-02-12

## 2025-02-12 RX ORDER — AMLODIPINE AND VALSARTAN 10; 320 MG/1; MG/1
1 TABLET ORAL DAILY
Qty: 90 TABLET | Refills: 3 | Status: SHIPPED | OUTPATIENT
Start: 2025-02-12

## 2025-02-12 NOTE — TELEPHONE ENCOUNTER
Caller: [email or web address]    Relationship: Emergency Contact    Best call back number: [phone]     Requested Prescriptions:   Requested Prescriptions     Pending Prescriptions Disp Refills    spironolactone-hydrochlorothiazide (ALDACTAZIDE) 25-25 MG tablet 90 tablet 0     Sig: Take 1 tablet by mouth Daily.        Pharmacy where request should be sent: [address] [phone]  - [phone] [facility]     Last office visit with prescribing clinician: 11/7/2024   Last telemedicine visit with prescribing clinician: Visit date not found   Next office visit with prescribing clinician: 5/7/2025     Additional details provided by patient:     Does the patient have less than a 3 day supply:  [] Yes  [] No    Would you like a call back once the refill request has been completed: [x] Yes [] No    If the office needs to give you a call back, can they leave a voicemail: [x] Yes [] No    Malgorzata Hood Rep   02/12/25 08:50 EST

## 2025-02-14 RX ORDER — NEBIVOLOL 10 MG/1
10 TABLET ORAL DAILY
Qty: 90 TABLET | Refills: 3 | Status: SHIPPED | OUTPATIENT
Start: 2025-02-14

## 2025-02-14 NOTE — TELEPHONE ENCOUNTER
Patient's wife called in stating they have changed to [facility] in [place] and need a new script for Nebivilol sent in.

## 2025-02-21 ENCOUNTER — CLINICAL SUPPORT (OUTPATIENT)
Dept: FAMILY MEDICINE CLINIC | Facility: CLINIC | Age: 75
End: 2025-02-21
Payer: MEDICARE

## 2025-02-21 ENCOUNTER — OFFICE VISIT (OUTPATIENT)
Dept: CARDIOLOGY | Facility: CLINIC | Age: 75
End: 2025-02-21
Payer: MEDICARE

## 2025-02-21 VITALS
HEART RATE: 61 BPM | DIASTOLIC BLOOD PRESSURE: 76 MMHG | WEIGHT: 286.6 LBS | BODY MASS INDEX: 38.82 KG/M2 | SYSTOLIC BLOOD PRESSURE: 147 MMHG | HEIGHT: 72 IN | OXYGEN SATURATION: 96 %

## 2025-02-21 DIAGNOSIS — I48.0 PAF (PAROXYSMAL ATRIAL FIBRILLATION): Primary | ICD-10-CM

## 2025-02-21 DIAGNOSIS — E29.1 HYPOGONADISM IN MALE: Primary | ICD-10-CM

## 2025-02-21 DIAGNOSIS — I10 ESSENTIAL HYPERTENSION: Chronic | ICD-10-CM

## 2025-02-21 PROCEDURE — 3078F DIAST BP <80 MM HG: CPT | Performed by: NURSE PRACTITIONER

## 2025-02-21 PROCEDURE — 1160F RVW MEDS BY RX/DR IN RCRD: CPT | Performed by: NURSE PRACTITIONER

## 2025-02-21 PROCEDURE — 1159F MED LIST DOCD IN RCRD: CPT | Performed by: NURSE PRACTITIONER

## 2025-02-21 PROCEDURE — 96372 THER/PROPH/DIAG INJ SC/IM: CPT

## 2025-02-21 PROCEDURE — 3077F SYST BP >= 140 MM HG: CPT | Performed by: NURSE PRACTITIONER

## 2025-02-21 PROCEDURE — 93000 ELECTROCARDIOGRAM COMPLETE: CPT | Performed by: NURSE PRACTITIONER

## 2025-02-21 PROCEDURE — 99214 OFFICE O/P EST MOD 30 MIN: CPT | Performed by: NURSE PRACTITIONER

## 2025-02-21 RX ORDER — METOCLOPRAMIDE 5 MG/1
TABLET ORAL
COMMUNITY
Start: 2025-02-11

## 2025-02-21 RX ADMIN — TESTOSTERONE CYPIONATE 100 MG: 200 INJECTION, SOLUTION INTRAMUSCULAR at 12:37

## 2025-02-21 NOTE — PROGRESS NOTES
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    History of Present Illness  The patient is a 74-year-old male who presents for a regular follow-up visit for atrial fibrillation and high blood pressure.    He reports no new or exacerbated cardiac symptoms since his last consultation. He continues to experience fatigue, the cause of which remains uncertain. He expresses interest in understanding the potential need for ablation therapy for his atrial fibrillation. He is currently on a regimen of digoxin 125 mcg daily for atrial fibrillation control and nebivolol 10 mg daily for blood pressure and heart rate control.    He is tolerating all his current medications at their prescribed dosages without any adverse effects. His medication includes amlodipine-valsartan combination tablet  mg daily for blood pressure control, spironolactone hydrochlorothiazide as a diuretic and blood pressure medication, and rosuvastatin 40 mg nightly for cholesterol management.    MEDICATIONS  Amlodipine-valsartan, Eliquis, digoxin, nebivolol, spironolactone hydrochlorothiazide, rosuvastatin.      PMH  Past Medical History:   Diagnosis Date    Abnormal ECG 4/20/2023    Clogged artery    Allergic 1988    Atrial fibrillation     Paz esophagus     Clotting disorder 2/5/2024    Colon polyp     Congenital heart disease 4/5/2024    Coronary artery disease Feb 5 2024    AFib    Depression 1995    Diabetes     Diverticulitis of colon     Diverticulosis 2/6/2024    Dizziness Feb5. 2024    GERD (gastroesophageal reflux disease) 2000    Hernia, inguinal     History of total hip arthroplasty, right 02/01/2023    HL (hearing loss) 1997    Hyperlipidemia     Hypertension     Hypervitaminosis D      Neuropathy in diabetes 1990     Plantar fasciitis 2019    PONV (postoperative nausea and vomiting)     Sleep apnea     Type 2 diabetes mellitus          ALLERGY  Allergies   Allergen Reactions    Codeine Nausea Only    Semaglutide(0.25 Or 0.5mg-Dos) GI Intolerance          SURGICALHX  Past Surgical History:   Procedure Laterality Date    COLONOSCOPY      COLONOSCOPY N/A 04/25/2022    Procedure: COLONOSCOPY WITH POLYPECTOMY;  Surgeon: Artie Dickerson MD;  Location: [facility] END[facility];  Service: Gastroenterology;  Laterality: N/A;  COLON POLYP    ENDOSCOPY N/A 04/25/2022    Procedure: ESOPHAGOGASTRODUODENOSCOPY WITH BX;  Surgeon: Artie Dickerson MD;  Location: [facility] ENDOSCOPY;  Service: Gastroenterology;  Laterality: N/A;  CAPPS'S ESOPHAGUS, HIATAL HERNIA    ENDOSCOPY N/A 08/09/2024    Procedure: ESOPHAGOGASTRODUODENOSCOPY WITH BIOPSIES;  Surgeon: Artie Dickerson MD;  Location: [facility] ENDOSCOPY;  Service: Gastroenterology;  Laterality: N/A;  HIATAL HERNIA, BARRETTS    HERNIA REPAIR      JOINT REPLACEMENT  2/28/2023    KNEE SURGERY Bilateral     TOTAL HIP ARTHROPLASTY Right     TRIGGER FINGER RELEASE Bilateral     x2 THUMBS    UPPER GASTROINTESTINAL ENDOSCOPY  2022          SOC  Social History     Socioeconomic History    Marital status:    Tobacco Use    Smoking status: Never     Passive exposure: Yes    Smokeless tobacco: Never   Vaping Use    Vaping status: Never Used   Substance and Sexual Activity    Alcohol use: Never    Drug use: Never    Sexual activity: Not Currently     Partners: Female     Birth control/protection: Condom         FAMHX  Family History   Problem Relation Age of Onset    Diabetes Mother     Hypertension Mother     Heart attack Mother     COPD Brother     Diabetes Maternal Grandmother     Heart attack Sister         Quad bypass    Heart attack Brother         AFib    Colon cancer Neg Hx           MEDSIGONLY  Current Outpatient Medications on File Prior to Visit   Medication Sig     amLODIPine-valsartan (EXFORGE)  MG per tablet Take 1 tablet by mouth Daily.    apixaban (ELIQUIS) 5 MG tablet tablet Take 1 tablet by mouth 2 (Two) Times a Day. Indications: Atrial Fibrillation    Ascorbic Acid (Vitamin C) 100 MG chewable tablet Chew 1 tablet Daily.    cetirizine (zyrTEC) 10 MG tablet Take 1 tablet by mouth Daily As Needed.    co-enzyme Q-10 30 MG capsule Take 1 capsule by mouth Daily.    Continuous Blood Gluc Sensor (Dexcom G6 Sensor) Every 10 (Ten) Days.    CONTOUR NEXT TEST test strip Test 6 times daily DX: e11.65    digoxin (LANOXIN) 125 MCG tablet Take 1 tablet by mouth Daily.    donepezil (ARICEPT) 10 MG tablet Take 1 tablet by mouth Every Night.    insulin patient supplied pump Inject  under the skin into the appropriate area as directed Continuous. Type of Insulin: Humulin R 500  Type of Pump:Amplidata/Hyperactive Media [phone]  Bolus amount: 50 units daily.   Basal amount : Max basal 5.50u/hr  Correction factor:   Insulin to carbohydrate ratio: 12a-12p 10                                                 12p-2p 10                                                  2p -12 a 7    Date of last site change: 2/3/24  Location of catheter: right side on stomach    Frequency of refill: approximately 3 days  Last refill date: per pt 2/3/24    Target 12a-3a 130-150               3a-6a -100-120               6a-9p 100-120               9p-12a 130-150    Active Ins time : 5 hours    Basal Pattern Setup 27.3    Prescriber Shara Campbell  Phone number [phone]    insulin regular (HUMULIN R) 500 UNIT/ML CONCENTRATED injection 50u daily with insulin pump (Patient taking differently: Inject 10 unit marking on U-100 syringe under the skin into the appropriate area as directed Daily. 60u daily with insulin pump)    L-Lysine 500 MG tablet tablet Take 1 tablet by mouth Daily.    Lancets misc Check 5 times daily DX: e11.65    memantine (NAMENDA) 10 MG tablet 1 tablet BID    metFORMIN (GLUCOPHAGE) 500 MG tablet  "Take 1 tablet by mouth 2 (Two) Times a Day With Meals.    metoclopramide (REGLAN) 5 MG tablet     multivitamin with minerals tablet tablet Take 1 tablet by mouth Daily.    nebivolol (BYSTOLIC) 10 MG tablet Take 1 tablet by mouth Daily.    Omega-3 Fatty Acids (fish oil) 1000 MG capsule capsule Take 1 capsule by mouth 2 (Two) Times a Day With Meals.    ondansetron ODT (Zofran ODT) 4 MG disintegrating tablet Place 1 tablet on the tongue Every 8 (Eight) Hours As Needed for Nausea or Vomiting.    pantoprazole (PROTONIX) 40 MG EC tablet Take 1 tablet by mouth Daily.    rosuvastatin (CRESTOR) 40 MG tablet Take 1 tablet by mouth Every Night.    spironolactone-hydrochlorothiazide (ALDACTAZIDE) 25-25 MG tablet Take 1 tablet by mouth Daily.    Testosterone Cypionate (DEPOTESTOTERONE CYPIONATE) 200 MG/ML injection INJECT 0.5ML INTO THE APPROPRIATE MUSCLE AS DIRECTED EVERY 14 DAYS    venlafaxine XR (EFFEXOR-XR) 150 MG 24 hr capsule Take 1 capsule by mouth Daily.    [DISCONTINUED] nebivolol (Bystolic) 10 MG tablet Take 1 tablet by mouth Daily.     Current Facility-Administered Medications on File Prior to Visit   Medication    Testosterone Cypionate (DEPOTESTOTERONE CYPIONATE) injection 100 mg         Objective   /76 (BP Location: Right arm, Patient Position: Sitting, Cuff Size: Adult)   Pulse 61   Ht 182.9 cm (72\")   Wt 130 kg (286 lb 9.6 oz)   SpO2 96%   BMI 38.87 kg/m²       Physical Exam  Constitutional:       Appearance: He is obese.   HENT:      Head: Normocephalic.   Neck:      Vascular: No carotid bruit.   Cardiovascular:      Rate and Rhythm: Regular rhythm. Bradycardia present.      Pulses: Normal pulses.      Heart sounds: Normal heart sounds. No murmur heard.  Pulmonary:      Effort: Pulmonary effort is normal.      Breath sounds: Normal breath sounds.   Musculoskeletal:      Cervical back: Neck supple.      Right lower leg: No edema.      Left lower leg: No edema.   Skin:     General: Skin is dry. " "  Neurological:      Mental Status: He is alert and oriented to person, place, and time.   Psychiatric:         Behavior: Behavior normal.       ECG 12 Lead    Date/Time: 2/21/2025 1:54 PM  Performed by: Alexa Connolly APRN    Authorized by: Alexa Connolly APRN  Comparison: compared with previous ECG from 2/13/2024  Similar to previous ECG  Rhythm: sinus rhythm  Rate: bradycardic  BPM: 57  Conduction: right bundle branch block  ST Segments: ST segments normal  QRS axis: normal  Other findings: T wave abnormality    Clinical impression: abnormal EKG      Result Review :   The following data was reviewed by: JOHNNA Sanchez on 02/21/2025:  proBNP   Date Value Ref Range Status   02/04/2024 894.2 0.0 - 900.0 pg/mL Final     CMP          9/9/2024    14:35 12/18/2024    09:30   CMP   Glucose 251  110    BUN 14  10    Creatinine 1.08  0.99    EGFR 73.4  81.4    Sodium 137  143    Potassium 4.7  3.8    Chloride 101  108    Calcium 9.6  9.2    Total Protein  6.8    Albumin  4.1    Globulin  2.7    Total Bilirubin  0.4    Alkaline Phosphatase  90    AST (SGOT)  19    ALT (SGPT)  15    Albumin/Globulin Ratio  1.5    BUN/Creatinine Ratio 13.0  10.1    Anion Gap 10.0  9.9      CBC w/diff          9/4/2024    10:40   CBC w/Diff   WBC 8.90    RBC 4.99    Hemoglobin 15.4    Hematocrit 45.2    MCV 90.6    MCH 30.9    MCHC 34.1    RDW 13.8    Platelets 244    Neutrophil Rel % 72.4    Immature Granulocyte Rel % 0.6    Lymphocyte Rel % 19.6    Monocyte Rel % 6.2    Eosinophil Rel % 0.8    Basophil Rel % 0.4       Lab Results   Component Value Date    TSH 0.813 12/18/2024      Lab Results   Component Value Date    FREET4 0.87 (L) 11/03/2023      No results found for: \"DDIMERQUANT\"  Magnesium   Date Value Ref Range Status   04/19/2024 2.1 1.6 - 2.4 mg/dL Final      Digoxin   Date Value Ref Range Status   09/09/2024 0.70 0.60 - 1.20 ng/mL Final      Lab Results   Component Value Date    TROPONINT 33 (H) 02/06/2024    "        Lipid Panel          12/18/2024    09:30   Lipid Panel   Total Cholesterol 99    Triglycerides 158    HDL Cholesterol 26    VLDL Cholesterol 27    LDL Cholesterol  46    LDL/HDL Ratio 1.59        Results for orders placed during the hospital encounter of 02/04/24    Adult Transthoracic Echo Complete W/ Cont if Necessary Per Protocol (With Agitated Saline)    Interpretation Summary    Technically difficult study due to poor acoustic windows.    Left ventricular systolic function is normal. Calculated left ventricular EF = 62%    Left ventricular diastolic function was indeterminate.    Cardiac valves morphology was not well-visualized on the study.      [id number] Score: 6        Assessment and Plan    Diagnoses and all orders for this visit:    1. PAF (paroxysmal atrial fibrillation) (Primary)    2. Essential hypertension      Assessment & Plan  1. Atrial Fibrillation.  The patient's condition appears to be stable at present. The Holter monitor did not reveal any atrial fibrillation, although there were instances of bradycardia. His digoxin level, as per the blood work conducted on 09/09/2024, was within the normal range, thus ruling out the possibility of digoxin toxicity. Today's EKG indicates a normal sinus rhythm with a heart rate of 57, which is consistent with the previous EKG from February 2024. Given the non-persistent nature of his atrial fibrillation, he is not deemed a suitable candidate for ablation therapy at this juncture. He will continue his current medication regimen, including digoxin 125 mcg daily and Eliquis for anticoagulation. If his atrial fibrillation becomes more persistent or if he develops symptoms indicative of congestive heart failure, such as fluid retention, then ablation therapy may be considered.    2. Hypertension.  His blood pressure is well-controlled with the current medication regimen. He will continue taking amlodipine-valsartan combination tablet  mg daily,  nebivolol 10 mg daily, and spironolactone hydrochlorothiazide as prescribed.    3. Hyperlipidemia.  His cholesterol levels are within the normal range. He will continue taking rosuvastatin 40 mg nightly for cholesterol management.    4. Fatigue.  The fatigue may be a side effect of his medications or due to age. There is no indication from a cardiac standpoint that is causing the fatigue. If the fatigue worsens or impacts his daily activities, further evaluation may be necessary.    Follow-up  The patient is scheduled for a follow-up visit in 6 months, or earlier if there is a deterioration in his atrial fibrillation.      Follow Up   Return in about 6 months (around 8/21/2025) for Follow up with Dr Manuel.    Patient was given instructions and counseling regarding his condition or for health maintenance advice. Please see specific information pulled into the AVS if appropriate.     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 He has been exposed to tobacco smoke. He has never used smokeless tobacco.           Patient or patient representative verbalized consent for the use of Ambient Listening during the visit with  JOHNNA Sanchez for chart documentation. 2/21/2025  16:06 EST    JOHNNA Sanchez  02/21/25  14:30 EST    Dictated Utilizing Dragon Dictation

## 2025-02-25 ENCOUNTER — OFFICE VISIT (OUTPATIENT)
Dept: ORTHOPEDIC SURGERY | Facility: CLINIC | Age: 75
End: 2025-02-25
Payer: MEDICARE

## 2025-02-25 ENCOUNTER — TELEPHONE (OUTPATIENT)
Dept: FAMILY MEDICINE CLINIC | Facility: CLINIC | Age: 75
End: 2025-02-25
Payer: MEDICARE

## 2025-02-25 VITALS
OXYGEN SATURATION: 93 % | HEIGHT: 72 IN | SYSTOLIC BLOOD PRESSURE: 132 MMHG | DIASTOLIC BLOOD PRESSURE: 88 MMHG | HEART RATE: 60 BPM | BODY MASS INDEX: 39.71 KG/M2 | WEIGHT: 293.2 LBS

## 2025-02-25 DIAGNOSIS — R91.1 LUNG NODULE: ICD-10-CM

## 2025-02-25 DIAGNOSIS — M65.341 TRIGGER RING FINGER OF RIGHT HAND: ICD-10-CM

## 2025-02-25 DIAGNOSIS — R91.1 NODULE OF UPPER LOBE OF RIGHT LUNG: Primary | ICD-10-CM

## 2025-02-25 DIAGNOSIS — M79.641 RIGHT HAND PAIN: Primary | ICD-10-CM

## 2025-02-25 RX ORDER — LIDOCAINE HYDROCHLORIDE 10 MG/ML
1 INJECTION, SOLUTION INFILTRATION; PERINEURAL
Status: COMPLETED | OUTPATIENT
Start: 2025-02-25 | End: 2025-02-25

## 2025-02-25 RX ORDER — TRIAMCINOLONE ACETONIDE 40 MG/ML
40 INJECTION, SUSPENSION INTRA-ARTICULAR; INTRAMUSCULAR
Status: COMPLETED | OUTPATIENT
Start: 2025-02-25 | End: 2025-02-25

## 2025-02-25 RX ADMIN — TRIAMCINOLONE ACETONIDE 40 MG: 40 INJECTION, SUSPENSION INTRA-ARTICULAR; INTRAMUSCULAR at 10:15

## 2025-02-25 RX ADMIN — LIDOCAINE HYDROCHLORIDE 1 ML: 10 INJECTION, SOLUTION INFILTRATION; PERINEURAL at 10:15

## 2025-02-25 NOTE — TELEPHONE ENCOUNTER
Caller: Pastor Bartholomew    Relationship: Self    Best call back number: [phone]     What was the call regarding: PATIENTS WIFE STATES THEY RECEIVED A LETTER STATING THE PATIENT SHOULD REACH OUT TO HIS PROVIDER ABOUT THE LUNG FINDINGS AND ANY FOLLOW UP TESTING THAT MAY BE NEEDED. PATIENTS WIFE STATES THEY WEREN'T AWARE OF ANY LUNG FINDINGS OR LUNG TESTING THAT WAS DONE FOR THE PATIENT SO THEY AREN'T SURE WHAT THIS IS ABOUT. PATIENTS WIFE STATES THE NUMBER TO CALL ON THE LETTER FOR MORE INFORMATION WAS: [phone]

## 2025-02-25 NOTE — PROGRESS NOTES
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  He has pain in the right hand 4 th digit has been triggering for 2-3 months.  He has difficulty with  and [facility] tasks.  He has had trigger finger release of both thumbs in 1990's.      Allergies   Allergen Reactions    Codeine Nausea Only    Semaglutide(0.25 Or 0.5mg-Dos) GI Intolerance        Social History     Socioeconomic History    Marital status:    Tobacco Use    Smoking status: Never     Passive exposure: Yes    Smokeless tobacco: Never   Vaping Use    Vaping status: Never Used   Substance and Sexual Activity    Alcohol use: Never    Drug use: Never    Sexual activity: Not Currently     Partners: Female     Birth control/protection: Condom        I reviewed the patient's chief complaint, history of present illness, review of systems, past medical history, surgical history, family history, social history, medications, and allergy list.     Review of Systems     Constitutional: Denies fevers, chills, weight loss  Cardiovascular: Denies chest pain, shortness of breath  Skin: Denies rashes, acute skin changes  Neurologic: Denies headache, loss of consciousness        Vital Signs:   /88   Pulse 60   Ht 182.9 cm (72\")   Wt 133 kg (293 lb 3.2 oz)   SpO2 93%   BMI 39.77 kg/m²          Physical Exam  General: Alert. No acute distress    Ortho Exam        RIGHT HAND Negative Compression testing/ Negative Tinels. NegativeFinkelsteins. Negative Freitas's testing. Negative [facility]MC grind testing. Negative Phalens. Full ROM of the hand, fingers, elbow and wrist. Positive Triggering of the digit. Sensation grossly intact to light touch, median, radial and ulnar nerve. Positive AIN, PIN and ulnar nerve motor function intact. Axillary nerve intact. Positive pulses.  Tender over the A1 Pulley      Right Fourth Trigger " FInger  Consent given by: patient  Site marked: site marked  Timeout: Immediately prior to procedure a time out was called to verify the correct patient, procedure, equipment, support staff and site/side marked as required   Procedure Details  Location: -   Location: Right fourth trigger finger.Preparation: Patient was prepped and draped in the usual sterile fashion  Needle gauge: 23g.  Medications administered: 1 mL lidocaine 1 %; 40 mg triamcinolone acetonide 40 MG/ML  Patient tolerance: patient tolerated the procedure well with no immediate complications    This injection documentation was Scribed for Jesus Tello MD by Tiffany Irby MA.  02/25/25   10:08 EST      Imaging Results (Most Recent)       Procedure Component Value Units Date/Time    XR Hand 2 View Right [[id number]] Resulted: 02/25/25 0946     Updated: 02/25/25 0947             Result Review :     X-Ray Report:  Right hand  X-Ray  Indication: Evaluation of the right hand  AP/Lateral view(s)  Findings: No acute fracture or dislocation.  Moderate degenerative changes.    Prior studies available for comparison: no                  Assessment and Plan     Diagnoses and all orders for this visit:    1. Right hand pain (Primary)  -     XR Hand 2 View Right    2. Trigger ring finger of right hand        Discussed the treatment plan with the patient. I reviewed the X-rays that were obtained today with the patient.     Discussed the treatment options with the patient, operative vs non-operative.The patient is a candidate for a right 4 th trigger finger release whenever he is ready.      Discussed the risks and benefits of conservative measures. The patient expressed understanding and wished to proceed with a right hand steroid injection of the 4 th digit.      Discussed with the patient that due to the steroid injection given today in the office they may see an increase in blood sugar for a few days. Advised patient to monitor sugar after receiving the  injection.     Discussed possibility of a reaction from the injection.  Discussed the possibility that the injection may not completely improve or remove the pain.  Discussed the risk of infection.        Call or return if worsening symptoms.    Follow Up     PRN      Patient was given instructions and counseling regarding his condition or for health maintenance advice. Please see specific information pulled into the AVS if appropriate.     Scribed for Jesus Tello MD by Cami Villafana MA.  02/25/25   09:49 EST    I have personally performed the services described in this document as scribed by the above individual and it is both accurate and complete. Jesus Tello MD 02/25/25

## 2025-02-25 NOTE — TELEPHONE ENCOUNTER
Pt received letter in mail about following up from previous imaging, last CT 3/5/24 showed 6mm nodule in right upper lobe. Recommended 6-12 month non contrast CT. Pended repeat CT if necessary.    details Extra occular movements intact/Anterior fontanel open and flat/PERRLA/Anicteric conjunctivae/Red reflex intact/Normal tympanic membranes/External ear normal/Nasal mucosa normal/Normal dentition/No oral lesions/Normal oropharynx negative

## 2025-02-26 ENCOUNTER — OFFICE VISIT (OUTPATIENT)
Dept: PODIATRY | Facility: CLINIC | Age: 75
End: 2025-02-26
Payer: MEDICARE

## 2025-02-26 VITALS
HEART RATE: 64 BPM | OXYGEN SATURATION: 95 % | DIASTOLIC BLOOD PRESSURE: 79 MMHG | TEMPERATURE: 96.4 F | BODY MASS INDEX: 39.82 KG/M2 | SYSTOLIC BLOOD PRESSURE: 163 MMHG | HEIGHT: 72 IN | WEIGHT: 294 LBS

## 2025-02-26 DIAGNOSIS — M79.671 PAIN IN BOTH FEET: Primary | ICD-10-CM

## 2025-02-26 DIAGNOSIS — E11.65 TYPE 2 DIABETES MELLITUS WITH HYPERGLYCEMIA, WITH LONG-TERM CURRENT USE OF INSULIN: ICD-10-CM

## 2025-02-26 DIAGNOSIS — M79.672 PAIN IN BOTH FEET: Primary | ICD-10-CM

## 2025-02-26 DIAGNOSIS — Z79.4 TYPE 2 DIABETES MELLITUS WITH HYPERGLYCEMIA, WITH LONG-TERM CURRENT USE OF INSULIN: ICD-10-CM

## 2025-02-26 DIAGNOSIS — B35.1 ONYCHOMYCOSIS: ICD-10-CM

## 2025-02-26 NOTE — PROGRESS NOTES
[facility] - PODIATRY    Today's Date: 02/26/25    Patient Name: Pastor Bartholomew  MRN: [id number]  CSN: [id number]  PCP: Esthela Arceo APRN,   Referring Provider: No ref. provider found    SUBJECTIVE     Chief Complaint   Patient presents with    Left Foot - Follow-up, Nail Problem, Diabetes    Right Foot - Follow-up, Nail Problem, Diabetes     HPI: Pastor Bartholomew, a 74 y.o.male, presents to clinic for a diabetic foot evaluation.    Patient is a 73-year-old diabetic male presenting with bilateral foot issues.  Patient states his A1c is usually around 7, but his sugars have been elevated due to pump issues.  Patient was recently admitted for diverticulitis and A-fib.  He is overall doing well.    Patient denies any fevers, chills, nausea, vomiting, shortness of breath, nor any other constitutional signs nor symptoms.    No other pedal complaints at this time.    Past Medical History:   Diagnosis Date    Abnormal ECG 4/20/2023    Clogged artery    Allergic 1988    Atrial fibrillation     Paz esophagus     Clotting disorder 2/5/2024    Colon polyp     Congenital heart disease 4/5/2024    Coronary artery disease Feb 5 2024    AFib    Depression 1995    Diabetes     Diverticulitis of colon     Diverticulosis 2/6/2024    Dizziness Feb5. 2024    GERD (gastroesophageal reflux disease) 2000    Hernia, inguinal     History of total hip arthroplasty, right 02/01/2023    HL (hearing loss) 1997    Hyperlipidemia     Hypertension     Hypervitaminosis D      Neuropathy in diabetes 1990    Plantar fasciitis 2019    PONV (postoperative nausea and vomiting)     Sleep apnea     Type 2 diabetes mellitus      Past Surgical History:   Procedure Laterality Date    COLONOSCOPY      COLONOSCOPY N/A 04/25/2022    Procedure: COLONOSCOPY WITH POLYPECTOMY;  Surgeon: Artie Dickerson MD;  Location: [facility] ENDOSCOPY;  Service: Gastroenterology;  Laterality: N/A;  COLON POLYP    ENDOSCOPY N/A 04/25/2022     Procedure: ESOPHAGOGASTRODUODENOSCOPY WITH BX;  Surgeon: Artie Dickerson MD;  Location: [facility] ENDOSCOPY;  Service: Gastroenterology;  Laterality: N/A;  CAPPS'S ESOPHAGUS, HIATAL HERNIA    ENDOSCOPY N/A 08/09/2024    Procedure: ESOPHAGOGASTRODUODENOSCOPY WITH BIOPSIES;  Surgeon: Artie Dickerson MD;  Location: [facility] ENDOSCOPY;  Service: Gastroenterology;  Laterality: N/A;  HIATAL HERNIA, BARRETTS    HERNIA REPAIR      JOINT REPLACEMENT  2/28/2023    KNEE SURGERY Bilateral     TOTAL HIP ARTHROPLASTY Right     TRIGGER FINGER RELEASE Bilateral     x2 THUMBS    UPPER GASTROINTESTINAL ENDOSCOPY  2022     Family History   Problem Relation Age of Onset    Diabetes Mother     Hypertension Mother     Heart attack Mother     COPD Brother     Diabetes Maternal Grandmother     Heart attack Sister         Quad bypass    Heart attack Brother         AFib    Colon cancer Neg Hx      Social History     Socioeconomic History    Marital status:    Tobacco Use    Smoking status: Never     Passive exposure: Yes    Smokeless tobacco: Never   Vaping Use    Vaping status: Never Used   Substance and Sexual Activity    Alcohol use: Never    Drug use: Never    Sexual activity: Not Currently     Partners: Female     Birth control/protection: Condom     Allergies   Allergen Reactions    Codeine Nausea Only    Semaglutide(0.25 Or 0.5mg-Dos) GI Intolerance     Current Outpatient Medications   Medication Sig Dispense Refill    amLODIPine-valsartan (EXFORGE)  MG per tablet Take 1 tablet by mouth Daily. 90 tablet 3    apixaban (ELIQUIS) 5 MG tablet tablet Take 1 tablet by mouth 2 (Two) Times a Day. Indications: Atrial Fibrillation 30 tablet 0    apixaban (ELIQUIS) 5 MG tablet tablet Take 1 tablet by mouth 2 (Two) Times a Day. 56 tablet 0    Ascorbic Acid (Vitamin C) 100 MG chewable tablet Chew 1 tablet Daily.      cetirizine (zyrTEC) 10 MG tablet Take 1 tablet by mouth Daily As Needed.      co-enzyme Q-10 30 MG capsule  Take 1 capsule by mouth Daily.      Continuous Blood Gluc Sensor (Dexcom G6 Sensor) Every 10 (Ten) Days.      CONTOUR NEXT TEST test strip Test 6 times daily DX: e11.65 200 each 5    digoxin (LANOXIN) 125 MCG tablet Take 1 tablet by mouth Daily. 90 tablet 1    donepezil (ARICEPT) 10 MG tablet Take 1 tablet by mouth Every Night. 90 tablet 1    insulin patient supplied pump Inject  under the skin into the appropriate area as directed Continuous. Type of Insulin: Humulin R 500  Type of Pump:LaunchPoint/RxCost Containment [phone]  Bolus amount: 50 units daily.   Basal amount : Max basal 5.50u/hr  Correction factor:   Insulin to carbohydrate ratio: 12a-12p 10                                                 12p-2p 10                                                  2p -12 a 7    Date of last site change: 2/3/24  Location of catheter: right side on stomach    Frequency of refill: approximately 3 days  Last refill date: per pt 2/3/24    Target 12a-3a 130-150               3a-6a -100-120               6a-9p 100-120               9p-12a 130-150    Active Ins time : 5 hours    Basal Pattern Setup 27.3    Prescriber Shara Campbell  Phone number [phone]      insulin regular (HUMULIN R) 500 UNIT/ML CONCENTRATED injection 50u daily with insulin pump (Patient taking differently: Inject 10 unit marking on U-100 syringe under the skin into the appropriate area as directed Daily. 60u daily with insulin pump) 40 mL 3    L-Lysine 500 MG tablet tablet Take 1 tablet by mouth Daily.      Lancets misc Check 5 times daily DX: e11.65 500 each 1    memantine (NAMENDA) 10 MG tablet 1 tablet  tablet 1    metFORMIN (GLUCOPHAGE) 500 MG tablet Take 1 tablet by mouth 2 (Two) Times a Day With Meals.      metoclopramide (REGLAN) 5 MG tablet       multivitamin with minerals tablet tablet Take 1 tablet by mouth Daily.      nebivolol (BYSTOLIC) 10 MG tablet Take 1 tablet by mouth Daily. 90 tablet 3    Omega-3 Fatty Acids (fish oil) 1000 MG capsule  capsule Take 1 capsule by mouth 2 (Two) Times a Day With Meals.      ondansetron ODT (Zofran ODT) 4 MG disintegrating tablet Place 1 tablet on the tongue Every 8 (Eight) Hours As Needed for Nausea or Vomiting. 30 tablet 1    pantoprazole (PROTONIX) 40 MG EC tablet Take 1 tablet by mouth Daily. 90 tablet 1    rosuvastatin (CRESTOR) 40 MG tablet Take 1 tablet by mouth Every Night. 90 tablet 1    spironolactone-hydrochlorothiazide (ALDACTAZIDE) 25-25 MG tablet Take 1 tablet by mouth Daily. 90 tablet 0    Testosterone Cypionate (DEPOTESTOTERONE CYPIONATE) 200 MG/ML injection INJECT 0.5ML INTO THE APPROPRIATE MUSCLE AS DIRECTED EVERY 14 DAYS 2 mL 0    venlafaxine XR (EFFEXOR-XR) 150 MG 24 hr capsule Take 1 capsule by mouth Daily. 90 capsule 3     Current Facility-Administered Medications   Medication Dose Route Frequency Provider Last Rate Last Admin    Testosterone Cypionate (DEPOTESTOTERONE CYPIONATE) injection 100 mg  100 mg Intramuscular Q14 Days Esthela Arceo APRN   100 mg at 02/21/25 1237     Review of Systems   All other systems reviewed and are negative.      OBJECTIVE     Vitals:    02/26/25 0918   BP: 163/79   Pulse: 64   Temp: 96.4 °F (35.8 °C)   SpO2: 95%       Body mass index is 39.87 kg/m².    Lab Results   Component Value Date    HGBA1C 7.70 (H) 12/18/2024       Lab Results   Component Value Date    GLUCOSE 110 (H) 12/18/2024    CALCIUM 9.2 12/18/2024     12/18/2024    K 3.8 12/18/2024    CO2 25.1 12/18/2024     (H) 12/18/2024    BUN 10 12/18/2024    CREATININE 0.99 12/18/2024    EGFRIFAFRI >60 12/19/2022    EGFRIFNONA 80 12/17/2021    BCR 10.1 12/18/2024    ANIONGAP 9.9 12/18/2024       Patient seen in no apparent distress.      PHYSICAL EXAM:     Foot/Ankle Exam    GENERAL  Appearance:  appears stated age  Orientation:  AAOx3  Affect:  appropriate  Gait:  unimpaired  Assistance:  independent  Right shoe gear: casual shoe  Left shoe gear: casual shoe    VASCULAR     Right Foot Vascularity    Normal vascular exam    Dorsalis pedis:  2+  Posterior tibial:  2+  Skin temperature:  warm  Edema grading:  None  CFT:  < 3 seconds  Pedal hair growth:  Present  Varicosities:  none     Left Foot Vascularity   Normal vascular exam    Dorsalis pedis:  2+  Posterior tibial:  2+  Skin temperature:  warm  Edema grading:  None  CFT:  < 3 seconds  Pedal hair growth:  Present  Varicosities:  none     NEUROLOGIC     Right Foot Neurologic   Normal sensation    Light touch sensation: normal  Vibratory sensation: normal  Hot/Cold sensation: normal  Protective Sensation using [place] Monofilament:   Sites intact: 10  Sites tested: 10     Left Foot Neurologic   Normal sensation    Light touch sensation: normal  Vibratory sensation: normal  Hot/Cold sensation:  normal  Protective Sensation using [place] Monofilament:   Sites intact: 10  Sites tested: 10    MUSCLE STRENGTH     Right Foot Muscle Strength   Foot dorsiflexion:  4  Foot plantar flexion:  4  Foot inversion:  4  Foot eversion:  4     Left Foot Muscle Strength   Foot dorsiflexion:  4  Foot plantar flexion:  4  Foot inversion:  4  Foot eversion:  4    RANGE OF MOTION     Right Foot Range of Motion   Foot and ankle ROM within normal limits       Left Foot Range of Motion   Foot and ankle ROM within normal limits      DERMATOLOGIC      Right Foot Dermatologic   Skin  Right foot skin is intact.      Left Foot Dermatologic   Skin  Left foot skin is intact.             ASSESSMENT/PLAN     Diagnoses and all orders for this visit:    1. Pain in both feet (Primary)    2. Type 2 diabetes mellitus with hyperglycemia, with long-term current use of insulin    3. Onychomycosis          Comprehensive lower extremity examination and evaluation was performed.    Toenails 1, 2, 3, 4, 5 on Right and 1, 2, 3, 4, 5 on Left were debrided with nail nippers.  Patient tolerated procedure well without complications.    Discussed findings and treatment plan including risks,  benefits, and treatment options with patient in detail. Patient agreed with treatment plan.    Medications and allergies reviewed.  Reviewed available blood glucose and HgB A1C lab values along with other pertinent labs.  These were discussed with the patient as to their importance of diabetic maintenance.    Diabetic foot exam performed and documented this date, compliant with CQM required standards. Detail of findings as noted in physical exam.  Lower extremity Neurologic exam for diabetic patient performed and documented this date, compliant with PQRS required standards. Detail of findings as noted in physical exam.  Advised patient importance of good routine lower extremity hygiene. Advised patient importance of evaluating for intact skin and pain free nail borders.  Advised patient to use mirror to evaluate plantar/ soles of feet for better visualization. Advised patient monitor and phone office to be seen if any cracking to skin, open lesions, painful nail borders or if nails become elongated prior to next visit. Advised patient importance of daily cleansing of lower extremities, followed by good skin cream to maintain normal hydration of skin. Also advised patient importance of close daily monitoring of blood sugar. Advised to regulate diet and medications to maintain control of blood sugar in optimal range. Contact primary care provider if difficulties maintaining blood sugar levels.  Advised Patient of presence of Diabetes Mellitus condition.  Advised Patient risk of progression and worsening or improvement, then return of condition.  Will monitor condition for any change in future. Treat with most appropriate treatment pending status of condition.  Counseled and advised patient extensively on nature and ramifications of diabetes. Standard instructions given to patient for good diabetic foot care and maintenance. Advised importance of careful monitoring to avoid break down and complications secondary to  diabetes. Advised patient importance of strict maintenance of blood sugar control. Advised patient of possible ominous results from neglect of condition, i.e.: amputation/ loss of digits, feet and legs, or even death.  Patient states understands counseling, will monitor closely, continue good hygiene and routine diabetic foot care. Patient will contact office if questions or problems.      An After Visit Summary was printed and given to the patient at discharge, including (if requested) any available informative/educational handouts regarding diagnosis, treatment, or medications. All questions were answered to patient/family satisfaction. Should symptoms fail to improve or worsen they agree to call or return to clinic or to go to the Emergency Department. Discussed the importance of following up with any needed screening tests/labs/specialist appointments and any requested follow-up recommended by me today. Importance of maintaining follow-up discussed and patient accepts that missed appointments can delay diagnosis and potentially lead to worsening of conditions.    Return in about 3 months (around 5/26/2025)., or sooner if acute issues arise.    This document has been electronically signed by Phuc Mena DPM on February 26, 2025 10:19 EST

## 2025-03-03 ENCOUNTER — PATIENT ROUNDING (BHMG ONLY) (OUTPATIENT)
Dept: ORTHOPEDIC SURGERY | Facility: CLINIC | Age: 75
End: 2025-03-03
Payer: MEDICARE

## 2025-03-03 RX ORDER — SPIRONOLACTONE AND HYDROCHLOROTHIAZIDE 25; 25 MG/1; MG/1
1 TABLET ORAL DAILY
Qty: 90 TABLET | Refills: 0 | Status: SHIPPED | OUTPATIENT
Start: 2025-03-03

## 2025-03-03 NOTE — PROGRESS NOTES
A [employer] message has been sent to the patient for PATIENT ROUNDING with [facility].

## 2025-03-04 ENCOUNTER — TELEPHONE (OUTPATIENT)
Dept: CARDIOLOGY | Facility: CLINIC | Age: 75
End: 2025-03-04
Payer: MEDICARE

## 2025-03-04 NOTE — TELEPHONE ENCOUNTER
PT NEEDS TO REAPPLY FOR ELIQU[facility] PT ASST.  HE DROPPED OFF PW FROM [employer] STATING HE NEEDED TO REAPPLY.      CALLED PT, ADVISED HIM HE NEEDED TO FILL OUT ANOTHER LETICIA AND PROVIDE THE REQUIRED FINANCIAL DOCS.  HE AND SPOUSE PLAN TO COME TOMORROW.      LETICIA AND REQ DOCS ARE SCANNED IN CHART AND WERE FAXED TO [facility].

## 2025-03-05 DIAGNOSIS — E29.1 HYPOGONADISM MALE: ICD-10-CM

## 2025-03-05 RX ORDER — TESTOSTERONE CYPIONATE 200 MG/ML
INJECTION, SOLUTION INTRAMUSCULAR
Qty: 2 ML | Refills: 0 | Status: SHIPPED | OUTPATIENT
Start: 2025-03-05

## 2025-03-07 ENCOUNTER — TELEPHONE (OUTPATIENT)
Dept: CARDIOLOGY | Facility: CLINIC | Age: 75
End: 2025-03-07
Payer: MEDICARE

## 2025-03-07 NOTE — TELEPHONE ENCOUNTER
The [facility] received a fax that requires your attention. The document has been indexed to the patient’s chart for your review.      Reason for sending: EXTERNAL MEDICAL RECORD NOTIFICATION     Documents Description: DENIAL-[facility] PAF-3.7.25    Name of Sender: JENNIFER THEODORE     Date Indexed: 3.7.25

## 2025-03-07 NOTE — TELEPHONE ENCOUNTER
PT WAS DENIED FOR NOT MEETING 3% OUT OF POCKET PRESC EXP.      I WILL CALL [employer] ON MONDAY TO GET THE EXACT TOTAL HE NEEDS BEFORE HE'LL BE APPROVED.

## 2025-03-10 ENCOUNTER — TELEPHONE (OUTPATIENT)
Dept: CARDIOLOGY | Facility: CLINIC | Age: 75
End: 2025-03-10
Payer: MEDICARE

## 2025-03-10 NOTE — TELEPHONE ENCOUNTER
SPOKE TO YRIS AT [facility] PT [facility] TO GET AN UPDATE ON APPLICATION.    PT HAS $1080.13 REMAINING OF THE 3% OUT OF POCKET EXPENSES.  PER MARCO ANTONIO, WE CAN PROVIDE SAMPLES EVERY OTHER MONTH.  MARCO ANTONIO SUGGESTS PT LOOK INTO THE MP[employer] / MEDICARE PRESCIPTION PAYMENT PLAN.  WILL DISCUSS THIS WITH THE PT.

## 2025-03-13 ENCOUNTER — HOSPITAL ENCOUNTER (OUTPATIENT)
Dept: CT IMAGING | Facility: HOSPITAL | Age: 75
Discharge: HOME | End: 2025-03-13
Payer: MEDICARE

## 2025-03-13 DIAGNOSIS — R91.1 NODULE OF UPPER LOBE OF RIGHT LUNG: ICD-10-CM

## 2025-03-13 DIAGNOSIS — R91.1 LUNG NODULE: ICD-10-CM

## 2025-03-13 PROCEDURE — 71250 CT THORAX DX C-: CPT

## 2025-03-14 ENCOUNTER — TRANSCRIBE ORDERS (OUTPATIENT)
Dept: LAB | Facility: HOSPITAL | Age: 75
End: 2025-03-14
Payer: MEDICARE

## 2025-03-14 ENCOUNTER — CLINICAL SUPPORT (OUTPATIENT)
Dept: FAMILY MEDICINE CLINIC | Facility: CLINIC | Age: 75
End: 2025-03-14
Payer: MEDICARE

## 2025-03-14 ENCOUNTER — LAB (OUTPATIENT)
Dept: LAB | Facility: HOSPITAL | Age: 75
End: 2025-03-14
Payer: MEDICARE

## 2025-03-14 DIAGNOSIS — E11.9 DIABETES MELLITUS WITHOUT COMPLICATION: Primary | ICD-10-CM

## 2025-03-14 DIAGNOSIS — E11.9 DIABETES MELLITUS WITHOUT COMPLICATION: ICD-10-CM

## 2025-03-14 DIAGNOSIS — E29.1 HYPOGONADISM IN MALE: Primary | ICD-10-CM

## 2025-03-14 LAB
ALBUMIN SERPL-MCNC: 4.5 G/DL (ref 3.5–5.2)
ALBUMIN/GLOB SERPL: 1.9 G/DL
ALP SERPL-CCNC: 84 U/L (ref 39–117)
ALT SERPL W P-5'-P-CCNC: 26 U/L (ref 1–41)
ANION GAP SERPL CALCULATED.3IONS-SCNC: 12 MMOL/L (ref 5–15)
AST SERPL-CCNC: 21 U/L (ref 1–40)
BILIRUB SERPL-MCNC: 0.5 MG/DL (ref 0–1.2)
BUN SERPL-MCNC: 15 MG/DL (ref 8–23)
BUN/CREAT SERPL: 11.4 (ref 7–25)
CALCIUM SPEC-SCNC: 9.6 MG/DL (ref 8.6–10.5)
CHLORIDE SERPL-SCNC: 100 MMOL/L (ref 98–107)
CO2 SERPL-SCNC: 29 MMOL/L (ref 22–29)
CREAT SERPL-MCNC: 1.32 MG/DL (ref 0.76–1.27)
EGFRCR SERPLBLD CKD-EPI 2021: 57.3 ML/MIN/1.73
GLOBULIN UR ELPH-MCNC: 2.4 GM/DL
GLUCOSE SERPL-MCNC: 124 MG/DL (ref 65–99)
HBA1C MFR BLD: 7.3 % (ref 4.8–5.6)
POTASSIUM SERPL-SCNC: 4.3 MMOL/L (ref 3.5–5.2)
PROT SERPL-MCNC: 6.9 G/DL (ref 6–8.5)
SODIUM SERPL-SCNC: 141 MMOL/L (ref 136–145)

## 2025-03-14 PROCEDURE — 83036 HEMOGLOBIN GLYCOSYLATED A1C: CPT

## 2025-03-14 PROCEDURE — 96372 THER/PROPH/DIAG INJ SC/IM: CPT

## 2025-03-14 PROCEDURE — 36415 COLL VENOUS BLD VENIPUNCTURE: CPT

## 2025-03-14 PROCEDURE — 80053 COMPREHEN METABOLIC PANEL: CPT

## 2025-03-14 RX ADMIN — TESTOSTERONE CYPIONATE 100 MG: 200 INJECTION, SOLUTION INTRAMUSCULAR at 09:24

## 2025-03-17 DIAGNOSIS — R91.1 LUNG NODULE: Primary | ICD-10-CM

## 2025-03-19 ENCOUNTER — TELEPHONE (OUTPATIENT)
Dept: CARDIOLOGY | Facility: CLINIC | Age: 75
End: 2025-03-19
Payer: MEDICARE

## 2025-03-19 NOTE — TELEPHONE ENCOUNTER
SPOKE TO SPOUSE ABOUT [facility] AND [employer].  SHE SAID THEY CANNOT AFFORD TO ENROLL IN MP3.      WE WENT OVER HER OOPP EXPENSES.  SHE FEELS THEY HAVE PAID MORE UP-TO-DATE SO SHE IS GETTING A NEW PRINT OUT FOR BOTH OF THEM TO SUBMIT TO [facility].

## 2025-03-26 ENCOUNTER — TRANSCRIBE ORDERS (OUTPATIENT)
Dept: ADMINISTRATIVE | Facility: HOSPITAL | Age: 75
End: 2025-03-26
Payer: MEDICARE

## 2025-03-26 DIAGNOSIS — R79.89 SERUM CREATININE RAISED: Primary | ICD-10-CM

## 2025-03-28 ENCOUNTER — CLINICAL SUPPORT (OUTPATIENT)
Dept: FAMILY MEDICINE CLINIC | Facility: CLINIC | Age: 75
End: 2025-03-28
Payer: MEDICARE

## 2025-03-28 DIAGNOSIS — E29.1 HYPOGONADISM IN MALE: Primary | ICD-10-CM

## 2025-03-28 RX ADMIN — TESTOSTERONE CYPIONATE 100 MG: 200 INJECTION, SOLUTION INTRAMUSCULAR at 12:25

## 2025-04-16 ENCOUNTER — LAB (OUTPATIENT)
Dept: LAB | Facility: HOSPITAL | Age: 75
End: 2025-04-16
Payer: MEDICARE

## 2025-04-16 ENCOUNTER — HOSPITAL ENCOUNTER (OUTPATIENT)
Dept: ULTRASOUND IMAGING | Facility: HOSPITAL | Age: 75
Discharge: HOME | End: 2025-04-16
Payer: MEDICARE

## 2025-04-16 ENCOUNTER — TRANSCRIBE ORDERS (OUTPATIENT)
Dept: LAB | Facility: HOSPITAL | Age: 75
End: 2025-04-16
Payer: MEDICARE

## 2025-04-16 DIAGNOSIS — R79.89 HYPOURICEMIA: Primary | ICD-10-CM

## 2025-04-16 DIAGNOSIS — R79.89 HYPOURICEMIA: ICD-10-CM

## 2025-04-16 DIAGNOSIS — R79.89 SERUM CREATININE RAISED: ICD-10-CM

## 2025-04-16 LAB
ALBUMIN SERPL-MCNC: 3.9 G/DL (ref 3.5–5.2)
ALBUMIN/GLOB SERPL: 1.4 G/DL
ALP SERPL-CCNC: 83 U/L (ref 39–117)
ALT SERPL W P-5'-P-CCNC: 22 U/L (ref 1–41)
ANION GAP SERPL CALCULATED.3IONS-SCNC: 12.6 MMOL/L (ref 5–15)
AST SERPL-CCNC: 24 U/L (ref 1–40)
BILIRUB SERPL-MCNC: 0.6 MG/DL (ref 0–1.2)
BUN SERPL-MCNC: 10 MG/DL (ref 8–23)
BUN/CREAT SERPL: 8.9 (ref 7–25)
CALCIUM SPEC-SCNC: 9.2 MG/DL (ref 8.6–10.5)
CHLORIDE SERPL-SCNC: 101 MMOL/L (ref 98–107)
CO2 SERPL-SCNC: 24.4 MMOL/L (ref 22–29)
CREAT SERPL-MCNC: 1.12 MG/DL (ref 0.76–1.27)
EGFRCR SERPLBLD CKD-EPI 2021: 69.8 ML/MIN/1.73
GLOBULIN UR ELPH-MCNC: 2.7 GM/DL
GLUCOSE SERPL-MCNC: 251 MG/DL (ref 65–99)
POTASSIUM SERPL-SCNC: 4.2 MMOL/L (ref 3.5–5.2)
PROT SERPL-MCNC: 6.6 G/DL (ref 6–8.5)
SODIUM SERPL-SCNC: 138 MMOL/L (ref 136–145)

## 2025-04-16 PROCEDURE — 80053 COMPREHEN METABOLIC PANEL: CPT

## 2025-04-16 PROCEDURE — 76775 US EXAM ABDO BACK WALL LIM: CPT

## 2025-04-16 PROCEDURE — 36415 COLL VENOUS BLD VENIPUNCTURE: CPT

## 2025-04-24 RX ORDER — NEBIVOLOL 10 MG/1
10 TABLET ORAL DAILY
Qty: 90 TABLET | Refills: 3 | OUTPATIENT
Start: 2025-04-24

## 2025-04-28 ENCOUNTER — TELEPHONE (OUTPATIENT)
Dept: CARDIOLOGY | Facility: CLINIC | Age: 75
End: 2025-04-28
Payer: MEDICARE

## 2025-04-28 NOTE — TELEPHONE ENCOUNTER
SPOKE TO [facility] BARBOSA.  SHE STATED THE OOPP EXPS THAT WAS FAXED BACK IN MAR COULD NOT BE READ DUE TO BEING BLURRY.    REFAXED THE INFORMATION. THIS IS A COMMON ANSWER TO STATUS UPDATE ON PT ASST.     SPOKE TO PT'S WIFE.  SHE VOICED UNDERSTANDING.

## 2025-05-02 ENCOUNTER — TELEPHONE (OUTPATIENT)
Dept: CARDIOLOGY | Facility: CLINIC | Age: 75
End: 2025-05-02
Payer: MEDICARE

## 2025-05-02 NOTE — TELEPHONE ENCOUNTER
05/02 - Tried to call spouse's number but it says it is not a working number.  LVM for pt for a return call.    Need to discuss pt asst denial.  I will send a [employer] message as well.

## 2025-05-07 ENCOUNTER — OFFICE VISIT (OUTPATIENT)
Dept: FAMILY MEDICINE CLINIC | Facility: CLINIC | Age: 75
End: 2025-05-07
Payer: MEDICARE

## 2025-05-07 VITALS
DIASTOLIC BLOOD PRESSURE: 60 MMHG | HEART RATE: 59 BPM | BODY MASS INDEX: 39.82 KG/M2 | WEIGHT: 294 LBS | SYSTOLIC BLOOD PRESSURE: 142 MMHG | OXYGEN SATURATION: 99 % | HEIGHT: 72 IN | TEMPERATURE: 97.1 F

## 2025-05-07 DIAGNOSIS — E66.813 CLASS 3 SEVERE OBESITY DUE TO EXCESS CALORIES WITHOUT SERIOUS COMORBIDITY WITH BODY MASS INDEX (BMI) OF 40.0 TO 44.9 IN ADULT: Primary | ICD-10-CM

## 2025-05-07 DIAGNOSIS — I10 ESSENTIAL HYPERTENSION: Chronic | ICD-10-CM

## 2025-05-07 DIAGNOSIS — Z79.899 MEDICATION MANAGEMENT: ICD-10-CM

## 2025-05-07 DIAGNOSIS — E29.1 HYPOGONADISM MALE: ICD-10-CM

## 2025-05-07 DIAGNOSIS — E66.01 CLASS 3 SEVERE OBESITY DUE TO EXCESS CALORIES WITHOUT SERIOUS COMORBIDITY WITH BODY MASS INDEX (BMI) OF 40.0 TO 44.9 IN ADULT: Primary | ICD-10-CM

## 2025-05-07 PROCEDURE — 3051F HG A1C>EQUAL 7.0%<8.0%: CPT

## 2025-05-07 PROCEDURE — 3077F SYST BP >= 140 MM HG: CPT

## 2025-05-07 PROCEDURE — 80305 DRUG TEST PRSMV DIR OPT OBS: CPT

## 2025-05-07 PROCEDURE — 1126F AMNT PAIN NOTED NONE PRSNT: CPT

## 2025-05-07 PROCEDURE — 3078F DIAST BP <80 MM HG: CPT

## 2025-05-07 PROCEDURE — G0439 PPPS, SUBSEQ VISIT: HCPCS

## 2025-05-07 RX ORDER — MAGNESIUM OXIDE 400 MG/1
400 TABLET ORAL DAILY
COMMUNITY

## 2025-05-07 RX ORDER — LANOLIN ALCOHOL/MO/W.PET/CERES
1000 CREAM (GRAM) TOPICAL DAILY
COMMUNITY

## 2025-05-07 RX ORDER — PHENTERMINE HYDROCHLORIDE 37.5 MG/1
37.5 CAPSULE ORAL EVERY MORNING
Qty: 30 CAPSULE | Refills: 0 | Status: CANCELLED | OUTPATIENT
Start: 2025-05-07

## 2025-05-07 NOTE — PROGRESS NOTES
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    The following portions of the patient's history were reviewed and   updated as appropriate: allergies, current medications, past family history, past medical history, past social history, past surgical history, and problem list.    Compared to one year ago, the patient's physical   health is worse.  Compared to one year ago, the patient's mental   health is the same.    Recent Hospitalizations:  He was not admitted to the hospital during the last year.     Current Medical Providers:  Patient Care Team:  Esthela Arceo APRN as PCP - General (Family Medicine)  Jen Bravo MD as Consulting Physician (Endocrinology)  Shara Campbell APRN (Nurse Practitioner)    Outpatient Medications Prior to Visit   Medication Sig Dispense Refill    amLODIPine-valsartan (EXFORGE)  MG per tablet Take 1 tablet by mouth Daily. 90 tablet 3    apixaban (ELIQUIS) 5 MG tablet tablet Take 1 tablet by mouth 2 (Two) Times a Day. Indications: Atrial Fibrillation 30 tablet 0    Ascorbic Acid (Vitamin C) 100 MG chewable tablet Chew 1 tablet Daily.      cetirizine (zyrTEC) 10 MG tablet Take 1 tablet by mouth Daily As Needed.      co-enzyme Q-10 30 MG capsule Take 1 capsule by mouth Daily.      Continuous Blood Gluc Sensor (Dexcom G6 Sensor) Every 10 (Ten) Days.      CONTOUR NEXT TEST test strip Test 6 times daily DX: e11.65 200 each 5    digoxin (LANOXIN) 125 MCG tablet Take 1 tablet by mouth Daily. 90 tablet 1    donepezil (ARICEPT) 10 MG tablet Take 1 tablet by mouth Every Night. 90 tablet 1    insulin patient supplied pump Inject  under the skin into the appropriate area as directed Continuous. Type of Insulin: Humulin R 500  Type of Pump:Rayspan/Mobiscope [phone]  Bolus amount: 50 units daily.   Basal amount : Max basal 5.50u/hr  Correction factor:   Insulin  to carbohydrate ratio: 12a-12p 10                                                 12p-2p 10                                                  2p -12 a 7    Date of last site change: 2/3/24  Location of catheter: right side on stomach    Frequency of refill: approximately 3 days  Last refill date: per pt 2/3/24    Target 12a-3a 130-150               3a-6a -100-120               6a-9p 100-120               9p-12a 130-150    Active Ins time : 5 hours    Basal Pattern Setup 27.3    Prescriber Shara Campbell  Phone number [phone]      insulin regular (HUMULIN R) 500 UNIT/ML CONCENTRATED injection 50u daily with insulin pump (Patient taking differently: Inject 0.1 mL under the skin into the appropriate area as directed Daily. 60u daily with insulin pump) 40 mL 3    L-Lysine 500 MG tablet tablet Take 1 tablet by mouth Daily.      Lancets misc Check 5 times daily DX: e11.65 500 each 1    magnesium oxide (MAG-OX) 400 MG tablet Take 1 tablet by mouth Daily.      memantine (NAMENDA) 10 MG tablet 1 tablet  tablet 1    metoclopramide (REGLAN) 5 MG tablet       multivitamin with minerals tablet tablet Take 1 tablet by mouth Daily.      nebivolol (BYSTOLIC) 10 MG tablet Take 1 tablet by mouth Daily. 90 tablet 3    Omega-3 Fatty Acids (fish oil) 1000 MG capsule capsule Take 1 capsule by mouth 2 (Two) Times a Day With Meals.      ondansetron ODT (Zofran ODT) 4 MG disintegrating tablet Place 1 tablet on the tongue Every 8 (Eight) Hours As Needed for Nausea or Vomiting. 30 tablet 1    pantoprazole (PROTONIX) 40 MG EC tablet Take 1 tablet by mouth Daily. 90 tablet 1    rosuvastatin (CRESTOR) 40 MG tablet Take 1 tablet by mouth Every Night. 90 tablet 1    spironolactone-hydrochlorothiazide (ALDACTAZIDE) 25-25 MG tablet TAKE 1 TABLET BY MOUTH EVERY DAY 90 tablet 0    venlafaxine XR (EFFEXOR-XR) 150 MG 24 hr capsule Take 1 capsule by mouth Daily. 90 capsule 3    vitamin B-12 (CYANOCOBALAMIN) 1000 MCG tablet Take 1 tablet by mouth  Daily.      apixaban (ELIQUIS) 5 MG tablet tablet Take 1 tablet by mouth 2 (Two) Times a Day. (Patient not taking: Reported on 5/7/2025) 56 tablet 0    metFORMIN (GLUCOPHAGE) 500 MG tablet Take 1 tablet by mouth 2 (Two) Times a Day With Meals. (Patient not taking: Reported on 5/7/2025)      Testosterone Cypionate (DEPOTESTOTERONE CYPIONATE) 200 MG/ML injection INJECT 0.5ML SUBQ ONCE EVERY 14 DAYS AS DIRECTED 2 mL 0     Facility-Administered Medications Prior to Visit   Medication Dose Route Frequency Provider Last Rate Last Admin    Testosterone Cypionate (DEPOTESTOTERONE CYPIONATE) injection 100 mg  100 mg Intramuscular Q14 Days Esthela Arceo APRN   100 mg at 05/09/25 1534     No opioid medication identified on active medication list. I have reviewed chart for other potential  high risk medication/s and harmful drug interactions in the elderly.      Aspirin is not on active medication list.  Aspirin use is not indicated based on review of current medical condition/s. Risk of harm outweighs potential benefits.  .    Patient Active Problem List   Diagnosis    Type 2 diabetes mellitus with hyperglycemia, with long-term current use of insulin    Essential hypertension    Mixed hyperlipidemia    Long-term insulin use    Insulin pump titration    BERNICE on CPAP    Nausea and vomiting    Gastroesophageal reflux disease    Altered bowel habits    Leukomalacia, periventricular    Lacunar infarction    Mild cognitive impairment    Osteoarthritis of hip    Presence of insulin pump    Seasonal allergic rhinitis    PAF (paroxysmal atrial fibrillation)    Class 3 severe obesity due to excess calories without serious comorbidity with body mass index (BMI) of 40.0 to 44.9 in adult    Paz's esophagus without dysplasia    History of Helicobacter pylori infection    Hiatal hernia    Chronic superficial gastritis without bleeding    Other fatigue     Advance Care Planning Advance Directive is on file.  ACP discussion was held  "with the patient during this visit. Patient has an advance directive in EMR which is still valid.             Objective   Vitals:    25 1457 25 1501   BP: 152/73 142/60   BP Location: Left arm Left arm   Patient Position: Sitting Sitting   Cuff Size: Adult Large Adult   Pulse: 59    Temp: 97.1 °F (36.2 °C)    TempSrc: Temporal    SpO2: 99%    Weight: 133 kg (294 lb)    Height: 182.9 cm (72\")    PainSc: 0-No pain        Estimated body mass index is 39.87 kg/m² as calculated from the following:    Height as of this encounter: 182.9 cm (72\").    Weight as of this encounter: 133 kg (294 lb).                Does the patient have evidence of cognitive impairment? No  Lab Results   Component Value Date    HGBA1C 7.30 (H) 2025                                                                                                Health  Risk Assessment    Smoking Status:  Social History     Tobacco Use   Smoking Status Never    Passive exposure: Yes   Smokeless Tobacco Never     Alcohol Consumption:  Social History     Substance and Sexual Activity   Alcohol Use Never       Fall Risk Screen  STEADI Fall Risk Assessment was completed, and patient is at LOW risk for falls.Assessment completed on:2025    Depression Screening   Little interest or pleasure in doing things? Not at all   Feeling down, depressed, or hopeless? Not at all   PHQ-2 Total Score 0      Health Habits and Functional and Cognitive Screenin/7/2025     2:55 PM   Functional & Cognitive Status   Do you have difficulty preparing food and eating? No   Do you have difficulty bathing yourself, getting dressed or grooming yourself? No   Do you have difficulty using the toilet? No   Do you have difficulty moving around from place to place? No   Do you have trouble with steps or getting out of a bed or a chair? No   Current Diet Well Balanced Diet   Dental Exam Up to date   Eye Exam Up to date   Exercise (times per week) 0 times per week "   Current Exercises Include No Regular Exercise   Do you need help using the phone?  No   Are you deaf or do you have serious difficulty hearing?  No   Do you need help to go to places out of walking distance? No   Do you need help shopping? No   Do you need help preparing meals?  No   Do you need help with housework?  No   Do you need help with laundry? No   Do you need help taking your medications? No   Do you need help managing money? No   Do you ever drive or ride in a car without wearing a seat belt? No   Have you felt unusual stress, anger or loneliness in the last month? No   Who do you live with? Spouse   If you need help, do you have trouble finding someone available to you? No   Have you been bothered in the last four weeks by sexual problems? No   Do you have difficulty concentrating, remembering or making decisions? No           Age-appropriate Screening Schedule:  Refer to the list below for future screening recommendations based on patient's age, sex and/or medical conditions. Orders for these recommended tests are listed in the plan section. The patient has been provided with a written plan.    Health Maintenance List  Health Maintenance   Topic Date Due    TDAP/TD VACCINES (1 - Tdap) Never done    COVID-19 Vaccine (4 - 2024-25 season) 09/01/2024    INFLUENZA VACCINE  07/01/2025    HEMOGLOBIN A1C  09/14/2025    DIABETIC EYE EXAM  10/14/2025    URINE MICROALBUMIN-CREATININE RATIO (uACR)  12/18/2025    LIPID PANEL  12/19/2025    ANNUAL WELLNESS VISIT  05/07/2026    DIABETIC FOOT EXAM  05/07/2026    GASTROSCOPY (EGD)  08/09/2026    COLORECTAL CANCER SCREENING  04/25/2027    HEPATITIS C SCREENING  Completed    Pneumococcal Vaccine 50+  Completed    ZOSTER VACCINE  Completed                                                                                                                                                CMS Preventative Services Quick Reference  Risk Factors Identified During  Encounter  Depression/Dysphoria: Prescribed new antidepressant medication treatment. Follow up visit planned.    The above risks/problems have been discussed with the patient.  Pertinent information has been shared with the patient in the After Visit Summary.  An After Visit Summary and PPPS were made available to the patient.    Follow Up:   Next Medicare Wellness visit to be scheduled in 1 year.          Additional E&M Note during same encounter follows:  Patient has multiple medical problems which are significant and separately identifiable that require additional work above and beyond the Medicare Wellness Visit.      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    He reports a lack of motivation and energy, attributing it to his mental state rather than physical health. He has been on Effexor for an extended period and is open to adjusting the dosage or discontinuing the medication. He mentions that he does not engage in activities and feels a general lack of willpower.    He has been experiencing intermittent vomiting for the past 4 years, which has progressively worsened. He suspects that metformin may be the cause, as he also experiences sweating and dizziness, which he believes are side effects of the medication. His endocrinologist advised him to temporarily discontinue metformin to see if it would alleviate his stomach issues. However, this led to an exacerbation of his symptoms. Consequently, he decided to stop taking metformin altogether and has not experienced any further vomiting episodes since. He is currently considering alternative medications for  "his diabetes management. He has previously tried Ozempic but discontinued it due to severe gastrointestinal side effects.    He expresses uncertainty about the continuation of his testosterone therapy, as he has not observed any significant improvement in his energy levels. His last testosterone injection was administered on 03/28/2025.    He is interested in trying phentermine for weight loss.    Objective   Vital Signs:   Vitals:    05/07/25 1457 05/07/25 1501   BP: 152/73 142/60   BP Location: Left arm Left arm   Patient Position: Sitting Sitting   Cuff Size: Adult Large Adult   Pulse: 59    Temp: 97.1 °F (36.2 °C)    TempSrc: Temporal    SpO2: 99%    Weight: 133 kg (294 lb)    Height: 182.9 cm (72\")    PainSc: 0-No pain        Wt Readings from Last 3 Encounters:   05/07/25 133 kg (294 lb)   02/26/25 133 kg (294 lb)   02/25/25 133 kg (293 lb 3.2 oz)     BP Readings from Last 3 Encounters:   05/07/25 142/60   02/26/25 163/79   02/25/25 132/88       Physical Exam  Constitutional:       Appearance: Normal appearance.   HENT:      Nose: Nose normal.      Mouth/Throat:      Mouth: Mucous membranes are moist.   Cardiovascular:      Rate and Rhythm: Normal rate and regular rhythm.      Pulses: Normal pulses.      Heart sounds: Normal heart sounds.   Pulmonary:      Effort: Pulmonary effort is normal.      Breath sounds: Normal breath sounds.   Skin:     General: Skin is warm and dry.   Neurological:      General: No focal deficit present.      Mental Status: He is alert and oriented to person, place, and time.   Psychiatric:         Mood and Affect: Mood normal.         Behavior: Behavior normal.         Physical Exam      The following data was reviewed by JOHNNA Machado on 05/07/2025  Common Labs   Common labs          12/18/2024    09:30 3/14/2025    09:18 4/16/2025    09:30   Common Labs   Glucose 110  124  251    BUN 10  15  10    Creatinine 0.99  1.32  1.12    Sodium 143  141  138    Potassium 3.8  4.3  " 4.2    Chloride 108  100  101    Calcium 9.2  9.6  9.2    Albumin 4.1  4.5  3.9    Total Bilirubin 0.4  0.5  0.6    Alkaline Phosphatase 90  84  83    AST (SGOT) 19  21  24    ALT (SGPT) 15  26  22    Total Cholesterol 99      Triglycerides 158      HDL Cholesterol 26      LDL Cholesterol  46      Hemoglobin A1C 7.70  7.30     Microalbumin, Urine 17.2          Results          Assessment & Plan   Diagnoses and all orders for this visit:    1. Class 3 severe obesity due to excess calories without serious comorbidity with body mass index (BMI) of 40.0 to 44.9 in adult (Primary)  Assessment & Plan:  Patient's (Body mass index is 39.87 kg/m².) indicates that they are morbidly/severely obese (BMI > 40 or > 35 with obesity - related health condition) with health conditions that include hypertension and diabetes mellitus . Weight is unchanged. BMI  is above average; BMI management plan is completed. We discussed portion control and increasing exercise.       2. Hypogonadism male  -     Testosterone Cypionate (DEPOTESTOTERONE CYPIONATE) 200 MG/ML injection; Inject 1 mL into the appropriate muscle as directed by prescriber Every 14 (Fourteen) Days.  Dispense: 2 mL; Refill: 0    3. Medication management  -     POC Medline 12 Panel Urine Drug Screen    4. Essential hypertension  Assessment & Plan:  Hypertension is stable and controlled  Continue current treatment regimen.  Blood pressure will be reassessed in 6 months.          Assessment & Plan  1. Depression.  - Reports a lack of desire and energy to engage in activities, which may be related to his current medication regimen.  - Potential side effects of discontinuing Effexor were discussed, and it was decided to maintain the current dosage.  - Phentermine will be prescribed to potentially boost his energy levels.  - If there is no improvement in his symptoms after one month, an additional antidepressant may be considered.    2. Diabetes mellitus.  - Has been experiencing  gastrointestinal side effects from metformin, including vomiting and dizziness.  - Discontinued metformin and has not had any further vomiting episodes.  - Alternative medications for diabetes management will be considered. Mentioned trying Ozempic in the past but experienced severe stomach problems.  - Phentermine will be prescribed to potentially boost his energy levels. A urine drug screen (UDS) and consent will be obtained prior to initiating phentermine treatment. Monthly follow-ups for the next 2 to 3 months are scheduled to monitor blood pressure and heart rate.    3. Low testosterone.  - Testosterone levels remain low.  - Will continue with his testosterone therapy.  - A prescription for testosterone will be sent to his pharmacy.  - Last testosterone shot was on 03/28/2025.    4. Weight management.  - Interested in trying phentermine for weight loss.  - Phentermine will be prescribed to potentially boost his energy levels.  - A urine drug screen (UDS) and consent will be obtained prior to initiating phentermine treatment.  - Monthly follow-ups for the next 2 to 3 months are scheduled to monitor blood pressure and heart rate.               FOLLOW UP  No follow-ups on file.  Patient was given instructions and counseling regarding his condition or for health maintenance advice. Please see specific information pulled into the AVS if appropriate.     Patient or patient representative verbalized consent for the use of Ambient Listening during the visit with  JOHNNA Machado for chart documentation. 5/12/2025  15:30 EDT    JOHNNA Machado  05/12/25  13:21 EDT

## 2025-05-08 DIAGNOSIS — E29.1 HYPOGONADISM MALE: ICD-10-CM

## 2025-05-08 RX ORDER — TESTOSTERONE CYPIONATE 200 MG/ML
INJECTION, SOLUTION INTRAMUSCULAR
Qty: 2 ML | Refills: 0 | Status: SHIPPED | OUTPATIENT
Start: 2025-05-08

## 2025-05-09 ENCOUNTER — TELEPHONE (OUTPATIENT)
Dept: FAMILY MEDICINE CLINIC | Facility: CLINIC | Age: 75
End: 2025-05-09

## 2025-05-09 ENCOUNTER — CLINICAL SUPPORT (OUTPATIENT)
Dept: FAMILY MEDICINE CLINIC | Facility: CLINIC | Age: 75
End: 2025-05-09
Payer: MEDICARE

## 2025-05-09 DIAGNOSIS — E66.812 CLASS 2 SEVERE OBESITY WITH SERIOUS COMORBIDITY AND BODY MASS INDEX (BMI) OF 39.0 TO 39.9 IN ADULT, UNSPECIFIED OBESITY TYPE: Primary | ICD-10-CM

## 2025-05-09 DIAGNOSIS — E29.1 HYPOGONADISM IN MALE: Primary | ICD-10-CM

## 2025-05-09 DIAGNOSIS — E66.01 CLASS 2 SEVERE OBESITY WITH SERIOUS COMORBIDITY AND BODY MASS INDEX (BMI) OF 39.0 TO 39.9 IN ADULT, UNSPECIFIED OBESITY TYPE: Primary | ICD-10-CM

## 2025-05-09 PROCEDURE — 96372 THER/PROPH/DIAG INJ SC/IM: CPT

## 2025-05-09 RX ORDER — PHENTERMINE HYDROCHLORIDE 37.5 MG/1
37.5 CAPSULE ORAL EVERY MORNING
Qty: 30 CAPSULE | Refills: 0 | Status: SHIPPED | OUTPATIENT
Start: 2025-05-09

## 2025-05-09 RX ADMIN — TESTOSTERONE CYPIONATE 100 MG: 200 INJECTION, SOLUTION INTRAMUSCULAR at 15:34

## 2025-05-09 NOTE — TELEPHONE ENCOUNTER
Caller: KAMI MERCADO    Relationship: Emergency Contact    Best call back number: [phone]     What medication are you requesting: PHENTERMINE    What are your current symptoms: WEIGHT LOSS      If a prescription is needed, what is your preferred pharmacy and phone number: [place], [place] - [address] [facility] [facility] [phone]  - [phone] FX     Additional notes:    PATIENTS WIFE STATES THE PROVIDER WAS SUPPOSED TO CALL THIS MEDICATION IN AS WAS DISCUSSED AT APPOINTMENT ON 5.7.25. THE MEDICATION WAS NOT CALLED IN.

## 2025-05-12 ENCOUNTER — TELEPHONE (OUTPATIENT)
Dept: CARDIOLOGY | Facility: CLINIC | Age: 75
End: 2025-05-12
Payer: MEDICARE

## 2025-05-12 RX ORDER — TESTOSTERONE CYPIONATE 200 MG/ML
200 INJECTION, SOLUTION INTRAMUSCULAR
Qty: 2 ML | Refills: 0 | Status: SHIPPED | OUTPATIENT
Start: 2025-05-12

## 2025-05-12 NOTE — TELEPHONE ENCOUNTER
PER ADRIANNA, SPRX:  30 days: $111.98; 90 days: $271.55-- looks like he has not paid any toward deductible and even though generic it is not a preferred generic so it will stay pricey even after deductible is met.       I will speak to pt about cost.  Just in case this is not affordable, please advise.

## 2025-05-12 NOTE — ASSESSMENT & PLAN NOTE
Patient's (Body mass index is 39.87 kg/m².) indicates that they are morbidly/severely obese (BMI > 40 or > 35 with obesity - related health condition) with health conditions that include hypertension and diabetes mellitus . Weight is unchanged. BMI  is above average; BMI management plan is completed. We discussed portion control and increasing exercise.

## 2025-05-12 NOTE — TELEPHONE ENCOUNTER
Despite submitting updated OOPP exps, pt was still denied pt asst with Eliquis. He cannot afford cost of Eliquis.    If pt is a candidate for Pradaxa, I can request SPRX  do a test bill for cost of med.    Please advise, thank you.

## 2025-05-21 ENCOUNTER — OFFICE VISIT (OUTPATIENT)
Dept: PODIATRY | Facility: CLINIC | Age: 75
End: 2025-05-21
Payer: MEDICARE

## 2025-05-21 VITALS
HEART RATE: 69 BPM | TEMPERATURE: 97.5 F | DIASTOLIC BLOOD PRESSURE: 60 MMHG | BODY MASS INDEX: 38.87 KG/M2 | OXYGEN SATURATION: 100 % | WEIGHT: 287 LBS | HEIGHT: 72 IN | SYSTOLIC BLOOD PRESSURE: 165 MMHG

## 2025-05-21 DIAGNOSIS — S99.921A INJURY OF PLANTAR PLATE OF RIGHT FOOT, INITIAL ENCOUNTER: ICD-10-CM

## 2025-05-21 DIAGNOSIS — B35.1 ONYCHOMYCOSIS: ICD-10-CM

## 2025-05-21 DIAGNOSIS — E11.65 TYPE 2 DIABETES MELLITUS WITH HYPERGLYCEMIA, WITH LONG-TERM CURRENT USE OF INSULIN: Primary | ICD-10-CM

## 2025-05-21 DIAGNOSIS — Z79.4 TYPE 2 DIABETES MELLITUS WITH HYPERGLYCEMIA, WITH LONG-TERM CURRENT USE OF INSULIN: Primary | ICD-10-CM

## 2025-05-21 PROCEDURE — 3077F SYST BP >= 140 MM HG: CPT | Performed by: PODIATRIST

## 2025-05-21 PROCEDURE — 1159F MED LIST DOCD IN RCRD: CPT | Performed by: PODIATRIST

## 2025-05-21 PROCEDURE — 3078F DIAST BP <80 MM HG: CPT | Performed by: PODIATRIST

## 2025-05-21 PROCEDURE — 11721 DEBRIDE NAIL 6 OR MORE: CPT | Performed by: PODIATRIST

## 2025-05-21 PROCEDURE — 99213 OFFICE O/P EST LOW 20 MIN: CPT | Performed by: PODIATRIST

## 2025-05-21 PROCEDURE — 1160F RVW MEDS BY RX/DR IN RCRD: CPT | Performed by: PODIATRIST

## 2025-05-21 NOTE — PROGRESS NOTES
[facility] - PODIATRY    Today's Date: 05/21/25    Patient Name: Pastor Bartholomew  MRN: [id number]  CSN: [id number]  PCP: Esthela Arceo APRN,   Referring Provider: No ref. provider found    SUBJECTIVE     Chief Complaint   Patient presents with    Left Foot - Follow-up, Nail Problem, Diabetes    Right Foot - Follow-up, Nail Problem, Diabetes     Pain ball of foot x 2 weeks no injury      HPI: Pastor Bartholomew, a 74 y.o.male, presents to clinic for a diabetic foot evaluation.    Is here for follow-up of his toenails and diabetic foot check.  He also states that he is having pain underneath his right second toe.  It bothers him when he is out doing a lot of work.  He has had inserts in the past.  Would like to discuss treatments for this.    Past Medical History:   Diagnosis Date    Abnormal ECG 4/20/2023    Clogged artery    Allergic 1988    Atrial fibrillation     Paz esophagus     Carotid artery occlusion 3-    Clotting disorder 2/5/2024    Colon polyp     Congenital heart disease 4/5/2024    Coronary artery disease Feb 5 2024    AFib    Depression 1995    Diabetes     Diverticulitis of colon     Diverticulosis 2/6/2024    Dizziness Feb5. 2024    GERD (gastroesophageal reflux disease) 2000    Headache     Headache, tension-type     Hernia, inguinal     History of total hip arthroplasty, right 02/01/2023    HL (hearing loss) 1997    Hyperlipidemia     Hypertension     Hypervitaminosis D  06/05/2019    Memory loss 2023    Neuropathy in diabetes 1990    Obesity 3/12/2023    Plantar fasciitis 2019    PONV (postoperative nausea and vomiting)     Shingles 2004    Sleep apnea     Type 2 diabetes mellitus      Past Surgical History:   Procedure Laterality Date    COLONOSCOPY      COLONOSCOPY N/A 04/25/2022    Procedure: COLONOSCOPY WITH POLYPECTOMY;  Surgeon: Artie Dickerson MD;  Location: [facility] ENDOSCOPY;  Service: Gastroenterology;  Laterality: N/A;  COLON POLYP    ENDOSCOPY N/A  04/25/2022    Procedure: ESOPHAGOGASTRODUODENOSCOPY WITH BX;  Surgeon: Artie Dickerson MD;  Location: [facility] ENDOSCOPY;  Service: Gastroenterology;  Laterality: N/A;  CAPPS'S ESOPHAGUS, HIATAL HERNIA    ENDOSCOPY N/A 08/09/2024    Procedure: ESOPHAGOGASTRODUODENOSCOPY WITH BIOPSIES;  Surgeon: Artie Dickerson MD;  Location: [facility] ENDOSCOPY;  Service: Gastroenterology;  Laterality: N/A;  HIATAL HERNIA, BARRETTS    HAND SURGERY  2015    Triger release on both thumbs    HERNIA REPAIR      JOINT REPLACEMENT  2/28/2023    KNEE SURGERY Bilateral     TOTAL HIP ARTHROPLASTY Right     TRIGGER FINGER RELEASE Bilateral     x2 THUMBS    UPPER GASTROINTESTINAL ENDOSCOPY  2022     Family History   Problem Relation Age of Onset    Diabetes Mother     Hypertension Mother     Heart attack Mother     Stroke Mother     COPD Brother     Diabetes Maternal Grandmother     Heart attack Sister         Quad bypass    Heart attack Brother         AFib    Colon cancer Neg Hx      Social History     Socioeconomic History    Marital status:    Tobacco Use    Smoking status: Never     Passive exposure: Yes    Smokeless tobacco: Never   Vaping Use    Vaping status: Never Used   Substance and Sexual Activity    Alcohol use: Never    Drug use: Never    Sexual activity: Not Currently     Partners: Male     Birth control/protection: Condom     Allergies   Allergen Reactions    Codeine Nausea Only    Semaglutide(0.25 Or 0.5mg-Dos) GI Intolerance     Current Outpatient Medications   Medication Sig Dispense Refill    amLODIPine-valsartan (EXFORGE)  MG per tablet Take 1 tablet by mouth Daily. 90 tablet 3    apixaban (ELIQUIS) 5 MG tablet tablet Take 1 tablet by mouth 2 (Two) Times a Day. Indications: Atrial Fibrillation 30 tablet 0    Ascorbic Acid (Vitamin C) 100 MG chewable tablet Chew 1 tablet Daily.      cetirizine (zyrTEC) 10 MG tablet Take 1 tablet by mouth Daily As Needed.      co-enzyme Q-10 30 MG capsule Take 1  capsule by mouth Daily.      Continuous Blood Gluc Sensor (Dexcom G6 Sensor) Every 10 (Ten) Days.      CONTOUR NEXT TEST test strip Test 6 times daily DX: e11.65 200 each 5    digoxin (LANOXIN) 125 MCG tablet Take 1 tablet by mouth Daily. 90 tablet 1    donepezil (ARICEPT) 10 MG tablet Take 1 tablet by mouth Every Night. 90 tablet 1    insulin patient supplied pump Inject  under the skin into the appropriate area as directed Continuous. Type of Insulin: Humulin R 500  Type of Pump:[employer]otsAl[employer]/KDW [phone]  Bolus amount: 50 units daily.   Basal amount : Max basal 5.50u/hr  Correction factor:   Insulin to carbohydrate ratio: 12a-12p 10                                                 12p-2p 10                                                  2p -12 a 7    Date of last site change: 2/3/24  Location of catheter: right side on stomach    Frequency of refill: approximately 3 days  Last refill date: per pt 2/3/24    Target 12a-3a 130-150               3a-6a -100-120               6a-9p 100-120               9p-12a 130-150    Active Ins time : 5 hours    Basal Pattern Setup 27.3    Prescriber Shara Campbell  Phone number [phone]      insulin regular (HUMULIN R) 500 UNIT/ML CONCENTRATED injection 50u daily with insulin pump (Patient taking differently: Inject 0.1 mL under the skin into the appropriate area as directed Daily. 60u daily with insulin pump) 40 mL 3    L-Lysine 500 MG tablet tablet Take 1 tablet by mouth Daily.      Lancets misc Check 5 times daily DX: e11.65 500 each 1    magnesium oxide (MAG-OX) 400 MG tablet Take 1 tablet by mouth Daily.      memantine (NAMENDA) 10 MG tablet 1 tablet  tablet 1    metoclopramide (REGLAN) 5 MG tablet       multivitamin with minerals tablet tablet Take 1 tablet by mouth Daily.      nebivolol (BYSTOLIC) 10 MG tablet Take 1 tablet by mouth Daily. 90 tablet 3    Omega-3 Fatty Acids (fish oil) 1000 MG capsule capsule Take 1 capsule by mouth 2 (Two) Times a Day With  Meals.      ondansetron ODT (Zofran ODT) 4 MG disintegrating tablet Place 1 tablet on the tongue Every 8 (Eight) Hours As Needed for Nausea or Vomiting. 30 tablet 1    pantoprazole (PROTONIX) 40 MG EC tablet Take 1 tablet by mouth Daily. 90 tablet 1    phentermine 37.5 MG capsule Take 1 capsule by mouth Every Morning. 30 capsule 0    rosuvastatin (CRESTOR) 40 MG tablet Take 1 tablet by mouth Every Night. 90 tablet 1    spironolactone-hydrochlorothiazide (ALDACTAZIDE) 25-25 MG tablet TAKE 1 TABLET BY MOUTH EVERY DAY 90 tablet 0    Testosterone Cypionate (DEPOTESTOTERONE CYPIONATE) 200 MG/ML injection Inject 1 mL into the appropriate muscle as directed by prescriber Every 14 (Fourteen) Days. 2 mL 0    Testosterone Cypionate (DEPOTESTOTERONE CYPIONATE) 200 MG/ML injection INJECT 0.5ML INTO THE APPROPRIATE MUSCLE ONCE EVERY 14 DAYS AS DIRECTED 2 mL 0    venlafaxine XR (EFFEXOR-XR) 150 MG 24 hr capsule Take 1 capsule by mouth Daily. 90 capsule 3    vitamin B-12 (CYANOCOBALAMIN) 1000 MCG tablet Take 1 tablet by mouth Daily.      diclofenac (VOLTAREN) 50 MG EC tablet Take 1 tablet by mouth 2 (Two) Times a Day for 30 days. 60 tablet 0     Current Facility-Administered Medications   Medication Dose Route Frequency Provider Last Rate Last Admin    Testosterone Cypionate (DEPOTESTOTERONE CYPIONATE) injection 100 mg  100 mg Intramuscular Q14 Days Esthela Arceo APRN   100 mg at 05/09/25 1534     Review of Systems   All other systems reviewed and are negative.      OBJECTIVE     Vitals:    05/21/25 0921   BP: 165/60   Pulse: 69   Temp: 97.5 °F (36.4 °C)   SpO2: 100%       Body mass index is 38.92 kg/m².    Lab Results   Component Value Date    HGBA1C 7.30 (H) 03/14/2025       Lab Results   Component Value Date    GLUCOSE 251 (H) 04/16/2025    CALCIUM 9.2 04/16/2025     04/16/2025    K 4.2 04/16/2025    CO2 24.4 04/16/2025     04/16/2025    BUN 10 04/16/2025    CREATININE 1.12 04/16/2025    EGFRIFAFRI >60  12/19/2022    EGFRIFNONA 80 12/17/2021    BCR 8.9 04/16/2025    ANIONGAP 12.6 04/16/2025       Patient seen in no apparent distress.      PHYSICAL EXAM:     Foot/Ankle Exam    GENERAL  Appearance:  appears stated age  Orientation:  AAOx3  Affect:  appropriate  Gait:  unimpaired  Assistance:  independent  Right shoe gear: casual shoe  Left shoe gear: casual shoe    VASCULAR     Right Foot Vascularity   Normal vascular exam    Dorsalis pedis:  2+  Posterior tibial:  2+  Skin temperature:  warm  Edema grading:  None  CFT:  < 3 seconds  Pedal hair growth:  Present  Varicosities:  none     Left Foot Vascularity   Normal vascular exam    Dorsalis pedis:  2+  Posterior tibial:  2+  Skin temperature:  warm  Edema grading:  None  CFT:  < 3 seconds  Pedal hair growth:  Present  Varicosities:  none     NEUROLOGIC     Right Foot Neurologic   Light touch sensation: diminished  Vibratory sensation: diminished  Hot/Cold sensation: diminished  Protective Sensation using Selmer-Cailin Monofilament:   Sites intact: 10  Sites tested: 10     Left Foot Neurologic   Light touch sensation: diminished  Vibratory sensation: diminished  Hot/Cold sensation:  diminished  Protective Sensation using Selmer-Cailin Monofilament:   Sites intact: 10  Sites tested: 10    MUSCULOSKELETAL     Right Foot Musculoskeletal   Tenderness:  MTP 2 dorsal tenderness      MUSCLE STRENGTH     Right Foot Muscle Strength   Foot dorsiflexion:  4  Foot plantar flexion:  4  Foot inversion:  4  Foot eversion:  4     Left Foot Muscle Strength   Foot dorsiflexion:  4  Foot plantar flexion:  4  Foot inversion:  4  Foot eversion:  4    RANGE OF MOTION     Right Foot Range of Motion   Foot and ankle ROM within normal limits       Left Foot Range of Motion   Foot and ankle ROM within normal limits      DERMATOLOGIC      Right Foot Dermatologic   Skin  Right foot skin is intact.   Nails  1.  Positive for elongated, onychomycosis, abnormal thickness, subungual debris and  ingrown toenail.  2.  Positive for elongated, onychomycosis, abnormal thickness, subungual debris and ingrown toenail.  3.  Positive for elongated, onychomycosis, abnormal thickness, subungual debris and ingrown toenail.  4.  Positive for elongated, onychomycosis, abnormal thickness, subungual debris and ingrown toenail.  5.  Positive for elongated, onychomycosis, abnormal thickness, subungual debris and ingrown toenail.  Nails comment:  Toenails 1, 2, 3, 4, and 5     Left Foot Dermatologic   Skin  Left foot skin is intact.   Nails comment:  Toenails 1, 2, 3, 4, and 5  Nails  1.  Positive for elongated, onychomycosis, abnormal thickness, subungual debris and ingrown toenail.  2.  Positive for elongated, onychomycosis, abnormal thickness, subungual debris and ingrown toenail.  3.  Positive for elongated, onychomycosis, abnormal thickness, subungual debris and ingrown toenail.  4.  Positive for elongated, onychomycosis, abnormally thick, subungual debris and ingrown toenail.  5.  Positive for elongated, onychomycosis, abnormally thick, subungual debris and ingrown toenail.            ASSESSMENT/PLAN     Diagnoses and all orders for this visit:    1. Type 2 diabetes mellitus with hyperglycemia, with long-term current use of insulin (Primary)    2. Onychomycosis    3. Injury of plantar plate of right foot, initial encounter    Other orders  -     diclofenac (VOLTAREN) 50 MG EC tablet; Take 1 tablet by mouth 2 (Two) Times a Day for 30 days.  Dispense: 60 tablet; Refill: 0          Comprehensive lower extremity examination and evaluation was performed.    Toenails 1, 2, 3, 4, 5 on Right and 1, 2, 3, 4, 5 on Left were debrided with nail nippers.  Patient tolerated procedure well without complications.    Patient to begin taping to right second toe.  Discussed rest ice and elevating.  Diclofenac prescribed.  Return to clinic in 2 to 3 months.    Discussed findings and treatment plan including risks, benefits, and treatment  options with patient in detail. Patient agreed with treatment plan.    Medications and allergies reviewed.  Reviewed available blood glucose and HgB A1C lab values along with other pertinent labs.  These were discussed with the patient as to their importance of diabetic maintenance.    Diabetic foot exam performed and documented this date, compliant with CQM required standards. Detail of findings as noted in physical exam.  Lower extremity Neurologic exam for diabetic patient performed and documented this date, compliant with PQRS required standards. Detail of findings as noted in physical exam.  Advised patient importance of good routine lower extremity hygiene. Advised patient importance of evaluating for intact skin and pain free nail borders.  Advised patient to use mirror to evaluate plantar/ soles of feet for better visualization. Advised patient monitor and phone office to be seen if any cracking to skin, open lesions, painful nail borders or if nails become elongated prior to next visit. Advised patient importance of daily cleansing of lower extremities, followed by good skin cream to maintain normal hydration of skin. Also advised patient importance of close daily monitoring of blood sugar. Advised to regulate diet and medications to maintain control of blood sugar in optimal range. Contact primary care provider if difficulties maintaining blood sugar levels.  Advised Patient of presence of Diabetes Mellitus condition.  Advised Patient risk of progression and worsening or improvement, then return of condition.  Will monitor condition for any change in future. Treat with most appropriate treatment pending status of condition.  Counseled and advised patient extensively on nature and ramifications of diabetes. Standard instructions given to patient for good diabetic foot care and maintenance. Advised importance of careful monitoring to avoid break down and complications secondary to diabetes. Advised patient  importance of strict maintenance of blood sugar control. Advised patient of possible ominous results from neglect of condition, i.e.: amputation/ loss of digits, feet and legs, or even death.  Patient states understands counseling, will monitor closely, continue good hygiene and routine diabetic foot care. Patient will contact office if questions or problems.      An After Visit Summary was printed and given to the patient at discharge, including (if requested) any available informative/educational handouts regarding diagnosis, treatment, or medications. All questions were answered to patient/family satisfaction. Should symptoms fail to improve or worsen they agree to call or return to clinic or to go to the Emergency Department. Discussed the importance of following up with any needed screening tests/labs/specialist appointments and any requested follow-up recommended by me today. Importance of maintaining follow-up discussed and patient accepts that missed appointments can delay diagnosis and potentially lead to worsening of conditions.    Return in about 3 months (around 8/21/2025)., or sooner if acute issues arise.    This document has been electronically signed by Phuc Mena DPM on May 21, 2025 09:48 EDT

## 2025-05-23 ENCOUNTER — CLINICAL SUPPORT (OUTPATIENT)
Dept: FAMILY MEDICINE CLINIC | Facility: CLINIC | Age: 75
End: 2025-05-23
Payer: MEDICARE

## 2025-05-23 ENCOUNTER — TELEPHONE (OUTPATIENT)
Dept: FAMILY MEDICINE CLINIC | Facility: CLINIC | Age: 75
End: 2025-05-23

## 2025-05-23 DIAGNOSIS — E29.1 HYPOGONADISM IN MALE: Primary | ICD-10-CM

## 2025-05-23 RX ADMIN — TESTOSTERONE CYPIONATE 100 MG: 200 INJECTION, SOLUTION INTRAMUSCULAR at 10:45

## 2025-06-03 ENCOUNTER — OFFICE VISIT (OUTPATIENT)
Dept: FAMILY MEDICINE CLINIC | Facility: CLINIC | Age: 75
End: 2025-06-03
Payer: MEDICARE

## 2025-06-03 ENCOUNTER — TELEPHONE (OUTPATIENT)
Dept: CARDIOLOGY | Facility: CLINIC | Age: 75
End: 2025-06-03
Payer: MEDICARE

## 2025-06-03 VITALS
WEIGHT: 286 LBS | DIASTOLIC BLOOD PRESSURE: 61 MMHG | HEIGHT: 72 IN | OXYGEN SATURATION: 100 % | HEART RATE: 58 BPM | TEMPERATURE: 97.3 F | BODY MASS INDEX: 38.74 KG/M2 | SYSTOLIC BLOOD PRESSURE: 125 MMHG

## 2025-06-03 DIAGNOSIS — E66.01 CLASS 2 SEVERE OBESITY WITH SERIOUS COMORBIDITY AND BODY MASS INDEX (BMI) OF 39.0 TO 39.9 IN ADULT, UNSPECIFIED OBESITY TYPE: ICD-10-CM

## 2025-06-03 DIAGNOSIS — E66.812 CLASS 2 SEVERE OBESITY WITH SERIOUS COMORBIDITY AND BODY MASS INDEX (BMI) OF 39.0 TO 39.9 IN ADULT, UNSPECIFIED OBESITY TYPE: ICD-10-CM

## 2025-06-03 DIAGNOSIS — E29.1 HYPOGONADISM MALE: ICD-10-CM

## 2025-06-03 DIAGNOSIS — M75.52 BURSITIS OF LEFT SHOULDER: Primary | ICD-10-CM

## 2025-06-03 PROCEDURE — 3078F DIAST BP <80 MM HG: CPT

## 2025-06-03 PROCEDURE — 99214 OFFICE O/P EST MOD 30 MIN: CPT

## 2025-06-03 PROCEDURE — 1125F AMNT PAIN NOTED PAIN PRSNT: CPT

## 2025-06-03 PROCEDURE — 3074F SYST BP LT 130 MM HG: CPT

## 2025-06-03 PROCEDURE — 3051F HG A1C>EQUAL 7.0%<8.0%: CPT

## 2025-06-03 RX ORDER — PHENTERMINE HYDROCHLORIDE 37.5 MG/1
37.5 CAPSULE ORAL EVERY MORNING
Qty: 30 CAPSULE | Refills: 0 | Status: SHIPPED | OUTPATIENT
Start: 2025-06-03

## 2025-06-03 RX ORDER — METHYLPREDNISOLONE 4 MG/1
TABLET ORAL
Qty: 1 EACH | Refills: 0 | Status: SHIPPED | OUTPATIENT
Start: 2025-06-03

## 2025-06-03 RX ORDER — TESTOSTERONE CYPIONATE 200 MG/ML
200 INJECTION, SOLUTION INTRAMUSCULAR
Qty: 2 ML | Refills: 0 | Status: SHIPPED | OUTPATIENT
Start: 2025-06-03

## 2025-06-03 NOTE — ASSESSMENT & PLAN NOTE
Patient's (Body mass index is 38.79 kg/m².) indicates that they are morbidly/severely obese (BMI > 40 or > 35 with obesity - related health condition) with health conditions that include none . Weight is unchanged. BMI  is above average; BMI management plan is completed. We discussed portion control and increasing exercise.

## 2025-06-03 NOTE — TELEPHONE ENCOUNTER
Caller: KAMI MERCADO    Relationship: Emergency Contact    Best call back number: [phone]     What is the best time to reach you: ANYTIME    Who are you requesting to speak with (clinical staff, provider,  specific staff member): KEZIA    Do you know the name of the person who called:     What was the call regarding: KAMI CALLED IN STATING PATIENT HASN'T STARTED WARFARIN YET. PATIENT HAS A 30 DAY SUPPLY LEFT OF ELIQUIS.     Is it okay if the provider responds through [employer]: NO, PLEASE CALL.

## 2025-06-03 NOTE — TELEPHONE ENCOUNTER
LAVELLE patient wife. Advised can use up his supply of Eliquis. Advised to follow previous instructions that were given. Advised to start Warfarin day after last dose of Eliquis. Advised it needs to be taken around 4/5 PM.Advised will take 5 mg Warfarin daily and check INR on day 4 that morning. Stressed importance of time to take warfarin and time to check INR. Wife voiced understanding

## 2025-06-03 NOTE — PROGRESS NOTES
Chief Complaint     Obesity and Arm Pain (LEFT bicep area, pt states worse with cold air )    Patient or patient representative verbalized consent for the use of Ambient Listening during the visit with  JOHNNA Machado for chart documentation. 6/3/2025  09:10 EDT    History of Present Illness     Pastor Bartholomew is a 74 y.o. male who presents to [facility] .    History of Present Illness  The patient presents for evaluation of weight management, hypoglycemia, arm pain, and yeast infection.    He has been experiencing minimal weight loss with the use of phentermine and reports an increase in energy levels, though he also notes a slight increase in hyperactivity. His daily caloric intake is approximately 1800 calories, and he does not engage in regular exercise such as running, walking, or cycling. He has been incorporating dehydrated snacks into his diet, each containing only 40 calories.    He experiences episodes of hypoglycemia at night, with blood sugar levels dropping to around 65, necessitating food intake to stabilize his levels. He reports that his blood sugar levels have been consistently lower since starting his current medication. He uses Dexcom G7.    He suspects he may have arthritis in his arm, which is exacerbated by cold weather or air conditioning. The pain has been persistent for the past few weeks, and he frequently rubs the affected area for relief. He has been performing more head work recently, which seems to worsen the pain. He describes a knot-like sensation in his shoulder that appears to have shifted upwards. He has been applying Biofreeze to the area for relief.    He requests a refill of his testosterone prescription.    He requests a prescription for Diflucan to manage a yeast infection.         History      Past Medical History:   Diagnosis Date    Abnormal ECG 4/20/2023    Clogged artery    Allergic 1988    Atrial fibrillation     Paz  esophagus     Carotid artery occlusion 3-    Clotting disorder 2/5/2024    Colon polyp     Congenital heart disease 4/5/2024    Coronary artery disease Feb 5 2024    AFib    Depression 1995    Diabetes     Diverticulitis of colon     Diverticulosis 2/6/2024    Dizziness Feb5. 2024    GERD (gastroesophageal reflux disease) 2000    Headache     Headache, tension-type     Hernia, inguinal     History of total hip arthroplasty, right 02/01/2023    HL (hearing loss) 1997    Hyperlipidemia     Hypertension     Hypervitaminosis D  06/05/2019    Memory loss 2023    Neuropathy in diabetes 1990    Obesity 3/12/2023    Plantar fasciitis 2019    PONV (postoperative nausea and vomiting)     Shingles 2004    Sleep apnea     Type 2 diabetes mellitus        Past Surgical History:   Procedure Laterality Date    COLONOSCOPY      COLONOSCOPY N/A 04/25/2022    Procedure: COLONOSCOPY WITH POLYPECTOMY;  Surgeon: Artie Dickerson MD;  Location: [facility];  Service: Gastroenterology;  Laterality: N/A;  COLON POLYP    ENDOSCOPY N/A 04/25/2022    Procedure: ESOPHAGOGASTRODUODENOSCOPY WITH BX;  Surgeon: Artie Dickerson MD;  Location: [facility] END[facility];  Service: Gastroenterology;  Laterality: N/A;  CAPPS'S ESOPHAGUS, HIATAL HERNIA    ENDOSCOPY N/A 08/09/2024    Procedure: ESOPHAGOGASTRODUODENOSCOPY WITH BIOPSIES;  Surgeon: Artie Dickerson MD;  Location: [facility] ENDOSCOPY;  Service: Gastroenterology;  Laterality: N/A;  HIATAL HERNIA, BARRETTS    HAND SURGERY  2015    Triger release on both thumbs    HERNIA REPAIR      JOINT REPLACEMENT  2/28/2023    KNEE SURGERY Bilateral     TOTAL HIP ARTHROPLASTY Right     TRIGGER FINGER RELEASE Bilateral     x2 THUMBS    UPPER GASTROINTESTINAL ENDOSCOPY  2022       Family History   Problem Relation Age of Onset    Diabetes Mother     Hypertension Mother     Heart attack Mother     Stroke Mother     COPD Brother     Diabetes Maternal Grandmother     Heart attack Sister          Quad bypass    Heart attack Brother         AFib    Colon cancer Neg Hx         Current Medications        Current Outpatient Medications:     amLODIPine-valsartan (EXFORGE)  MG per tablet, Take 1 tablet by mouth Daily., Disp: 90 tablet, Rfl: 3    Ascorbic Acid (Vitamin C) 100 MG chewable tablet, Chew 1 tablet Daily., Disp: , Rfl:     cetirizine (zyrTEC) 10 MG tablet, Take 1 tablet by mouth Daily As Needed., Disp: , Rfl:     co-enzyme Q-10 30 MG capsule, Take 1 capsule by mouth Daily., Disp: , Rfl:     Continuous Blood Gluc Sensor (Dexcom G6 Sensor), Every 10 (Ten) Days., Disp: , Rfl:     CONTOUR NEXT TEST test strip, Test 6 times daily DX: e11.65, Disp: 200 each, Rfl: 5    diclofenac (VOLTAREN) 50 MG EC tablet, Take 1 tablet by mouth 2 (Two) Times a Day for 30 days., Disp: 60 tablet, Rfl: 0    digoxin (LANOXIN) 125 MCG tablet, Take 1 tablet by mouth Daily., Disp: 90 tablet, Rfl: 1    donepezil (ARICEPT) 10 MG tablet, Take 1 tablet by mouth Every Night., Disp: 90 tablet, Rfl: 1    insulin patient supplied pump, Inject  under the skin into the appropriate area as directed Continuous. Type of Insulin: Humulin R 500 Type of Pump:[employer]/iCrossing [id number] Bolus amount: 50 units daily.  Basal amount : Max basal 5.50u/hr Correction factor:  Insulin to carbohydrate ratio: 12a-12p 10                                                12p-2p 10                                                 2p -12 a 7  Date of last site change: 2/3/24 Location of catheter: right side on stomach  Frequency of refill: approximately 3 days Last refill date: per pt 2/3/24  Target 12a-3a 130-150              3a-6a -100-120              6a-9p 100-120              9p-12a 130-150  Active Ins time : 5 hours  Basal Pattern Setup 27.3  Prescriber Shara Campbell Phone number [phone], Disp: , Rfl:     insulin regular (HUMULIN R) 500 UNIT/ML CONCENTRATED injection, 50u daily with insulin pump (Patient taking differently: Inject 0.1 mL under  the skin into the appropriate area as directed Daily. 60u daily with insulin pump), Disp: 40 mL, Rfl: 3    L-Lysine 500 MG tablet tablet, Take 1 tablet by mouth Daily., Disp: , Rfl:     Lancets misc, Check 5 times daily DX: e11.65, Disp: 500 each, Rfl: 1    magnesium oxide (MAG-OX) 400 MG tablet, Take 1 tablet by mouth Daily., Disp: , Rfl:     memantine (NAMENDA) 10 MG tablet, 1 tablet BID, Disp: 180 tablet, Rfl: 1    metoclopramide (REGLAN) 5 MG tablet, , Disp: , Rfl:     multivitamin with minerals tablet tablet, Take 1 tablet by mouth Daily., Disp: , Rfl:     nebivolol (BYSTOLIC) 10 MG tablet, Take 1 tablet by mouth Daily., Disp: 90 tablet, Rfl: 3    Omega-3 Fatty Acids (fish oil) 1000 MG capsule capsule, Take 1 capsule by mouth 2 (Two) Times a Day With Meals., Disp: , Rfl:     ondansetron ODT (Zofran ODT) 4 MG disintegrating tablet, Place 1 tablet on the tongue Every 8 (Eight) Hours As Needed for Nausea or Vomiting., Disp: 30 tablet, Rfl: 1    pantoprazole (PROTONIX) 40 MG EC tablet, Take 1 tablet by mouth Daily., Disp: 90 tablet, Rfl: 1    phentermine 37.5 MG capsule, Take 1 capsule by mouth Every Morning., Disp: 30 capsule, Rfl: 0    rosuvastatin (CRESTOR) 40 MG tablet, Take 1 tablet by mouth Every Night., Disp: 90 tablet, Rfl: 1    spironolactone-hydrochlorothiazide (ALDACTAZIDE) 25-25 MG tablet, TAKE 1 TABLET BY MOUTH EVERY DAY, Disp: 90 tablet, Rfl: 0    Testosterone Cypionate (DEPOTESTOTERONE CYPIONATE) 200 MG/ML injection, Inject 1 mL into the appropriate muscle as directed by prescriber Every 14 (Fourteen) Days., Disp: 2 mL, Rfl: 0    venlafaxine XR (EFFEXOR-XR) 150 MG 24 hr capsule, Take 1 capsule by mouth Daily., Disp: 90 capsule, Rfl: 3    vitamin B-12 (CYANOCOBALAMIN) 1000 MCG tablet, Take 1 tablet by mouth Daily., Disp: , Rfl:     warfarin (COUMADIN) 5 MG tablet, TAKE 1 TABLET BY MOUTH DAILY OR AS DIRECTED BY YOUR PROVIDER, Disp: 90 tablet, Rfl: 3    methylPREDNISolone (MEDROL) 4 MG dose pack, Take  "as directed on package instructions., Disp: 1 each, Rfl: 0    Testosterone Cypionate (DEPOTESTOTERONE CYPIONATE) 200 MG/ML injection, INJECT 0.5ML INTO THE APPROPRIATE MUSCLE ONCE EVERY 14 DAYS AS DIRECTED, Disp: 2 mL, Rfl: 0    Current Facility-Administered Medications:     Testosterone Cypionate (DEPOTESTOTERONE CYPIONATE) injection 100 mg, 100 mg, Intramuscular, Q14 Days, Chewning, Esthela, APRN, 100 mg at 05/23/25 1045     Allergies     Allergies   Allergen Reactions    Codeine Nausea Only    Semaglutide(0.25 Or 0.5mg-Dos) GI Intolerance       Social History       Social History     Social History Narrative    ** Merged History Encounter **            Immunizations     Immunization:  Immunization History   Administered Date(s) Administered    COVID-19 (PFIZER) Purple Cap Monovalent 03/01/2021, 03/08/2021, 03/29/2021    Fluzone High-Dose 65+YRS 11/07/2024    Fluzone High-Dose 65+yrs 10/18/2020, 11/15/2021, 11/10/2022, 12/05/2023    Pneumococcal Conjugate 20-Valent (PCV20) 11/07/2024    Pneumococcal Polysaccharide (PPSV23) 01/06/2010, 08/05/2022    Shingrix 04/10/2024, 06/26/2024    Zostavax 08/16/2017          Objective     Objective     Vital Signs:   /61 (BP Location: Left arm, Patient Position: Sitting, Cuff Size: Adult)   Pulse 58   Temp 97.3 °F (36.3 °C) (Temporal)   Ht 182.9 cm (72\")   Wt 130 kg (286 lb)   SpO2 100%   BMI 38.79 kg/m²       Physical Exam  Constitutional:       Appearance: Normal appearance.   HENT:      Nose: Nose normal.      Mouth/Throat:      Mouth: Mucous membranes are moist.   Cardiovascular:      Rate and Rhythm: Normal rate and regular rhythm.      Pulses: Normal pulses.      Heart sounds: Normal heart sounds.   Pulmonary:      Effort: Pulmonary effort is normal.      Breath sounds: Normal breath sounds.   Skin:     General: Skin is warm and dry.   Neurological:      General: No focal deficit present.      Mental Status: He is alert and oriented to person, place, and time. "   Psychiatric:         Mood and Affect: Mood normal.         Behavior: Behavior normal.         Physical Exam  Respiratory: Clear to auscultation, no wheezing, rales or rhonchi  Musculoskeletal: Tenderness in the right shoulder muscle  Other: Height is 6 feet 7 inches      Results    The following data was reviewed by: JOHNNA Machado on 06/03/2025:          Results         Assessment and Plan        Assessment and Plan    Diagnoses and all orders for this visit:    1. Bursitis of left shoulder (Primary)  -     methylPREDNISolone (MEDROL) 4 MG dose pack; Take as directed on package instructions.  Dispense: 1 each; Refill: 0    2. Hypogonadism male  -     Testosterone Cypionate (DEPOTESTOTERONE CYPIONATE) 200 MG/ML injection; Inject 1 mL into the appropriate muscle as directed by prescriber Every 14 (Fourteen) Days.  Dispense: 2 mL; Refill: 0    3. Class 2 severe obesity with serious comorbidity and body mass index (BMI) of 39.0 to 39.9 in adult, unspecified obesity type  Assessment & Plan:  Patient's (Body mass index is 38.79 kg/m².) indicates that they are morbidly/severely obese (BMI > 40 or > 35 with obesity - related health condition) with health conditions that include none . Weight is unchanged. BMI  is above average; BMI management plan is completed. We discussed portion control and increasing exercise.     Orders:  -     phentermine 37.5 MG capsule; Take 1 capsule by mouth Every Morning.  Dispense: 30 capsule; Refill: 0        Assessment & Plan  1. Weight management.  - Phentermine has resulted in weight loss and increased energy levels.  - Advised to maintain a daily caloric intake of 1600 calories, supplemented with a 200-calorie protein bar before bedtime to stabilize blood sugar levels.  - Encouraged to incorporate SEEQ protein into the diet as an alternative to protein bars.    2. Hypoglycemia.  - Blood sugar levels have been running lower since starting phentermine.  - Advised to monitor blood  sugar closely, especially during the night, and consume a protein bar or drink if levels drop below 65.  - Appointment scheduled on 06/30/2025 to adjust medication if necessary.    3. Arm pain.  - Pain localized in the muscle, possibly due to tendinitis or bursitis.  - Medrol Dosepak prescribed for a 5-day course to alleviate pain.  - If effective, an injection may be considered for future treatment. If pain persists, patient can return for an injection.    4. Medication management.  - Testosterone prescription has been refilled.  - Prescription for Diflucan provided to manage yeast infection.    Follow-up in 1 month.        Follow Up        Follow Up   Return in about 4 weeks (around 7/1/2025), or if symptoms worsen or fail to improve.  Patient was given instructions and counseling regarding his condition or for health maintenance advice. Please see specific information pulled into the AVS if appropriate.    Answers submitted by the patient for this visit:  Weight Management (Submitted on 5/29/2025)  Chief Complaint: Weight Management  Weight: decreased  Weight loss treatment: low calorie, low carb diet, prescription medications  Eating habit changes: Reduced appetite  Energy level: increased  Physical activity tolerance: improved  Treatment barriers: none

## 2025-06-05 ENCOUNTER — OFFICE VISIT (OUTPATIENT)
Dept: NEUROLOGY | Facility: CLINIC | Age: 75
End: 2025-06-05
Payer: MEDICARE

## 2025-06-05 VITALS
BODY MASS INDEX: 38.49 KG/M2 | SYSTOLIC BLOOD PRESSURE: 171 MMHG | HEIGHT: 72 IN | DIASTOLIC BLOOD PRESSURE: 76 MMHG | WEIGHT: 284.2 LBS | HEART RATE: 62 BPM

## 2025-06-05 DIAGNOSIS — I63.81 LACUNAR INFARCTION: ICD-10-CM

## 2025-06-05 DIAGNOSIS — G31.84 MILD COGNITIVE IMPAIRMENT: Primary | ICD-10-CM

## 2025-06-05 RX ORDER — MEMANTINE HYDROCHLORIDE 10 MG/1
TABLET ORAL
Qty: 180 TABLET | Refills: 1 | Status: SHIPPED | OUTPATIENT
Start: 2025-06-05

## 2025-06-05 RX ORDER — DONEPEZIL HYDROCHLORIDE 10 MG/1
10 TABLET, FILM COATED ORAL NIGHTLY
Qty: 90 TABLET | Refills: 1 | Status: SHIPPED | OUTPATIENT
Start: 2025-06-05

## 2025-06-05 NOTE — PROGRESS NOTES
"Chief Complaint  Neurologic Problem    Subjective          Pastor Bartholomew presents to [facility]  History of Present Illness    History of Present Illness  The patient presents for follow-up regarding mild cognitive impairment, history of lacunar infarct, hyperlipidemia, diabetes, and hypertension.    He continues to take donepezil 10 mg nightly and memantine 10 mg twice daily for memory augmentation, reporting satisfactory tolerance to both medications. Currently, he is on Eliquis until the end of the month, after which he will transition to Coumadin. Additionally, he is taking rosuvastatin for vascular risk reduction.    Recent lab results indicate his A1c remains elevated at 7.3 but shows some improvement. His LDL is within goal at 46, and triglycerides are slightly elevated at 158. He recalls that his triglyceride level was significantly high at 1000 when first diagnosed with diabetes.    He reports an improvement in his overall condition, attributing this to a diet pill prescribed by his primary care physician. This medication has increased his energy levels, leading to heightened activity and productivity. However, it is noteworthy that the diet pill contains opioids, necessitating close monitoring.    INTERVAL: Since the last visit, he reports improved energy and activity levels due to a diet pill prescribed by his primary care physician.    MEDICATIONS  CURRENT MEDS:  Donepezil 10 mg Oral Nightly  Memantine 10 mg Oral Twice daily  Rosuvastatin Oral  Eliquis Oral  PREVIOUS MEDS:  Coumadin  Reason for Discontinuation: Transitioning to Eliquis       Objective   Vital Signs:   BP (!) 184/71   Pulse 62   Ht 182.9 cm (72\")   Wt 129 kg (284 lb 3.2 oz)   BMI 38.54 kg/m²     Physical Exam  HENT:      Head: Normocephalic.   Pulmonary:      Effort: Pulmonary effort is normal.   Neurological:      Mental Status: He is alert and oriented to person, place, and time.      " Sensory: Sensation is intact.      Motor: Motor function is intact.      Coordination: Coordination is intact.      Deep Tendon Reflexes: Reflexes are normal and symmetric.        Neurological Exam  Mental Status  Alert. Oriented to person, place, and time.    Sensory  Normal sensation.    Reflexes  Deep tendon reflexes are 2+ and symmetric in all four extremities.    Coordination    Finger-to-nose, rapid alternating movements and heel-to-shin normal bilaterally without dysmetria.      Result Review :   CBC:  Lab Results   Component Value Date    WBC 8.90 09/04/2024    RBC 4.99 09/04/2024    HGB 15.4 09/04/2024    HCT 45.2 09/04/2024    MCV 90.6 09/04/2024    MCH 30.9 09/04/2024    MCHC 34.1 09/04/2024    RDW 13.8 09/04/2024     09/04/2024     CMP:  Lab Results   Component Value Date     (H) 12/19/2022    BUN 10 04/16/2025    CREATININE 1.12 04/16/2025    EGFRIFNONA 80 12/17/2021    EGFRIFAFRI >60 12/19/2022     04/16/2025    K 4.2 04/16/2025     04/16/2025    CALCIUM 9.2 04/16/2025    ALBUMIN 3.9 04/16/2025    BILITOT 0.6 04/16/2025    ALKPHOS 83 04/16/2025    AST 24 04/16/2025    ALT 22 04/16/2025     LIPID PANEL:  Lab Results   Component Value Date    CHLPL 130 06/01/2021    TRIG 158 (H) 12/18/2024    HDL 26 (L) 12/18/2024    VLDL 27 12/18/2024    LDL 46 12/18/2024    LDLHDL 1.59 12/18/2024     HGBA1C(MOST RECENT):  Lab Results   Component Value Date    HGBA1C 7.30 (H) 03/14/2025     B12:   Lab Results   Component Value Date    TPCJWSLV71 746 12/18/2024      FOLATE:   Lab Results   Component Value Date    FOLATE 17.70 07/24/2023     TSH/FREE T4:   Lab Results   Component Value Date    TSH 0.813 12/18/2024    FREET4 0.87 (L) 11/03/2023                Assessment and Plan    There are no diagnoses linked to this encounter.    Assessment & Plan  1. Mild cognitive impairment.  He will continue with the current regimen of donepezil 10 mg nightly and memantine 10 mg twice daily for memory  augmentation. Refills for donepezil and memantine have been sent to the pharmacy.    2. History of lacunar infarct.  He is currently on Eliquis and will transition to Coumadin at the end of the month. He is advised to obtain a home INR monitor machine to facilitate easier monitoring of INR levels. This will help avoid frequent blood work and ensure better management of his anticoagulation therapy.    3. Hyperlipidemia.  His last LDL was within goal at 46, and triglycerides were slightly elevated at 158 but much improved. He will continue on rosuvastatin for cholesterol management.    4. Diabetes.  His last A1c remains elevated at 7.3 but is somewhat improved. He is advised to continue with his current diabetes management plan, including medications and lifestyle modifications.    5. Hypertension.  His blood pressure will be rechecked during this visit to ensure it is within the desired range.    Follow-up  The patient will follow up in 6 months.         Follow Up   Return in about 6 months (around 12/5/2025) for memory f/u.  Patient was given instructions and counseling regarding his condition or for health maintenance advice. Please see specific information pulled into the AVS if appropriate.       Patient or patient representative verbalized consent for the use of Ambient Listening during the visit with  JOHNNA Savage for chart documentation. 6/5/2025  10:13 EDT

## 2025-06-06 ENCOUNTER — CLINICAL SUPPORT (OUTPATIENT)
Dept: FAMILY MEDICINE CLINIC | Facility: CLINIC | Age: 75
End: 2025-06-06
Payer: MEDICARE

## 2025-06-06 DIAGNOSIS — E29.1 HYPOGONADISM IN MALE: Primary | ICD-10-CM

## 2025-06-06 RX ADMIN — TESTOSTERONE CYPIONATE 100 MG: 200 INJECTION, SOLUTION INTRAMUSCULAR at 10:35

## 2025-06-23 ENCOUNTER — CLINICAL SUPPORT (OUTPATIENT)
Dept: FAMILY MEDICINE CLINIC | Facility: CLINIC | Age: 75
End: 2025-06-23
Payer: MEDICARE

## 2025-06-23 ENCOUNTER — TRANSCRIBE ORDERS (OUTPATIENT)
Dept: LAB | Facility: HOSPITAL | Age: 75
End: 2025-06-23
Payer: MEDICARE

## 2025-06-23 ENCOUNTER — LAB (OUTPATIENT)
Dept: LAB | Facility: HOSPITAL | Age: 75
End: 2025-06-23
Payer: MEDICARE

## 2025-06-23 DIAGNOSIS — E11.9 DIABETES MELLITUS WITHOUT COMPLICATION: Primary | ICD-10-CM

## 2025-06-23 DIAGNOSIS — E29.1 HYPOGONADISM IN MALE: Primary | ICD-10-CM

## 2025-06-23 DIAGNOSIS — E11.9 DIABETES MELLITUS WITHOUT COMPLICATION: ICD-10-CM

## 2025-06-23 LAB
ALBUMIN SERPL-MCNC: 4 G/DL (ref 3.5–5.2)
ALBUMIN UR-MCNC: 3.9 MG/DL
ALBUMIN/GLOB SERPL: 1.5 G/DL
ALP SERPL-CCNC: 71 U/L (ref 39–117)
ALT SERPL W P-5'-P-CCNC: 20 U/L (ref 1–41)
ANION GAP SERPL CALCULATED.3IONS-SCNC: 10.2 MMOL/L (ref 5–15)
AST SERPL-CCNC: 19 U/L (ref 1–40)
BILIRUB SERPL-MCNC: 0.4 MG/DL (ref 0–1.2)
BUN SERPL-MCNC: 11 MG/DL (ref 8–23)
BUN/CREAT SERPL: 10.8 (ref 7–25)
CALCIUM SPEC-SCNC: 9.2 MG/DL (ref 8.6–10.5)
CHLORIDE SERPL-SCNC: 103 MMOL/L (ref 98–107)
CHOLEST SERPL-MCNC: 124 MG/DL (ref 0–200)
CO2 SERPL-SCNC: 27.8 MMOL/L (ref 22–29)
CREAT SERPL-MCNC: 1.02 MG/DL (ref 0.76–1.27)
CREAT UR-MCNC: 169.1 MG/DL
EGFRCR SERPLBLD CKD-EPI 2021: 78.1 ML/MIN/1.73
FOLATE SERPL-MCNC: 12.3 NG/ML (ref 4.78–24.2)
GLOBULIN UR ELPH-MCNC: 2.7 GM/DL
GLUCOSE SERPL-MCNC: 143 MG/DL (ref 65–99)
HBA1C MFR BLD: 7.3 % (ref 4.8–5.6)
HDLC SERPL-MCNC: 30 MG/DL (ref 40–60)
LDLC SERPL CALC-MCNC: 64 MG/DL (ref 0–100)
LDLC/HDLC SERPL: 1.95 {RATIO}
MICROALBUMIN/CREAT UR: 23.1 MG/G (ref 0–29)
POTASSIUM SERPL-SCNC: 5 MMOL/L (ref 3.5–5.2)
PROT SERPL-MCNC: 6.7 G/DL (ref 6–8.5)
SODIUM SERPL-SCNC: 141 MMOL/L (ref 136–145)
T4 FREE SERPL-MCNC: 0.79 NG/DL (ref 0.92–1.68)
TRIGL SERPL-MCNC: 177 MG/DL (ref 0–150)
TSH SERPL DL<=0.05 MIU/L-ACNC: 0.99 UIU/ML (ref 0.27–4.2)
VIT B12 BLD-MCNC: 613 PG/ML (ref 211–946)
VLDLC SERPL-MCNC: 30 MG/DL (ref 5–40)

## 2025-06-23 PROCEDURE — 80061 LIPID PANEL: CPT

## 2025-06-23 PROCEDURE — 36415 COLL VENOUS BLD VENIPUNCTURE: CPT

## 2025-06-23 PROCEDURE — 80053 COMPREHEN METABOLIC PANEL: CPT

## 2025-06-23 PROCEDURE — 82043 UR ALBUMIN QUANTITATIVE: CPT

## 2025-06-23 PROCEDURE — 84439 ASSAY OF FREE THYROXINE: CPT

## 2025-06-23 PROCEDURE — 83036 HEMOGLOBIN GLYCOSYLATED A1C: CPT

## 2025-06-23 PROCEDURE — 96372 THER/PROPH/DIAG INJ SC/IM: CPT

## 2025-06-23 PROCEDURE — 84443 ASSAY THYROID STIM HORMONE: CPT

## 2025-06-23 PROCEDURE — 82746 ASSAY OF FOLIC ACID SERUM: CPT

## 2025-06-23 PROCEDURE — 82570 ASSAY OF URINE CREATININE: CPT

## 2025-06-23 PROCEDURE — 82607 VITAMIN B-12: CPT

## 2025-06-23 RX ADMIN — TESTOSTERONE CYPIONATE 100 MG: 200 INJECTION, SOLUTION INTRAMUSCULAR at 08:06

## 2025-06-26 RX ORDER — SPIRONOLACTONE AND HYDROCHLOROTHIAZIDE 25; 25 MG/1; MG/1
1 TABLET ORAL DAILY
Qty: 90 TABLET | Refills: 0 | Status: SHIPPED | OUTPATIENT
Start: 2025-06-26

## 2025-07-07 ENCOUNTER — CLINICAL SUPPORT (OUTPATIENT)
Dept: FAMILY MEDICINE CLINIC | Facility: CLINIC | Age: 75
End: 2025-07-07
Payer: MEDICARE

## 2025-07-07 ENCOUNTER — LAB (OUTPATIENT)
Dept: LAB | Facility: HOSPITAL | Age: 75
End: 2025-07-07
Payer: MEDICARE

## 2025-07-07 ENCOUNTER — OFFICE VISIT (OUTPATIENT)
Dept: FAMILY MEDICINE CLINIC | Facility: CLINIC | Age: 75
End: 2025-07-07
Payer: MEDICARE

## 2025-07-07 ENCOUNTER — ANTICOAGULATION VISIT (OUTPATIENT)
Dept: CARDIOLOGY | Facility: CLINIC | Age: 75
End: 2025-07-07
Payer: MEDICARE

## 2025-07-07 VITALS
HEART RATE: 65 BPM | DIASTOLIC BLOOD PRESSURE: 63 MMHG | SYSTOLIC BLOOD PRESSURE: 139 MMHG | TEMPERATURE: 97.8 F | WEIGHT: 291 LBS | BODY MASS INDEX: 39.42 KG/M2 | OXYGEN SATURATION: 99 % | HEIGHT: 72 IN

## 2025-07-07 DIAGNOSIS — E66.01 CLASS 2 SEVERE OBESITY WITH SERIOUS COMORBIDITY AND BODY MASS INDEX (BMI) OF 39.0 TO 39.9 IN ADULT, UNSPECIFIED OBESITY TYPE: ICD-10-CM

## 2025-07-07 DIAGNOSIS — I10 ESSENTIAL HYPERTENSION: Chronic | ICD-10-CM

## 2025-07-07 DIAGNOSIS — E29.1 HYPOGONADISM MALE: ICD-10-CM

## 2025-07-07 DIAGNOSIS — E11.65 TYPE 2 DIABETES MELLITUS WITH HYPERGLYCEMIA, WITH LONG-TERM CURRENT USE OF INSULIN: ICD-10-CM

## 2025-07-07 DIAGNOSIS — Z51.81 MEDICATION MONITORING ENCOUNTER: Primary | ICD-10-CM

## 2025-07-07 DIAGNOSIS — M19.90 ARTHRITIS: ICD-10-CM

## 2025-07-07 DIAGNOSIS — Z51.81 MEDICATION MONITORING ENCOUNTER: ICD-10-CM

## 2025-07-07 DIAGNOSIS — E29.1 HYPOGONADISM IN MALE: Primary | ICD-10-CM

## 2025-07-07 DIAGNOSIS — Z79.4 TYPE 2 DIABETES MELLITUS WITH HYPERGLYCEMIA, WITH LONG-TERM CURRENT USE OF INSULIN: ICD-10-CM

## 2025-07-07 DIAGNOSIS — I48.0 PAROXYSMAL ATRIAL FIBRILLATION: ICD-10-CM

## 2025-07-07 DIAGNOSIS — E66.812 CLASS 2 SEVERE OBESITY WITH SERIOUS COMORBIDITY AND BODY MASS INDEX (BMI) OF 39.0 TO 39.9 IN ADULT, UNSPECIFIED OBESITY TYPE: ICD-10-CM

## 2025-07-07 LAB
BASOPHILS # BLD AUTO: 0.06 10*3/MM3 (ref 0–0.2)
BASOPHILS NFR BLD AUTO: 0.8 % (ref 0–1.5)
DEPRECATED RDW RBC AUTO: 44 FL (ref 37–54)
EOSINOPHIL # BLD AUTO: 0.06 10*3/MM3 (ref 0–0.4)
EOSINOPHIL NFR BLD AUTO: 0.8 % (ref 0.3–6.2)
ERYTHROCYTE [DISTWIDTH] IN BLOOD BY AUTOMATED COUNT: 13.8 % (ref 12.3–15.4)
HCT VFR BLD AUTO: 48.2 % (ref 37.5–51)
HGB BLD-MCNC: 15.7 G/DL (ref 13–17.7)
IMM GRANULOCYTES # BLD AUTO: 0.05 10*3/MM3 (ref 0–0.05)
IMM GRANULOCYTES NFR BLD AUTO: 0.6 % (ref 0–0.5)
INR PPP: 1.28 (ref 0.86–1.15)
LYMPHOCYTES # BLD AUTO: 1.7 10*3/MM3 (ref 0.7–3.1)
LYMPHOCYTES NFR BLD AUTO: 21.7 % (ref 19.6–45.3)
MCH RBC QN AUTO: 28.3 PG (ref 26.6–33)
MCHC RBC AUTO-ENTMCNC: 32.6 G/DL (ref 31.5–35.7)
MCV RBC AUTO: 86.8 FL (ref 79–97)
MONOCYTES # BLD AUTO: 0.61 10*3/MM3 (ref 0.1–0.9)
MONOCYTES NFR BLD AUTO: 7.8 % (ref 5–12)
NEUTROPHILS NFR BLD AUTO: 5.34 10*3/MM3 (ref 1.7–7)
NEUTROPHILS NFR BLD AUTO: 68.3 % (ref 42.7–76)
NRBC BLD AUTO-RTO: 0 /100 WBC (ref 0–0.2)
PLATELET # BLD AUTO: 288 10*3/MM3 (ref 140–450)
PMV BLD AUTO: 11.6 FL (ref 6–12)
PROTHROMBIN TIME: 16.6 SECONDS (ref 11.8–14.9)
RBC # BLD AUTO: 5.55 10*6/MM3 (ref 4.14–5.8)
TESTOST SERPL-MCNC: 359 NG/DL (ref 193–740)
WBC NRBC COR # BLD AUTO: 7.82 10*3/MM3 (ref 3.4–10.8)

## 2025-07-07 PROCEDURE — 84403 ASSAY OF TOTAL TESTOSTERONE: CPT

## 2025-07-07 PROCEDURE — 3078F DIAST BP <80 MM HG: CPT

## 2025-07-07 PROCEDURE — 3051F HG A1C>EQUAL 7.0%<8.0%: CPT

## 2025-07-07 PROCEDURE — 85610 PROTHROMBIN TIME: CPT

## 2025-07-07 PROCEDURE — 3075F SYST BP GE 130 - 139MM HG: CPT

## 2025-07-07 PROCEDURE — 99214 OFFICE O/P EST MOD 30 MIN: CPT

## 2025-07-07 PROCEDURE — 85025 COMPLETE CBC W/AUTO DIFF WBC: CPT

## 2025-07-07 PROCEDURE — 96372 THER/PROPH/DIAG INJ SC/IM: CPT

## 2025-07-07 PROCEDURE — 1125F AMNT PAIN NOTED PAIN PRSNT: CPT

## 2025-07-07 PROCEDURE — 36415 COLL VENOUS BLD VENIPUNCTURE: CPT

## 2025-07-07 RX ORDER — WARFARIN SODIUM 6 MG/1
6 TABLET ORAL NIGHTLY
Qty: 90 TABLET | Refills: 1 | Status: SHIPPED | OUTPATIENT
Start: 2025-07-07

## 2025-07-07 RX ORDER — TESTOSTERONE CYPIONATE 200 MG/ML
200 INJECTION, SOLUTION INTRAMUSCULAR
Qty: 2 ML | Refills: 0 | Status: SHIPPED | OUTPATIENT
Start: 2025-07-07

## 2025-07-07 RX ORDER — ACYCLOVIR 400 MG/1
TABLET ORAL
COMMUNITY

## 2025-07-07 RX ORDER — DICLOFENAC SODIUM 75 MG/1
75 TABLET, DELAYED RELEASE ORAL 2 TIMES DAILY
Qty: 60 TABLET | Refills: 0 | Status: SHIPPED | OUTPATIENT
Start: 2025-07-07

## 2025-07-07 RX ORDER — PHENTERMINE HYDROCHLORIDE 37.5 MG/1
37.5 CAPSULE ORAL EVERY MORNING
Qty: 30 CAPSULE | Refills: 0 | Status: SHIPPED | OUTPATIENT
Start: 2025-07-07

## 2025-07-07 RX ORDER — TESTOSTERONE CYPIONATE 200 MG/ML
200 INJECTION, SOLUTION INTRAMUSCULAR
Status: SHIPPED | OUTPATIENT
Start: 2025-07-07

## 2025-07-07 RX ADMIN — TESTOSTERONE CYPIONATE 200 MG: 200 INJECTION, SOLUTION INTRAMUSCULAR at 14:04

## 2025-07-07 NOTE — PROGRESS NOTES
Chief Complaint     Obesity (Follow up on phentermine ), Shoulder Pain (Left arm pain still present ), and Difficulty Urinating (Pt states infrequent )    Patient or patient representative verbalized consent for the use of Ambient Listening during the visit with  JOHNNA Machado for chart documentation. 7/7/2025  10:21 EDT    History of Present Illness     Pastor Bartholomew is a 74 y.o. male who presents to [facility] .    History of Present Illness  The patient presents for a 30-day follow-up visit.    He reports no weight loss since his last visit. His diabetes specialist has advised him to inform us that he should not be prescribed phentermine again. He experiences low blood sugar levels at night, which he attributes to his current medication. This has led to him waking up in the middle of the night to eat and drink, resulting in an increased caloric intake. Despite a 10% adjustment to his insulin pump, his blood sugar levels continue to drop around 5:30 or 6:00 in the morning. He has informed his diabetes specialist about this issue and is awaiting further adjustments. He believes that if he can avoid eating at night, it will aid in his weight loss efforts. His blood sugar levels have also been significantly reduced during the day. His A1c level was 7.3 at his last visit, down from 7.6 previously.    He mentions that his insurance will not cover a PSA test until 09/2025. He also needs to have his blood drawn for warfarin monitoring.    He reports experiencing shoulder pain, which is exacerbated by cold weather. He has not tried any arthritis medications but has used muscle rubs and Aleve for relief. He also had severe arthritis in his hip, which required a hip replacement.    PAST SURGICAL HISTORY:  Hip replacement due to severe arthritis.         History      Past Medical History:   Diagnosis Date    Abnormal ECG 4/20/2023    Clogged artery    Allergic 1988    Atrial  fibrillation     Capps esophagus     Carotid artery occlusion 3-    Clotting disorder 2/5/2024    Colon polyp     Congenital heart disease 4/5/2024    Coronary artery disease Feb 5 2024    AFib    Depression 1995    Diabetes     Diverticulitis of colon     Diverticulosis 2/6/2024    Dizziness Feb5. 2024    GERD (gastroesophageal reflux disease) 2000    Headache     Headache, tension-type     Hernia, inguinal     History of total hip arthroplasty, right 02/01/2023    HL (hearing loss) 1997    Hyperlipidemia     Hypertension     Hypervitaminosis D  06/05/2019    Memory loss 2023    Neuropathy in diabetes 1990    Obesity 3/12/2023    Plantar fasciitis 2019    PONV (postoperative nausea and vomiting)     Shingles 2004    Sleep apnea     Type 2 diabetes mellitus        Past Surgical History:   Procedure Laterality Date    COLONOSCOPY      COLONOSCOPY N/A 04/25/2022    Procedure: COLONOSCOPY WITH POLYPECTOMY;  Surgeon: Artie Dickerson MD;  Location: [facility];  Service: Gastroenterology;  Laterality: N/A;  COLON POLYP    ENDOSCOPY N/A 04/25/2022    Procedure: ESOPHAGOGASTRODUODENOSCOPY WITH BX;  Surgeon: Artie Dickerson MD;  Location: [facility] END[facility];  Service: Gastroenterology;  Laterality: N/A;  CAPPS'S ESOPHAGUS, HIATAL HERNIA    ENDOSCOPY N/A 08/09/2024    Procedure: ESOPHAGOGASTRODUODENOSCOPY WITH BIOPSIES;  Surgeon: Artie Dickerson MD;  Location: [facility] END[facility];  Service: Gastroenterology;  Laterality: N/A;  HIATAL HERNIA, BARRETTS    HAND SURGERY  2015    Triger release on both thumbs    HERNIA REPAIR      JOINT REPLACEMENT  2/28/2023    KNEE SURGERY Bilateral     TOTAL HIP ARTHROPLASTY Right     TRIGGER FINGER RELEASE Bilateral     x2 THUMBS    UPPER GASTROINTESTINAL ENDOSCOPY  2022       Family History   Problem Relation Age of Onset    Diabetes Mother     Hypertension Mother     Heart attack Mother     Stroke Mother     COPD Brother     Diabetes Maternal Grandmother      Heart attack Sister         Quad bypass    Heart attack Brother         AFib    Colon cancer Neg Hx         Current Medications        Current Outpatient Medications:     amLODIPine-valsartan (EXFORGE)  MG per tablet, Take 1 tablet by mouth Daily., Disp: 90 tablet, Rfl: 3    Ascorbic Acid (Vitamin C) 100 MG chewable tablet, Chew 1 tablet Daily., Disp: , Rfl:     cetirizine (zyrTEC) 10 MG tablet, Take 1 tablet by mouth Daily As Needed., Disp: , Rfl:     co-enzyme Q-10 30 MG capsule, Take 1 capsule by mouth Daily., Disp: , Rfl:     Continuous Glucose Sensor (Dexcom G7 Sensor) misc, Use., Disp: , Rfl:     CONTOUR NEXT TEST test strip, Test 6 times daily DX: e11.65, Disp: 200 each, Rfl: 5    digoxin (LANOXIN) 125 MCG tablet, Take 1 tablet by mouth Daily., Disp: 90 tablet, Rfl: 1    donepezil (ARICEPT) 10 MG tablet, Take 1 tablet by mouth Every Night., Disp: 90 tablet, Rfl: 1    insulin patient supplied pump, Inject  under the skin into the appropriate area as directed Continuous. Type of Insulin: Humulin R 500 Type of Pump:Asurvest/Blockchain [phone] Bolus amount: 50 units daily.  Basal amount : Max basal 5.50u/hr Correction factor:  Insulin to carbohydrate ratio: 12a-12p 10                                                12p-2p 10                                                 2p -12 a 7  Date of last site change: 2/3/24 Location of catheter: right side on stomach  Frequency of refill: approximately 3 days Last refill date: per pt 2/3/24  Target 12a-3a 130-150              3a-6a -100-120              6a-9p 100-120              9p-12a 130-150  Active Ins time : 5 hours  Basal Pattern Setup 27.3  Prescriber Shara Campbell Phone number [phone], Disp: , Rfl:     insulin regular (HUMULIN R) 500 UNIT/ML CONCENTRATED injection, 50u daily with insulin pump (Patient taking differently: Inject 0.1 mL under the skin into the appropriate area as directed Daily. 60u daily with insulin pump), Disp: 40 mL, Rfl: 3     L-Lysine 500 MG tablet tablet, Take 1 tablet by mouth Daily., Disp: , Rfl:     Lancets misc, Check 5 times daily DX: e11.65, Disp: 500 each, Rfl: 1    magnesium oxide (MAG-OX) 400 MG tablet, Take 1 tablet by mouth Daily., Disp: , Rfl:     memantine (NAMENDA) 10 MG tablet, 1 tablet BID, Disp: 180 tablet, Rfl: 1    metoclopramide (REGLAN) 5 MG tablet, , Disp: , Rfl:     multivitamin with minerals tablet tablet, Take 1 tablet by mouth Daily., Disp: , Rfl:     nebivolol (BYSTOLIC) 10 MG tablet, Take 1 tablet by mouth Daily., Disp: 90 tablet, Rfl: 3    Omega-3 Fatty Acids (fish oil) 1000 MG capsule capsule, Take 1 capsule by mouth 2 (Two) Times a Day With Meals., Disp: , Rfl:     ondansetron ODT (Zofran ODT) 4 MG disintegrating tablet, Place 1 tablet on the tongue Every 8 (Eight) Hours As Needed for Nausea or Vomiting., Disp: 30 tablet, Rfl: 1    pantoprazole (PROTONIX) 40 MG EC tablet, Take 1 tablet by mouth Daily., Disp: 90 tablet, Rfl: 1    phentermine 37.5 MG capsule, Take 1 capsule by mouth Every Morning., Disp: 30 capsule, Rfl: 0    rosuvastatin (CRESTOR) 40 MG tablet, Take 1 tablet by mouth Every Night., Disp: 90 tablet, Rfl: 1    spironolactone-hydrochlorothiazide (ALDACTAZIDE) 25-25 MG tablet, TAKE 1 TABLET BY MOUTH ONCE DAILY, Disp: 90 tablet, Rfl: 0    Testosterone Cypionate (DEPOTESTOTERONE CYPIONATE) 200 MG/ML injection, Inject 1 mL into the appropriate muscle as directed by prescriber Every 14 (Fourteen) Days., Disp: 2 mL, Rfl: 0    venlafaxine XR (EFFEXOR-XR) 150 MG 24 hr capsule, Take 1 capsule by mouth Daily., Disp: 90 capsule, Rfl: 3    vitamin B-12 (CYANOCOBALAMIN) 1000 MCG tablet, Take 1 tablet by mouth Daily., Disp: , Rfl:     warfarin (COUMADIN) 5 MG tablet, TAKE 1 TABLET BY MOUTH DAILY OR AS DIRECTED BY YOUR PROVIDER, Disp: 90 tablet, Rfl: 3    diclofenac (VOLTAREN) 75 MG EC tablet, Take 1 tablet by mouth 2 (Two) Times a Day., Disp: 60 tablet, Rfl: 0    Current Facility-Administered Medications:     " Testosterone Cypionate (DEPOTESTOTERONE CYPIONATE) injection 100 mg, 100 mg, Intramuscular, Q14 Days, Esthela Arceo APRN, 100 mg at 06/23/25 0806     Allergies     Allergies   Allergen Reactions    Codeine Nausea Only    Semaglutide(0.25 Or 0.5mg-Dos) GI Intolerance       Social History       Social History     Social History Narrative    ** Merged History Encounter **            Immunizations     Immunization:  Immunization History   Administered Date(s) Administered    COVID-19 (PFIZER) Purple Cap Monovalent 03/01/2021, 03/08/2021, 03/29/2021    Fluzone High-Dose 65+YRS 11/07/2024    Fluzone High-Dose 65+yrs 10/18/2020, 11/15/2021, 11/10/2022, 12/05/2023    Pneumococcal Conjugate 20-Valent (PCV20) 11/07/2024    Pneumococcal Polysaccharide (PPSV23) 01/06/2010, 08/05/2022    Shingrix 04/10/2024, 06/26/2024    Zostavax 08/16/2017          Objective     Objective     Vital Signs:   /63 (BP Location: Right arm, Patient Position: Sitting, Cuff Size: Adult)   Pulse 65   Temp 97.8 °F (36.6 °C) (Temporal)   Ht 182.9 cm (72\")   Wt 132 kg (291 lb)   SpO2 99%   BMI 39.47 kg/m²       Physical Exam  Constitutional:       Appearance: Normal appearance.   HENT:      Nose: Nose normal.      Mouth/Throat:      Mouth: Mucous membranes are moist.   Cardiovascular:      Rate and Rhythm: Normal rate and regular rhythm.      Pulses: Normal pulses.      Heart sounds: Normal heart sounds.   Pulmonary:      Effort: Pulmonary effort is normal.      Breath sounds: Normal breath sounds.   Skin:     General: Skin is warm and dry.   Neurological:      General: No focal deficit present.      Mental Status: He is alert and oriented to person, place, and time.   Psychiatric:         Mood and Affect: Mood normal.         Behavior: Behavior normal.         Physical Exam  Respiratory: Clear to auscultation, no wheezing, rales or rhonchi  Cardiovascular: Regular rate and rhythm, no murmurs, rubs, or gallops      Results    The " following data was reviewed by: JOHNNA Machado on 07/07/2025:          Results  Labs   - A1c: 7.3       Assessment and Plan        Assessment and Plan    Diagnoses and all orders for this visit:    1. Medication monitoring encounter (Primary)  -     CBC w AUTO Differential; Future  -     Testosterone; Future    2. Hypogonadism male  -     Testosterone; Future  -     Testosterone Cypionate (DEPOTESTOTERONE CYPIONATE) 200 MG/ML injection; Inject 1 mL into the appropriate muscle as directed by prescriber Every 14 (Fourteen) Days.  Dispense: 2 mL; Refill: 0    3. Type 2 diabetes mellitus with hyperglycemia, with long-term current use of insulin  Assessment & Plan:  Diabetes is stable.   Continue current treatment regimen.  Diabetes will be reassessed in 6 months      4. Class 2 severe obesity with serious comorbidity and body mass index (BMI) of 39.0 to 39.9 in adult, unspecified obesity type  Assessment & Plan:  Patient's (Body mass index is 39.47 kg/m².) indicates that they are morbidly/severely obese (BMI > 40 or > 35 with obesity - related health condition) with health conditions that include diabetes mellitus . Weight is unchanged. BMI  is above average; BMI management plan is completed. We discussed portion control and increasing exercise.     Orders:  -     phentermine 37.5 MG capsule; Take 1 capsule by mouth Every Morning.  Dispense: 30 capsule; Refill: 0    5. Essential hypertension  Assessment & Plan:  Hypertension is stable and controlled  Continue current treatment regimen.  Blood pressure will be reassessed in 6 months.      6. Arthritis  -     diclofenac (VOLTAREN) 75 MG EC tablet; Take 1 tablet by mouth 2 (Two) Times a Day.  Dispense: 60 tablet; Refill: 0        Assessment & Plan  1. Diabetes Mellitus.  - Blood sugar levels have been dropping significantly during the night, causing nocturnal eating and affecting calorie intake and weight loss efforts.  - Diabetes doctor adjusted the pump by 10%,  but it did not fully resolve the issue; another adjustment will be made by the patient under guidance.  - Discussed the potential for a steroid injection if blood sugar levels stabilize and nocturnal eating ceases.  - Monitoring for stabilization of blood sugar levels and reduction in nocturnal eating.    2. Shoulder Pain.  - Reports shoulder pain that worsens with cold temperatures.  - Physical exam findings: Heart and lungs sound good.  - Diclofenac 50 mg twice daily prescribed for joint pain relief; can continue using muscle rub as needed.  - Effectiveness of diclofenac to be evaluated in one month.    3. Health Maintenance.  - PSA test deferred until 09/2025 due to insurance coverage limitations.  - CBC and testosterone level test to be conducted today.  - Warfarin level to be checked.    4. Weight Management.  - Phentermine prescription sent to pharmacy.  - Monitoring for any changes in weight and potential side effects.    Follow-up  - Follow up in 1 month.        Follow Up        Follow Up   Return in 4 weeks (on 8/4/2025), or if symptoms worsen or fail to improve.  Patient was given instructions and counseling regarding his condition or for health maintenance advice. Please see specific information pulled into the AVS if appropriate.    Answers submitted by the patient for this visit:  Problem not listed (Submitted on 6/30/2025)  Chief Complaint: Other medical problem  abdominal pain: No  anorexia: No  joint pain: Yes  change in stool: No  chest pain: No  chills: No  nasal congestion: No  cough: No  diaphoresis: Yes  fatigue: Yes  fever: No  headaches: No  joint swelling: Yes  myalgias: Yes  nausea: No  neck pain: No  numbness: No  rash: No  sore throat: No  swollen glands: No  dysuria: No  vertigo: No  visual change: No  vomiting: No  weakness: Yes  Onset: at an unknown time  Chronicity: recurrent  Frequency: daily  Medications tried: Steroid pack

## 2025-07-07 NOTE — PROGRESS NOTES
Lab Results   Component Value Date    INR 1.28 (H) 07/07/2025    PROTIME 16.6 (H) 07/07/2025     PAF    Range: 2.0-3.0    5mg    [facility]    Patient was on Eliquis and instructed to take 5mg of Warfarin and on 4th day to check INR.    Per Alexa:      Take 6 mg daily, recheck in one week     Left message for patient to call office.     Spoke with patient, patient is aware and verbalized understanding.

## 2025-07-07 NOTE — ASSESSMENT & PLAN NOTE
Diabetes is stable.   Continue current treatment regimen.  Diabetes will be reassessed in 6 months  
Hypertension is stable and controlled  Continue current treatment regimen.  Blood pressure will be reassessed in 6 months.  
Patient's (Body mass index is 39.47 kg/m².) indicates that they are morbidly/severely obese (BMI > 40 or > 35 with obesity - related health condition) with health conditions that include diabetes mellitus . Weight is unchanged. BMI  is above average; BMI management plan is completed. We discussed portion control and increasing exercise.   
97.7

## 2025-07-15 ENCOUNTER — TELEPHONE (OUTPATIENT)
Dept: CARDIOLOGY | Facility: CLINIC | Age: 75
End: 2025-07-15
Payer: MEDICARE

## 2025-07-16 ENCOUNTER — TELEPHONE (OUTPATIENT)
Dept: FAMILY MEDICINE CLINIC | Facility: CLINIC | Age: 75
End: 2025-07-16

## 2025-07-16 ENCOUNTER — ANTICOAGULATION VISIT (OUTPATIENT)
Dept: CARDIOLOGY | Facility: CLINIC | Age: 75
End: 2025-07-16
Payer: MEDICARE

## 2025-07-16 ENCOUNTER — CLINICAL SUPPORT (OUTPATIENT)
Dept: FAMILY MEDICINE CLINIC | Facility: CLINIC | Age: 75
End: 2025-07-16
Payer: MEDICARE

## 2025-07-16 DIAGNOSIS — R79.89 LOW TESTOSTERONE: Primary | ICD-10-CM

## 2025-07-16 DIAGNOSIS — I48.0 PAF (PAROXYSMAL ATRIAL FIBRILLATION): Primary | ICD-10-CM

## 2025-07-16 PROCEDURE — 96372 THER/PROPH/DIAG INJ SC/IM: CPT

## 2025-07-16 RX ADMIN — TESTOSTERONE CYPIONATE 200 MG: 200 INJECTION, SOLUTION INTRAMUSCULAR at 09:50

## 2025-07-17 ENCOUNTER — LAB (OUTPATIENT)
Dept: LAB | Facility: HOSPITAL | Age: 75
End: 2025-07-17
Payer: MEDICARE

## 2025-07-17 ENCOUNTER — ANTICOAGULATION VISIT (OUTPATIENT)
Dept: CARDIOLOGY | Facility: CLINIC | Age: 75
End: 2025-07-17
Payer: MEDICARE

## 2025-07-17 ENCOUNTER — APPOINTMENT (OUTPATIENT)
Dept: LAB | Facility: HOSPITAL | Age: 75
End: 2025-07-17
Payer: MEDICARE

## 2025-07-17 DIAGNOSIS — I48.0 PAF (PAROXYSMAL ATRIAL FIBRILLATION): ICD-10-CM

## 2025-07-17 LAB
INR PPP: 2.27 (ref 0.86–1.15)
PROTHROMBIN TIME: 26.2 SECONDS (ref 11.8–14.9)

## 2025-07-17 PROCEDURE — 36415 COLL VENOUS BLD VENIPUNCTURE: CPT

## 2025-07-17 PROCEDURE — 85610 PROTHROMBIN TIME: CPT

## 2025-07-24 ENCOUNTER — ANTICOAGULATION VISIT (OUTPATIENT)
Dept: CARDIOLOGY | Facility: CLINIC | Age: 75
End: 2025-07-24
Payer: MEDICARE

## 2025-07-24 ENCOUNTER — LAB (OUTPATIENT)
Dept: LAB | Facility: HOSPITAL | Age: 75
End: 2025-07-24
Payer: MEDICARE

## 2025-07-24 DIAGNOSIS — I48.0 PAF (PAROXYSMAL ATRIAL FIBRILLATION): ICD-10-CM

## 2025-07-24 LAB
INR PPP: 2.01 (ref 0.86–1.15)
PROTHROMBIN TIME: 23.7 SECONDS (ref 11.8–14.9)

## 2025-07-24 PROCEDURE — 36415 COLL VENOUS BLD VENIPUNCTURE: CPT

## 2025-07-24 PROCEDURE — 85610 PROTHROMBIN TIME: CPT

## 2025-07-24 NOTE — PROGRESS NOTES
Lab Results   Component Value Date    INR 2.01 (H) 07/24/2025    INR 2.27 (H) 07/17/2025    INR 1.28 (H) 07/07/2025    PROTIME 23.7 (H) 07/24/2025    PROTIME 26.2 (H) 07/17/2025    PROTIME 16.6 (H) 07/07/2025     PAF    Range: 2.0-3.0    6mg    [facility]    Per Alexa:    Normal, continue same dose, recheck in 2 weeks     Spoke with patient, patient is aware and verbalized understanding.

## 2025-07-29 ENCOUNTER — TELEPHONE (OUTPATIENT)
Dept: FAMILY MEDICINE CLINIC | Facility: CLINIC | Age: 75
End: 2025-07-29

## 2025-07-29 DIAGNOSIS — E29.1 HYPOGONADISM MALE: ICD-10-CM

## 2025-07-29 NOTE — TELEPHONE ENCOUNTER
Patient ID: Maria R Webb is a 47 y.o. female Date of Birth 1977       Chief Complaint   Patient presents with    Follow Up Wound Care Visit       Allergies:  Patient has no known allergies.    Diagnosis:   Diagnosis ICD-10-CM Associated Orders   1. Non-healing surgical wound, subsequent encounter  T81.89XD Wound cleansing and dressings Abdomen     Wound Procedure Treatment Abdomen      2. Obesity, Class III, BMI 40-49.9 (morbid obesity) ([facility])  E66.01       3. S/P hysterectomy  Z90.710       4. Tobacco use disorder  F17.200       5. S/P hernia repair  Z98.890     Z87.19       6. Rheumatoid arthritis involving multiple sites with positive rheumatoid factor ([facility])  M05.79            Assessment  & Plan:    F/u surgical wound of abdomen related to dehiscence following hysterectomy resulting in abdominal herniation with further dehiscence post hernia repair. Difficult to get accurate measurement of the wound depth give the remaining opening is very small in diameter. Drainage is serousanguineous, green discoloration has resolved. Periwound with scar tissue, fungal dermatitis and MASD has resolved.   Continue with gentamicin ointment for an additional three days then switch to application of silver hydrogel daily. Continue to keep site covered and utilize a barrier cream to protect skin from drainage.   Would benefit from smoking cessation.   Hold on starting Humira given concern for bacterial colonization and potential for worsening infection with biologic.   Monitor for fevers, chills, abdominal pain, N/V, change in BM. Notify office should these occur.   F/u in one week. Instructed to call if any questions or concerns arise in meantime.            Subjective:   3/11/2024: Maria R is a 46-year-old female who is presenting to wound center today for follow-up of abdominal wound s/p hysterectomy complicated by fascial dehiscence and abdominal herniation that required incisional hernia repair w/ mesh.  Has been following  Testosterone UDS & Consent 9/20/24  LOV 7/7/25   "with wound center since January '23.  Per wound center visit on 2/5/2024 \"saw Dr. Conroy, general surgery since prior visit whom did not feel as though any further medication/imaging/intervention was necessary at this time and would like pt to continue with local wound care and follow up with him in 3 months.\" At last wound center visit, was changed to Dakins soaked packing after she was experiencing green drainage.  She was instructed to resume silver gel coated packing once the green drainage resolved.  She reports that she has been utilizing Dakin soaked packing over the past week and had not switched to green drainage though this seemed to resolve a few days ago. Of note, is to have recently started Ozempic for weight reduction.  Currently on Z-Dallin and prednisone for bronchitis.  She denies fever and/or chills.  Continues with cough and shortness of breath but no acute worsening.    03/18/24: Pt presents for f/u surgical abdominal wound dehiscence s/p hysterectomy complicated by fascial dehiscence and abdominal herniation requiring incision hernia repair with mesh complicated by further dehiscence. Pt notes that Dr. Lopez was able to surgically debride a piece of remaining mesh at her visit last week and opted to continue with Dakin soaked packing giving significant improvement in the wound with its use. No nausea, vomiting, severe abdominal pain, change in bowel habits nor fevers/chills. Continuing to smoke about the same amount.     04/08/24: Pt presents for f/u surgical abdominal wound dehiscence. Since her previous visit she has had f/u with Dr. Conroy, general surgery. No further f/u with general surgery was required, pt is to continue with wound care but may f/u with surgery if needed. Pt reports she noticed a slight increase in the odor related to the drainage the other day so she used Dakins and it went away. She ran out of the AMD packing therefore returned to use of the silver gel coated packing. " Is feeling well. Has started Ozempic and has noticed a 3 lb weight loss already. Smoking about the same amount. No fevers, chills.     04/15/24: Pt presents for wound related to surgical site dehiscence of the abdomen. Has been using endoform am packing into the remaining opening. Has not had a return of green or foul smelling drainage since her previous visit. Offers no complaints today. No significant changes in medical history since prior visit.     04/29/24: Pt presents for f/u wound related to surgical site dehiscence of the abdomen. Is using endoform am but has noticed a more obvious odor since switching from use of silver gel to the endoform. Drainage is tan in color. Aside from the odor of the drainage offers no complaints today.     05/06/24: Pt presents for f/u wound related to surgical site dehiscence of the abdomen. Has been using gentamicin ointment; packing is no longer staying in well. The odor from the drainage has improved from the prior visit. Offers no complaints today.     05/20/24: Pt presents for f/u wound related to surgical site dehiscence of the abdomen. She has continued with gentamicin ointment three times daily. The wound continues to drain, she has not noticed a return of odor or odd coloration to the drainage. Is continuing to keep the site covered. Denies abdominal pain, N/V, change in bowel movements, fevers, chils.     06/03/24: Pt presents for f/u wound secondary to surgical site dehiscence of the abdomen. Currently Medihoney is being used. ABD is being used to cover the site. There continues to be small-moderate drainage without any foul smell or green discoloration of the drainage. Pt offers no complaints today.     06/24/24: Pt presents for f/u wound secondary to surgical site dehiscence of the abdomen. She is currently using Medihoney. She believes there is less drainage. Current drainage on the pad is honey-colored therefore it is difficult to tell if it is truly drainage or  the Medihoney being placed into the wound. No significant changes since past visit. She is inquiring if she can start Humira again for her RA; was on hold d/t wound.     07/01/24: Patient presents for follow-up of wound secondary to surgical site dehiscence of the abdomen.  Midweek patient began to notice an odor again to her abdominal wound therefore she resumed use of Medihoney to the wound bed and zinc to the periwound.  Today when the dressing was taken off there was a significant amount of blood on the overlying dressing.  Patient does not report noticing any significant bleeding as of last night.  She does state that her activity level was significantly increased from her typical levels this past week due to being short staffed at work as well as doing projects around the house.  Has not noticed any significant changes in terms of pain, change in bowel movements, fevers or chills.      07/08/24: Pt presents for f/u wound secondary to surgical site dehiscence of the abdomen. She has been packing the wound with Iodoform packing and using medihoney to the cracks of periwound skin. Had a brief return of green drainage however this resolved quickly. Saw rheumatology since her previous visit and is going to be reinitiated on Humira following pre-approval process as long as she gets clearance from wound care perspective.      07/22/24: Pt presents for f/u wound secondary to surgical site dehiscence of the abdomen. Has been using Iodoform packing however it is barely fitting and is not staying in place well. She reports being more sweaty this week working on some painting in her house and has noticed a rash of her surrounding skin. She tired to apply Medihoney since that has calmed things down in the past however even with application of Medihoney her surrounding skin still appears red/angry.       07/29/24: Pt presents for f/u wound secondary to surgical site dehiscence of the abdomen. Nystatin and zinc is being used  "to periwound which has significantly improved the skin around the wound. She has not started Humira yet, is still awaiting insurance approval.      08/05/24: Patient presents for follow-up abdominal wound secondary to surgical site dehiscence and exposed mesh. She trialed not using the Medihoney to see if the wound was still draining and had a small amount of drainage coming from the wound still. Reports she was approved for Humira and is wondering if it would be appropriate to start this again with her open wound not fully healed yet.     08/19/24: Pt presents for f/u abdominal wound secondary to surgical site dehiscence (hysterectomy complicated by fascial dehiscence and abdominal herniation requiring incision hernia repair with mesh complicated by further dehiscence). She notes that since her previous visit she had another episode of significant bleeding from her abdominal. It resolved after a few hours. She reports wearing a waterproof bandage instead of using the ABD leading to a return of a rash on her abdomen. Is wondering if additional abdominal imaging is necessary.       08/26/24: HPI per covering provider, \"Patient presents to wound care center for follow-up visit of surgical abdominal wound.  Patient states that she recently noted small amount of blue-green drainage from wound.  Patient states when this happens that she has been instructed to pack the wound with Dakin's moistened plain packing daily until the discoloration to the drainage stops. Patient has been applying betadine to the wound and then silver alginate to the wound daily prior to restarting the dakin's.  Patient denies any episodes of bleeding.  Denies increased pain and state drainage amount has been the same, just noted change in color.  No fever or chills. Patient reports that she is unable to follow at the wound center any other day by Monday due to her work schedule.\"     09/09/24: Pt presents for f/u chronic abdominal wound following " surgical site dehiscence.  She reports that every time she has tried to stop using the Dakin solution there is a return of green drainage. Has continued with barrier cream to periwound. Has not yet started her Humira d/t infection concerns with her open wound and being on a biologic. No alarm sx, denies fevers, chills, N/V. Is continuing to have regular BM.     09/16/24: Pt presents for f/u chronic draining abdominal wound following surgical site dehiscence. She has been using gentamicin ointment at least twice daily this past week and has noticed an improvement in the color/quality/amount of drainage. Periwound rash has been improving as well. No concerning sx.           The following portions of the patient's history were reviewed and updated as appropriate:   Patient Active Problem List   Diagnosis    Tobacco use disorder    COVID-19    Obesity, morbid, BMI 50 or higher (HCC)    Bulky or enlarged uterus    Pelvic pain    Preoperative exam for gynecologic surgery    HTN (hypertension)    Gastroesophageal reflux disease    Asthma    Obstructive sleep apnea syndrome    S/P hysterectomy    Postoperative anemia    Rheumatoid arthritis involving multiple sites with positive rheumatoid factor (HCC)    Encounter for postoperative care    Open wound of abdomen    Surgical wound, non healing    Cigarette nicotine dependence without complication     Past Medical History:   Diagnosis Date    Abdominal wall abscess at site of surgical wound 1/9/2023    Abnormal uterine bleeding due to intramural leiomyoma 08/16/2022    Arthritis     Rheumatoid    Asthma     Breathing difficulty     only occured during inverted robotic hysterectomy    Cellulitis of drainage site following surgery 1/9/2023    CPAP (continuous positive airway pressure) dependence     GERD (gastroesophageal reflux disease)     Hypertension     Obese     Pneumonia 2007    Sleep apnea     Uterine fibroid     LTH today 12/15/2022    Wound dehiscence, surgical  2022     Past Surgical History:   Procedure Laterality Date    ABDOMINAL WASHOUT      post  with wound vac     SECTION      had hematoma removed after the surgery    FOREARM SURGERY Right     repair of fracture with plating    INCISIONAL HERNIA REPAIR  2022    Procedure: REPAIR  RECURRENT HERNIA INCISIONAL WITH MESH;  Surgeon: Donna Lau MD;  Location: [facility];  Service: Gynecology    IR DRAINAGE TUBE CHECK WITH SCLEROSIS  2/3/2023    IR DRAINAGE TUBE CHECK WITH SCLEROSIS  3/7/2023    IR DRAINAGE TUBE CHECK/CHANGE/REPOSITION/REINSERTION/UPSIZE  2023    IR DRAINAGE TUBE CHECK/CHANGE/REPOSITION/REINSERTION/UPSIZE  2023    IR DRAINAGE TUBE CHECK/CHANGE/REPOSITION/REINSERTION/UPSIZE  2023    IR DRAINAGE TUBE CHECK/CHANGE/REPOSITION/REINSERTION/UPSIZE  2023    IR DRAINAGE TUBE PLACEMENT  2023    LAPAROTOMY N/A 2022    Procedure: LAPAROTOMY EXPLORATORY;  Surgeon: Donna Lau MD;  Location: [facility];  Service: Gynecology    MYRINGOTOMY W/ TUBES      two sets as a young child    WV CYSTOURETHROSCOPY N/A 12/15/2022    Procedure: CYSTO W/ ROBOT;  Surgeon: Donna Lau MD;  Location: [facility];  Service: Gynecology    WV LAPS TOTAL HYSTERECT 250 GM/< W/RMVL TUBE/OVARY N/A 12/15/2022    Procedure: (LTH) W/ BILATERAL SALPINGECTOMY  W/ ROBOT COVERTED TO OPEN;  Surgeon: Donna Lau MD;  Location: [facility];  Service: Gynecology    VAC DRESSING APPLICATION N/A 3/17/2023    Procedure: APPLICATION VAC DRESSING ABDOMEN/TRUNK;  Surgeon: Chan Conroy MD;  Location: [facility];  Service: General    WOUND DEBRIDEMENT N/A 3/15/2023    Procedure: DEBRIDEMENT Abdominal WOUND and placement of Wound VAC;  Surgeon: Chna Conroy MD;  Location: [facility];  Service: General    WOUND DEBRIDEMENT N/A 3/17/2023    Procedure: DEBRIDEMENT WOUND (WASH OUT);  Surgeon: Chan Conroy MD;  Location: [facility];  Service: General     Family  History   Problem Relation Age of Onset    Multiple sclerosis Mother     Hypertension Father     Hyperlipidemia Father     Cerebral aneurysm Father     Pulmonary embolism Father     Liver disease Brother      Social History     Socioeconomic History    Marital status:      Spouse name: None    Number of children: None    Years of education: None    Highest education level: None   Occupational History    None   Tobacco Use    Smoking status: Every Day     Current packs/day: 0.50     Average packs/day: 0.5 packs/day for 30.7 years (15.4 ttl pk-yrs)     Types: Cigarettes     Start date: 1995    Smokeless tobacco: Never   Vaping Use    Vaping status: Never Used   Substance and Sexual Activity    Alcohol use: Not Currently     Alcohol/week: 1.0 standard drink of alcohol     Types: 1 Standard drinks or equivalent per week     Comment: 3 x monthly    Drug use: Never    Sexual activity: Yes     Partners: Male     Birth control/protection: Male Sterilization   Other Topics Concern    None   Social History Narrative    None     Social Determinants of Health     Financial Resource Strain: Not on file   Food Insecurity: No Food Insecurity (3/16/2023)    Hunger Vital Sign     Worried About Running Out of Food in the Last Year: Never true     Ran Out of Food in the Last Year: Never true   Transportation Needs: No Transportation Needs (3/16/2023)    PRAPARE - Transportation     Lack of Transportation (Medical): No     Lack of Transportation (Non-Medical): No   Physical Activity: Not on file   Stress: Not on file   Social Connections: Not on file   Intimate Partner Violence: Not on file   Housing Stability: Low Risk  (3/16/2023)    Housing Stability Vital Sign     Unable to Pay for Housing in the Last Year: No     Number of Places Lived in the Last Year: 1     Unstable Housing in the Last Year: No       Current Outpatient Medications:     celecoxib (CeleBREX) 200 mg capsule, Take 200 mg by mouth 2 (two) times a day, Disp: ,  Rfl:     albuterol (2.5 mg/3 mL) 0.083 % nebulizer solution, Take 2.5 mg by nebulization every 4 (four) hours as needed for shortness of breath or wheezing, Disp: , Rfl:     albuterol (ProAir HFA) 90 mcg/act inhaler, Inhale 2 puffs every 6 (six) hours as needed for wheezing or shortness of breath, Disp: 8.5 g, Rfl: 0    albuterol (PROVENTIL HFA,VENTOLIN HFA) 90 mcg/act inhaler, Inhale 2 puffs every 6 (six) hours as needed for wheezing, Disp: 8 g, Rfl: 2    amoxicillin (AMOXIL) 500 mg capsule, , Disp: , Rfl:     Arthritis Pain Relief 650 MG CR tablet, take 1 tablet by mouth every 8 hours if needed for mild pain (Patient not taking: Reported on 3/7/2024), Disp: , Rfl:     aspirin 325 mg tablet, Take 325 mg by mouth daily. Indications: as needed (Patient not taking: Reported on 3/7/2024), Disp: , Rfl:     chlorhexidine (HIBICLENS) 4 % external liquid, Apply 1 Application topically daily as needed for wound care Can use to clean wound during VAC changes. (Patient not taking: Reported on 1/29/2024), Disp: 473 mL, Rfl: 5    clonazePAM (KlonoPIN) 0.5 mg tablet, Take 0.5 mg by mouth 2 (two) times a day as needed (Patient not taking: Reported on 1/29/2024), Disp: , Rfl:     clotrimazole (LOTRIMIN) 1 % cream, Apply topically 2 (two) times a day for 14 days To rash of abdomen, Disp: 28 g, Rfl: 1    cyclobenzaprine (FLEXERIL) 5 mg tablet, Take 1 tablet (5 mg total) by mouth 3 (three) times a day as needed for muscle spasms (Patient taking differently: Take 5 mg by mouth 3 (three) times a day as needed for muscle spasms As needed), Disp: 60 tablet, Rfl: 0    dulaglutide (Trulicity) 0.75 MG/0.5ML injection, Inject 0.75 mg under the skin Once a week (Patient not taking: Reported on 1/29/2024), Disp: , Rfl:     escitalopram (LEXAPRO) 10 mg tablet, Take 10 mg by mouth daily, Disp: , Rfl:     escitalopram (LEXAPRO) 5 mg tablet, Take 5 mg by mouth daily, Disp: , Rfl:     fluticasone (FLONASE) 50 mcg/act nasal spray, 2 sprays into each  nostril daily, Disp: 16 g, Rfl: 4    Fluticasone-Salmeterol (Advair) 250-50 mcg/dose inhaler, , Disp: , Rfl:     Fluticasone-Salmeterol (Advair) 500-50 mcg/dose inhaler, Inhale 1 puff 2 (two) times a day, Disp: , Rfl:     folic acid (FOLVITE) 1 mg tablet, Take by mouth daily, Disp: , Rfl:     gabapentin (NEURONTIN) 100 mg capsule, Take 1 capsule (100 mg total) by mouth 3 (three) times a day (Patient not taking: Reported on 3/7/2024), Disp: 90 capsule, Rfl: 0    gabapentin (NEURONTIN) 300 mg capsule, Take 300 mg by mouth 3 (three) times a day, Disp: , Rfl:     gentamicin (GARAMYCIN) 0.1 % ointment, Apply topically 3 (three) times a day for 7 days To packing and place into open wound, Disp: 30 g, Rfl: 1    hydrochlorothiazide (HYDRODIURIL) 12.5 mg tablet, Take 12.5 mg by mouth daily Pt only  taking as needed for leg swelling (Patient not taking: Reported on 3/7/2024), Disp: , Rfl:     ibuprofen (MOTRIN) 600 mg tablet, Take 600 mg by mouth every 6 (six) hours as needed for moderate pain As needed, Disp: , Rfl:     methocarbamol (ROBAXIN) 500 mg tablet, Take 1 tablet (500 mg total) by mouth every 6 (six) hours for 14 days (Patient taking differently: Take 500 mg by mouth every 6 (six) hours As needed), Disp: 56 tablet, Rfl: 0    methotrexate 2.5 mg tablet, Four 2.5 tabs one time a week ( Every Saturday), Disp: , Rfl:     montelukast (SINGULAIR) 10 mg tablet, Take by mouth daily at bedtime as needed, Disp: , Rfl:     naloxone (NARCAN) 4 mg/0.1 mL nasal spray, Administer 1 spray into a nostril. If no response after 2-3 minutes, give another dose in the other nostril using a new spray. (Patient not taking: Reported on 4/14/2023), Disp: 1 each, Rfl: 1    neomycin-polymyxin-hydrocortisone (CORTISPORIN) otic solution, Administer 3 drops into the left ear every 6 (six) hours (Patient not taking: Reported on 1/29/2024), Disp: , Rfl:     nicotine (NICODERM CQ) 14 mg/24hr TD 24 hr patch, Place 1 patch on the skin every 24 hours  (Patient not taking: Reported on 6/2/2023), Disp: 28 patch, Rfl: 3    nystatin (MYCOSTATIN) powder, Apply topically 2 (two) times a day for 14 days To periwound mixed with barrier cream, Disp: 30 g, Rfl: 1    omeprazole (PriLOSEC) 40 MG capsule, Take 40 mg by mouth daily, Disp: , Rfl:     predniSONE 10 mg tablet, Take 5 tabs po x 2 days; 4 tabs po x 2 days; 3 tabs po x 1 day; 2 tabs po x 1 day. 1 tab po x 1 day. (Patient not taking: Reported on 1/29/2024), Disp: 24 tablet, Rfl: 0    predniSONE 10 mg tablet, Take by mouth 2 (two) times a day with meals For 10 days (Patient not taking: Reported on 4/1/2024), Disp: , Rfl:     predniSONE 10 mg tablet, Take 5 tabs po x 2 days; 4 tabs po x 2 days; 3 tabs po x 1 day; 2 tabs po x 1 day. 1 tab po x 1 day. (Patient not taking: Reported on 4/1/2024), Disp: 24 tablet, Rfl: 0    semaglutide, 0.25 or 0.5 mg/dose, (Ozempic) 2 mg/3 mL injection pen, Inject 0.5 mg under the skin Once a week (Patient not taking: Reported on 1/29/2024), Disp: , Rfl:     sodium chloride, PF, 0.9 %, Inject 10 mL into a catheter in a vein 3 (three) times a week Wound irrigation. (Patient not taking: Reported on 1/29/2024), Disp: 100 mL, Rfl: 10    Vitamin D, Ergocalciferol, 40899 units CAPS, Take by mouth once a week, Disp: , Rfl:     Review of Systems   Constitutional:  Negative for chills and fever.   Gastrointestinal:  Negative for abdominal pain, constipation, diarrhea, nausea and vomiting.   Skin:  Positive for wound (open surgical wound abdomen).   Psychiatric/Behavioral:  The patient is not nervous/anxious.          Objective:  /81   Pulse 79   Temp 97.5 °F (36.4 °C)   Resp 16   LMP 11/23/2022 (Exact Date) Comment: partial        Physical Exam  Vitals reviewed.   Constitutional:       General: She is not in acute distress.     Appearance: She is morbidly obese. She is not ill-appearing, toxic-appearing or diaphoretic.      Comments: Very pleasant, no acute distress   HENT:      Head:  Normocephalic and atraumatic.   Cardiovascular:      Rate and Rhythm: Normal rate.   Pulmonary:      Effort: Pulmonary effort is normal. No respiratory distress.   Abdominal:          Comments: Midline abdomen continues to have a small diameter sinus tract to skin that is difficult to fully visualize/assess d/t small diameter. Drainage is serousanguineous, green discoloration has resolved. Periwound with scar tissue, fungal dermatitis and MASD has resolved.      Skin:     Findings: Wound present.   Neurological:      Mental Status: She is alert and oriented to person, place, and time.   Psychiatric:         Mood and Affect: Mood normal.         Behavior: Behavior normal.           Wound 07/10/23 Abdomen (Active)   Wound Image   09/16/24 0830   Wound Description Pink 09/16/24 0830   Alexa-wound Assessment Scar Tissue;Fragile 09/16/24 0830   Wound Length (cm) 0.4 cm 09/16/24 0830   Wound Width (cm) 0.1 cm 09/16/24 0830   Wound Depth (cm) 0.3 cm 09/16/24 0830   Wound Surface Area (cm^2) 0.04 cm^2 09/16/24 0830   Wound Volume (cm^3) 0.012 cm^3 09/16/24 0830   Calculated Wound Volume (cm^3) 0.01 cm^3 09/16/24 0830   Change in Wound Size % 99.98 09/16/24 0830   Tunneling 1 2 cm 03/04/24 0822   Tunneling 1 in depth located at 7 o'clock 03/04/24 0822   Number of underminings 1 02/05/24 0821   Undermining 1 2.5 02/05/24 0821   Undermining 1 is depth extending from 6-7 02/05/24 0821   Drainage Amount Small 09/16/24 0830   Drainage Description Serosanguineous 09/16/24 0830   Non-staged Wound Description Full thickness 09/16/24 0830   Treatments Cleansed 09/09/24 0826   Dressing Wound V.A.C. 07/31/23 0859   Wound packed? No 09/09/24 0826   Packing- # removed 1 05/06/24 0829   Packing- # inserted 2 07/24/23 0832   Dressing Changed New 07/24/23 0832   Patient Tolerance Tolerated well 07/01/24 1008   Dressing Status Intact 09/09/24 0826                   Wound Instructions:  Orders Placed This Encounter   Procedures    Wound  "cleansing and dressings Abdomen     Abdominal wound                         Wash your hands with soap and water.  Remove old dressing, discard into plastic bag and place in trash.    Cleanse the wound with unscented soap (such as plain white Dove soap) and warm water  prior to applying a clean dressing. Do not use tissue or cotton balls.   Do not scrub the wound. Pat dry using gauze.     Shower yes - Ok to remove old wound dressing, shower hair and body first. Let soapy water pass by wound. No scrubbing with loofahs or washcloths. Pat dry with clean gauze and redress the wound as written:     Apply barrier cream, such as Desitin to rashy area of the lisseth wound  Continue gentamicin for 3 MORE DAYS to wound 2-3 times a day   THEN use silvasorb gel daily  Cover with ABD secure with Medifix tape    Quit smoking or try to cut back   As discussed smoking slows the ability for a wound to heal by constricting blood vessels for approximately 30 minutes after each cigarette.     Standing Status:   Future     Standing Expiration Date:   9/23/2024    Wound Procedure Treatment Abdomen     This order was created via procedure documentation       Cally Garcia, PA-C      Portions of the record may have been created with voice recognition software. Occasional wrong word or \"sound alike\" substitutions may have occurred due to the inherent limitations of voice recognition software. Read the chart carefully and recognize, using context, where substitutions have occurred.      "

## 2025-07-29 NOTE — TELEPHONE ENCOUNTER
Caller: KAMI MERCADO- NO VERBAL ON FILE FOR THIS OFFICE.    Relationship: Emergency Contact    Best call back number:     [phone]     Requested Prescriptions:   Requested Prescriptions     Pending Prescriptions Disp Refills    Testosterone Cypionate (DEPOTESTOTERONE CYPIONATE) 200 MG/ML injection 2 mL 0     Sig: Inject 1 mL into the appropriate muscle as directed by prescriber Every 14 (Fourteen) Days.      Pharmacy where request should be sent: [address] [phone] [facility] [phone] FX     Last office visit with prescribing clinician: 7/7/2025   Last telemedicine visit with prescribing clinician: Visit date not found   Next office visit with prescribing clinician: 11/7/2025     Additional details provided by patient: PATIENT IS DUE FOR FOR INJECTION ON FRIDAY 8.1.2025    Does the patient have less than a 3 day supply:  [] Yes  [] No    Would you like a call back once the refill request has been completed: [] Yes [x] No    If the office needs to give you a call back, can they leave a voicemail: [] Yes [x] No    Malgorzata Andrew Rep   07/29/25 12:45 EDT            Patient accepted with [facility] with start of care set for Monday 8/28/23 per liakrishna Ayala.        08/25/23 5400   Post-Acute Status   Post-Acute Authorization [facility]   Home Health Status Pending medical clearance/testing

## 2025-07-30 RX ORDER — TESTOSTERONE CYPIONATE 200 MG/ML
200 INJECTION, SOLUTION INTRAMUSCULAR
Qty: 2 ML | Refills: 0 | Status: SHIPPED | OUTPATIENT
Start: 2025-07-30

## 2025-07-31 ENCOUNTER — OFFICE VISIT (OUTPATIENT)
Dept: PODIATRY | Facility: CLINIC | Age: 75
End: 2025-07-31
Payer: MEDICARE

## 2025-07-31 VITALS
TEMPERATURE: 98 F | DIASTOLIC BLOOD PRESSURE: 75 MMHG | HEART RATE: 65 BPM | WEIGHT: 292 LBS | SYSTOLIC BLOOD PRESSURE: 166 MMHG | BODY MASS INDEX: 39.55 KG/M2 | OXYGEN SATURATION: 96 % | HEIGHT: 72 IN

## 2025-07-31 DIAGNOSIS — M79.672 PAIN IN BOTH FEET: Primary | ICD-10-CM

## 2025-07-31 DIAGNOSIS — E11.65 TYPE 2 DIABETES MELLITUS WITH HYPERGLYCEMIA, WITH LONG-TERM CURRENT USE OF INSULIN: ICD-10-CM

## 2025-07-31 DIAGNOSIS — B35.1 ONYCHOMYCOSIS: ICD-10-CM

## 2025-07-31 DIAGNOSIS — M79.671 PAIN IN BOTH FEET: Primary | ICD-10-CM

## 2025-07-31 DIAGNOSIS — Z79.4 TYPE 2 DIABETES MELLITUS WITH HYPERGLYCEMIA, WITH LONG-TERM CURRENT USE OF INSULIN: ICD-10-CM

## 2025-07-31 NOTE — PROGRESS NOTES
[facility] - PODIATRY    Today's Date: 07/31/25    Patient Name: Pastor Bartholomew  MRN: [id number]  CSN: [id number]  PCP: Esthela Arceo APRN,   Referring Provider: No ref. provider found    SUBJECTIVE     Chief Complaint   Patient presents with    Left Foot - Follow-up, Nail Problem, Diabetes    Right Foot - Follow-up, Diabetes, Nail Problem     HPI: Pastor Bartholomew, a 74 y.o.male, presents to clinic for a diabetic foot evaluation.    Is here for follow-up of his toenails and diabetic foot check.  Patient states overall he is doing fine.  Some dryness to the feet but does not cause him significant issues.  Says the heat has been getting to him.    Past Medical History:   Diagnosis Date    Abnormal ECG 4/20/2023    Clogged artery    Allergic 1988    Atrial fibrillation     Paz esophagus     Callus June 2025    Please check    Carotid artery occlusion 3-    Clotting disorder 2/5/2024    Colon polyp     Congenital heart disease 4/5/2024    Coronary artery disease Feb 5 2024    AFib    Dementia     Depression 1995    Diabetes     Diverticulitis of colon     Diverticulosis 2/6/2024    Dizziness Feb5. 2024    GERD (gastroesophageal reflux disease) 2000    Headache     Headache, tension-type     Hernia, inguinal     History of total hip arthroplasty, right 02/01/2023    HL (hearing loss) 1997    Hyperlipidemia     Hypertension     Hypervitaminosis D  06/05/2019    Memory loss 2023    Neuropathy in diabetes 1990    Obesity 3/12/2023    Plantar fasciitis 2019    PONV (postoperative nausea and vomiting)     Shingles 2004    Sleep apnea     Type 2 diabetes mellitus      Past Surgical History:   Procedure Laterality Date    COLONOSCOPY      COLONOSCOPY N/A 04/25/2022    Procedure: COLONOSCOPY WITH POLYPECTOMY;  Surgeon: Artie Dickerson MD;  Location: [facility] ENDOSCOPY;  Service: Gastroenterology;  Laterality: N/A;  COLON POLYP    ENDOSCOPY N/A 04/25/2022    Procedure:  ESOPHAGOGASTRODUODENOSCOPY WITH BX;  Surgeon: Artie Dickerson MD;  Location: [facility] ENDOSCOPY;  Service: Gastroenterology;  Laterality: N/A;  CAPPS'S ESOPHAGUS, HIATAL HERNIA    ENDOSCOPY N/A 08/09/2024    Procedure: ESOPHAGOGASTRODUODENOSCOPY WITH BIOPSIES;  Surgeon: Artie Dickerson MD;  Location: [facility] ENDOSCOPY;  Service: Gastroenterology;  Laterality: N/A;  HIATAL HERNIA, BARRETTS    HAND SURGERY  2015    Triger release on both thumbs    HERNIA REPAIR      JOINT REPLACEMENT  2/28/2023    KNEE SURGERY Bilateral     TOTAL HIP ARTHROPLASTY Right     TRIGGER FINGER RELEASE Bilateral     x2 THUMBS    UPPER GASTROINTESTINAL ENDOSCOPY  2022     Family History   Problem Relation Age of Onset    Diabetes Mother     Hypertension Mother     Heart attack Mother     Stroke Mother     COPD Brother     Diabetes Maternal Grandmother     Heart attack Sister         Quad bypass    Heart attack Brother         AFib    Colon cancer Neg Hx      Social History     Socioeconomic History    Marital status:    Tobacco Use    Smoking status: Never     Passive exposure: Yes    Smokeless tobacco: Never   Vaping Use    Vaping status: Never Used   Substance and Sexual Activity    Alcohol use: Never    Drug use: Never    Sexual activity: Not Currently     Partners: Female     Birth control/protection: Condom     Allergies   Allergen Reactions    Codeine Nausea Only    Semaglutide(0.25 Or 0.5mg-Dos) GI Intolerance     Current Outpatient Medications   Medication Sig Dispense Refill    amLODIPine-valsartan (EXFORGE)  MG per tablet Take 1 tablet by mouth Daily. 90 tablet 3    Ascorbic Acid (Vitamin C) 100 MG chewable tablet Chew 1 tablet Daily.      cetirizine (zyrTEC) 10 MG tablet Take 1 tablet by mouth Daily As Needed.      co-enzyme Q-10 30 MG capsule Take 1 capsule by mouth Daily.      Continuous Glucose Sensor (Dexcom G7 Sensor) misc Use.      CONTOUR NEXT TEST test strip Test 6 times daily DX: e11.65 200 each 5     diclofenac (VOLTAREN) 75 MG EC tablet Take 1 tablet by mouth 2 (Two) Times a Day. 60 tablet 0    digoxin (LANOXIN) 125 MCG tablet Take 1 tablet by mouth Daily. 90 tablet 1    donepezil (ARICEPT) 10 MG tablet Take 1 tablet by mouth Every Night. 90 tablet 1    insulin patient supplied pump Inject  under the skin into the appropriate area as directed Continuous. Type of Insulin: Humulin R 500  Type of Pump:[employer] [phone]  Bolus amount: 50 units daily.   Basal amount : Max basal 5.50u/hr  Correction factor:   Insulin to carbohydrate ratio: 12a-12p 10                                                 12p-2p 10                                                  2p -12 a 7    Date of last site change: 2/3/24  Location of catheter: right side on stomach    Frequency of refill: approximately 3 days  Last refill date: per pt 2/3/24    Target 12a-3a 130-150               3a-6a -100-120               6a-9p 100-120               9p-12a 130-150    Active Ins time : 5 hours    Basal Pattern Setup 27.3    Prescriber Shara Campbell  Phone number [phone]      insulin regular (HUMULIN R) 500 UNIT/ML CONCENTRATED injection 50u daily with insulin pump (Patient taking differently: Inject 0.1 mL under the skin into the appropriate area as directed Daily. 60u daily with insulin pump) 40 mL 3    L-Lysine 500 MG tablet tablet Take 1 tablet by mouth Daily.      Lancets misc Check 5 times daily DX: e11.65 500 each 1    magnesium oxide (MAG-OX) 400 MG tablet Take 1 tablet by mouth Daily.      memantine (NAMENDA) 10 MG tablet 1 tablet  tablet 1    metoclopramide (REGLAN) 5 MG tablet       multivitamin with minerals tablet tablet Take 1 tablet by mouth Daily.      nebivolol (BYSTOLIC) 10 MG tablet Take 1 tablet by mouth Daily. 90 tablet 3    Omega-3 Fatty Acids (fish oil) 1000 MG capsule capsule Take 1 capsule by mouth 2 (Two) Times a Day With Meals.      ondansetron ODT (Zofran ODT) 4 MG disintegrating tablet Place 1  tablet on the tongue Every 8 (Eight) Hours As Needed for Nausea or Vomiting. 30 tablet 1    pantoprazole (PROTONIX) 40 MG EC tablet Take 1 tablet by mouth Daily. 90 tablet 1    phentermine 37.5 MG capsule Take 1 capsule by mouth Every Morning. 30 capsule 0    rosuvastatin (CRESTOR) 40 MG tablet Take 1 tablet by mouth Every Night. 90 tablet 1    spironolactone-hydrochlorothiazide (ALDACTAZIDE) 25-25 MG tablet TAKE 1 TABLET BY MOUTH ONCE DAILY 90 tablet 0    Testosterone Cypionate (DEPOTESTOTERONE CYPIONATE) 200 MG/ML injection Inject 1 mL into the appropriate muscle as directed by prescriber Every 14 (Fourteen) Days. 2 mL 0    venlafaxine XR (EFFEXOR-XR) 150 MG 24 hr capsule Take 1 capsule by mouth Daily. 90 capsule 3    vitamin B-12 (CYANOCOBALAMIN) 1000 MCG tablet Take 1 tablet by mouth Daily.      warfarin (COUMADIN) 5 MG tablet TAKE 1 TABLET BY MOUTH DAILY OR AS DIRECTED BY YOUR PROVIDER 90 tablet 3    warfarin (COUMADIN) 6 MG tablet Take 1 tablet by mouth Every Night. 90 tablet 1     Current Facility-Administered Medications   Medication Dose Route Frequency Provider Last Rate Last Admin    Testosterone Cypionate (DEPOTESTOTERONE CYPIONATE) injection 100 mg  100 mg Intramuscular Q14 Days Chewning, Esthela, APRN   100 mg at 06/23/25 0806    Testosterone Cypionate (DEPOTESTOTERONE CYPIONATE) injection 200 mg  200 mg Intramuscular Q14 Days Chewning, Esthela, APRN   200 mg at 07/16/25 0950     Review of Systems   All other systems reviewed and are negative.      OBJECTIVE     Vitals:    07/31/25 0900   BP: 166/75   Pulse: 65   Temp: 98 °F (36.7 °C)   SpO2: 96%       Body mass index is 39.6 kg/m².    Lab Results   Component Value Date    HGBA1C 7.30 (H) 06/23/2025       Lab Results   Component Value Date    GLUCOSE 143 (H) 06/23/2025    CALCIUM 9.2 06/23/2025     06/23/2025    K 5.0 06/23/2025    CO2 27.8 06/23/2025     06/23/2025    BUN 11.0 06/23/2025    CREATININE 1.02 06/23/2025    EGFRIFAFRI >60  12/19/2022    EGFRIFNONA 80 12/17/2021    BCR 10.8 06/23/2025    ANIONGAP 10.2 06/23/2025       Patient seen in no apparent distress.      PHYSICAL EXAM:     Foot/Ankle Exam    GENERAL  Appearance:  appears stated age  Orientation:  AAOx3  Affect:  appropriate  Gait:  unimpaired  Assistance:  independent  Right shoe gear: casual shoe  Left shoe gear: casual shoe    VASCULAR     Right Foot Vascularity   Normal vascular exam    Dorsalis pedis:  2+  Posterior tibial:  2+  Skin temperature:  warm  Edema grading:  None  CFT:  < 3 seconds  Pedal hair growth:  Present  Varicosities:  none     Left Foot Vascularity   Normal vascular exam    Dorsalis pedis:  2+  Posterior tibial:  2+  Skin temperature:  warm  Edema grading:  None  CFT:  < 3 seconds  Pedal hair growth:  Present  Varicosities:  none     NEUROLOGIC     Right Foot Neurologic   Light touch sensation: diminished  Vibratory sensation: diminished  Hot/Cold sensation: diminished  Protective Sensation using Goodwin-Cailin Monofilament:   Sites intact: 10  Sites tested: 10     Left Foot Neurologic   Light touch sensation: diminished  Vibratory sensation: diminished  Hot/Cold sensation:  diminished  Protective Sensation using Goodwin-Cailin Monofilament:   Sites intact: 10  Sites tested: 10    MUSCULOSKELETAL     Right Foot Musculoskeletal   Tenderness:  MTP 2 dorsal tenderness      MUSCLE STRENGTH     Right Foot Muscle Strength   Foot dorsiflexion:  4  Foot plantar flexion:  4  Foot inversion:  4  Foot eversion:  4     Left Foot Muscle Strength   Foot dorsiflexion:  4  Foot plantar flexion:  4  Foot inversion:  4  Foot eversion:  4    RANGE OF MOTION     Right Foot Range of Motion   Foot and ankle ROM within normal limits       Left Foot Range of Motion   Foot and ankle ROM within normal limits      DERMATOLOGIC      Right Foot Dermatologic   Skin  Right foot skin is intact.   Nails  1.  Positive for elongated, onychomycosis, abnormal thickness, subungual debris  and ingrown toenail.  2.  Positive for elongated, onychomycosis, abnormal thickness, subungual debris and ingrown toenail.  3.  Positive for elongated, onychomycosis, abnormal thickness, subungual debris and ingrown toenail.  4.  Positive for elongated, onychomycosis, abnormal thickness, subungual debris and ingrown toenail.  5.  Positive for elongated, onychomycosis, abnormal thickness, subungual debris and ingrown toenail.  Nails comment:  Toenails 1, 2, 3, 4, and 5     Left Foot Dermatologic   Skin  Left foot skin is intact.   Nails comment:  Toenails 1, 2, 3, 4, and 5  Nails  1.  Positive for elongated, onychomycosis, abnormal thickness, subungual debris and ingrown toenail.  2.  Positive for elongated, onychomycosis, abnormal thickness, subungual debris and ingrown toenail.  3.  Positive for elongated, onychomycosis, abnormal thickness, subungual debris and ingrown toenail.  4.  Positive for elongated, onychomycosis, abnormally thick, subungual debris and ingrown toenail.  5.  Positive for elongated, onychomycosis, abnormally thick, subungual debris and ingrown toenail.            ASSESSMENT/PLAN     Diagnoses and all orders for this visit:    1. Pain in both feet (Primary)    2. Type 2 diabetes mellitus with hyperglycemia, with long-term current use of insulin    3. Onychomycosis            Comprehensive lower extremity examination and evaluation was performed.    Toenails 1, 2, 3, 4, 5 on Right and 1, 2, 3, 4, 5 on Left were debrided with nail nippers.  Patient tolerated procedure well without complications.    Patient to begin taping to right second toe.  Discussed rest ice and elevating.  Diclofenac prescribed.  Return to clinic in 2 to 3 months.    Discussed findings and treatment plan including risks, benefits, and treatment options with patient in detail. Patient agreed with treatment plan.    Medications and allergies reviewed.  Reviewed available blood glucose and HgB A1C lab values along with other  pertinent labs.  These were discussed with the patient as to their importance of diabetic maintenance.    Diabetic foot exam performed and documented this date, compliant with CQM required standards. Detail of findings as noted in physical exam.  Lower extremity Neurologic exam for diabetic patient performed and documented this date, compliant with PQRS required standards. Detail of findings as noted in physical exam.  Advised patient importance of good routine lower extremity hygiene. Advised patient importance of evaluating for intact skin and pain free nail borders.  Advised patient to use mirror to evaluate plantar/ soles of feet for better visualization. Advised patient monitor and phone office to be seen if any cracking to skin, open lesions, painful nail borders or if nails become elongated prior to next visit. Advised patient importance of daily cleansing of lower extremities, followed by good skin cream to maintain normal hydration of skin. Also advised patient importance of close daily monitoring of blood sugar. Advised to regulate diet and medications to maintain control of blood sugar in optimal range. Contact primary care provider if difficulties maintaining blood sugar levels.  Advised Patient of presence of Diabetes Mellitus condition.  Advised Patient risk of progression and worsening or improvement, then return of condition.  Will monitor condition for any change in future. Treat with most appropriate treatment pending status of condition.  Counseled and advised patient extensively on nature and ramifications of diabetes. Standard instructions given to patient for good diabetic foot care and maintenance. Advised importance of careful monitoring to avoid break down and complications secondary to diabetes. Advised patient importance of strict maintenance of blood sugar control. Advised patient of possible ominous results from neglect of condition, i.e.: amputation/ loss of digits, feet and legs, or  even death.  Patient states understands counseling, will monitor closely, continue good hygiene and routine diabetic foot care. Patient will contact office if questions or problems.      An After Visit Summary was printed and given to the patient at discharge, including (if requested) any available informative/educational handouts regarding diagnosis, treatment, or medications. All questions were answered to patient/family satisfaction. Should symptoms fail to improve or worsen they agree to call or return to clinic or to go to the Emergency Department. Discussed the importance of following up with any needed screening tests/labs/specialist appointments and any requested follow-up recommended by me today. Importance of maintaining follow-up discussed and patient accepts that missed appointments can delay diagnosis and potentially lead to worsening of conditions.    Return in about 3 months (around 10/31/2025)., or sooner if acute issues arise.    This document has been electronically signed by Phuc Mena DPM on July 31, 2025 09:50 EDT

## 2025-08-08 ENCOUNTER — CLINICAL SUPPORT (OUTPATIENT)
Dept: FAMILY MEDICINE CLINIC | Facility: CLINIC | Age: 75
End: 2025-08-08
Payer: MEDICARE

## 2025-08-08 DIAGNOSIS — R79.89 LOW TESTOSTERONE: Primary | ICD-10-CM

## 2025-08-08 PROCEDURE — 96372 THER/PROPH/DIAG INJ SC/IM: CPT

## 2025-08-08 RX ADMIN — TESTOSTERONE CYPIONATE 200 MG: 200 INJECTION, SOLUTION INTRAMUSCULAR at 10:26

## 2025-08-11 ENCOUNTER — TELEPHONE (OUTPATIENT)
Dept: FAMILY MEDICINE CLINIC | Facility: CLINIC | Age: 75
End: 2025-08-11
Payer: MEDICARE

## 2025-08-12 ENCOUNTER — TELEPHONE (OUTPATIENT)
Dept: ADMINISTRATIVE | Facility: OTHER | Age: 75
End: 2025-08-12
Payer: MEDICARE

## 2025-08-14 ENCOUNTER — TELEPHONE (OUTPATIENT)
Dept: CARDIOLOGY | Facility: CLINIC | Age: 75
End: 2025-08-14
Payer: MEDICARE

## 2025-08-14 ENCOUNTER — LAB (OUTPATIENT)
Dept: LAB | Facility: HOSPITAL | Age: 75
End: 2025-08-14
Payer: MEDICARE

## 2025-08-14 ENCOUNTER — ANTICOAGULATION VISIT (OUTPATIENT)
Dept: CARDIOLOGY | Facility: CLINIC | Age: 75
End: 2025-08-14
Payer: MEDICARE

## 2025-08-14 DIAGNOSIS — I48.0 PAF (PAROXYSMAL ATRIAL FIBRILLATION): ICD-10-CM

## 2025-08-14 LAB
INR PPP: 2.07 (ref 0.86–1.15)
PROTHROMBIN TIME: 24.4 SECONDS (ref 11.8–14.9)

## 2025-08-14 PROCEDURE — 85610 PROTHROMBIN TIME: CPT

## 2025-08-14 PROCEDURE — 36415 COLL VENOUS BLD VENIPUNCTURE: CPT

## 2025-08-22 ENCOUNTER — CLINICAL SUPPORT (OUTPATIENT)
Dept: FAMILY MEDICINE CLINIC | Facility: CLINIC | Age: 75
End: 2025-08-22
Payer: MEDICARE

## 2025-08-22 DIAGNOSIS — R79.89 LOW TESTOSTERONE: Primary | ICD-10-CM

## 2025-08-22 PROCEDURE — 96372 THER/PROPH/DIAG INJ SC/IM: CPT

## 2025-08-22 RX ADMIN — TESTOSTERONE CYPIONATE 200 MG: 200 INJECTION, SOLUTION INTRAMUSCULAR at 11:09

## 2025-08-25 RX ORDER — SPIRONOLACTONE AND HYDROCHLOROTHIAZIDE 25; 25 MG/1; MG/1
1 TABLET ORAL DAILY
Qty: 90 TABLET | Refills: 0 | Status: SHIPPED | OUTPATIENT
Start: 2025-08-25

## 2025-08-28 ENCOUNTER — LAB (OUTPATIENT)
Facility: HOSPITAL | Age: 75
End: 2025-08-28
Payer: MEDICARE

## 2025-08-28 ENCOUNTER — OFFICE VISIT (OUTPATIENT)
Dept: CARDIOLOGY | Facility: CLINIC | Age: 75
End: 2025-08-28
Payer: MEDICARE

## 2025-08-28 ENCOUNTER — ANTICOAGULATION VISIT (OUTPATIENT)
Dept: CARDIOLOGY | Facility: CLINIC | Age: 75
End: 2025-08-28
Payer: MEDICARE

## 2025-08-28 VITALS
BODY MASS INDEX: 39.28 KG/M2 | WEIGHT: 290 LBS | SYSTOLIC BLOOD PRESSURE: 144 MMHG | DIASTOLIC BLOOD PRESSURE: 60 MMHG | HEIGHT: 72 IN | HEART RATE: 62 BPM

## 2025-08-28 DIAGNOSIS — I48.0 PAF (PAROXYSMAL ATRIAL FIBRILLATION): ICD-10-CM

## 2025-08-28 DIAGNOSIS — I10 ESSENTIAL HYPERTENSION: Chronic | ICD-10-CM

## 2025-08-28 DIAGNOSIS — I48.0 PAF (PAROXYSMAL ATRIAL FIBRILLATION): Primary | ICD-10-CM

## 2025-08-28 LAB
DIGOXIN SERPL-MCNC: 0.7 NG/ML (ref 0.6–1.2)
INR PPP: 1.88 (ref 0.86–1.15)
PROTHROMBIN TIME: 22.5 SECONDS (ref 11.8–14.9)

## 2025-08-28 PROCEDURE — 80162 ASSAY OF DIGOXIN TOTAL: CPT

## 2025-08-28 PROCEDURE — 36415 COLL VENOUS BLD VENIPUNCTURE: CPT

## 2025-08-28 PROCEDURE — 85610 PROTHROMBIN TIME: CPT

## 2025-08-28 RX ORDER — WARFARIN SODIUM 2 MG/1
2 TABLET ORAL DAILY
Qty: 30 TABLET | Refills: 0 | Status: SHIPPED | OUTPATIENT
Start: 2025-08-28

## (undated) DEVICE — LINER SURG CANSTR SXN S/RIGD 1500CC

## (undated) DEVICE — SINGLE-USE BIOPSY FORCEPS: Brand: RADIAL JAW 4

## (undated) DEVICE — COLON KIT: Brand: MEDLINE INDUSTRIES, INC.

## (undated) DEVICE — BLCK/BITE BLOX WO/DENTL/RIM W/STRAP 54F

## (undated) DEVICE — Device

## (undated) DEVICE — SOLIDIFIER LIQLOC PLS 1500CC BT

## (undated) DEVICE — Device: Brand: DEFENDO AIR/WATER/SUCTION AND BIOPSY VALVE

## (undated) DEVICE — SOL IRRG H2O PL/BG 1000ML STRL

## (undated) DEVICE — SNAR POLYP CAPTIFLEX XS/OVL 11X2.4MM 240CM 1P/U

## (undated) DEVICE — EGD OR ERCP KIT: Brand: MEDLINE INDUSTRIES, INC.

## (undated) DEVICE — CONN JET HYDRA H20 AUXILIARY DISP

## (undated) DEVICE — THE SINGLE USE ETRAP – POLYP TRAP IS USED FOR SUCTION RETRIEVAL OF ENDOSCOPICALLY REMOVED POLYPS.: Brand: ETRAP